# Patient Record
Sex: MALE | Race: WHITE | NOT HISPANIC OR LATINO
[De-identification: names, ages, dates, MRNs, and addresses within clinical notes are randomized per-mention and may not be internally consistent; named-entity substitution may affect disease eponyms.]

---

## 2022-01-13 ENCOUNTER — APPOINTMENT (OUTPATIENT)
Dept: GERIATRICS | Facility: CLINIC | Age: 82
End: 2022-01-13
Payer: MEDICARE

## 2022-01-13 VITALS — SYSTOLIC BLOOD PRESSURE: 130 MMHG | DIASTOLIC BLOOD PRESSURE: 70 MMHG | HEART RATE: 72 BPM

## 2022-01-13 DIAGNOSIS — I44.7 LEFT BUNDLE-BRANCH BLOCK, UNSPECIFIED: ICD-10-CM

## 2022-01-13 DIAGNOSIS — R29.6 REPEATED FALLS: ICD-10-CM

## 2022-01-13 DIAGNOSIS — I44.0 ATRIOVENTRICULAR BLOCK, FIRST DEGREE: ICD-10-CM

## 2022-01-13 PROBLEM — Z00.00 ENCOUNTER FOR PREVENTIVE HEALTH EXAMINATION: Status: ACTIVE | Noted: 2022-01-13

## 2022-01-13 NOTE — PHYSICAL EXAM
[Alert] : alert [No Acute Distress] : in no acute distress [Sclera] : the sclera and conjunctiva were normal [PERRL] : pupils were equal in size, round, and reactive to light [Normal Outer Ear/Nose] : the ears and nose were normal in appearance [Edema] : edema was not present [Normal] : no focal deficits [Normal Affect] : the affect was normal [Normal Mood] : the mood was normal [de-identified] : 2/6 holosys murmur

## 2022-01-13 NOTE — REVIEW OF SYSTEMS
[Recent Weight Gain (___ Lbs)] : recent [unfilled] ~Ulb weight gain [Hesitancy] : urinary hesitancy [Arthralgias] : arthralgias [Limb Pain] : limb pain [Depression] : depression [Negative] : Heme/Lymph [Fever] : no fever [Chills] : no chills [Confused] : no confusion

## 2022-01-13 NOTE — HISTORY OF PRESENT ILLNESS
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 The home situation was deemed to be unsafe with falls and extreme hoarding. The patient was hospitalized and then  sent to rehab. The pts PMH is significant for 40 years  of bipolar disease treated with lithium. over the last several months the patient has experienced functional and  cognitive decline. He currently ambultes with a walker. He denies cp, sob or palp. He has no specfic complaints  but makes  it clear he  does not need me as a physician  as he will be leaving the [facility] to return  to [place].

## 2022-01-13 NOTE — ASSESSMENT
[FreeTextEntry1] : The patient is a 82 yo male with long standing bipolar  disease. He was  recently removed from his home  by adult protective services after being found to be unable to care for himself and his wife and experiencing multiple falls. unclear what workup was done. Spoke with the patient's nephew Jacobo Scott who will assist with obtaining  hospital records.  At present  the patient is refusing lab work up as "I am  not going to live  here" The patient has experienced significant cognitive and  functional decline. Will arrange  for PT at the [facility]. Will follow up if the patient allows.

## 2022-01-28 ENCOUNTER — LABORATORY RESULT (OUTPATIENT)
Age: 82
End: 2022-01-28

## 2022-02-07 ENCOUNTER — APPOINTMENT (OUTPATIENT)
Dept: GERIATRICS | Facility: ASSISTED LIVING FACILITY | Age: 82
End: 2022-02-07

## 2022-02-07 VITALS — DIASTOLIC BLOOD PRESSURE: 65 MMHG | HEART RATE: 72 BPM | SYSTOLIC BLOOD PRESSURE: 136 MMHG

## 2022-02-07 DIAGNOSIS — N41.1 CHRONIC PROSTATITIS: ICD-10-CM

## 2022-02-07 DIAGNOSIS — R94.6 ABNORMAL RESULTS OF THYROID FUNCTION STUDIES: ICD-10-CM

## 2022-02-07 NOTE — PHYSICAL EXAM
[Alert] : alert [No Acute Distress] : in no acute distress [Sclera] : the sclera and conjunctiva were normal [PERRL] : pupils were equal in size, round, and reactive to light [Normal Outer Ear/Nose] : the ears and nose were normal in appearance [Motor Tone] : muscle strength and tone were normal [Normal] : normal skin color and pigmentation [No Focal Deficits] : no focal deficits [Normal Affect] : the affect was normal [Normal Mood] : the mood was normal [Remote Memory Normal] : remote memory was not impaired [Normal Gait] : abnormal gait

## 2022-02-07 NOTE — HISTORY OF PRESENT ILLNESS
[FreeTextEntry1] : 80 yo male seen as an initial visit at his home in the [facility] on 1/13. The patient has a history of bipolar disease and mild dementia. He is receiving PT for gait instability with good results. TFTs revealed suppressed TSH. No clinical evidence of hyperthyroidism.

## 2022-02-07 NOTE — REVIEW OF SYSTEMS
[Difficulty Walking] : difficulty walking [Negative] : Integumentary [Dizziness] : no dizziness [Limb Weakness] : no limb weakness [FreeTextEntry9] : back pain at night [de-identified] : long ho of bipolar disorder

## 2022-02-07 NOTE — ASSESSMENT
[FreeTextEntry1] : 80 yo male currently living at the [facility] after being removed from his home by adult protective services. Despite several attempts have not yet obtained previous medical records. Today the patient feels well., Only complaint is chronic back pain. Routine labs revealed a suppresed TSH. No clinical evidence of hyperthyroidism. Normal free t4. Will obtain total t3. Will observe for now. Continue physical therapy for unsteady gait. Will obtain  lithium level.

## 2022-02-09 ENCOUNTER — LABORATORY RESULT (OUTPATIENT)
Age: 82
End: 2022-02-09

## 2022-02-10 ENCOUNTER — NON-APPOINTMENT (OUTPATIENT)
Age: 82
End: 2022-02-10

## 2022-02-28 ENCOUNTER — APPOINTMENT (OUTPATIENT)
Dept: GERIATRICS | Facility: ASSISTED LIVING FACILITY | Age: 82
End: 2022-02-28

## 2022-02-28 VITALS — SYSTOLIC BLOOD PRESSURE: 130 MMHG | DIASTOLIC BLOOD PRESSURE: 60 MMHG | HEART RATE: 72 BPM

## 2022-02-28 DIAGNOSIS — I34.0 NONRHEUMATIC MITRAL (VALVE) INSUFFICIENCY: ICD-10-CM

## 2022-02-28 NOTE — HISTORY OF PRESENT ILLNESS
[FreeTextEntry1] : Asked to evaluate the patient by the [facility] staff for back pain. The patient states his back pain is chronic and unchanged. He states he has been worked up in the past. Told of "degenertive issues in lumbar spind and  "stenosis" States he has been successfully treated with tramadol in the past. The patient has been receiving PY. He is ambulating and rising from chair much better.

## 2022-02-28 NOTE — PHYSICAL EXAM
[Alert] : alert [No Acute Distress] : in no acute distress [No CVA Tenderness] : no CVA  tenderness [No Spinal Tenderness] : no spinal tenderness [Normal] : normal skin color and pigmentation [No Focal Deficits] : no focal deficits [Normal Affect] : the affect was normal [Normal Gait] : abnormal gait

## 2022-02-28 NOTE — ASSESSMENT
[FreeTextEntry1] : Asked to see today for back p[ain. The pt states that this is of >10 years duration. He is sp work up. Was previously treated with tramadol.

## 2022-02-28 NOTE — REVIEW OF SYSTEMS
[Negative] : Endocrine [Fever] : no fever [Chills] : no chills [Suicidal] : not suicidal [Anxiety] : no anxiety

## 2022-03-14 PROBLEM — N41.1 CHRONIC PROSTATITIS WITHOUT HEMATURIA: Status: RESOLVED | Noted: 2022-03-14 | Resolved: 2022-03-14

## 2022-03-21 LAB — LITHIUM SERPL-SCNC: 0.33 MMOL/L

## 2022-03-28 ENCOUNTER — APPOINTMENT (OUTPATIENT)
Dept: GERIATRICS | Facility: ASSISTED LIVING FACILITY | Age: 82
End: 2022-03-28

## 2022-03-28 VITALS — SYSTOLIC BLOOD PRESSURE: 130 MMHG | DIASTOLIC BLOOD PRESSURE: 70 MMHG | HEART RATE: 72 BPM

## 2022-03-28 NOTE — HISTORY OF PRESENT ILLNESS
[FreeTextEntry1] : The pt is an 80 yo male with long hx of bipolar disease, agit instability, OA, MR and CHF. The pt is co of malaise and viral syndrome for several days. He denies fever, cp or uri sx.  He subjectivly appears at baseline. No specific complaints

## 2022-03-28 NOTE — PHYSICAL EXAM
[Alert] : alert [No Acute Distress] : in no acute distress [EOMI] : extraocular movements were intact [PERRL] : pupils were equal in size, round, and reactive to light [Normal Outer Ear/Nose] : the ears and nose were normal in appearance [Edema] : edema was not present [Normal] : normal skin color and pigmentation [No Focal Deficits] : no focal deficits [Normal Affect] : the affect was normal

## 2022-03-28 NOTE — ASSESSMENT
[FreeTextEntry1] : The pt is co non specific malaise. His mood appears stable   , with hx of biploar illness. Li level is low. Will proceed as above and monitor.

## 2022-04-08 ENCOUNTER — EMERGENCY (EMERGENCY)
Facility: HOSPITAL | Age: 82
LOS: 1 days | Discharge: HOME | End: 2022-04-08
Attending: EMERGENCY MEDICINE | Admitting: EMERGENCY MEDICINE
Payer: MEDICARE

## 2022-04-08 VITALS
RESPIRATION RATE: 20 BRPM | HEART RATE: 69 BPM | DIASTOLIC BLOOD PRESSURE: 85 MMHG | TEMPERATURE: 98 F | OXYGEN SATURATION: 98 % | SYSTOLIC BLOOD PRESSURE: 135 MMHG

## 2022-04-08 VITALS
SYSTOLIC BLOOD PRESSURE: 139 MMHG | OXYGEN SATURATION: 98 % | HEART RATE: 67 BPM | TEMPERATURE: 98 F | DIASTOLIC BLOOD PRESSURE: 67 MMHG | RESPIRATION RATE: 20 BRPM

## 2022-04-08 LAB
ALBUMIN SERPL ELPH-MCNC: 4.2 G/DL — SIGNIFICANT CHANGE UP (ref 3.3–5)
ALP SERPL-CCNC: 85 U/L — SIGNIFICANT CHANGE UP (ref 40–120)
ALT FLD-CCNC: 16 U/L — SIGNIFICANT CHANGE UP (ref 4–41)
ANION GAP SERPL CALC-SCNC: 15 MMOL/L — HIGH (ref 7–14)
APPEARANCE UR: CLEAR — SIGNIFICANT CHANGE UP
AST SERPL-CCNC: 19 U/L — SIGNIFICANT CHANGE UP (ref 4–40)
BASOPHILS # BLD AUTO: 0.06 K/UL — SIGNIFICANT CHANGE UP (ref 0–0.2)
BASOPHILS NFR BLD AUTO: 0.7 % — SIGNIFICANT CHANGE UP (ref 0–2)
BILIRUB SERPL-MCNC: 0.7 MG/DL — SIGNIFICANT CHANGE UP (ref 0.2–1.2)
BILIRUB UR-MCNC: NEGATIVE — SIGNIFICANT CHANGE UP
BUN SERPL-MCNC: 18 MG/DL — SIGNIFICANT CHANGE UP (ref 7–23)
CALCIUM SERPL-MCNC: 10 MG/DL — SIGNIFICANT CHANGE UP (ref 8.4–10.5)
CHLORIDE SERPL-SCNC: 107 MMOL/L — SIGNIFICANT CHANGE UP (ref 98–107)
CO2 SERPL-SCNC: 19 MMOL/L — LOW (ref 22–31)
COLOR SPEC: YELLOW — SIGNIFICANT CHANGE UP
CREAT SERPL-MCNC: 0.91 MG/DL — SIGNIFICANT CHANGE UP (ref 0.5–1.3)
DIFF PNL FLD: NEGATIVE — SIGNIFICANT CHANGE UP
EGFR: 85 ML/MIN/1.73M2 — SIGNIFICANT CHANGE UP
EOSINOPHIL # BLD AUTO: 0.23 K/UL — SIGNIFICANT CHANGE UP (ref 0–0.5)
EOSINOPHIL NFR BLD AUTO: 2.6 % — SIGNIFICANT CHANGE UP (ref 0–6)
GLUCOSE SERPL-MCNC: 83 MG/DL — SIGNIFICANT CHANGE UP (ref 70–99)
GLUCOSE UR QL: NEGATIVE — SIGNIFICANT CHANGE UP
HCT VFR BLD CALC: 43.3 % — SIGNIFICANT CHANGE UP (ref 39–50)
HGB BLD-MCNC: 14.6 G/DL — SIGNIFICANT CHANGE UP (ref 13–17)
IANC: 5.5 K/UL — SIGNIFICANT CHANGE UP (ref 1.8–7.4)
IMM GRANULOCYTES NFR BLD AUTO: 0.2 % — SIGNIFICANT CHANGE UP (ref 0–1.5)
KETONES UR-MCNC: NEGATIVE — SIGNIFICANT CHANGE UP
LEUKOCYTE ESTERASE UR-ACNC: NEGATIVE — SIGNIFICANT CHANGE UP
LYMPHOCYTES # BLD AUTO: 2.34 K/UL — SIGNIFICANT CHANGE UP (ref 1–3.3)
LYMPHOCYTES # BLD AUTO: 26.4 % — SIGNIFICANT CHANGE UP (ref 13–44)
MCHC RBC-ENTMCNC: 32.2 PG — SIGNIFICANT CHANGE UP (ref 27–34)
MCHC RBC-ENTMCNC: 33.7 GM/DL — SIGNIFICANT CHANGE UP (ref 32–36)
MCV RBC AUTO: 95.6 FL — SIGNIFICANT CHANGE UP (ref 80–100)
MONOCYTES # BLD AUTO: 0.73 K/UL — SIGNIFICANT CHANGE UP (ref 0–0.9)
MONOCYTES NFR BLD AUTO: 8.2 % — SIGNIFICANT CHANGE UP (ref 2–14)
NEUTROPHILS # BLD AUTO: 5.5 K/UL — SIGNIFICANT CHANGE UP (ref 1.8–7.4)
NEUTROPHILS NFR BLD AUTO: 61.9 % — SIGNIFICANT CHANGE UP (ref 43–77)
NITRITE UR-MCNC: NEGATIVE — SIGNIFICANT CHANGE UP
NRBC # BLD: 0 /100 WBCS — SIGNIFICANT CHANGE UP
NRBC # FLD: 0 K/UL — SIGNIFICANT CHANGE UP
PH UR: 6 — SIGNIFICANT CHANGE UP (ref 5–8)
PLATELET # BLD AUTO: 164 K/UL — SIGNIFICANT CHANGE UP (ref 150–400)
POTASSIUM SERPL-MCNC: 4.2 MMOL/L — SIGNIFICANT CHANGE UP (ref 3.5–5.3)
POTASSIUM SERPL-SCNC: 4.2 MMOL/L — SIGNIFICANT CHANGE UP (ref 3.5–5.3)
PROT SERPL-MCNC: 7.1 G/DL — SIGNIFICANT CHANGE UP (ref 6–8.3)
PROT UR-MCNC: ABNORMAL
RBC # BLD: 4.53 M/UL — SIGNIFICANT CHANGE UP (ref 4.2–5.8)
RBC # FLD: 14 % — SIGNIFICANT CHANGE UP (ref 10.3–14.5)
SODIUM SERPL-SCNC: 141 MMOL/L — SIGNIFICANT CHANGE UP (ref 135–145)
SP GR SPEC: 1.02 — SIGNIFICANT CHANGE UP (ref 1–1.05)
UROBILINOGEN FLD QL: SIGNIFICANT CHANGE UP
WBC # BLD: 8.88 K/UL — SIGNIFICANT CHANGE UP (ref 3.8–10.5)
WBC # FLD AUTO: 8.88 K/UL — SIGNIFICANT CHANGE UP (ref 3.8–10.5)

## 2022-04-08 PROCEDURE — 99284 EMERGENCY DEPT VISIT MOD MDM: CPT | Mod: FS

## 2022-04-08 NOTE — ED PROVIDER NOTE - NS ED ATTENDING STATEMENT MOD
This was a shared visit with the [facility]. I reviewed and verified the documentation and independently performed the documented:

## 2022-04-08 NOTE — ED PROVIDER NOTE - PROGRESS NOTE DETAILS
Patient observed still with no discomfort rpt exam non tender. Ok for dc SW called for assistance with transport home

## 2022-04-08 NOTE — ED PROVIDER NOTE - PATIENT PORTAL LINK FT
You can access the FollowMyHealth Patient Portal offered by [facility] by registering at the following website: [email or web address]. By joining [facility]’s FollowMyHealth portal, you will also be able to view your health information using other applications (apps) compatible with our system.

## 2022-04-08 NOTE — ED ADULT NURSE NOTE - INTERVENTIONS DEFINITIONS
[place] to call system/Call bell, personal items and telephone within reach/Instruct patient to call for assistance/Room bathroom lighting operational/Non-slip footwear when patient is off stretcher/Physically safe environment: no spills, clutter or unnecessary equipment/Stretcher in lowest position, wheels locked, appropriate side rails in place/Provide visual cue, wrist band, yellow gown, etc./Monitor gait and stability/Monitor for mental status changes and reorient to person, place, and time

## 2022-04-08 NOTE — ED PROVIDER NOTE - CLINICAL SUMMARY MEDICAL DECISION MAKING FREE TEXT BOX
80 yo M with PMHX of bipolar depression on lithium, (chart PMHX of HTN, CAD, cardiomyopathy, arthritis, ) here with c/o of RLQ pain that began gradually yesterday morning, was its worse yesterday afternoon, last night began to get better and now today is resolved. Patient reports he told staff at [facility] living of his pain and told them to monitor him for a appendicitis with abdominal exams and he was transferred here after that, but now denies pain.   Does reports intermittent dysuria x weeks however notes that he has been drinking much less water.  labs, urine  no active pain, no TTP, benign abdominal exam. vitally stable.   reassess abd and for pain

## 2022-04-08 NOTE — ED ADULT NURSE NOTE - OBJECTIVE STATEMENT
Pt received AOx4, ambulatory w. walker w. pmhx of Bipolar, HTN, CAD, cardiomyopathy, Arthritis, LBBB, coming in from [facility]. chief complaint of RLQ abd pain that started yesterday. Denies CP, SOB, NVD, chills, fever, cough at this time. Pt states he was sent in here to r/o appendicitis. Breathing even and unlabored, saturating 100% on RA. VS as noted. 20G placed in RLF. Bruises noted to b/l arm. Safety precautions maintained.

## 2022-04-08 NOTE — ED PROVIDER NOTE - OBJECTIVE STATEMENT
82 yo M with PMHX of bipolar depression on lithium, (chart PMHX of HTN, CAD, cardiomyopathy, arthritis, ) here with c/o of RLQ pain that began gradually yesterday morning, was its worse yesterday afternoon, last night began to get better and now today is resolved. Patient reports he told staff at [facility] living of his pain and told them to monitor him for a appendicitis with abdominal exams and he was transferred here after that, but now denies pain.   Does reports intermittent dysuria x weeks however notes that he has been drinking much less water. Denies nausea, vomiting, diarrhea, constipation, hematuria, fever, chills, cp, sob.     patient is a Doctor of Urology.

## 2022-04-08 NOTE — ED PROVIDER NOTE - NSFOLLOWUPINSTRUCTIONS_ED_ALL_ED_FT
Follow up with your PMD within 48-72 hours.      Rest, increase fluids.     New, Worsening abdominal pain, fever, chills, weakness, nausea, vomiting  return to ER

## 2022-04-08 NOTE — PROVIDER CONTACT NOTE (OTHER) - ASSESSMENT
Arranged [facility] [phone]. P/U 9 PM. Trip# [id number]. Pt returning back to [facility] at [address] [place] [address].

## 2022-04-08 NOTE — ED PROVIDER NOTE - ATTENDING CONTRIBUTION TO CARE
Seen and examined,  has had minor low abd pains in the past but typically has not been severe enough to c/o. Now 2d ago c/o onset of RLQ pain "at [place]" which initially was episodic but not related to food intake, b.m., or activity/position. Then became constant yest. and more severe yest. night, states max 6/10 in evening. Pain was improving overnight and awoke with mild RLQ pain which cont. to improve over ~4 hrs. after awakening until resolved. Pt.  had no pain upon arrival in ED and is currently feeling well. Denies change in po intake over past few d.,  has constipation always and occ. needs to "help manually" to disimpact himself. No traumatic disimpaction in past 2d, last b.m. yest. evening but states did not notice any change in pain at time of b.m. Hx of hernia repair but no gallbladder or appendix surgery in past. MMM, clear lungs, heart reg, abd soft, NT to palp, +b.s.

## 2022-04-10 LAB
CULTURE RESULTS: SIGNIFICANT CHANGE UP
SPECIMEN SOURCE: SIGNIFICANT CHANGE UP

## 2022-04-11 ENCOUNTER — APPOINTMENT (OUTPATIENT)
Dept: GERIATRICS | Facility: ASSISTED LIVING FACILITY | Age: 82
End: 2022-04-11

## 2022-04-11 VITALS — SYSTOLIC BLOOD PRESSURE: 139 MMHG | HEART RATE: 78 BPM | DIASTOLIC BLOOD PRESSURE: 75 MMHG

## 2022-04-11 DIAGNOSIS — R26.81 UNSTEADINESS ON FEET: ICD-10-CM

## 2022-04-11 DIAGNOSIS — R10.9 UNSPECIFIED ABDOMINAL PAIN: ICD-10-CM

## 2022-04-11 NOTE — PHYSICAL EXAM
[Alert] : alert [No Acute Distress] : in no acute distress [EOMI] : extraocular movements were intact [PERRL] : pupils were equal in size, round, and reactive to light [Normal] : normal skin color and pigmentation [Normal Affect] : the affect was normal [Normal Mood] : the mood was normal

## 2022-04-11 NOTE — HISTORY OF PRESENT ILLNESS
[FreeTextEntry1] : Asked to evaluate sp trip to ER 3 days ago . The pt was sent to ER to evaluate for abdominal p[ain and ro appedicitis. In  ER the pt felt much better. No nausea or vomiting. Normal labs. The pt was sent home. Over the last 3 days the pt is doing well. No abd pain. He does co constipation

## 2022-04-11 NOTE — REVIEW OF SYSTEMS
[Constipation] : constipation [Hesitancy] : urinary hesitancy [Negative] : Heme/Lymph [Chest Pain] : no chest pain [Abdominal Pain] : no abdominal pain [Vomiting] : no vomiting [Dizziness] : no dizziness [Anxiety] : no anxiety

## 2022-04-11 NOTE — ASSESSMENT
[FreeTextEntry1] : The pt 3 days sp ER vist to ro appendicitis. Symptoms are resolved. Continue management as previously described

## 2022-06-06 ENCOUNTER — APPOINTMENT (OUTPATIENT)
Dept: GERIATRICS | Facility: ASSISTED LIVING FACILITY | Age: 82
End: 2022-06-06
Payer: MEDICARE

## 2022-06-06 VITALS — HEART RATE: 70 BPM | SYSTOLIC BLOOD PRESSURE: 136 MMHG | DIASTOLIC BLOOD PRESSURE: 72 MMHG

## 2022-06-13 NOTE — REVIEW OF SYSTEMS
[Chest Pain] : no chest pain [Palpitations] : no palpitations [Shortness Of Breath] : no shortness of breath [SOB on Exertion] : no shortness of breath during exertion [Abdominal Pain] : no abdominal pain [Dizziness] : no dizziness [Suicidal] : not suicidal

## 2022-06-13 NOTE — ASSESSMENT
[FreeTextEntry1] : 82 yo male with cardiomyopathy, bipolar disease, CAD, ckd, chronic back pain is being seen in his home for follow up of these problems.  only complaint is "I am depressed" "I don't want to live here". No cp or sob. Back pain is at baseline.\par \par Will continue current tx as previously outlined.

## 2022-06-13 NOTE — HISTORY OF PRESENT ILLNESS
[FreeTextEntry1] : 82 yo male with cardiomyopathy, bipolar disease, CAD, ckd, chronic back pain is being seen in his home for follow up of these problems.  only complaint is "I am depressed" "I don't want to live here". No cp or sob. Back pain is at baseline.

## 2022-06-28 ENCOUNTER — APPOINTMENT (OUTPATIENT)
Dept: GERIATRICS | Facility: ASSISTED LIVING FACILITY | Age: 82
End: 2022-06-28

## 2022-06-28 VITALS
HEART RATE: 78 BPM | DIASTOLIC BLOOD PRESSURE: 80 MMHG | TEMPERATURE: 97 F | SYSTOLIC BLOOD PRESSURE: 120 MMHG | OXYGEN SATURATION: 96 %

## 2022-06-28 DIAGNOSIS — U07.1 COVID-19: ICD-10-CM

## 2022-06-28 NOTE — PHYSICAL EXAM
[Alert] : alert [No Acute Distress] : in no acute distress [Sclera] : the sclera and conjunctiva were normal [PERRL] : pupils were equal in size, round, and reactive to light [Normal Outer Ear/Nose] : the ears and nose were normal in appearance [Normal Appearance] : the appearance of the neck was normal [Supple] : the neck was supple [No Respiratory Distress] : no respiratory distress [Respiration, Rhythm And Depth] : normal respiratory rhythm and effort [Normal S1, S2] : normal S1 and S2 [Edema] : edema was not present [Normal] : normal gait, no clubbing or cyanosis of the fingernails, muscle strength and tone were normal, no involuntary movements were seen [No Clubbing, Cyanosis] : no clubbing or cyanosis of the fingernails [Normal Color / Pigmentation] : normal skin color and pigmentation [No Focal Deficits] : no focal deficits [Normal Affect] : the affect was normal

## 2022-06-28 NOTE — HISTORY OF PRESENT ILLNESS
[FreeTextEntry1] : Asked to evaluate for COVID exposure. The pt is an 80 yo male with cardiomyopathy and bipolar disease. he lives in a [facility] apaartment with his wife who has COVID. The patient was tested 3 days ago and is negative. he does co a cough. He is vaccinated and boosted. He denies fever or other covid symptoms. He denies sob. Sat  is 96%

## 2022-06-28 NOTE — REVIEW OF SYSTEMS
[Negative] : Heme/Lymph [Fever] : no fever [Chills] : no chills [Feeling Poorly] : not feeling poorly

## 2022-07-22 PROBLEM — Z00.00 ENCOUNTER FOR PREVENTIVE HEALTH EXAMINATION: Noted: 2022-07-22

## 2022-07-26 ENCOUNTER — APPOINTMENT (OUTPATIENT)
Dept: GERIATRICS | Facility: ASSISTED LIVING FACILITY | Age: 82
End: 2022-07-26

## 2022-07-26 VITALS — HEART RATE: 72 BPM | DIASTOLIC BLOOD PRESSURE: 77 MMHG | SYSTOLIC BLOOD PRESSURE: 117 MMHG

## 2022-07-26 NOTE — REVIEW OF SYSTEMS
[Depression] : depression [Negative] : Heme/Lymph [Fever] : no fever [Chills] : no chills [Chest Pain] : no chest pain [Palpitations] : no palpitations [Shortness Of Breath] : no shortness of breath [Wheezing] : no wheezing [SOB on Exertion] : no shortness of breath during exertion [Confused] : no confusion [Dizziness] : no dizziness [Anxiety] : no anxiety

## 2022-07-26 NOTE — PHYSICAL EXAM
[Alert] : alert [No Acute Distress] : in no acute distress [Sclera] : the sclera and conjunctiva were normal [PERRL] : pupils were equal in size, round, and reactive to light [Normal] : normal skin color and pigmentation [No Focal Deficits] : no focal deficits [Normal Affect] : the affect was normal [Normal Mood] : the mood was normal

## 2022-07-26 NOTE — HISTORY OF PRESENT ILLNESS
[FreeTextEntry1] : 81 yo male with bipolar disease, CAD, CKD, chf and recent bout of covid is seen for follow up. Main issue has been depression. The pt was recently seen by psych via tele health. Today the pt seems more optimistic and les depressed. No cp or sob.

## 2022-07-26 NOTE — ASSESSMENT
[FreeTextEntry1] : 81 yo male sith bipolar disease, chf, djd, ckd, mild dementia is at baseline now recovered from covid.

## 2022-09-06 ENCOUNTER — APPOINTMENT (OUTPATIENT)
Dept: GERIATRICS | Facility: ASSISTED LIVING FACILITY | Age: 82
End: 2022-09-06

## 2022-09-06 VITALS — DIASTOLIC BLOOD PRESSURE: 62 MMHG | SYSTOLIC BLOOD PRESSURE: 140 MMHG

## 2022-09-06 NOTE — PHYSICAL EXAM
[Alert] : alert [No Acute Distress] : in no acute distress [Sclera] : the sclera and conjunctiva were normal [PERRL] : pupils were equal in size, round, and reactive to light [Normal Outer Ear/Nose] : the ears and nose were normal in appearance [Normal Appearance] : the appearance of the neck was normal [Supple] : the neck was supple [No Respiratory Distress] : no respiratory distress [Respiration, Rhythm And Depth] : normal respiratory rhythm and effort [Normal S1, S2] : normal S1 and S2 [Heart Rate And Rhythm] : heart rate was normal and rhythm regular [Normal] : normal skin color and pigmentation [de-identified] : alert

## 2022-09-06 NOTE — HISTORY OF PRESENT ILLNESS
[FreeTextEntry1] : 81 yo male with hx of bipolar dis, cad, ckd, chf is seen in his home. Main complaint is depression and arthritis pain

## 2022-10-10 ENCOUNTER — APPOINTMENT (OUTPATIENT)
Dept: GERIATRICS | Facility: ASSISTED LIVING FACILITY | Age: 82
End: 2022-10-10

## 2022-10-10 VITALS — DIASTOLIC BLOOD PRESSURE: 70 MMHG | SYSTOLIC BLOOD PRESSURE: 110 MMHG

## 2022-10-10 RX ORDER — TRAMADOL HYDROCHLORIDE 50 MG/1
50 TABLET, COATED ORAL
Qty: 60 | Refills: 0 | Status: DISCONTINUED | COMMUNITY
Start: 2022-02-28 | End: 2022-10-10

## 2022-10-10 NOTE — PHYSICAL EXAM
[Alert] : alert [No Acute Distress] : in no acute distress [Sclera] : the sclera and conjunctiva were normal [PERRL] : pupils were equal in size, round, and reactive to light [Normal] : normal skin color and pigmentation [No Focal Deficits] : no focal deficits [Normal Affect] : the affect was normal

## 2022-10-10 NOTE — REVIEW OF SYSTEMS
[Joint Stiffness] : joint stiffness [Limb Pain] : limb pain [Depression] : depression [Negative] : Heme/Lymph [Fever] : no fever [Chills] : no chills [Limb Swelling] : no limb swelling

## 2022-10-10 NOTE — HISTORY OF PRESENT ILLNESS
[FreeTextEntry1] : 81 yo male with hx of bipolar disease, ckd, chf and osteoarthritis   was seen in his home at the [facility]. Main concern is ongoing djd /oa with knee and back pain.  +/- response to tylenol and tramadol.

## 2022-10-25 ENCOUNTER — EMERGENCY (EMERGENCY)
Facility: HOSPITAL | Age: 82
LOS: 1 days | Discharge: HOME | End: 2022-10-25
Attending: EMERGENCY MEDICINE | Admitting: EMERGENCY MEDICINE

## 2022-10-25 VITALS
SYSTOLIC BLOOD PRESSURE: 134 MMHG | DIASTOLIC BLOOD PRESSURE: 96 MMHG | TEMPERATURE: 98 F | HEART RATE: 86 BPM | RESPIRATION RATE: 18 BRPM | OXYGEN SATURATION: 100 %

## 2022-10-25 VITALS
HEART RATE: 69 BPM | OXYGEN SATURATION: 99 % | TEMPERATURE: 99 F | RESPIRATION RATE: 16 BRPM | SYSTOLIC BLOOD PRESSURE: 156 MMHG | DIASTOLIC BLOOD PRESSURE: 81 MMHG

## 2022-10-25 LAB
APPEARANCE UR: CLEAR — SIGNIFICANT CHANGE UP
BACTERIA # UR AUTO: NEGATIVE — SIGNIFICANT CHANGE UP
BILIRUB UR-MCNC: NEGATIVE — SIGNIFICANT CHANGE UP
COLOR SPEC: YELLOW — SIGNIFICANT CHANGE UP
DIFF PNL FLD: NEGATIVE — SIGNIFICANT CHANGE UP
EPI CELLS # UR: 0 /HPF — SIGNIFICANT CHANGE UP (ref 0–5)
GLUCOSE UR QL: NEGATIVE — SIGNIFICANT CHANGE UP
HYALINE CASTS # UR AUTO: 2 /LPF — SIGNIFICANT CHANGE UP (ref 0–7)
KETONES UR-MCNC: NEGATIVE — SIGNIFICANT CHANGE UP
LEUKOCYTE ESTERASE UR-ACNC: NEGATIVE — SIGNIFICANT CHANGE UP
NITRITE UR-MCNC: NEGATIVE — SIGNIFICANT CHANGE UP
PH UR: 6 — SIGNIFICANT CHANGE UP (ref 5–8)
PROT UR-MCNC: ABNORMAL
RBC CASTS # UR COMP ASSIST: 3 /HPF — SIGNIFICANT CHANGE UP (ref 0–4)
SP GR SPEC: 1.03 — SIGNIFICANT CHANGE UP (ref 1.01–1.05)
UROBILINOGEN FLD QL: SIGNIFICANT CHANGE UP
WBC UR QL: 1 /HPF — SIGNIFICANT CHANGE UP (ref 0–5)

## 2022-10-25 PROCEDURE — 99283 EMERGENCY DEPT VISIT LOW MDM: CPT | Mod: GC

## 2022-10-25 RX ORDER — MINERAL OIL
133 OIL (ML) MISCELLANEOUS ONCE
Refills: 0 | Status: COMPLETED | OUTPATIENT
Start: 2022-10-25 | End: 2022-10-25

## 2022-10-25 RX ADMIN — Medication 133 MILLILITER(S): at 14:59

## 2022-10-25 NOTE — ED PROVIDER NOTE - NS ED ROS FT
REVIEW OF SYSTEMS:    CONSTITUTIONAL: No weakness, fevers or chills  EYES/ENT: No visual changes;  No vertigo or throat pain   NECK: No pain or stiffness  RESPIRATORY: No cough, wheezing, hemoptysis; No shortness of breath  CARDIOVASCULAR: No chest pain or palpitations  GASTROINTESTINAL: +abdominal pain. No nausea, vomiting, or hematemesis; +constipation. No melena or hematochezia.  GENITOURINARY: No dysuria, frequency or hematuria; +decreased urinary frequency  NEUROLOGICAL: No numbness or weakness  SKIN: No itching, burning, rashes, or lesions   HEME: no easy bruising or unexplained bleeding  ENDO: no heat intolerance, no cold intolerance  PSYCH: no SI, or depression  All other review of systems is negative unless indicated above.

## 2022-10-25 NOTE — ED PROVIDER NOTE - PROGRESS NOTE DETAILS
MD Olson- Pt had large bowel movement. UA w/o significant findings. Will call social work to coordinate ride home and plan for d/c.

## 2022-10-25 NOTE — ED ADULT NURSE NOTE - CHIEF COMPLAINT QUOTE
Pt coming from [facility] for c/c of constipation x 1wk. Pt also endorsing urinary retention x 2 hrs. Pt reports last BM about 1 wk ago. PMHx: dementia

## 2022-10-25 NOTE — ED PROVIDER NOTE - CLINICAL SUMMARY MEDICAL DECISION MAKING FREE TEXT BOX
82M PMH HTN, HLD, CAD, bipolar disorder presents w/ constipation x1 week. Nontoxic appearing, no nausea/vomiting, low risk for obstruction; likely stool impaction. Will give enemas, if not effective will attempt manual disimpaction. 82M PMH HTN, HLD, CAD, bipolar disorder presents w/ constipation x1 week. Nontoxic appearing, no nausea/vomiting, low risk for obstruction; likely stool impaction. Will give enemas, if not effective will attempt manual disimpaction. Will check UA.

## 2022-10-25 NOTE — ED ADULT NURSE NOTE - OBJECTIVE STATEMENT
Pt a&ox4 ambulatory with one assist no pertinent PMH presenting to ED for rectal pain, constipation and urinary retention. Pt states last normal BM was 5 days ago despite talking over the counter stool softeners. Pt abdomen tender in lower quadrant, no distension noted. Pt also c/o trouble urinating starting this morning. Last urination 11am. Pt appears comfortable in ED at this time. Pt denies blood in stool/urine. MD at bedside for evaluation, call bell in reach. Comfort measures provided.

## 2022-10-25 NOTE — ED PROVIDER NOTE - CARE PROVIDER_API CALL
Brent Dior)  Geriatric Medicine; [facility]  [address], [facility] [address]  [place] [address]  Phone: [phone]  Fax: [phone]  Established Patient  Follow Up Time: 7-10 Days

## 2022-10-25 NOTE — ED PROVIDER NOTE - OBJECTIVE STATEMENT
82M PMH bipolar disorder, HLD who presents from [facility] with a complaint of constipation. Per the patient, he has had constipation for over 1 week. He attests this to lapse in taking his stool softeners, which he regularly takes daily. Reports that he has had increasing abdominal discomfort associated and more recently has had decreased urinary frequency though reports still peeing freely. ROS otherwise negative. 82M PMH HTN, CAD, HLD, bipolar disorder who presents from [facility] with a complaint of constipation. Per the patient, he has had constipation for over 1 week. He attests this to lapse in taking his stool softeners, which he regularly takes daily. Reports that he has had increasing abdominal discomfort associated and more recently has had decreased urinary frequency though reports still peeing freely. ROS otherwise negative.

## 2022-10-25 NOTE — ED ADULT NURSE NOTE - NSIMPLEMENTINTERV_GEN_ALL_ED
Implemented All Fall with Harm Risk Interventions:  [place] to call system. Call bell, personal items and telephone within reach. Instruct patient to call for assistance. Room bathroom lighting operational. Non-slip footwear when patient is off stretcher. Physically safe environment: no spills, clutter or unnecessary equipment. Stretcher in lowest position, wheels locked, appropriate side rails in place. Provide visual cue, wrist band, yellow gown, etc. Monitor gait and stability. Monitor for mental status changes and reorient to person, place, and time. Review medications for side effects contributing to fall risk. Reinforce activity limits and safety measures with patient and family. Provide visual clues: red socks.

## 2022-10-25 NOTE — ED PROVIDER NOTE - ATTENDING CONTRIBUTION TO CARE
agree with resident note    "82M PMH HTN, CAD, HLD, bipolar disorder who presents from [facility] with a complaint of constipation. Per the patient, he has had constipation for over 1 week. He attests this to lapse in taking his stool softeners, which he regularly takes daily. Reports that he has had increasing abdominal discomfort associated and more recently has had decreased urinary frequency though reports still peeing freely. 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    PE: well appearing; VSS: CTAB/L; s1 s2 no m/r/g abd soft/NT/ND ext: no edema    Imp: constipation; given mineral oil enema and had large bowel movement and feels better; will check UA; has frequency and urgency likely from the constipation

## 2022-10-25 NOTE — ED PROVIDER NOTE - NSICDXPASTMEDICALHX_GEN_ALL_CORE_FT
PAST MEDICAL HISTORY:  Bipolar disorder     Hypercholesterolemia      PAST MEDICAL HISTORY:  Bipolar disorder     CAD (coronary artery disease)     Hypercholesterolemia     Hypertension

## 2022-10-25 NOTE — ED PROVIDER NOTE - PHYSICAL EXAMINATION
VITALS:   T(C): 36.7 (10-25-22 @ 13:20), Max: 37.3 (10-25-22 @ 12:11)  HR: 77 (10-25-22 @ 13:20) (69 - 77)  BP: 160/80 (10-25-22 @ 13:20) (156/81 - 160/80)  RR: 17 (10-25-22 @ 13:20) (16 - 17)  SpO2: 100% (10-25-22 @ 13:20) (99% - 100%)    GENERAL: NAD, lying in bed comfortably  HEAD:  Atraumatic, Normocephalic  EYES: EOMI, PERRLA, conjunctiva and sclera clear  ENT: Moist mucous membranes  NECK: Supple, No JVD  CHEST/LUNG: Clear to auscultation bilaterally; No rales, rhonchi, wheezing, or rubs. Unlabored respirations  HEART: Regular rate and rhythm; No murmurs, rubs, or gallops  ABDOMEN: BSx4; Tender throughout  EXTREMITIES:  2+ Peripheral Pulses, brisk capillary refill. No clubbing, cyanosis, or edema  NERVOUS SYSTEM:  A&Ox3, no focal deficits   SKIN: No rashes or lesions  Psych: Normal speech, normal behavior, normal affect

## 2022-10-25 NOTE — ED PROVIDER NOTE - PATIENT PORTAL LINK FT
You can access the FollowMyHealth Patient Portal offered by [facility] by registering at the following website: [email or web address]. By joining [employer]’s FollowMyHealth portal, you will also be able to view your health information using other applications (apps) compatible with our system.

## 2022-10-25 NOTE — ED PROVIDER NOTE - NSFOLLOWUPINSTRUCTIONS_ED_ALL_ED_FT
Constipation is when a person has fewer than three bowel movements in a week, has difficulty having a bowel movement, or has stools (feces) that are dry, hard, or larger than normal. Constipation may be caused by an underlying condition. It may become worse with age if a person takes certain medicines and does not take in enough fluids.    Follow these instructions at home:    Eating and drinking   •Eat foods that have a lot of fiber, such as beans, whole grains, and fresh fruits and vegetables.  •Limit foods that are low in fiber and high in fat and processed sugars, such as fried or sweet foods. These include french fries, hamburgers, cookies, candies, and soda.  •Drink enough fluid to keep your urine pale yellow.    General instructions   •Exercise regularly or as told by your health care provider. Try to do 150 minutes of moderate exercise each week.  •Use the bathroom when you have the urge to go. Do not hold it in.  •Take over-the-counter and prescription medicines only as told by your health care provider. This includes any fiber supplements.  •During bowel movements:   •Practice deep breathing while relaxing the lower abdomen.  •Practice pelvic floor relaxation.  •Watch your condition for any changes. Let your health care provider know about them.  •Keep all follow-up visits as told by your health care provider. This is important.    Contact a health care provider if:  •You have pain that gets worse.  •You have a fever.  •You do not have a bowel movement after 4 days.  •You vomit.  •You are not hungry or you lose weight.  •You are bleeding from the opening between the buttocks (anus).  •You have thin, pencil-like stools.    Get help right away if:  •You have a fever and your symptoms suddenly get worse.  •You leak stool or have blood in your stool.  •Your abdomen is bloated.  •You have severe pain in your abdomen.  •You feel dizzy or you faint.    Summary  •Constipation is when a person has fewer than three bowel movements in a week, has difficulty having a bowel movement, or has stools (feces) that are dry, hard, or larger than normal.  •Eat foods that have a lot of fiber, such as beans, whole grains, and fresh fruits and vegetables.  •Drink enough fluid to keep your urine pale yellow.  •Take over-the-counter and prescription medicines only as told by your health care provider. This includes any fiber supplements.    This information is not intended to replace advice given to you by your health care provider. Make sure you discuss any questions you have with your health care provider.

## 2022-10-25 NOTE — ED ADULT TRIAGE NOTE - CHIEF COMPLAINT QUOTE
Pt coming from [facility] for c/c of constipation x 1wk. Pt also endorsing urinary retention x 2 hrs. Pt reports last BM about 1 wk ago. PMHx: dementia

## 2022-10-25 NOTE — ED ADULT NURSE REASSESSMENT NOTE - NS ED NURSE REASSESS COMMENT FT1
Pt received Fleet Enema and placed on toilet seat. Pt noted to have large NBNB BM, expresses relief in pain. Pt able to urinate without difficulty, awaiting discharge

## 2022-10-25 NOTE — CHART NOTE - NSCHARTNOTEFT_GEN_A_CORE
informed by Dr. Olson that patient is ready for discharge back to assisted living and needs transportation.  contacted patient's  informed by Dr. Olson that patient is ready for discharge back to assisted living and needs transportation.  contacted patient's nephew, Johny ([phone]), who stated there's nobody to pick him up and assisted living does not provide transportation; they are unable to pay for an ambulette.  scheduled ambulette as bill back through [employer] ([phone]).  spoke to Keenan who confirmed pick-up time for 6pm; trip# [id number]. No other social work needs at this time.

## 2022-11-08 ENCOUNTER — APPOINTMENT (OUTPATIENT)
Dept: GERIATRICS | Facility: ASSISTED LIVING FACILITY | Age: 82
End: 2022-11-08

## 2022-11-08 VITALS — SYSTOLIC BLOOD PRESSURE: 140 MMHG | DIASTOLIC BLOOD PRESSURE: 64 MMHG

## 2022-11-08 NOTE — HISTORY OF PRESENT ILLNESS
[FreeTextEntry1] : I was asked to see this 82-year-old man following an ER visit approximately 2 weeks ago.  At that time the patient had a fecal impaction.  He was seen in the [facility] emergency room.  He received an enema.  He states he feels much improved.  He has discontinued tramadol because of constipation.  He denies any pain at this time.  His mood appears improved.  He denies chest pain shortness of breath or abdominal pain.  He is having daily bowel movements.

## 2022-11-08 NOTE — PHYSICAL EXAM
[Alert] : alert [No Acute Distress] : in no acute distress [Sclera] : the sclera and conjunctiva were normal [PERRL] : pupils were equal in size, round, and reactive to light [Normal Outer Ear/Nose] : the ears and nose were normal in appearance [Normal] : no spinal tenderness [Normal Color / Pigmentation] : normal skin color and pigmentation [No Focal Deficits] : no focal deficits [Normal Affect] : the affect was normal

## 2022-11-08 NOTE — ASSESSMENT
[FreeTextEntry1] : The patient is an 82-year-old man with a history of coronary artery disease, mild dementia who was recently seen in the emergency room for constipation.  The patient is much improved.  His mood is good.  He has no complaints at this time.  We will continue as previously outlined.  We will add MiraLAX 3 times a week.  He is no longer taking tramadol., congestive heart failure

## 2022-11-08 NOTE — REVIEW OF SYSTEMS
[Constipation] : constipation [Hesitancy] : urinary hesitancy [Limb Pain] : limb pain [Confused] : no confusion [Depression] : depression [Negative] : Endocrine

## 2022-11-09 RX ORDER — TRAMADOL HYDROCHLORIDE 50 MG/1
50 TABLET, COATED ORAL
Qty: 60 | Refills: 0 | Status: DISCONTINUED | COMMUNITY
Start: 2022-10-10 | End: 2022-11-09

## 2022-11-12 PROBLEM — I10 ESSENTIAL (PRIMARY) HYPERTENSION: Chronic | Status: ACTIVE | Noted: 2022-10-25

## 2022-11-12 PROBLEM — I25.10 ATHEROSCLEROTIC HEART DISEASE OF NATIVE CORONARY ARTERY WITHOUT ANGINA PECTORIS: Chronic | Status: ACTIVE | Noted: 2022-10-25

## 2022-11-12 PROBLEM — E78.00 PURE HYPERCHOLESTEROLEMIA, UNSPECIFIED: Chronic | Status: ACTIVE | Noted: 2022-10-25

## 2022-11-12 PROBLEM — F31.9 BIPOLAR DISORDER, UNSPECIFIED: Chronic | Status: ACTIVE | Noted: 2022-10-25

## 2022-11-14 ENCOUNTER — RX RENEWAL (OUTPATIENT)
Age: 82
End: 2022-11-14

## 2022-12-06 ENCOUNTER — APPOINTMENT (OUTPATIENT)
Dept: GERIATRICS | Facility: ASSISTED LIVING FACILITY | Age: 82
End: 2022-12-06

## 2022-12-06 VITALS — SYSTOLIC BLOOD PRESSURE: 130 MMHG | DIASTOLIC BLOOD PRESSURE: 61 MMHG | HEART RATE: 68 BPM

## 2022-12-06 NOTE — PHYSICAL EXAM
[Alert] : alert [No Acute Distress] : in no acute distress [Sclera] : the sclera and conjunctiva were normal [PERRL] : pupils were equal in size, round, and reactive to light [Normal Outer Ear/Nose] : the ears and nose were normal in appearance [Normal] : posterior cervical, supraclavicular, anterior cervical, supraclavicular, axillary [No Focal Deficits] : no focal deficits [Normal Affect] : the affect was normal

## 2022-12-06 NOTE — HISTORY OF PRESENT ILLNESS
[FreeTextEntry1] : The patient is a 82-year-old man with a history of bipolar disease, CHF, chronic kidney disease, osteoarthritis.  He was seen in his home at the [facility].  His main complaint is depression related to boredom.  He expresses unhappiness about having to live at the [facility].  He continues to complain of generalized osteoarthritis.  The patient is status post a recent trip to the emergency room for constipation.  Consequently he has cut back on his tramadol.

## 2022-12-06 NOTE — REVIEW OF SYSTEMS
[Confused] : no confusion [Dizziness] : no dizziness [Depression] : depression [Negative] : Neurological

## 2023-01-09 ENCOUNTER — EMERGENCY (EMERGENCY)
Facility: HOSPITAL | Age: 83
LOS: 1 days | Discharge: HOME | End: 2023-01-09
Attending: EMERGENCY MEDICINE | Admitting: EMERGENCY MEDICINE
Payer: MEDICARE

## 2023-01-09 VITALS
SYSTOLIC BLOOD PRESSURE: 157 MMHG | TEMPERATURE: 97 F | DIASTOLIC BLOOD PRESSURE: 97 MMHG | HEART RATE: 67 BPM | OXYGEN SATURATION: 98 % | RESPIRATION RATE: 20 BRPM

## 2023-01-09 VITALS
TEMPERATURE: 98 F | SYSTOLIC BLOOD PRESSURE: 147 MMHG | DIASTOLIC BLOOD PRESSURE: 74 MMHG | HEART RATE: 58 BPM | RESPIRATION RATE: 17 BRPM | OXYGEN SATURATION: 98 %

## 2023-01-09 LAB
ALBUMIN SERPL ELPH-MCNC: 4.5 G/DL — SIGNIFICANT CHANGE UP (ref 3.3–5)
ALP SERPL-CCNC: 69 U/L — SIGNIFICANT CHANGE UP (ref 40–120)
ALT FLD-CCNC: 13 U/L — SIGNIFICANT CHANGE UP (ref 4–41)
AMPHET UR-MCNC: NEGATIVE — SIGNIFICANT CHANGE UP
ANION GAP SERPL CALC-SCNC: 13 MMOL/L — SIGNIFICANT CHANGE UP (ref 7–14)
APAP SERPL-MCNC: <10 UG/ML — LOW (ref 15–25)
APPEARANCE UR: CLEAR — SIGNIFICANT CHANGE UP
AST SERPL-CCNC: 17 U/L — SIGNIFICANT CHANGE UP (ref 4–40)
BARBITURATES UR SCN-MCNC: NEGATIVE — SIGNIFICANT CHANGE UP
BASOPHILS # BLD AUTO: 0.06 K/UL — SIGNIFICANT CHANGE UP (ref 0–0.2)
BASOPHILS NFR BLD AUTO: 0.7 % — SIGNIFICANT CHANGE UP (ref 0–2)
BENZODIAZ UR-MCNC: NEGATIVE — SIGNIFICANT CHANGE UP
BILIRUB SERPL-MCNC: 0.7 MG/DL — SIGNIFICANT CHANGE UP (ref 0.2–1.2)
BILIRUB UR-MCNC: NEGATIVE — SIGNIFICANT CHANGE UP
BUN SERPL-MCNC: 16 MG/DL — SIGNIFICANT CHANGE UP (ref 7–23)
CALCIUM SERPL-MCNC: 9.5 MG/DL — SIGNIFICANT CHANGE UP (ref 8.4–10.5)
CHLORIDE SERPL-SCNC: 104 MMOL/L — SIGNIFICANT CHANGE UP (ref 98–107)
CO2 SERPL-SCNC: 23 MMOL/L — SIGNIFICANT CHANGE UP (ref 22–31)
COCAINE METAB.OTHER UR-MCNC: NEGATIVE — SIGNIFICANT CHANGE UP
COLOR SPEC: COLORLESS — SIGNIFICANT CHANGE UP
CREAT SERPL-MCNC: 0.89 MG/DL — SIGNIFICANT CHANGE UP (ref 0.5–1.3)
CREATININE URINE RESULT, DAU: 28 MG/DL — SIGNIFICANT CHANGE UP
DIFF PNL FLD: NEGATIVE — SIGNIFICANT CHANGE UP
EGFR: 86 ML/MIN/1.73M2 — SIGNIFICANT CHANGE UP
EOSINOPHIL # BLD AUTO: 0.12 K/UL — SIGNIFICANT CHANGE UP (ref 0–0.5)
EOSINOPHIL NFR BLD AUTO: 1.4 % — SIGNIFICANT CHANGE UP (ref 0–6)
EPI CELLS # UR: SIGNIFICANT CHANGE UP
ETHANOL SERPL-MCNC: <10 MG/DL — SIGNIFICANT CHANGE UP
GLUCOSE SERPL-MCNC: 105 MG/DL — HIGH (ref 70–99)
GLUCOSE UR QL: NEGATIVE — SIGNIFICANT CHANGE UP
HCT VFR BLD CALC: 45.6 % — SIGNIFICANT CHANGE UP (ref 39–50)
HGB BLD-MCNC: 15.1 G/DL — SIGNIFICANT CHANGE UP (ref 13–17)
IANC: 5.46 K/UL — SIGNIFICANT CHANGE UP (ref 1.8–7.4)
IMM GRANULOCYTES NFR BLD AUTO: 0.2 % — SIGNIFICANT CHANGE UP (ref 0–0.9)
KETONES UR-MCNC: NEGATIVE — SIGNIFICANT CHANGE UP
LEUKOCYTE ESTERASE UR-ACNC: ABNORMAL
LITHIUM SERPL-MCNC: 0.2 MMOL/L — LOW (ref 0.6–1.2)
LYMPHOCYTES # BLD AUTO: 2.37 K/UL — SIGNIFICANT CHANGE UP (ref 1–3.3)
LYMPHOCYTES # BLD AUTO: 27.7 % — SIGNIFICANT CHANGE UP (ref 13–44)
MCHC RBC-ENTMCNC: 31.6 PG — SIGNIFICANT CHANGE UP (ref 27–34)
MCHC RBC-ENTMCNC: 33.1 GM/DL — SIGNIFICANT CHANGE UP (ref 32–36)
MCV RBC AUTO: 95.4 FL — SIGNIFICANT CHANGE UP (ref 80–100)
METHADONE UR-MCNC: NEGATIVE — SIGNIFICANT CHANGE UP
MONOCYTES # BLD AUTO: 0.52 K/UL — SIGNIFICANT CHANGE UP (ref 0–0.9)
MONOCYTES NFR BLD AUTO: 6.1 % — SIGNIFICANT CHANGE UP (ref 2–14)
NEUTROPHILS # BLD AUTO: 5.46 K/UL — SIGNIFICANT CHANGE UP (ref 1.8–7.4)
NEUTROPHILS NFR BLD AUTO: 63.9 % — SIGNIFICANT CHANGE UP (ref 43–77)
NITRITE UR-MCNC: NEGATIVE — SIGNIFICANT CHANGE UP
NRBC # BLD: 0 /100 WBCS — SIGNIFICANT CHANGE UP (ref 0–0)
NRBC # FLD: 0 K/UL — SIGNIFICANT CHANGE UP (ref 0–0)
OPIATES UR-MCNC: NEGATIVE — SIGNIFICANT CHANGE UP
OXYCODONE UR-MCNC: NEGATIVE — SIGNIFICANT CHANGE UP
PCP SPEC-MCNC: SIGNIFICANT CHANGE UP
PCP UR-MCNC: NEGATIVE — SIGNIFICANT CHANGE UP
PH UR: 6.5 — SIGNIFICANT CHANGE UP (ref 5–8)
PLATELET # BLD AUTO: 157 K/UL — SIGNIFICANT CHANGE UP (ref 150–400)
POTASSIUM SERPL-MCNC: 4.1 MMOL/L — SIGNIFICANT CHANGE UP (ref 3.5–5.3)
POTASSIUM SERPL-SCNC: 4.1 MMOL/L — SIGNIFICANT CHANGE UP (ref 3.5–5.3)
PROT SERPL-MCNC: 7.7 G/DL — SIGNIFICANT CHANGE UP (ref 6–8.3)
PROT UR-MCNC: NEGATIVE — SIGNIFICANT CHANGE UP
RBC # BLD: 4.78 M/UL — SIGNIFICANT CHANGE UP (ref 4.2–5.8)
RBC # FLD: 13.8 % — SIGNIFICANT CHANGE UP (ref 10.3–14.5)
RBC CASTS # UR COMP ASSIST: 0 /HPF — SIGNIFICANT CHANGE UP (ref 0–4)
SALICYLATES SERPL-MCNC: <0.3 MG/DL — LOW (ref 15–30)
SARS-COV-2 RNA SPEC QL NAA+PROBE: SIGNIFICANT CHANGE UP
SODIUM SERPL-SCNC: 140 MMOL/L — SIGNIFICANT CHANGE UP (ref 135–145)
SP GR SPEC: 1.01 — LOW (ref 1.01–1.05)
THC UR QL: NEGATIVE — SIGNIFICANT CHANGE UP
TOXICOLOGY SCREEN, DRUGS OF ABUSE, SERUM RESULT: SIGNIFICANT CHANGE UP
TSH SERPL-MCNC: 0.84 UIU/ML — SIGNIFICANT CHANGE UP (ref 0.27–4.2)
UROBILINOGEN FLD QL: SIGNIFICANT CHANGE UP
WBC # BLD: 8.55 K/UL — SIGNIFICANT CHANGE UP (ref 3.8–10.5)
WBC # FLD AUTO: 8.55 K/UL — SIGNIFICANT CHANGE UP (ref 3.8–10.5)
WBC UR QL: SIGNIFICANT CHANGE UP /HPF (ref 0–5)

## 2023-01-09 PROCEDURE — 99285 EMERGENCY DEPT VISIT HI MDM: CPT | Mod: GC

## 2023-01-09 PROCEDURE — 71045 X-RAY EXAM CHEST 1 VIEW: CPT | Mod: 26

## 2023-01-09 RX ORDER — ALBUTEROL 90 UG/1
1 AEROSOL, METERED ORAL ONCE
Refills: 0 | Status: COMPLETED | OUTPATIENT
Start: 2023-01-09 | End: 2023-01-09

## 2023-01-09 RX ADMIN — ALBUTEROL 1 PUFF(S): 90 AEROSOL, METERED ORAL at 18:05
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
## 2023-01-09 NOTE — ED PROVIDER NOTE - NSICDXPASTMEDICALHX_GEN_ALL_CORE_FT
PAST MEDICAL HISTORY:  Bipolar disorder     CAD (coronary artery disease)     Hypercholesterolemia     Hypertension

## 2023-01-09 NOTE — ED PROVIDER NOTE - CARE PLAN
1 Principal Discharge DX:	Behavior concern   Principal Discharge DX:	Behavior concern  Secondary Diagnosis:	Bipolar mood disorder

## 2023-01-09 NOTE — ED PROVIDER NOTE - PROGRESS NOTE DETAILS
Akilah Patterson MD, PGY-3: labs, ua, cxr unremarkable. pt clinically stable. ready for dc home. pt has resources for jj-psych f/u. contacted SW to assist with transport. Akilah Patterson MD, PGY-3: pt given instructions about geriatric psych f/u at [facility], also referred via electronic referral, will call pt to setup apt.

## 2023-01-09 NOTE — ED PROVIDER NOTE - PATIENT PORTAL LINK FT
You can access the FollowMyHealth Patient Portal offered by [facility] by registering at the following website: [email or web address]. By joining [employer]’s FollowMyHealth portal, you will also be able to view your health information using other applications (apps) compatible with our system.

## 2023-01-09 NOTE — ED PROVIDER NOTE - PHYSICAL EXAMINATION
Gen: NAD, non-toxic appearing  Head: normal appearing  HEENT: normal conjunctiva  Lung: no respiratory distress, speaking in full sentences, ctab     CV: regular rate and rhythm, no murmurs  Abd: soft, non distended, non tender   MSK: no visible deformities  Neuro: alert and grossly oriented, no gross motor deficits  Skin: No stefani rashes   PSYCH:   Well-kept and pleasant individual.  Speaks calmly with examiner.  Becomes tearful when discussing depressed mood and wanting to be there for his wife.  Otherwise no abnormal mood, intact affect.  No abnormal thought processes.  No abnormal thought content.  Thoughts are well organized.

## 2023-01-09 NOTE — ED PROVIDER NOTE - NSFOLLOWUPINSTRUCTIONS_ED_ALL_ED_FT
--today, the lab tests we did include basic blood and urine studies, the imaging tests we did include chest xray. results significant for no abnormalities - no pneumonia, no uti. we have included these test results in your paperwork. please take them to your follow-up appointment  --given that you were in the ED today, we recommend a followup visit with your general doctor (primary care doctor) within 7 days.   --your diagnosis is: behavior concern  --return to the ED if thoughts of hurting self/others, if depression, if further episodes of aggressive behavior.   --use resources for geriatric-psych for followup.

## 2023-01-09 NOTE — ED PROVIDER NOTE - CLINICAL SUMMARY MEDICAL DECISION MAKING FREE TEXT BOX
Elderly male, with a history of bipolar disorder, well controlled for many years, presenting with a chief complaint of agitation x1 episode after provocative event.  Depressed mood, however no suicidal thoughts or plan.  No abnormal thought contents.  Vital signs are unremarkable.  Patient is very pleasant to be around, no agitation on my exam.  No organic cause of altered mental status was appreciated, will screen for same, CMP, CBC, urine analysis, urine culture and chest x-ray with EKG.  Low suspicion for ongoing organic process.  Patient likely has depressed mood secondary to lifestyle changes associated with aging.  Compassionately discussed.  Spoke with ED psychiatry team, who felt like the patient was low risk enough that he did not require consultation, ED team agrees.  Will discharge with resources for outpatient psychiatric follow-up for depressed mood.

## 2023-01-09 NOTE — ED PROVIDER NOTE - NS ED ROS FT
GENERAL: no fever  EYES: no eye pain  HEENT: no neck pain  CARDIAC: no chest pain  PULMONARY: no SOB  GI: no abdominal pain  : no dysuria  SKIN: no rashes  NEURO: no headache  MSK: no new joint pain  PSYCH: + Agitation, + depressed mood

## 2023-01-09 NOTE — PROVIDER CONTACT NOTE (OTHER) - ASSESSMENT
Pt with bi-polar, agitation, BLS ambulance needed for transportation back to assisted living facility.

## 2023-01-09 NOTE — PROVIDER CONTACT NOTE (OTHER) - ACTION/TREATMENT ORDERED:
longterm EMS arranged for transport back-ETA 10:00 p.m. trip # [id number]. [facility] arranged for transport back-ETA 10:00 p.m. trip # [id number].  Addendum:  Informed by provider that pt will be transported to facility by family.  Cancelled [facility] trip.
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
## 2023-01-12 LAB
-  AMIKACIN: SIGNIFICANT CHANGE UP
-  AMOXICILLIN/CLAVULANIC ACID: SIGNIFICANT CHANGE UP
-  AMPICILLIN/SULBACTAM: SIGNIFICANT CHANGE UP
-  AMPICILLIN: SIGNIFICANT CHANGE UP
-  AZTREONAM: SIGNIFICANT CHANGE UP
-  CEFAZOLIN: SIGNIFICANT CHANGE UP
-  CEFEPIME: SIGNIFICANT CHANGE UP
-  CEFOXITIN: SIGNIFICANT CHANGE UP
-  CEFTRIAXONE: SIGNIFICANT CHANGE UP
-  CEFUROXIME: SIGNIFICANT CHANGE UP
-  CIPROFLOXACIN: SIGNIFICANT CHANGE UP
-  ERTAPENEM: SIGNIFICANT CHANGE UP
-  GENTAMICIN: SIGNIFICANT CHANGE UP
-  LEVOFLOXACIN: SIGNIFICANT CHANGE UP
-  MEROPENEM: SIGNIFICANT CHANGE UP
-  NITROFURANTOIN: SIGNIFICANT CHANGE UP
-  PIPERACILLIN/TAZOBACTAM: SIGNIFICANT CHANGE UP
-  TOBRAMYCIN: SIGNIFICANT CHANGE UP
-  TRIMETHOPRIM/SULFAMETHOXAZOLE: SIGNIFICANT CHANGE UP
CULTURE RESULTS: SIGNIFICANT CHANGE UP
METHOD TYPE: SIGNIFICANT CHANGE UP
ORGANISM # SPEC MICROSCOPIC CNT: SIGNIFICANT CHANGE UP
ORGANISM # SPEC MICROSCOPIC CNT: SIGNIFICANT CHANGE UP
SPECIMEN SOURCE: SIGNIFICANT CHANGE UP

## 2023-01-15 NOTE — ED POST DISCHARGE NOTE - RESULT SUMMARY
UCX : Proteus Mirabilis 10,000-49,000CFU/ml . Patient elderly with bipolar disorder. Patient resides in [facility] living called and [place] [facility] with  to have nurse call me back.

## 2023-01-17 ENCOUNTER — APPOINTMENT (OUTPATIENT)
Dept: GERIATRICS | Facility: ASSISTED LIVING FACILITY | Age: 83
End: 2023-01-17
Payer: MEDICARE

## 2023-01-17 VITALS — DIASTOLIC BLOOD PRESSURE: 80 MMHG | SYSTOLIC BLOOD PRESSURE: 140 MMHG | TEMPERATURE: 78 F

## 2023-01-17 PROCEDURE — 99347 HOME/RES VST EST SF MDM 20: CPT

## 2023-01-17 NOTE — ASSESSMENT
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  He has a long history of bipolar disease.  He has been described by his psychiatrist as having poor impulse control.  He was sent to the emergency room last week for agitation.  There is no evidence of suicidal intent or ideation.  We will continue as previously outlined.

## 2023-01-17 NOTE — PHYSICAL EXAM
[Alert] : alert [No Acute Distress] : in no acute distress [Sclera] : the sclera and conjunctiva were normal [PERRL] : pupils were equal in size, round, and reactive to light [Normal Outer Ear/Nose] : the ears and nose were normal in appearance [Normal Appearance] : the appearance of the neck was normal [Supple] : the neck was supple [No Respiratory Distress] : no respiratory distress [Respiration, Rhythm And Depth] : normal respiratory rhythm and effort [Edema] : edema was not present [Normal] : posterior cervical, supraclavicular, anterior cervical, supraclavicular, axillary [No Spinal Tenderness] : no spinal tenderness [No Focal Deficits] : no focal deficits [Normal Affect] : the affect was normal [de-identified] : Venous stasis changes [de-identified] : Venous stasis changes of the legs.  No edema.

## 2023-02-07 ENCOUNTER — RX RENEWAL (OUTPATIENT)
Age: 83
End: 2023-02-07

## 2023-02-16 NOTE — ED ADULT NURSE NOTE - PAIN: PRESENCE, MLM
Home Oxygen Evaluation    Home Oxygen Evaluation completed. Patient is on 2 liters per minute via nasal cannula.   Resting SpO2 = 94%  Resting SpO2 on room air = 85%      Natasha Narayan RCP  8:17 AM complains of pain/discomfort

## 2023-02-28 ENCOUNTER — APPOINTMENT (OUTPATIENT)
Dept: GERIATRICS | Facility: ASSISTED LIVING FACILITY | Age: 83
End: 2023-02-28
Payer: MEDICARE

## 2023-02-28 VITALS — SYSTOLIC BLOOD PRESSURE: 147 MMHG | DIASTOLIC BLOOD PRESSURE: 78 MMHG | RESPIRATION RATE: 52 BRPM

## 2023-02-28 DIAGNOSIS — N18.9 CHRONIC KIDNEY DISEASE, UNSPECIFIED: ICD-10-CM

## 2023-02-28 PROCEDURE — 99348 HOME/RES VST EST LOW MDM 30: CPT

## 2023-02-28 NOTE — REVIEW OF SYSTEMS
[Fever] : no fever [Chills] : no chills [Chest Pain] : no chest pain [Palpitations] : no palpitations [Constipation] : constipation [Hesitancy] : urinary hesitancy [Limb Pain] : limb pain [Negative] : Heme/Lymph

## 2023-02-28 NOTE — HISTORY OF PRESENT ILLNESS
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  He was seen in his home at the [facility].  He has no specific complaints at this time.  He continues to express dissatisfaction about the [facility].  He has no pain complaints at this time.

## 2023-02-28 NOTE — PHYSICAL EXAM
In order to meet Medicare requirements, the clinical documentation must support the information cited in the admission order.  Please be sure to provide detailed and clear documentation about the following in the admitting note/history and physical: [Alert] : alert [No Acute Distress] : in no acute distress [PERRL] : pupils were equal in size, round, and reactive to light [Normal] : normal skin color and pigmentation [No Focal Deficits] : no focal deficits [Normal Affect] : the affect was normal

## 2023-04-11 ENCOUNTER — APPOINTMENT (OUTPATIENT)
Dept: GERIATRICS | Facility: ASSISTED LIVING FACILITY | Age: 83
End: 2023-04-11
Payer: MEDICARE

## 2023-04-11 VITALS — DIASTOLIC BLOOD PRESSURE: 96 MMHG | RESPIRATION RATE: 78 BRPM | SYSTOLIC BLOOD PRESSURE: 140 MMHG

## 2023-04-11 PROCEDURE — 99348 HOME/RES VST EST LOW MDM 30: CPT

## 2023-04-11 NOTE — HISTORY OF PRESENT ILLNESS
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  He was seen at his home in the [facility].  His main concern on complaint relates to unhappiness regarding his living situation.  Specifically he does not want to remain at the [facility].  This has been a longstanding complaint.  He states he would rather be dead.  He denies any suicidal intention.

## 2023-04-11 NOTE — REVIEW OF SYSTEMS
[Limb Pain] : limb pain [Negative] : Genitourinary [Confused] : no confusion [Dizziness] : no dizziness

## 2023-04-11 NOTE — ASSESSMENT
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  The main issue at this time remains unhappiness with his living situation at the [facility].  He denies suicidal intent or ideation.  Per his wife he is going to seek telepsychiatry consultation with his longstanding psychiatrist Dr. Guerrier.

## 2023-04-24 ENCOUNTER — RX RENEWAL (OUTPATIENT)
Age: 83
End: 2023-04-24

## 2023-05-18 NOTE — ED PROVIDER NOTE - BIRTH SEX
Procedure confirmed  Endoscopy     Via: Voice mail    Instructions given: Given to Patient at Visit     Prep Given: N/A    Call the office if there are any questions  Male

## 2023-06-13 ENCOUNTER — APPOINTMENT (OUTPATIENT)
Dept: GERIATRICS | Facility: ASSISTED LIVING FACILITY | Age: 83
End: 2023-06-13
Payer: MEDICARE

## 2023-06-13 VITALS — SYSTOLIC BLOOD PRESSURE: 109 MMHG | DIASTOLIC BLOOD PRESSURE: 72 MMHG

## 2023-06-13 DIAGNOSIS — K59.00 CONSTIPATION, UNSPECIFIED: ICD-10-CM

## 2023-06-13 PROCEDURE — 99348 HOME/RES VST EST LOW MDM 30: CPT

## 2023-06-13 NOTE — HISTORY OF PRESENT ILLNESS
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  He was seen in his home at the [facility].  His main concern continues to relate to unhappiness regarding living in the [facility].  His mood seems improved since our last visit.  He has been playing the piano for other residents and this seems to give him pleasure.  He denies chest pain shortness of breath or palpitations.

## 2023-06-13 NOTE — REVIEW OF SYSTEMS
[Feeling Poorly] : feeling poorly [Limb Pain] : limb pain [Negative] : Heme/Lymph [Fever] : no fever

## 2023-06-13 NOTE — PHYSICAL EXAM
[Alert] : alert [No Acute Distress] : in no acute distress [Normal] : normal skin color and pigmentation [No Focal Deficits] : no focal deficits [Oriented To Time, Place, And Person] : oriented to person, place, and time

## 2023-08-15 ENCOUNTER — APPOINTMENT (OUTPATIENT)
Dept: GERIATRICS | Facility: ASSISTED LIVING FACILITY | Age: 83
End: 2023-08-15
Payer: MEDICARE

## 2023-08-15 VITALS — HEART RATE: 74 BPM | DIASTOLIC BLOOD PRESSURE: 82 MMHG | SYSTOLIC BLOOD PRESSURE: 130 MMHG

## 2023-08-15 DIAGNOSIS — G89.29 DORSALGIA, UNSPECIFIED: ICD-10-CM

## 2023-08-15 DIAGNOSIS — M54.9 DORSALGIA, UNSPECIFIED: ICD-10-CM

## 2023-08-15 PROCEDURE — 99348 HOME/RES VST EST LOW MDM 30: CPT

## 2023-08-15 NOTE — HISTORY OF PRESENT ILLNESS
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  He is being seen in his home at the [facility].  He was last seen on June 13, 2023.  Since our previous visit the patient appears at baseline.  He denies chest pain shortness of breath or palpitations.  He is able to ambulate with his walker down the gasca to the dining area.  He continues to complain about the food having to live at the [facility].

## 2023-08-15 NOTE — REVIEW OF SYSTEMS
[Fever] : no fever [Chills] : no chills [Feeling Poorly] : not feeling poorly [Hesitancy] : no urinary hesitancy [Limb Pain] : limb pain [Confused] : no confusion [Dizziness] : no dizziness [Depression] : depression [Negative] : Heme/Lymph

## 2023-08-29 ENCOUNTER — RX RENEWAL (OUTPATIENT)
Age: 83
End: 2023-08-29

## 2023-08-29 RX ORDER — ACETAMINOPHEN 325 MG/1
325 TABLET, FILM COATED ORAL
Qty: 150 | Refills: 2 | Status: ACTIVE | COMMUNITY
Start: 2022-01-13 | End: 1900-01-01

## 2023-09-04 ENCOUNTER — TRANSCRIPTION ENCOUNTER (OUTPATIENT)
Age: 83
End: 2023-09-04

## 2023-09-04 ENCOUNTER — INPATIENT (INPATIENT)
Facility: HOSPITAL | Age: 83
LOS: 3 days | Discharge: NURSING, REHAB OR LONG-TERM CARE | End: 2023-09-08
Attending: SURGERY | Admitting: SURGERY
Payer: MEDICARE

## 2023-09-04 VITALS
OXYGEN SATURATION: 96 % | SYSTOLIC BLOOD PRESSURE: 152 MMHG | DIASTOLIC BLOOD PRESSURE: 93 MMHG | HEART RATE: 99 BPM | RESPIRATION RATE: 18 BRPM | TEMPERATURE: 98 F

## 2023-09-04 LAB
ALBUMIN SERPL ELPH-MCNC: 4.6 G/DL — SIGNIFICANT CHANGE UP (ref 3.3–5)
ALP SERPL-CCNC: 81 U/L — SIGNIFICANT CHANGE UP (ref 40–120)
ALT FLD-CCNC: 7 U/L — SIGNIFICANT CHANGE UP (ref 4–41)
ANION GAP SERPL CALC-SCNC: 15 MMOL/L — HIGH (ref 7–14)
AST SERPL-CCNC: 12 U/L — SIGNIFICANT CHANGE UP (ref 4–40)
BASOPHILS # BLD AUTO: 0.05 K/UL — SIGNIFICANT CHANGE UP (ref 0–0.2)
BASOPHILS NFR BLD AUTO: 0.3 % — SIGNIFICANT CHANGE UP (ref 0–2)
BILIRUB SERPL-MCNC: 0.6 MG/DL — SIGNIFICANT CHANGE UP (ref 0.2–1.2)
BUN SERPL-MCNC: 12 MG/DL — SIGNIFICANT CHANGE UP (ref 7–23)
CALCIUM SERPL-MCNC: 9.8 MG/DL — SIGNIFICANT CHANGE UP (ref 8.4–10.5)
CHLORIDE SERPL-SCNC: 104 MMOL/L — SIGNIFICANT CHANGE UP (ref 98–107)
CO2 SERPL-SCNC: 23 MMOL/L — SIGNIFICANT CHANGE UP (ref 22–31)
CREAT SERPL-MCNC: 0.92 MG/DL — SIGNIFICANT CHANGE UP (ref 0.5–1.3)
EGFR: 83 ML/MIN/1.73M2 — SIGNIFICANT CHANGE UP
EOSINOPHIL # BLD AUTO: 0.02 K/UL — SIGNIFICANT CHANGE UP (ref 0–0.5)
EOSINOPHIL NFR BLD AUTO: 0.1 % — SIGNIFICANT CHANGE UP (ref 0–6)
GAS PNL BLDV: SIGNIFICANT CHANGE UP
GLUCOSE SERPL-MCNC: 165 MG/DL — HIGH (ref 70–99)
HCT VFR BLD CALC: 48 % — SIGNIFICANT CHANGE UP (ref 39–50)
HGB BLD-MCNC: 16.1 G/DL — SIGNIFICANT CHANGE UP (ref 13–17)
IANC: 13.41 K/UL — HIGH (ref 1.8–7.4)
IMM GRANULOCYTES NFR BLD AUTO: 0.3 % — SIGNIFICANT CHANGE UP (ref 0–0.9)
LITHIUM SERPL-MCNC: 0.3 MMOL/L — LOW (ref 0.6–1.2)
LYMPHOCYTES # BLD AUTO: 1.54 K/UL — SIGNIFICANT CHANGE UP (ref 1–3.3)
LYMPHOCYTES # BLD AUTO: 9.8 % — LOW (ref 13–44)
MAGNESIUM SERPL-MCNC: 1.9 MG/DL — SIGNIFICANT CHANGE UP (ref 1.6–2.6)
MCHC RBC-ENTMCNC: 31.9 PG — SIGNIFICANT CHANGE UP (ref 27–34)
MCHC RBC-ENTMCNC: 33.5 GM/DL — SIGNIFICANT CHANGE UP (ref 32–36)
MCV RBC AUTO: 95 FL — SIGNIFICANT CHANGE UP (ref 80–100)
MONOCYTES # BLD AUTO: 0.72 K/UL — SIGNIFICANT CHANGE UP (ref 0–0.9)
MONOCYTES NFR BLD AUTO: 4.6 % — SIGNIFICANT CHANGE UP (ref 2–14)
NEUTROPHILS # BLD AUTO: 13.41 K/UL — HIGH (ref 1.8–7.4)
NEUTROPHILS NFR BLD AUTO: 84.9 % — HIGH (ref 43–77)
NRBC # BLD: 0 /100 WBCS — SIGNIFICANT CHANGE UP (ref 0–0)
NRBC # FLD: 0 K/UL — SIGNIFICANT CHANGE UP (ref 0–0)
PHOSPHATE SERPL-MCNC: 2.6 MG/DL — SIGNIFICANT CHANGE UP (ref 2.5–4.5)
PLATELET # BLD AUTO: 200 K/UL — SIGNIFICANT CHANGE UP (ref 150–400)
POTASSIUM SERPL-MCNC: 4.1 MMOL/L — SIGNIFICANT CHANGE UP (ref 3.5–5.3)
POTASSIUM SERPL-SCNC: 4.1 MMOL/L — SIGNIFICANT CHANGE UP (ref 3.5–5.3)
PROT SERPL-MCNC: 8.2 G/DL — SIGNIFICANT CHANGE UP (ref 6–8.3)
RBC # BLD: 5.05 M/UL — SIGNIFICANT CHANGE UP (ref 4.2–5.8)
RBC # FLD: 13.6 % — SIGNIFICANT CHANGE UP (ref 10.3–14.5)
SODIUM SERPL-SCNC: 142 MMOL/L — SIGNIFICANT CHANGE UP (ref 135–145)
WBC # BLD: 15.79 K/UL — HIGH (ref 3.8–10.5)
WBC # FLD AUTO: 15.79 K/UL — HIGH (ref 3.8–10.5)

## 2023-09-04 PROCEDURE — 99285 EMERGENCY DEPT VISIT HI MDM: CPT

## 2023-09-04 RX ORDER — SODIUM CHLORIDE 9 MG/ML
500 INJECTION, SOLUTION INTRAVENOUS
Refills: 0 | Status: DISCONTINUED | OUTPATIENT
Start: 2023-09-04 | End: 2023-09-05

## 2023-09-04 RX ORDER — MINERAL OIL
133 OIL (ML) MISCELLANEOUS ONCE
Refills: 0 | Status: COMPLETED | OUTPATIENT
Start: 2023-09-04 | End: 2023-09-04

## 2023-09-04 RX ADMIN — Medication 133 MILLILITER(S): at 21:52

## 2023-09-04 RX ADMIN — SODIUM CHLORIDE 500 MILLILITER(S): 9 INJECTION, SOLUTION INTRAVENOUS at 21:52

## 2023-09-04 NOTE — ED ADULT TRIAGE NOTE - CHIEF COMPLAINT QUOTE
pt c/o left lower abd pain since 4 pm today. denies n/v/d/fever/chills. pt states last BM was 1 week ago. +flatus.

## 2023-09-04 NOTE — ED ADULT NURSE NOTE - OBJECTIVE STATEMENT
Patient received in ED room 17. A&Ox4 and ambulatory with walker at baseline. C/o left sided groin pain since 1600 today, and constipation x 1 week. Pt noted to have bulge to left groin during writer and MD Mendoza assessment. Denies CP, SOB, nausea, vomiting, headache, lightheadedness, dizziness, fever or chills. Well-appearing sitting up in stretcher HOB elevated. Speaking in full and complete sentences. No acute distress noted. Respirations even and unlabored. IV 20g placed to left forearm, labs drawn and sent as ordered. Medicated as ordered. Bed in lowest position, call bell in hand, wheels locked, fall precautions in effect, appropriate side rails up, safety maintained. Awaiting CT.

## 2023-09-04 NOTE — ED ADULT NURSE NOTE - NSFALLHARMRISKINTERV_ED_ALL_ED
Assistance OOB with selected safe patient handling equipment if applicable/Assistance with ambulation/Communicate risk of Fall with Harm to all staff, patient, and family/Monitor gait and stability/Provide visual cue: red socks, yellow wristband, yellow gown, etc/Reinforce activity limits and safety measures with patient and family/Bed in lowest position, wheels locked, appropriate side rails in place/Call bell, personal items and telephone in reach/Instruct patient to call for assistance before getting out of bed/chair/stretcher/Non-slip footwear applied when patient is off stretcher/[place] to call system/Physically safe environment - no spills, clutter or unnecessary equipment/Purposeful Proactive Rounding/Room/bathroom lighting operational, light cord in reach

## 2023-09-05 DIAGNOSIS — K40.30 UNILATERAL INGUINAL HERNIA, WITH OBSTRUCTION, WITHOUT GANGRENE, NOT SPECIFIED AS RECURRENT: ICD-10-CM

## 2023-09-05 LAB
ANION GAP SERPL CALC-SCNC: 17 MMOL/L — HIGH (ref 7–14)
BLD GP AB SCN SERPL QL: NEGATIVE — SIGNIFICANT CHANGE UP
BUN SERPL-MCNC: 16 MG/DL — SIGNIFICANT CHANGE UP (ref 7–23)
CALCIUM SERPL-MCNC: 9.2 MG/DL — SIGNIFICANT CHANGE UP (ref 8.4–10.5)
CHLORIDE SERPL-SCNC: 103 MMOL/L — SIGNIFICANT CHANGE UP (ref 98–107)
CO2 SERPL-SCNC: 21 MMOL/L — LOW (ref 22–31)
CREAT SERPL-MCNC: 1.03 MG/DL — SIGNIFICANT CHANGE UP (ref 0.5–1.3)
EGFR: 72 ML/MIN/1.73M2 — SIGNIFICANT CHANGE UP
GLUCOSE SERPL-MCNC: 184 MG/DL — HIGH (ref 70–99)
HCT VFR BLD CALC: 45.4 % — SIGNIFICANT CHANGE UP (ref 39–50)
HGB BLD-MCNC: 15.1 G/DL — SIGNIFICANT CHANGE UP (ref 13–17)
MAGNESIUM SERPL-MCNC: 1.7 MG/DL — SIGNIFICANT CHANGE UP (ref 1.6–2.6)
MCHC RBC-ENTMCNC: 31.5 PG — SIGNIFICANT CHANGE UP (ref 27–34)
MCHC RBC-ENTMCNC: 33.3 GM/DL — SIGNIFICANT CHANGE UP (ref 32–36)
MCV RBC AUTO: 94.8 FL — SIGNIFICANT CHANGE UP (ref 80–100)
NRBC # BLD: 0 /100 WBCS — SIGNIFICANT CHANGE UP (ref 0–0)
NRBC # FLD: 0 K/UL — SIGNIFICANT CHANGE UP (ref 0–0)
PHOSPHATE SERPL-MCNC: 4 MG/DL — SIGNIFICANT CHANGE UP (ref 2.5–4.5)
PLATELET # BLD AUTO: 179 K/UL — SIGNIFICANT CHANGE UP (ref 150–400)
POTASSIUM SERPL-MCNC: 4.3 MMOL/L — SIGNIFICANT CHANGE UP (ref 3.5–5.3)
POTASSIUM SERPL-SCNC: 4.3 MMOL/L — SIGNIFICANT CHANGE UP (ref 3.5–5.3)
RBC # BLD: 4.79 M/UL — SIGNIFICANT CHANGE UP (ref 4.2–5.8)
RBC # FLD: 13.6 % — SIGNIFICANT CHANGE UP (ref 10.3–14.5)
RH IG SCN BLD-IMP: POSITIVE — SIGNIFICANT CHANGE UP
SODIUM SERPL-SCNC: 141 MMOL/L — SIGNIFICANT CHANGE UP (ref 135–145)
WBC # BLD: 12.27 K/UL — HIGH (ref 3.8–10.5)
WBC # FLD AUTO: 12.27 K/UL — HIGH (ref 3.8–10.5)

## 2023-09-05 PROCEDURE — 49651 LAP ING HERNIA REPAIR RECUR: CPT

## 2023-09-05 PROCEDURE — 93010 ELECTROCARDIOGRAM REPORT: CPT

## 2023-09-05 PROCEDURE — G1004: CPT

## 2023-09-05 PROCEDURE — 71045 X-RAY EXAM CHEST 1 VIEW: CPT | Mod: 26

## 2023-09-05 PROCEDURE — 74177 CT ABD & PELVIS W/CONTRAST: CPT | Mod: 26,ME

## 2023-09-05 DEVICE — LIGATING CLIPS WECK HEMOLOK POLYMER LARGE (PURPLE) 6: Type: IMPLANTABLE DEVICE | Site: LEFT | Status: FUNCTIONAL

## 2023-09-05 DEVICE — MESH HERNIA INGUINAL 3DMAX LARGE 4 X 6" LEFT: Type: IMPLANTABLE DEVICE | Site: LEFT | Status: FUNCTIONAL

## 2023-09-05 DEVICE — SURGICEL 2 X 14": Type: IMPLANTABLE DEVICE | Site: LEFT | Status: FUNCTIONAL

## 2023-09-05 RX ORDER — HYDROMORPHONE HYDROCHLORIDE 2 MG/ML
0.5 INJECTION INTRAMUSCULAR; INTRAVENOUS; SUBCUTANEOUS
Refills: 0 | Status: DISCONTINUED | OUTPATIENT
Start: 2023-09-05 | End: 2023-09-05

## 2023-09-05 RX ORDER — MAGNESIUM SULFATE 500 MG/ML
2 VIAL (ML) INJECTION ONCE
Refills: 0 | Status: COMPLETED | OUTPATIENT
Start: 2023-09-05 | End: 2023-09-05

## 2023-09-05 RX ORDER — SODIUM CHLORIDE 9 MG/ML
1000 INJECTION, SOLUTION INTRAVENOUS
Refills: 0 | Status: DISCONTINUED | OUTPATIENT
Start: 2023-09-05 | End: 2023-09-05

## 2023-09-05 RX ORDER — ENOXAPARIN SODIUM 100 MG/ML
40 INJECTION SUBCUTANEOUS EVERY 24 HOURS
Refills: 0 | Status: DISCONTINUED | OUTPATIENT
Start: 2023-09-05 | End: 2023-09-08

## 2023-09-05 RX ORDER — ACETAMINOPHEN 500 MG
1000 TABLET ORAL EVERY 6 HOURS
Refills: 0 | Status: DISCONTINUED | OUTPATIENT
Start: 2023-09-05 | End: 2023-09-08

## 2023-09-05 RX ORDER — OXYCODONE HYDROCHLORIDE 5 MG/1
2.5 TABLET ORAL EVERY 4 HOURS
Refills: 0 | Status: DISCONTINUED | OUTPATIENT
Start: 2023-09-05 | End: 2023-09-08

## 2023-09-05 RX ORDER — INFLUENZA VIRUS VACCINE 15; 15; 15; 15 UG/.5ML; UG/.5ML; UG/.5ML; UG/.5ML
0.7 SUSPENSION INTRAMUSCULAR ONCE
Refills: 0 | Status: COMPLETED | OUTPATIENT
Start: 2023-09-05 | End: 2023-09-07

## 2023-09-05 RX ORDER — ONDANSETRON 8 MG/1
4 TABLET, FILM COATED ORAL ONCE
Refills: 0 | Status: DISCONTINUED | OUTPATIENT
Start: 2023-09-05 | End: 2023-09-05

## 2023-09-05 RX ORDER — SODIUM CHLORIDE 9 MG/ML
1000 INJECTION, SOLUTION INTRAVENOUS
Refills: 0 | Status: DISCONTINUED | OUTPATIENT
Start: 2023-09-05 | End: 2023-09-06

## 2023-09-05 RX ORDER — OXYCODONE HYDROCHLORIDE 5 MG/1
5 TABLET ORAL ONCE
Refills: 0 | Status: DISCONTINUED | OUTPATIENT
Start: 2023-09-05 | End: 2023-09-05

## 2023-09-05 RX ORDER — OXYCODONE HYDROCHLORIDE 5 MG/1
5 TABLET ORAL EVERY 4 HOURS
Refills: 0 | Status: DISCONTINUED | OUTPATIENT
Start: 2023-09-05 | End: 2023-09-08

## 2023-09-05 RX ORDER — IBUPROFEN 200 MG
400 TABLET ORAL EVERY 6 HOURS
Refills: 0 | Status: DISCONTINUED | OUTPATIENT
Start: 2023-09-05 | End: 2023-09-08

## 2023-09-05 RX ADMIN — Medication 50 GRAM(S): at 15:28

## 2023-09-05 RX ADMIN — ENOXAPARIN SODIUM 40 MILLIGRAM(S): 100 INJECTION SUBCUTANEOUS at 19:40

## 2023-09-05 RX ADMIN — Medication 400 MILLIGRAM(S): at 21:43

## 2023-09-05 RX ADMIN — SODIUM CHLORIDE 100 MILLILITER(S): 9 INJECTION, SOLUTION INTRAVENOUS at 14:29

## 2023-09-05 RX ADMIN — Medication 400 MILLIGRAM(S): at 21:13

## 2023-09-05 RX ADMIN — SODIUM CHLORIDE 75 MILLILITER(S): 9 INJECTION, SOLUTION INTRAVENOUS at 05:18

## 2023-09-05 NOTE — ED PROVIDER NOTE - CARE PLAN
Principal Discharge DX:	Incarcerated inguinal hernia  Secondary Diagnosis:	SBO (small bowel obstruction)   1

## 2023-09-05 NOTE — H&P ADULT - NSHPLABSRESULTS_GEN_ALL_CORE
LABS:                        16.1   15.79 )-----------( 200      ( 04 Sep 2023 21:36 )             48.0     04 Sep 2023 21:36    142    |  104    |  12     ----------------------------<  165    4.1     |  23     |  0.92     Ca    9.8        04 Sep 2023 21:36  Phos  2.6       04 Sep 2023 21:36  Mg     1.90      04 Sep 2023 21:36    TPro  8.2    /  Alb  4.6    /  TBili  0.6    /  DBili  x      /  AST  12     /  ALT  7      /  AlkPhos  81     04 Sep 2023 21:36      CAPILLARY BLOOD GLUCOSE            LIVER FUNCTIONS - ( 04 Sep 2023 21:36 )  Alb: 4.6 g/dL / Pro: 8.2 g/dL / ALK PHOS: 81 U/L / ALT: 7 U/L / AST: 12 U/L / GGT: x             Urinalysis Basic - ( 04 Sep 2023 21:36 )    Color: x / Appearance: x / SG: x / pH: x  Gluc: 165 mg/dL / Ketone: x  / Bili: x / Urobili: x   Blood: x / Protein: x / Nitrite: x   Leuk Esterase: x / RBC: x / WBC x   Sq Epi: x / Non Sq Epi: x / Bacteria: x      IMAGING:    CT Abdomen and Pelvis w/ IV Cont (09.05.23 @ 02:40)    FINDINGS:  LOWER CHEST: Bibasilar subsegmental atelectasis.  Thinning of left   ventricular apex suggesting prior myocardial infarction.  Atherosclerotic   calcifications aorta and coronary arteries.    LIVER: Small right lobe calcification, likely sequela of prior   granulomatous disease.  BILE DUCTS: Normal caliber.  GALLBLADDER: Within normal limits.  SPLEEN: Multiple indeterminate rounded hypodensities measuring up to 1.3   cm.  PANCREAS: Within normal limits.  ADRENALS: Within normal limits.  KIDNEYS/URETERS: Bilateral cortical scarring. Bilateral subcentimeter   hypodensities, too small to characterize. Kidneys enhance symmetrically.   No hydronephrosis.    BLADDER: Within normal limits.  REPRODUCTIVE ORGANS: Prostate is mildly enlarged, measuring 4.7 x 5.8 cm.    BOWEL: Small hiatal hernia.  Stomach is distended with fluid.   Small   bowel obstruction with a bowel containing left inguinal hernia and a   transition point within the hernia sac (301-117, 601-81), medial to the   left inferior epigastric artery compatible with direct inguinal hernia.   Small amount of fluid within the hernia sac. No evidence of bowel   ischemia.    Distal bowel is collapsed.  Appendix is normal.  Extensive   colonic diverticulosis without evidence of diverticulitis.    PERITONEUM: Small fluid within the hernia sac.  VESSELS: Atherosclerotic changes.  RETROPERITONEUM/LYMPH NODES: No lymphadenopathy.  ABDOMINAL WALL: Left inguinal hernia as described above.  BONES: Degenerative changes.    IMPRESSION:  Small bowel obstruction with transition point involving a left inguinal   hernia containing small bowel and fluid.

## 2023-09-05 NOTE — BRIEF OPERATIVE NOTE - ASSISTANT(S)
Carola PGY 5 [FreeTextEntry3] : seen and discussed with  [Time Spent: ___ minutes] : I have spent [unfilled] minutes of time on the encounter.

## 2023-09-05 NOTE — H&P ADULT - ATTENDING SUPERVISION STATEMENT
Refill request received for     Disp Refills Start End    losartan (COZAAR) 100 MG tablet 90 tablet 1 3/9/2021     Sig - Route: Take 1 tablet by mouth daily. - Oral      The pt was last seen on 11/30/2020. There is a future appointment scheduled for 12/2/2021. Refill by protocol.    ambien      Last Written Prescription Date:  6/12/18  Last Fill Quantity: 30,   # refills: 0  Last Office Visit: 6/12/18  Future Office visit:       Routing refill request to provider for review/approval because:  Drug not on the [facility], [facility] or [facility] refill protocol or controlled substance     Resident

## 2023-09-05 NOTE — ED PROVIDER NOTE - CLINICAL SUMMARY MEDICAL DECISION MAKING FREE TEXT BOX
83M p/w sent from SNF, pmhx HTN CAD no stents, bipolar d/o, on lithium x 40 yrs over past 10 days noted decr BM.  (+)flatus.  L lower quadrant mass.  Pt got a glycerine enema previously and it improved his Bm.  No fever or vomiting.  VS wnl.  [facility]  Pt thinks it is a stool ball but on exam appears to be a L inguinal hernia.  Pt not in distress.  Plan check labs, CT, rx enema, pain meds, reass.  If hernia found would consult sx.  No sign of bowel obstruction here.

## 2023-09-05 NOTE — BRIEF OPERATIVE NOTE - NSICDXBRIEFPROCEDURE_GEN_ALL_CORE_FT
PROCEDURES:  Laparoscopic herniorrhaphy of left inguinal hernia using transabdominal preperitoneal (SHELBI) patch technique 05-Sep-2023 14:16:32  Seven Srivastava

## 2023-09-05 NOTE — H&P ADULT - NSHPPHYSICALEXAM_GEN_ALL_CORE
VITAL SIGNS:  Vital Signs Last 24 Hrs  T(C): 36.9 (04 Sep 2023 22:23), Max: 36.9 (04 Sep 2023 20:27)  T(F): 98.5 (04 Sep 2023 22:23), Max: 98.5 (04 Sep 2023 20:27)  HR: 91 (04 Sep 2023 22:23) (91 - 100)  BP: 150/94 (04 Sep 2023 22:23) (147/83 - 152/93)  BP(mean): --  RR: 16 (04 Sep 2023 22:23) (16 - 18)  SpO2: 96% (04 Sep 2023 22:23) (96% - 98%)    Parameters below as of 04 Sep 2023 22:23  Patient On (Oxygen Delivery Method): room air          PHYSICAL EXAM:    General: NAD, Sitting in bed comfortably  HEENT: NC/AT, EOMI  Neck: Soft, supple  Cardio: RRR, nml S1/S2  Resp: Good effort, CTA b/l  GI/Abd: Soft, NT/ND, no rebound/guarding, no masses palpated  Groin: Left groin bulge, firm, midly tender, no skin changes, nonreducible  Musculoskeletal: All 4 extremities moving spontaneously, no limitations

## 2023-09-05 NOTE — H&P ADULT - HISTORY OF PRESENT ILLNESS
83M w/ PMHx of HTN, HLD, CAD, and bipolar on lithium, and bilateral inguinal hernia repairs ~40 years ago who presents to the ED after noticing a bulge in his left groin a couple days ago.  Patient denies nausea or vomiting. His last BM was ~1 week ago and last flatus was 1 day ago.    In the ED, patient was afebrile, hemodynamically stable, labs remarkable for leukocytosis of 15k and lactate of 2.8. CTAP revealed left bowel containing direct inguinal hernia.

## 2023-09-05 NOTE — PATIENT PROFILE ADULT - FALL HARM RISK - HARM RISK INTERVENTIONS

## 2023-09-05 NOTE — H&P ADULT - ASSESSMENT
83M presenting with incarcerated bowel containing left inguinal hernia.    PLAN:  - Admit to B Team Surgery under Dr. Hutson.  - NPO/IVFs  - NGT  - Abdominal exam prior to providing pain medication  - VTE ppx    Discussed with attending surgeon Dr. Hutson.    OBIE Pantoja, PGY-4  [facility]  B Team Surgery  [id number]

## 2023-09-05 NOTE — ED PROVIDER NOTE - IV ALTEPLASE EXCL ABS HIDDEN
show Hydroxyzine Pregnancy And Lactation Text: This medication is not safe during pregnancy and should not be taken. It is also excreted in breast milk and breast feeding isn't recommended.

## 2023-09-05 NOTE — ED ADULT NURSE REASSESSMENT NOTE - NS ED NURSE REASSESS COMMENT FT1
Break RN note- Patient resting quietly in bed, breathing even and nonlabored. No acute distress. Awaiting CT scan. Safety maintained. Patient stable upon exiting the room.

## 2023-09-05 NOTE — ED PROVIDER NOTE - PROGRESS NOTE DETAILS
Patient signed out pending results of CT scan.  CT scan is demonstrating a left inguinal hernia that is incarcerated leading to a subsequent SBO.  Surgery was consulted and they are going to take the patient to their service.  He is remained hemodynamically stable.

## 2023-09-05 NOTE — ED PROVIDER NOTE - OBJECTIVE STATEMENT
83M p/w sent from SNF, pmhx HTN CAD no stents, bipolar d/o, on lithium x 40 yrs over past 10 days noted decr BM.  (+)flatus.  L lower quadrant mass.  Pt got a glycerine enema previously and it improved his Bm.  No fever or vomiting.  VS wnl.  Mostly bedbound. From residential.  Pt thinks it is a stool ball but on exam appears to be a L inguinal hernia.  Pt not in distress.  Plan check labs, CT, rx enema, pain meds, reass.  If hernia found would consult sx.  No sign of bowel obstruction here.

## 2023-09-05 NOTE — CHART NOTE - NSCHARTNOTEFT_GEN_A_CORE
Post Operative Note  Patient: JARRETT DIAZ 83y (1940) Male   MRN: [id number]  Location: [facility]  Visit: 09-05-23 Inpatient  Date: 09-05-23 @ 20:33    Procedure: S/P Laparoscopic herniorrhaphy of left inguinal hernia using transabdominal preperitoneal (SHELBI) patch technique    Subjective: Patient seen and examined post operatively. Resting comfortably in bed. Reports pain as controlled. Denies nausea, vomiting, fever, chills, chest pain, SOB, cough.    Objective:  Vitals: T(F): 98.6 (09-05-23 @ 17:55), Max: 98.7 (09-05-23 @ 09:22)  HR: 66 (09-05-23 @ 17:55)  BP: 114/55 (09-05-23 @ 17:55) (114/55 - 150/94)  RR: 17 (09-05-23 @ 17:55)  SpO2: 96% (09-05-23 @ 17:55)  Vent Settings:     In:   09-05-23 @ 07:01  -  09-05-23 @ 20:33  --------------------------------------------------------  IN: 100 mL    IV Fluids: lactated ringers. 1000 milliLiter(s) (100 mL/Hr) IV Continuous <Continuous>      Out:   09-05-23 @ 07:01  -  09-05-23 @ 20:33  --------------------------------------------------------  OUT: 0 mL      EBL: 20 mL    Voided Urine:   09-05-23 @ 07:01  -  09-05-23 @ 20:33  --------------------------------------------------------  OUT: 0 mL      Woodruff Catheter: no     Physical Examination:  General: NAD, resting comfortably in bed  HEENT: Normocephalic atraumatic  Respiratory: Nonlabored respirations, normal CW expansion.  Cardio: S1S2, regular rate and rhythm.  Abdomen: softly distended, appropriately tender, surgical incisionsx3 are c/d/i.  Vascular: extremities are warm and well perfused.     Imaging:  No post-op imaging studies    Assessment:  83y Male patient S/P Laparoscopic herniorrhaphy of left inguinal hernia for incarcerated left inguinal hernia, recovering appropriately.    Plan:  - Pain control PRN  - Diet: CLD  - IVF:  mL/Hr  - Activity: Ambulate as tolerated  - DVT ppx: SCD's & Lovenox  - I/Os monitoring Q4 hours  - am labs    Date/Time: 09-05-23 @ 20:33    [facility] Surgery B team  [id number]

## 2023-09-05 NOTE — ED PROVIDER NOTE - PHYSICAL EXAMINATION
VS:  unremarkable    GEN - NAD;   well appearing;   A+O x3   HEAD - NC/AT     ENT - PEERL, EOMI, mucous membranes    moist , no discharge      NECK: Neck supple, non-tender without lymphadenopathy, no masses, no JVD  PULM - CTA b/l,  symmetric breath sounds  COR -  normal heart sounds    ABD - , ND, NT, soft, except tender L groin bulge, nonreducible, no overlying skin change.  Testes wnl.    BACK - no CVA tenderness, nontender spine     EXTREMS - no edema, no deformity, warm and well perfused    SKIN - no rash    or bruising      NEUROLOGIC - alert, face symmetric, speech fluent, sensation nl, motor no focal deficit.

## 2023-09-05 NOTE — BRIEF OPERATIVE NOTE - OPERATION/FINDINGS
Laparoscopic left inguinal hernia repair (SHELBI) for incarcerated bowel containing hernia with SBO. Hernia contents reduced, bowel appeared congested but viable. Flap created and hernia repaired with Bard 3D MAX 4x6 inch mesh. Flap closed with running v-loc.

## 2023-09-05 NOTE — H&P ADULT - ATTENDING COMMENTS
The patient was seen and examined, chart and notes reviewed.  The current diagnosis, plan of care and alternatives have been discussed with the patient.  All questions have been answered and updates have been discussed.  The case was discussed with team residents/PA's and medical students at  surgical rounds and throughout the course of the day.    Incarcerated LIH  a.  Admit to B surgery/ TERI HANSON  b.  NPO, start IVF   c.  CT reviewed  d.  Plan for LIHR  e.  Start chemical DVT prophylaxis

## 2023-09-06 ENCOUNTER — TRANSCRIPTION ENCOUNTER (OUTPATIENT)
Age: 83
End: 2023-09-06

## 2023-09-06 LAB
ANION GAP SERPL CALC-SCNC: 13 MMOL/L — SIGNIFICANT CHANGE UP (ref 7–14)
BUN SERPL-MCNC: 17 MG/DL — SIGNIFICANT CHANGE UP (ref 7–23)
CALCIUM SERPL-MCNC: 9.1 MG/DL — SIGNIFICANT CHANGE UP (ref 8.4–10.5)
CHLORIDE SERPL-SCNC: 103 MMOL/L — SIGNIFICANT CHANGE UP (ref 98–107)
CO2 SERPL-SCNC: 25 MMOL/L — SIGNIFICANT CHANGE UP (ref 22–31)
CREAT SERPL-MCNC: 1.08 MG/DL — SIGNIFICANT CHANGE UP (ref 0.5–1.3)
EGFR: 68 ML/MIN/1.73M2 — SIGNIFICANT CHANGE UP
GLUCOSE SERPL-MCNC: 109 MG/DL — HIGH (ref 70–99)
MAGNESIUM SERPL-MCNC: 2.2 MG/DL — SIGNIFICANT CHANGE UP (ref 1.6–2.6)
PHOSPHATE SERPL-MCNC: 3.9 MG/DL — SIGNIFICANT CHANGE UP (ref 2.5–4.5)
POTASSIUM SERPL-MCNC: 4.3 MMOL/L — SIGNIFICANT CHANGE UP (ref 3.5–5.3)
POTASSIUM SERPL-SCNC: 4.3 MMOL/L — SIGNIFICANT CHANGE UP (ref 3.5–5.3)
SODIUM SERPL-SCNC: 141 MMOL/L — SIGNIFICANT CHANGE UP (ref 135–145)

## 2023-09-06 RX ORDER — LITHIUM CARBONATE 300 MG/1
450 TABLET, EXTENDED RELEASE ORAL
Refills: 0 | Status: DISCONTINUED | OUTPATIENT
Start: 2023-09-06 | End: 2023-09-08

## 2023-09-06 RX ADMIN — Medication 1000 MILLIGRAM(S): at 05:48

## 2023-09-06 RX ADMIN — ENOXAPARIN SODIUM 40 MILLIGRAM(S): 100 INJECTION SUBCUTANEOUS at 19:35

## 2023-09-06 RX ADMIN — Medication 1000 MILLIGRAM(S): at 06:18

## 2023-09-06 RX ADMIN — LITHIUM CARBONATE 450 MILLIGRAM(S): 300 TABLET, EXTENDED RELEASE ORAL at 18:11

## 2023-09-06 NOTE — DISCHARGE NOTE PROVIDER - NSDCFUADDAPPT_GEN_ALL_CORE_FT
Please follow up with your primary care provider 1-2 weeks after discharge regarding recent hospitalization.

## 2023-09-06 NOTE — DISCHARGE NOTE PROVIDER - HOSPITAL COURSE
Patient is an 83 year old male with a PMHx of HTN, HLD, CAD, bipolar disorder (on Lithium) and PSHx of bilateral inguinal hernia repairs (~40 years ago) who presented to the ED after noticing a bulge in his left groin a couple days ago.    On 9/5 CT Abdomen / Pelvis performed showing small bowel obstruction with transition point involving a left inguinal hernia containing small bowel and fluid.  Patient went to the OR for a laparoscopic left inguinal hernia repair (SHELBI) for incarcerated bowel containing hernia with SBO.  Post operatively patient was advanced to a clear liquid diet, which he tolerated well.    On 9/6 patient was advanced to a soft and bite sized diet, which he tolerated well.    At the time of discharge, the patient was hemodynamically stable, was tolerating PO diet, was voiding urine and passing stool.  He was ambulating and was comfortable with adequate pain control.  The patient was instructed to follow up with Dr. Au within 1 week after discharge from the hospital.  The patient / family felt comfortable with discharge.  The patient had no other issues.    Per attending, patient deemed medically stable and ready for discharge at this time.

## 2023-09-06 NOTE — ANESTHESIA FOLLOW-UP NOTE - NSCONDITION_GEN_ALL_CORE
Stable Continued growth and development Follow up with PMD 1-2 days  Feeding on demand and at least every 3 hrs  Monitor diaper count Follow up with Pediatrician in 1-2 days  Feeding on demand and at least every 3 hrs  Monitor diaper count

## 2023-09-06 NOTE — DISCHARGE NOTE NURSING/CASE MANAGEMENT/SOCIAL WORK - NSDCPEFALRISK_GEN_ALL_CORE
For information on Fall & Injury Prevention, visit: [email or web address] OR  [email or web address] OR  https://www.cdc.gov/steadi/patient.html

## 2023-09-06 NOTE — DISCHARGE NOTE NURSING/CASE MANAGEMENT/SOCIAL WORK - PATIENT PORTAL LINK FT
You can access the FollowMyHealth Patient Portal offered by [facility] by registering at the following website: [email or web address]. By joining [employer]’s FollowMyHealth portal, you will also be able to view your health information using other applications (apps) compatible with our system.

## 2023-09-06 NOTE — DISCHARGE NOTE NURSING/CASE MANAGEMENT/SOCIAL WORK - NSDCVIVACCINE_GEN_ALL_CORE_FT
No Vaccines Administered. influenza, high-dose, quadrivalent; 07-Sep-2023 14:53; Akilah Garcia (ERNESTO); [employer]; [id number] (Exp. Date: 07-Jun-2024); IntraMuscular; Deltoid Right.; 0.7 milliLiter(s); VIS (VIS Published: 06-Aug-2021, VIS Presented: 07-Sep-2023);

## 2023-09-06 NOTE — DISCHARGE NOTE PROVIDER - NSDCCPTREATMENT_GEN_ALL_CORE_FT
PRINCIPAL PROCEDURE  Procedure: Laparoscopic herniorrhaphy of left inguinal hernia using transabdominal preperitoneal (SHELBI) patch technique  Findings and Treatment: 9/5/23

## 2023-09-06 NOTE — DISCHARGE NOTE PROVIDER - DISCHARGE DIET
Soft and Bite-Sized Diet Topical Sulfur Applications Pregnancy And Lactation Text: This medication is Pregnancy Category C and has an unknown safety profile during pregnancy. It is unknown if this topical medication is excreted in breast milk.

## 2023-09-06 NOTE — DISCHARGE NOTE PROVIDER - CARE PROVIDER_API CALL
Michelle Au [facility]  Surgery  [address] [place] [address] Level [place] [address]  Phone: [phone]  Fax: [phone]  Follow Up Time: 1 week

## 2023-09-06 NOTE — DISCHARGE NOTE PROVIDER - NSDCFUADDINST_GEN_ALL_CORE_FT
WOUND CARE: Please keep incisions clean and dry.  Please do not scrub or rub incisions.  Do not use lotion or powder on incisions.   BATHING: Please do not submerge wound underwater.  You may shower and / or sponge bathe.  ACTIVITY: No heavy lifting or straining.  Otherwise, you may return to your usual level of physical activity.  If you are taking narcotic pain medication (such as Oxycodone or Percocet) DO NOT drive a car, operate machinery or make important decisions.  DIET: Resume a soft and bite sized diet.  NOTIFY YOUR SURGEON IF: You have any bleeding that does not stop, any pus draining from your wound(s), any fever (over 100.4 F) or chills, persistent nausea / vomiting, persistent diarrhea or if your pain is not controlled on your discharge pain medications.  FOLLOW-UP:   1. Please follow up with your primary care physician in one week regarding your hospitalization.    2. Please follow-up with your surgeon, Dr. Canales within 7 days following discharge.  Please call [phone] to schedule an appointment. WOUND CARE: Please keep incisions clean and dry.  Please do not scrub or rub incisions.  Do not use lotion or powder on incisions.   BATHING: Please do not submerge wound underwater.  You may shower and / or sponge bathe.  ACTIVITY: No heavy lifting or straining.  Otherwise, you may return to your usual level of physical activity.  If you are taking narcotic pain medication (such as Oxycodone or Percocet) DO NOT drive a car, operate machinery or make important decisions.  DIET: Resume a soft and bite sized diet.  NOTIFY YOUR SURGEON IF: You have any bleeding that does not stop, any pus draining from your wound(s), any fever (over 100.4 F) or chills, persistent nausea / vomiting, persistent diarrhea or if your pain is not controlled on your discharge pain medications.  FOLLOW-UP:   1. Please follow up with your primary care physician in one week regarding your hospitalization.    2. Please follow-up with your surgeon, Dr. Au within 7 days following discharge.  Please call [phone] to schedule an appointment.

## 2023-09-06 NOTE — DISCHARGE NOTE PROVIDER - NSDCMRMEDTOKEN_GEN_ALL_CORE_FT
acetaminophen 500 mg oral tablet: 2 tab(s) orally every 6 hours  ibuprofen 400 mg oral tablet: 1 tab(s) orally every 6 hours  lithium 450 mg oral tablet, extended release: 1 tab(s) orally 2 times a day

## 2023-09-06 NOTE — DISCHARGE NOTE NURSING/CASE MANAGEMENT/SOCIAL WORK - NSDCPNINST_GEN_ALL_CORE
Make a follow up appointment with DR. Au. Call MD if you develop a fever, or if there is redness, swelling, drainage or pain not relieved by pain medication. No heavy lifting, bending, or straining to move your bowels. Take over the counter stool softeners as needed to prevent constipation which may be caused by pain medication.

## 2023-09-06 NOTE — DISCHARGE NOTE PROVIDER - NSDCCPCAREPLAN_GEN_ALL_CORE_FT
PRINCIPAL DISCHARGE DIAGNOSIS  Diagnosis: Incarcerated inguinal hernia  Assessment and Plan of Treatment:       SECONDARY DISCHARGE DIAGNOSES  Diagnosis: SBO (small bowel obstruction)  Assessment and Plan of Treatment:

## 2023-09-06 NOTE — PROGRESS NOTE ADULT - SUBJECTIVE AND OBJECTIVE BOX
Surgery Daily Progress Note  =====================================================  Interval / Overnight Events: No acute events overnight.      HPI:  Patient is an 83 year old male with a PMHx of HTN, HLD, CAD, bipolar disorder (on Lithium) and PSHx of bilateral inguinal hernia repairs (~40 years ago) who presented to the ED after noticing a bulge in his left groin a couple days ago. (05 Sep 2023 05:08)      PAST MEDICAL & SURGICAL HISTORY:  Bipolar disorder  Hypercholesterolemia  Hypertension  CAD (coronary artery disease)  No significant past surgical history      ALLERGIES:  No Known Allergies    --------------------------------------------------------------------------------------    MEDICATIONS:    Neurologic Medications  acetaminophen     Tablet .. 1000 milliGRAM(s) Oral every 6 hours  ibuprofen  Tablet. 400 milliGRAM(s) Oral every 6 hours  oxyCODONE    IR 2.5 milliGRAM(s) Oral every 4 hours PRN Moderate Pain (4 - 6)  oxyCODONE    IR 5 milliGRAM(s) Oral every 4 hours PRN Severe Pain (7 - 10)    Gastrointestinal Medications  lactated ringers. 1000 milliLiter(s) IV Continuous <Continuous>    Hematologic/Oncologic Medications  enoxaparin Injectable 40 milliGRAM(s) SubCutaneous every 24 hours  influenza  Vaccine (HIGH DOSE) 0.7 milliLiter(s) IntraMuscular once    --------------------------------------------------------------------------------------    VITAL SIGNS:  T(C): 37 (06 Sep 2023 05:44), Max: 37.1 (05 Sep 2023 09:22)  T(F): 98.6 (06 Sep 2023 05:44), Max: 98.7 (05 Sep 2023 09:22)  HR: 74 (06 Sep 2023 05:44) (65 - 87)  BP: 107/54 (06 Sep 2023 05:44) (107/54 - 134/71)  BP(mean): 68 (05 Sep 2023 16:00) (65 - 89)  RR: 17 (06 Sep 2023 05:44) (14 - 20)  SpO2: 96% (06 Sep 2023 05:44) (94% - 99%)    --------------------------------------------------------------------------------------    INS AND OUTS:    05 Sep 2023 07:01  -  06 Sep 2023 07:00  --------------------------------------------------------  IN:    Lactated Ringers: 100 mL    Oral Fluid: 170 mL  Total IN: 270 mL    OUT:    Voided (mL): 150 mL  Total OUT: 150 mL    Total NET: 120 mL    --------------------------------------------------------------------------------------    EXAM    NEUROLOGY   Exam: Normal, in no acute distress.    HEENT  Exam: Normocephalic, atraumatic.    RESPIRATORY  Exam: Normal expansion / effort.     CARDIOVASCULAR  Exam: S1, S2.  Regular rate and rhythm.    GI/NUTRITION  Exam: Abdomen soft, Non-tender, Non-distended.  Incisions clean, dry and intact.  Current Diet: Clear liquid diet    MUSCULOSKELETAL  Exam: All extremities moving spontaneously without limitations.      METABOLIC / FLUIDS / ELECTROLYTES  lactated ringers. 1000 milliLiter(s) IV Continuous <Continuous>      HEMATOLOGIC  [x] VTE Prophylaxis: enoxaparin Injectable 40 milliGRAM(s) SubCutaneous every 24 hours      INFECTIOUS DISEASE  Antimicrobials/Immunologic Medications:  influenza  Vaccine (HIGH DOSE) 0.7 milliLiter(s) IntraMuscular once    --------------------------------------------------------------------------------------

## 2023-09-06 NOTE — PROGRESS NOTE ADULT - ASSESSMENT
Patient is an 83 year old male with a PMHx of HTN, HLD, CAD, bipolar disorder (on Lithium) and PSHx of bilateral inguinal hernia repairs (~40 years ago) who presented to the ED after noticing a bulge in his left groin a couple days ago.  CT Abdomen / Pelvis performed showing small bowel obstruction with transition point involving a left inguinal hernia containing small bowel and fluid.  Patient is now S/P a laparoscopic left inguinal hernia repair (SHELBI) for incarcerated bowel containing hernia with SBO on 9/5/23.      PLAN:  - Clear liquid diet  - LR @100cc/hr  - Out of bed  - Pain control  - VTE prophylaxis with Lovenox subcutaneous      #[id number]  B Team Surgery

## 2023-09-07 LAB
ANION GAP SERPL CALC-SCNC: 13 MMOL/L — SIGNIFICANT CHANGE UP (ref 7–14)
BUN SERPL-MCNC: 15 MG/DL — SIGNIFICANT CHANGE UP (ref 7–23)
CALCIUM SERPL-MCNC: 8.7 MG/DL — SIGNIFICANT CHANGE UP (ref 8.4–10.5)
CHLORIDE SERPL-SCNC: 105 MMOL/L — SIGNIFICANT CHANGE UP (ref 98–107)
CO2 SERPL-SCNC: 21 MMOL/L — LOW (ref 22–31)
CREAT SERPL-MCNC: 1.01 MG/DL — SIGNIFICANT CHANGE UP (ref 0.5–1.3)
EGFR: 74 ML/MIN/1.73M2 — SIGNIFICANT CHANGE UP
GLUCOSE SERPL-MCNC: 100 MG/DL — HIGH (ref 70–99)
HCT VFR BLD CALC: 39.6 % — SIGNIFICANT CHANGE UP (ref 39–50)
HGB BLD-MCNC: 13.1 G/DL — SIGNIFICANT CHANGE UP (ref 13–17)
MAGNESIUM SERPL-MCNC: 2.1 MG/DL — SIGNIFICANT CHANGE UP (ref 1.6–2.6)
MCHC RBC-ENTMCNC: 31.4 PG — SIGNIFICANT CHANGE UP (ref 27–34)
MCHC RBC-ENTMCNC: 33.1 GM/DL — SIGNIFICANT CHANGE UP (ref 32–36)
MCV RBC AUTO: 95 FL — SIGNIFICANT CHANGE UP (ref 80–100)
NRBC # BLD: 0 /100 WBCS — SIGNIFICANT CHANGE UP (ref 0–0)
NRBC # FLD: 0 K/UL — SIGNIFICANT CHANGE UP (ref 0–0)
PHOSPHATE SERPL-MCNC: 2.3 MG/DL — LOW (ref 2.5–4.5)
PLATELET # BLD AUTO: 158 K/UL — SIGNIFICANT CHANGE UP (ref 150–400)
POTASSIUM SERPL-MCNC: 3.9 MMOL/L — SIGNIFICANT CHANGE UP (ref 3.5–5.3)
POTASSIUM SERPL-SCNC: 3.9 MMOL/L — SIGNIFICANT CHANGE UP (ref 3.5–5.3)
RBC # BLD: 4.17 M/UL — LOW (ref 4.2–5.8)
RBC # FLD: 13.8 % — SIGNIFICANT CHANGE UP (ref 10.3–14.5)
SODIUM SERPL-SCNC: 139 MMOL/L — SIGNIFICANT CHANGE UP (ref 135–145)
WBC # BLD: 9.57 K/UL — SIGNIFICANT CHANGE UP (ref 3.8–10.5)
WBC # FLD AUTO: 9.57 K/UL — SIGNIFICANT CHANGE UP (ref 3.8–10.5)

## 2023-09-07 RX ORDER — POLYETHYLENE GLYCOL 3350 17 G/17G
17 POWDER, FOR SOLUTION ORAL EVERY 12 HOURS
Refills: 0 | Status: DISCONTINUED | OUTPATIENT
Start: 2023-09-07 | End: 2023-09-08

## 2023-09-07 RX ORDER — SODIUM,POTASSIUM PHOSPHATES 278-250MG
1 POWDER IN PACKET (EA) ORAL ONCE
Refills: 0 | Status: COMPLETED | OUTPATIENT
Start: 2023-09-07 | End: 2023-09-07

## 2023-09-07 RX ADMIN — LITHIUM CARBONATE 450 MILLIGRAM(S): 300 TABLET, EXTENDED RELEASE ORAL at 07:05

## 2023-09-07 RX ADMIN — Medication 1000 MILLIGRAM(S): at 08:02

## 2023-09-07 RX ADMIN — Medication 1 TABLET(S): at 14:24

## 2023-09-07 RX ADMIN — POLYETHYLENE GLYCOL 3350 17 GRAM(S): 17 POWDER, FOR SOLUTION ORAL at 19:22

## 2023-09-07 RX ADMIN — Medication 1000 MILLIGRAM(S): at 07:05

## 2023-09-07 RX ADMIN — INFLUENZA VIRUS VACCINE 0.7 MILLILITER(S): 15; 15; 15; 15 SUSPENSION INTRAMUSCULAR at 14:53

## 2023-09-07 RX ADMIN — Medication 1000 MILLIGRAM(S): at 01:22

## 2023-09-07 RX ADMIN — Medication 1000 MILLIGRAM(S): at 02:22

## 2023-09-07 RX ADMIN — LITHIUM CARBONATE 450 MILLIGRAM(S): 300 TABLET, EXTENDED RELEASE ORAL at 19:21

## 2023-09-07 RX ADMIN — ENOXAPARIN SODIUM 40 MILLIGRAM(S): 100 INJECTION SUBCUTANEOUS at 19:21

## 2023-09-07 NOTE — PROGRESS NOTE ADULT - ASSESSMENT
Patient is an 83 year old male with a PMHx of HTN, HLD, CAD, bipolar disorder (on Lithium) and PSHx of bilateral inguinal hernia repairs (~40 years ago) who presented to the ED after noticing a bulge in his left groin a couple days ago.  CT Abdomen / Pelvis performed showing small bowel obstruction with transition point involving a left inguinal hernia containing small bowel and fluid.  Patient is now S/P a laparoscopic left inguinal hernia repair (SHELBI) for incarcerated bowel containing hernia with SBO on 9/5/23. He is recovering well, passing flatus, urinating without issue, pain is well controlled, and tolerating regular diet. Medically ready for discharge. He will go home tomorrow per his preference.       PLAN:  - regular diet  - discharge when pt is amenable       #[id number]  B Team Surgery

## 2023-09-07 NOTE — PROGRESS NOTE ADULT - SUBJECTIVE AND OBJECTIVE BOX
Surgery Progress Note     24hour Events:     Subjective:  Patient seen and examined. No complaints today, but feels like he would prefer to stay for an extra day to make sure he is ready to go home. Passing flatus, no BM yet.       Vital Signs:  Vital Signs Last 24 Hrs  T(C): 36.7 (07 Sep 2023 06:33), Max: 37.3 (06 Sep 2023 14:01)  T(F): 98.1 (07 Sep 2023 06:33), Max: 99.2 (06 Sep 2023 14:01)  HR: 64 (07 Sep 2023 06:33) (63 - 85)  BP: 103/52 (07 Sep 2023 06:33) (103/52 - 135/60)  BP(mean): --  RR: 18 (07 Sep 2023 06:33) (17 - 18)  SpO2: 98% (07 Sep 2023 06:33) (90% - 99%)    Parameters below as of 07 Sep 2023 06:33  Patient On (Oxygen Delivery Method): room air        CAPILLARY BLOOD GLUCOSE          I&O's Detail    06 Sep 2023 07:01  -  07 Sep 2023 07:00  --------------------------------------------------------  IN:    Oral Fluid: 100 mL  Total IN: 100 mL    OUT:    Voided (mL): 750 mL  Total OUT: 750 mL    Total NET: -650 mL            Physical Exam:  General: NAD, resting comfortably in bed  HEENT: Normocephalic atraumatic  Respiratory: Nonlabored respirations  Cardio: pulse present  Abdomen: surgical incisionsx3 are c/d/i, abdomen non-tender  Vascular: extremities are warm and well perfused.       Labs:    09-07    139  |  105  |  15  ----------------------------<  100<H>  3.9   |  21<L>  |  1.01    Ca    8.7      07 Sep 2023 05:10  Phos  2.3     09-07  Mg     2.10     09-07                              13.1   9.57  )-----------( 158      ( 07 Sep 2023 05:10 )             39.6

## 2023-09-08 VITALS
SYSTOLIC BLOOD PRESSURE: 115 MMHG | HEART RATE: 76 BPM | OXYGEN SATURATION: 96 % | RESPIRATION RATE: 17 BRPM | DIASTOLIC BLOOD PRESSURE: 68 MMHG | TEMPERATURE: 98 F

## 2023-09-08 RX ORDER — ACETAMINOPHEN 500 MG
2 TABLET ORAL
Qty: 0 | Refills: 0 | DISCHARGE
Start: 2023-09-08

## 2023-09-08 RX ORDER — LITHIUM CARBONATE 300 MG/1
1 TABLET, EXTENDED RELEASE ORAL
Qty: 0 | Refills: 0 | DISCHARGE
Start: 2023-09-08

## 2023-09-08 RX ORDER — IBUPROFEN 200 MG
1 TABLET ORAL
Qty: 0 | Refills: 0 | DISCHARGE
Start: 2023-09-08

## 2023-09-08 RX ADMIN — LITHIUM CARBONATE 450 MILLIGRAM(S): 300 TABLET, EXTENDED RELEASE ORAL at 05:15

## 2023-09-08 NOTE — PROGRESS NOTE ADULT - ASSESSMENT
83 year old female with PMH CAD, HLD, HTN, bipolar disorder and PSH bilateral inguinal hernia repairs presents s/p laparoscopic L inguinal hernia repair on 9/5    Plan  - Diet: regular  - Lithium restarted  - Pain control  - DVT prophylaxis  - Dispo planning    B team surgery [id number] 83 year old female with PMH CAD, HLD, HTN, bipolar disorder and PSH bilateral inguinal hernia repairs presents s/p laparoscopic L inguinal hernia repair on 9/5    Plan  - Diet: regular  - Lithium restarted  - Pain control  - DVT prophylaxis  - PT consult  - Patient is surgically cleared for discharge    B team surgery [id number]

## 2023-09-08 NOTE — PROGRESS NOTE ADULT - SUBJECTIVE AND OBJECTIVE BOX
TEAM [ B ] Surgery Daily Progress Note  =====================================================    SUBJECTIVE: Patient seen and examined at bedside on AM rounds. Patient reports that they're feeling well. Denies fever, chills, N/V, chest pain, SOB    ALLERGIES:  No Known Allergies      --------------------------------------------------------------------------------------    MEDICATIONS:    Neurologic Medications  acetaminophen     Tablet .. 1000 milliGRAM(s) Oral every 6 hours  ibuprofen  Tablet. 400 milliGRAM(s) Oral every 6 hours  lithium CR (ESKALITH-CR) 450 milliGRAM(s) Oral two times a day  oxyCODONE    IR 2.5 milliGRAM(s) Oral every 4 hours PRN Moderate Pain (4 - 6)  oxyCODONE    IR 5 milliGRAM(s) Oral every 4 hours PRN Severe Pain (7 - 10)    Respiratory Medications    Cardiovascular Medications    Gastrointestinal Medications  polyethylene glycol 3350 17 Gram(s) Oral every 12 hours    Genitourinary Medications    Hematologic/Oncologic Medications  enoxaparin Injectable 40 milliGRAM(s) SubCutaneous every 24 hours    Antimicrobial/Immunologic Medications    Endocrine/Metabolic Medications    Topical/Other Medications    --------------------------------------------------------------------------------------    VITAL SIGNS:  T(C): 37 (09-08-23 @ 05:21), Max: 37.2 (09-08-23 @ 01:33)  HR: 64 (09-08-23 @ 05:21) (64 - 76)  BP: 110/76 (09-08-23 @ 05:21) (103/52 - 128/73)  RR: 17 (09-08-23 @ 05:21) (17 - 18)  SpO2: 95% (09-08-23 @ 05:21) (95% - 100%)  --------------------------------------------------------------------------------------    INS AND OUTS:    09-06-23 @ 07:01  -  09-07-23 @ 07:00  --------------------------------------------------------  IN: 100 mL / OUT: 750 mL / NET: -650 mL    09-07-23 @ 07:01  -  09-08-23 @ 05:59  --------------------------------------------------------  IN: 0 mL / OUT: 700 mL / NET: -700 mL      --------------------------------------------------------------------------------------      EXAM    General: NAD, resting in bed comfortably.  Cardiac: regular rate, warm and well perfused  Respiratory: Nonlabored respirations, normal cw expansion.  Abdomen: soft, nontender, nondistended. ___ incision is c/d/i, ostomy, NGT, faust.   Extremities: normal strength, FROM, no deformities    --------------------------------------------------------------------------------------    LABS       TEAM [ B ] Surgery Daily Progress Note  =====================================================    SUBJECTIVE: Patient seen and examined at bedside on AM rounds. Patient reports that they're feeling well. Denies fever, chills, N/V, chest pain, SOB    ALLERGIES:  No Known Allergies      --------------------------------------------------------------------------------------    MEDICATIONS:    Neurologic Medications  acetaminophen     Tablet .. 1000 milliGRAM(s) Oral every 6 hours  ibuprofen  Tablet. 400 milliGRAM(s) Oral every 6 hours  lithium CR (ESKALITH-CR) 450 milliGRAM(s) Oral two times a day  oxyCODONE    IR 2.5 milliGRAM(s) Oral every 4 hours PRN Moderate Pain (4 - 6)  oxyCODONE    IR 5 milliGRAM(s) Oral every 4 hours PRN Severe Pain (7 - 10)    Respiratory Medications    Cardiovascular Medications    Gastrointestinal Medications  polyethylene glycol 3350 17 Gram(s) Oral every 12 hours    Genitourinary Medications    Hematologic/Oncologic Medications  enoxaparin Injectable 40 milliGRAM(s) SubCutaneous every 24 hours    Antimicrobial/Immunologic Medications    Endocrine/Metabolic Medications    Topical/Other Medications    --------------------------------------------------------------------------------------    VITAL SIGNS:  T(C): 37 (09-08-23 @ 05:21), Max: 37.2 (09-08-23 @ 01:33)  HR: 64 (09-08-23 @ 05:21) (64 - 76)  BP: 110/76 (09-08-23 @ 05:21) (103/52 - 128/73)  RR: 17 (09-08-23 @ 05:21) (17 - 18)  SpO2: 95% (09-08-23 @ 05:21) (95% - 100%)  --------------------------------------------------------------------------------------    INS AND OUTS:    09-06-23 @ 07:01  -  09-07-23 @ 07:00  --------------------------------------------------------  IN: 100 mL / OUT: 750 mL / NET: -650 mL    09-07-23 @ 07:01  -  09-08-23 @ 05:59  --------------------------------------------------------  IN: 0 mL / OUT: 700 mL / NET: -700 mL      --------------------------------------------------------------------------------------      EXAM    General: NAD, resting in bed comfortably.  Cardiac: regular rate, warm and well perfused  Respiratory: Nonlabored respirations, normal cw expansion.  Abdomen: soft, nontender, nondistended, port sites c/d/i  Extremities: normal strength, FROM, no deformities    --------------------------------------------------------------------------------------    LABS                        13.1   9.57  )-----------( 158      ( 07 Sep 2023 05:10 )             39.6   09-07    139  |  105  |  15  ----------------------------<  100<H>  3.9   |  21<L>  |  1.01    Ca    8.7      07 Sep 2023 05:10  Phos  2.3     09-07  Mg     2.10     09-07

## 2023-09-08 NOTE — PHYSICAL THERAPY INITIAL EVALUATION ADULT - LEVEL OF CONSCIOUSNESS, REHAB EVAL
[facility] Emergency Department/Urgent Care Lab result notification [Positive for uti and bacteria is susceptible to antibiotic]:    Reason for call:   Notify of Final urine culture result, confirm patient is taking antibiotic, assess symptoms, and advise per Emergency Dept/Urgent Care discharge instructions and Emergency Dept urine culture protocol.    Lab Result & Date of Final Report [copied from Result Note]:    Final Urine Culture Report on 5/31/21  [facility] Emergency Dept discharge antibiotic prescribed: Cefdinir (Omnicef) 300 mg capsule, 1 capsule (300 mg) by mouth 2 times daily for 10 days  #1. Bacteria, >100,000 colonies/mL Escherichia coli , is SUSCEPTIBLE to Antibiotic.    Recommendations in treatment per [facility] ED lab result Urine Culture protocol.    Current symptoms (include time patient called):    1:53PM: Spoke with patient. He states he is feeling better and is taking the antibiotic and has seen his PCP since his ED visit.     Recommendations/Instructions:   Patient notified of lab result and treatment recommendation.   Take antibiotic as directed by the Emergency Dept/Urgent Care Provider.  Advised to follow up with the PCP and urology as directed by the ED provider.  The patient is comfortable with the information given and has no further questions.     Please contact you PCP or return to the Emergency Department if your:    Symptoms return    Symptoms worsen or other concerning symptoms    Saima Lawrence RN  [facility]  Emergency Dept Lab Result RN  Ph# [phone]            alert

## 2023-09-12 ENCOUNTER — APPOINTMENT (OUTPATIENT)
Dept: GERIATRICS | Facility: ASSISTED LIVING FACILITY | Age: 83
End: 2023-09-12
Payer: MEDICARE

## 2023-09-12 VITALS — HEART RATE: 78 BPM | DIASTOLIC BLOOD PRESSURE: 80 MMHG | SYSTOLIC BLOOD PRESSURE: 134 MMHG

## 2023-09-12 DIAGNOSIS — K40.90 UNILATERAL INGUINAL HERNIA, W/OUT OBSTRUCTION OR GANGRENE, NOT SPECIFIED AS RECURRENT: ICD-10-CM

## 2023-09-12 PROCEDURE — 99348 HOME/RES VST EST LOW MDM 30: CPT

## 2023-10-09 RX ORDER — DOCUSATE SODIUM 100 MG/1
100 CAPSULE ORAL
Qty: 100 | Refills: 2 | Status: ACTIVE | COMMUNITY
Start: 2022-01-13 | End: 1900-01-01

## 2023-10-31 NOTE — ED PROVIDER NOTE - NSICDXNOPASTSURGICALHX_GEN_ALL_ED
<-- Click to add NO significant Past Surgical History Doxycycline Pregnancy And Lactation Text: This medication is Pregnancy Category D and not consider safe during pregnancy. It is also excreted in breast milk but is considered safe for shorter treatment courses.

## 2023-11-07 ENCOUNTER — APPOINTMENT (OUTPATIENT)
Dept: GERIATRICS | Facility: ASSISTED LIVING FACILITY | Age: 83
End: 2023-11-07
Payer: MEDICARE

## 2023-11-07 VITALS — SYSTOLIC BLOOD PRESSURE: 128 MMHG | DIASTOLIC BLOOD PRESSURE: 70 MMHG

## 2023-11-07 DIAGNOSIS — M19.90 UNSPECIFIED OSTEOARTHRITIS, UNSPECIFIED SITE: ICD-10-CM

## 2023-11-07 DIAGNOSIS — R53.83 OTHER MALAISE: ICD-10-CM

## 2023-11-07 DIAGNOSIS — R53.81 OTHER MALAISE: ICD-10-CM

## 2023-11-07 PROCEDURE — 99348 HOME/RES VST EST LOW MDM 30: CPT

## 2023-11-08 ENCOUNTER — INPATIENT (INPATIENT)
Facility: HOSPITAL | Age: 83
LOS: 7 days | Discharge: NURSING, REHAB OR LONG-TERM CARE | DRG: 280 | End: 2023-11-16
Attending: HOSPITALIST | Admitting: STUDENT IN AN ORGANIZED HEALTH CARE EDUCATION/TRAINING PROGRAM
Payer: MEDICARE

## 2023-11-08 VITALS
DIASTOLIC BLOOD PRESSURE: 90 MMHG | RESPIRATION RATE: 20 BRPM | OXYGEN SATURATION: 97 % | HEART RATE: 97 BPM | SYSTOLIC BLOOD PRESSURE: 159 MMHG | TEMPERATURE: 99 F

## 2023-11-08 DIAGNOSIS — J81.0 ACUTE PULMONARY EDEMA: ICD-10-CM

## 2023-11-08 LAB
ALBUMIN SERPL ELPH-MCNC: 4.9 G/DL — SIGNIFICANT CHANGE UP (ref 3.3–5)
ALP SERPL-CCNC: 94 U/L — SIGNIFICANT CHANGE UP (ref 40–120)
ALT FLD-CCNC: 9 U/L — LOW (ref 10–45)
ANION GAP SERPL CALC-SCNC: 16 MMOL/L — SIGNIFICANT CHANGE UP (ref 5–17)
APPEARANCE UR: CLEAR — SIGNIFICANT CHANGE UP
APTT BLD: 30.6 SEC — SIGNIFICANT CHANGE UP (ref 24.5–35.6)
AST SERPL-CCNC: 12 U/L — SIGNIFICANT CHANGE UP (ref 10–40)
BACTERIA # UR AUTO: NEGATIVE /HPF — SIGNIFICANT CHANGE UP
BASE EXCESS BLDV CALC-SCNC: -2.4 MMOL/L — LOW (ref -2–3)
BASOPHILS # BLD AUTO: 0.08 K/UL — SIGNIFICANT CHANGE UP (ref 0–0.2)
BASOPHILS NFR BLD AUTO: 0.6 % — SIGNIFICANT CHANGE UP (ref 0–2)
BILIRUB SERPL-MCNC: 0.6 MG/DL — SIGNIFICANT CHANGE UP (ref 0.2–1.2)
BILIRUB UR-MCNC: NEGATIVE — SIGNIFICANT CHANGE UP
BUN SERPL-MCNC: 19 MG/DL — SIGNIFICANT CHANGE UP (ref 7–23)
CA-I SERPL-SCNC: 1.2 MMOL/L — SIGNIFICANT CHANGE UP (ref 1.15–1.33)
CALCIUM SERPL-MCNC: 10 MG/DL — SIGNIFICANT CHANGE UP (ref 8.4–10.5)
CAST: 10 /LPF — HIGH (ref 0–4)
CHLORIDE BLDV-SCNC: 105 MMOL/L — SIGNIFICANT CHANGE UP (ref 96–108)
CHLORIDE SERPL-SCNC: 103 MMOL/L — SIGNIFICANT CHANGE UP (ref 96–108)
CO2 BLDV-SCNC: 27 MMOL/L — HIGH (ref 22–26)
CO2 SERPL-SCNC: 21 MMOL/L — LOW (ref 22–31)
COLOR SPEC: YELLOW — SIGNIFICANT CHANGE UP
CREAT SERPL-MCNC: 1.2 MG/DL — SIGNIFICANT CHANGE UP (ref 0.5–1.3)
DIFF PNL FLD: NEGATIVE — SIGNIFICANT CHANGE UP
EGFR: 60 ML/MIN/1.73M2 — SIGNIFICANT CHANGE UP
EOSINOPHIL # BLD AUTO: 0.23 K/UL — SIGNIFICANT CHANGE UP (ref 0–0.5)
EOSINOPHIL NFR BLD AUTO: 1.7 % — SIGNIFICANT CHANGE UP (ref 0–6)
GAS PNL BLDV: 137 MMOL/L — SIGNIFICANT CHANGE UP (ref 136–145)
GAS PNL BLDV: SIGNIFICANT CHANGE UP
GLUCOSE BLDV-MCNC: 137 MG/DL — HIGH (ref 70–99)
GLUCOSE SERPL-MCNC: 113 MG/DL — HIGH (ref 70–99)
GLUCOSE UR QL: NEGATIVE MG/DL — SIGNIFICANT CHANGE UP
HCO3 BLDV-SCNC: 25 MMOL/L — SIGNIFICANT CHANGE UP (ref 22–29)
HCT VFR BLD CALC: 52.8 % — HIGH (ref 39–50)
HCT VFR BLDA CALC: 47 % — SIGNIFICANT CHANGE UP (ref 39–51)
HGB BLD CALC-MCNC: 15.7 G/DL — SIGNIFICANT CHANGE UP (ref 12.6–17.4)
HGB BLD-MCNC: 17.4 G/DL — HIGH (ref 13–17)
IMM GRANULOCYTES NFR BLD AUTO: 0.4 % — SIGNIFICANT CHANGE UP (ref 0–0.9)
INR BLD: 1.18 RATIO — SIGNIFICANT CHANGE UP (ref 0.85–1.18)
KETONES UR-MCNC: NEGATIVE MG/DL — SIGNIFICANT CHANGE UP
LACTATE BLDV-MCNC: 2 MMOL/L — SIGNIFICANT CHANGE UP (ref 0.5–2)
LEUKOCYTE ESTERASE UR-ACNC: ABNORMAL
LITHIUM SERPL-MCNC: 0.2 MMOL/L — LOW (ref 0.6–1.2)
LYMPHOCYTES # BLD AUTO: 25.1 % — SIGNIFICANT CHANGE UP (ref 13–44)
LYMPHOCYTES # BLD AUTO: 3.38 K/UL — HIGH (ref 1–3.3)
MAGNESIUM SERPL-MCNC: 2 MG/DL — SIGNIFICANT CHANGE UP (ref 1.6–2.6)
MCHC RBC-ENTMCNC: 32 PG — SIGNIFICANT CHANGE UP (ref 27–34)
MCHC RBC-ENTMCNC: 33 GM/DL — SIGNIFICANT CHANGE UP (ref 32–36)
MCV RBC AUTO: 97.1 FL — SIGNIFICANT CHANGE UP (ref 80–100)
MONOCYTES # BLD AUTO: 0.89 K/UL — SIGNIFICANT CHANGE UP (ref 0–0.9)
MONOCYTES NFR BLD AUTO: 6.6 % — SIGNIFICANT CHANGE UP (ref 2–14)
NEUTROPHILS # BLD AUTO: 8.81 K/UL — HIGH (ref 1.8–7.4)
NEUTROPHILS NFR BLD AUTO: 65.6 % — SIGNIFICANT CHANGE UP (ref 43–77)
NITRITE UR-MCNC: NEGATIVE — SIGNIFICANT CHANGE UP
NRBC # BLD: 0 /100 WBCS — SIGNIFICANT CHANGE UP (ref 0–0)
NT-PROBNP SERPL-SCNC: 3035 PG/ML — HIGH (ref 0–300)
PCO2 BLDV: 52 MMHG — SIGNIFICANT CHANGE UP (ref 42–55)
PH BLDV: 7.29 — LOW (ref 7.32–7.43)
PH UR: 5.5 — SIGNIFICANT CHANGE UP (ref 5–8)
PHOSPHATE SERPL-MCNC: 3.6 MG/DL — SIGNIFICANT CHANGE UP (ref 2.5–4.5)
PLATELET # BLD AUTO: 221 K/UL — SIGNIFICANT CHANGE UP (ref 150–400)
PO2 BLDV: 34 MMHG — SIGNIFICANT CHANGE UP (ref 25–45)
POTASSIUM BLDV-SCNC: 4.5 MMOL/L — SIGNIFICANT CHANGE UP (ref 3.5–5.1)
POTASSIUM SERPL-MCNC: 4.5 MMOL/L — SIGNIFICANT CHANGE UP (ref 3.5–5.3)
POTASSIUM SERPL-SCNC: 4.5 MMOL/L — SIGNIFICANT CHANGE UP (ref 3.5–5.3)
PROT SERPL-MCNC: 8.1 G/DL — SIGNIFICANT CHANGE UP (ref 6–8.3)
PROT UR-MCNC: 30 MG/DL
PROTHROM AB SERPL-ACNC: 12.3 SEC — SIGNIFICANT CHANGE UP (ref 9.5–13)
RBC # BLD: 5.44 M/UL — SIGNIFICANT CHANGE UP (ref 4.2–5.8)
RBC # FLD: 14.9 % — HIGH (ref 10.3–14.5)
RBC CASTS # UR COMP ASSIST: 0 /HPF — SIGNIFICANT CHANGE UP (ref 0–4)
REVIEW: SIGNIFICANT CHANGE UP
SAO2 % BLDV: 45.5 % — LOW (ref 67–88)
SODIUM SERPL-SCNC: 140 MMOL/L — SIGNIFICANT CHANGE UP (ref 135–145)
SP GR SPEC: 1.01 — SIGNIFICANT CHANGE UP (ref 1–1.03)
SQUAMOUS # UR AUTO: 1 /HPF — SIGNIFICANT CHANGE UP (ref 0–5)
TROPONIN T, HIGH SENSITIVITY RESULT: 2432 NG/L — HIGH (ref 0–51)
TROPONIN T, HIGH SENSITIVITY RESULT: 26 NG/L — SIGNIFICANT CHANGE UP (ref 0–51)
UROBILINOGEN FLD QL: 0.2 MG/DL — SIGNIFICANT CHANGE UP (ref 0.2–1)
WBC # BLD: 13.44 K/UL — HIGH (ref 3.8–10.5)
WBC # FLD AUTO: 13.44 K/UL — HIGH (ref 3.8–10.5)
WBC UR QL: 2 /HPF — SIGNIFICANT CHANGE UP (ref 0–5)

## 2023-11-08 PROCEDURE — 93308 TTE F-UP OR LMTD: CPT | Mod: 26

## 2023-11-08 PROCEDURE — 71045 X-RAY EXAM CHEST 1 VIEW: CPT | Mod: 26

## 2023-11-08 PROCEDURE — 99222 1ST HOSP IP/OBS MODERATE 55: CPT | Mod: GC

## 2023-11-08 PROCEDURE — 99291 CRITICAL CARE FIRST HOUR: CPT | Mod: GC

## 2023-11-08 PROCEDURE — 99223 1ST HOSP IP/OBS HIGH 75: CPT

## 2023-11-08 RX ORDER — FUROSEMIDE 40 MG
40 TABLET ORAL ONCE
Refills: 0 | Status: COMPLETED | OUTPATIENT
Start: 2023-11-08 | End: 2023-11-08

## 2023-11-08 RX ORDER — HEPARIN SODIUM 5000 [USP'U]/ML
4400 INJECTION INTRAVENOUS; SUBCUTANEOUS ONCE
Refills: 0 | Status: COMPLETED | OUTPATIENT
Start: 2023-11-08 | End: 2023-11-08

## 2023-11-08 RX ORDER — HEPARIN SODIUM 5000 [USP'U]/ML
5000 INJECTION INTRAVENOUS; SUBCUTANEOUS ONCE
Refills: 0 | Status: DISCONTINUED | OUTPATIENT
Start: 2023-11-08 | End: 2023-11-08

## 2023-11-08 RX ORDER — HEPARIN SODIUM 5000 [USP'U]/ML
4400 INJECTION INTRAVENOUS; SUBCUTANEOUS EVERY 6 HOURS
Refills: 0 | Status: DISCONTINUED | OUTPATIENT
Start: 2023-11-08 | End: 2023-11-09

## 2023-11-08 RX ORDER — TICAGRELOR 90 MG/1
180 TABLET ORAL ONCE
Refills: 0 | Status: COMPLETED | OUTPATIENT
Start: 2023-11-08 | End: 2023-11-08

## 2023-11-08 RX ORDER — HEPARIN SODIUM 5000 [USP'U]/ML
INJECTION INTRAVENOUS; SUBCUTANEOUS
Qty: 25000 | Refills: 0 | Status: DISCONTINUED | OUTPATIENT
Start: 2023-11-08 | End: 2023-11-09

## 2023-11-08 RX ADMIN — HEPARIN SODIUM 4400 UNIT(S): 5000 INJECTION INTRAVENOUS; SUBCUTANEOUS at 22:48

## 2023-11-08 RX ADMIN — HEPARIN SODIUM 900 UNIT(S)/HR: 5000 INJECTION INTRAVENOUS; SUBCUTANEOUS at 23:06

## 2023-11-08 RX ADMIN — Medication 40 MILLIGRAM(S): at 18:31

## 2023-11-08 RX ADMIN — HEPARIN SODIUM 900 UNIT(S)/HR: 5000 INJECTION INTRAVENOUS; SUBCUTANEOUS at 22:50

## 2023-11-08 RX ADMIN — TICAGRELOR 180 MILLIGRAM(S): 90 TABLET ORAL at 22:52

## 2023-11-08 NOTE — H&P ADULT - NSHPREVIEWOFSYSTEMS_GEN_ALL_CORE
REVIEW OF SYSTEMS:  CONSTITUTIONAL: No weakness. No fevers. No chills. No rigors. No poor appetite.  EYES: No blurry or double vision. No eye pain.  ENT: No hearing difficulty. No sore throat. No Sinusitis/rhinorrhea.   NECK: No pain. No stiffness/rigidity.  CARDIAC: +chest pain. No palpitations. No lightheadedness. No syncope.  RESPIRATORY: No cough. No SOB.   GASTROINTESTINAL: No abdominal pain. No nausea. No vomiting. No diarrhea. No constipation.   GENITOURINARY: No dysuria. No frequency. No oliguria.  NEUROLOGICAL: No numbness/tingling. No focal weakness. No headache. No unsteady gait.  BACK: No back pain. No flank pain.  EXTREMITIES: No lower extremity edema. Full ROM. No joint pain.  SKIN: No rashes. No itching. No other lesions.  All other review of systems is negative unless indicated above.  Unless indicated above, unable to assess ROS 2/2

## 2023-11-08 NOTE — H&P ADULT - PROBLEM SELECTOR PLAN 1
- tele  - tte  - trend CE  - cont asa, brilinta, hep gtt  - f/u card recs  - plan for [facility] - tele  - tte  - trend CE  - cont asa, brilinta, hep gtt  - f/u card recs  - plan for [facility] - tele  - tte  - trend CE  - cont asa, brilinta, hep gtt  - f/u card recs  - plan for [facility] - tele  - tte  - trend CE  - cont asa, brilinta, hep gtt  - f/u card recs  - plan for [facility]  - will make npo for now, advance diet post procedure - tele  - tte  - trend CE  - cont asa, brilinta, hep gtt  - f/u card recs  - plan for [facility]  - will make npo for now, advance diet post procedure - tele  - tte  - trend CE  - cont asa, brilinta, hep gtt  - f/u card recs  - plan for [facility]  - will make npo for now, advance diet post procedure

## 2023-11-08 NOTE — ED ADULT TRIAGE NOTE - CHIEF COMPLAINT QUOTE
From [facility] Notified ED at 1753 with resp distress ON CPAP from facility 02 sat 90% Denies intubation Resp labored Pt awake alert and orientedx4

## 2023-11-08 NOTE — H&P ADULT - ASSESSMENT
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

## 2023-11-08 NOTE — H&P ADULT - NSHPPHYSICALEXAM_GEN_ALL_CORE
PHYSICAL EXAM:   GENERAL: Alert. Not confused. No acute distress. Not thin. Not cachectic. Not obese.  HEAD:  Atraumatic. Normocephalic.  EYES: EOMI. PERRLA. Normal conjunctiva/sclera.  ENT: Neck supple. No JVD. Moist oral mucosa. Not edentulous. No thrush.  LYMPH: Normal supraclavicular/cervical lymph nodes.   CARDIAC: Not tachy, Not nolan. +irregularly irregular. S1. S2. No murmur. No rub. No distant heart sounds.  LUNG/CHEST: CTAB. BS equal bilaterally. No wheezes. No rales. No rhonchi.  ABDOMEN: Soft. No tenderness. No distension. No fluid wave. Normal bowel sounds.  BACK: No midline/vertebral tenderness. No flank tenderness.  VASCULAR: +2 b/l radial or ulnar pulses. Palpable DP pulses.  EXTREMITIES:  No clubbing. No cyanosis. No edema. Moving all 4.  NEUROLOGY: A&Ox3. Non-focal exam. Cranial nerves intact. Normal speech. Sensation intact.  PSYCH: Normal behavior. Normal affect.  SKIN: No jaundice. No erythema. No rash/lesion.  ICU Vital Signs Last 24 Hrs  T(C): 36.7 (09 Nov 2023 00:37), Max: 37 (08 Nov 2023 18:19)  T(F): 98 (09 Nov 2023 00:37), Max: 98.6 (08 Nov 2023 18:19)  HR: 69 (09 Nov 2023 00:37) (67 - 97)  BP: 119/84 (09 Nov 2023 00:37) (119/84 - 159/90)  BP(mean): --  ABP: --  ABP(mean): --  RR: 16 (09 Nov 2023 00:37) (16 - 20)  SpO2: 97% (09 Nov 2023 00:37) (95% - 100%)    O2 Parameters below as of 09 Nov 2023 00:37  Patient On (Oxygen Delivery Method): nasal cannula  O2 Flow (L/min): 3        I&O's Summary

## 2023-11-08 NOTE — CONSULT NOTE ADULT - ASSESSMENT
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    Cardiology consulted for c/f ACS.    Troponin 20 --> 2432, lactate 2.0.  EKG showed afib with LBBB which per the patient are both new, although no data available in our system. Sgarbossa criteria is borderline with discordant depressions in V5, V6.  CXR with possible mild pulmonary edema, no effusions, no consolidations.  He was very briefly on BiPAP in the ED, but quickly weaned down to 4L nasal canula and satting mid-high 90s with normal WOB.      Impression/Recommendations:  - Symptoms and enzymes are consistent with ACS. EKG borderline for STEMI given borderline Sgarbossa in LBBB.  - Discussed with interventionalist Dr. Lang, given resolved chest pain, will treat NSTEMI and cath in the morning  - Admit to tele  - Trend troponin, CK-MB, CPK, and EKGs  - If chest pain returns, please call back   - S/p ASA load with EMS  - Please load 180mg Brilinta  - Please load 5000U heparin + start heparin drip  - TTE in the morning  - LHC in the morning  - Will need long-term AC and beta-blocker post-cath for afib      Note incomplete until cosigned by attending.    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    Cardiology consulted for c/f ACS.    Troponin 20 --> 2432, lactate 2.0.  EKG showed afib with LBBB which per the patient are both new, although no data available in our system. Sgarbossa criteria is borderline with discordant depressions in V5, V6.  CXR with possible mild pulmonary edema, no effusions, no consolidations.  He was very briefly on BiPAP in the ED, but quickly weaned down to 4L nasal canula and satting mid-high 90s with normal WOB.      Impression/Recommendations:  - Symptoms and enzymes are consistent with ACS. EKG borderline for STEMI given borderline Sgarbossa in LBBB.  - Discussed with interventionalist Dr. Lang, given resolved chest pain, will treat NSTEMI and cath in the morning  - Admit to tele  - Trend troponin, CK-MB, CPK, and EKGs  - If chest pain returns, please call back   - S/p ASA load with EMS  - Please load 180mg Brilinta  - Please load 5000U heparin + start heparin drip  - TTE in the morning  - LHC in the morning  - Will need long-term AC and beta-blocker post-cath for afib      Note incomplete until cosigned by attending.    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    Cardiology consulted for c/f ACS.    Troponin 20 --> 2432, lactate 2.0.  EKG showed afib with LBBB which per the patient are both new, although no data available in our system. Sgarbossa criteria is borderline with discordant depressions in V5, V6.  CXR with possible mild pulmonary edema, no effusions, no consolidations.  He was very briefly on BiPAP in the ED, but quickly weaned down to 4L nasal canula and satting mid-high 90s with normal WOB.      Impression/Recommendations:  - Symptoms and enzymes are consistent with ACS. EKG borderline for STEMI given borderline Sgarbossa in LBBB.  - Discussed with interventionalist Dr. Lang, given resolved chest pain, will treat NSTEMI and cath in the morning  - Admit to tele  - Trend troponin, CK-MB, CPK, and EKGs  - If chest pain returns, please call back   - S/p ASA load with EMS  - Please load 180mg Brilinta  - Please load 5000U heparin + start heparin drip  - TTE in the morning  - LHC in the morning  - Will need long-term AC and beta-blocker post-cath for afib      Note incomplete until cosigned by attending.    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  Now presenting with crushing substernal chest pain.  A. fib and LBBB seen on EKG (?new); elevated cardiac enz. c/w acute myocardial injury.  Pain free at this time.  Cardiology consulted for c/f ACS.    Troponin 20 --> 2432, lactate 2.0.  EKG showed afib with LBBB which per the patient are both new, although no data available in our system. Sgarbossa criteria is borderline with discordant depressions in V5, V6.  CXR with possible mild pulmonary edema, no effusions, no consolidations.  He was very briefly on BiPAP in the ED, but quickly weaned down to 4L nasal canula and satting mid-high 90s with normal WOB.      Impression/Recommendations:  - Symptoms and enzymes are consistent with ACS. EKG borderline for STEMI given borderline Sgarbossa in LBBB.  - Discussed with interventionalist Dr. Lang, given resolved chest pain, will treat NSTEMI and cath in the morning  - Admit to tele  - Trend troponin, CK-MB, CPK, and EKGs  - If chest pain returns, please call back   - S/p ASA load with EMS  - Please load 180mg Brilinta  - Please load 5000U heparin + start heparin drip  - TTE in the morning  - LHC in the morning  - Will need long-term AC and beta-blocker post-cath for afib      David Ibanez, PGY-4  Cardiology Fellow    Plan discussed with cardiology fellow.  Patient seen and examined.  Hx., exam and labs as above.  I agree with the assessment and recommendations, which I have reviewed and edited where appropriate.  Justin Anton M.D.  Cardiology Attending, Consult Service    For Cardiology consults and questions, all Cardiology service information can be found 24/7 on [email or web address] - use password: [email or web address] to log in.  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  Now presenting with crushing substernal chest pain.  A. fib and LBBB seen on EKG (?new); elevated cardiac enz. c/w acute myocardial injury.  Pain free at this time.  Cardiology consulted for c/f ACS.    Troponin 20 --> 2432, lactate 2.0.  EKG showed afib with LBBB which per the patient are both new, although no data available in our system. Sgarbossa criteria is borderline with discordant depressions in V5, V6.  CXR with possible mild pulmonary edema, no effusions, no consolidations.  He was very briefly on BiPAP in the ED, but quickly weaned down to 4L nasal canula and satting mid-high 90s with normal WOB.      Impression/Recommendations:  - Symptoms and enzymes are consistent with ACS. EKG borderline for STEMI given borderline Sgarbossa in LBBB.  - Discussed with interventionalist Dr. Lang, given resolved chest pain, will treat NSTEMI and cath in the morning  - Admit to tele  - Trend troponin, CK-MB, CPK, and EKGs  - If chest pain returns, please call back   - S/p ASA load with EMS  - Please load 180mg Brilinta  - Please load 5000U heparin + start heparin drip  - TTE in the morning  - LHC in the morning  - Will need long-term AC and beta-blocker post-cath for afib      David Ibanez, PGY-4  Cardiology Fellow    Plan discussed with cardiology fellow.  Patient seen and examined.  Hx., exam and labs as above.  I agree with the assessment and recommendations, which I have reviewed and edited where appropriate.  Justin Anton M.D.  Cardiology Attending, Consult Service    For Cardiology consults and questions, all Cardiology service information can be found 24/7 on [email or web address] - use password: [email or web address] to log in.

## 2023-11-08 NOTE — ED PROVIDER NOTE - OTHER FINDINGS
ECG recorded at 1850 independently interpreted by me at 1858 and shows A-fib rate 76, left bundle branch block, Sgarbossa a and B criteria not met.  Borderline Sgarbossa modified C criteria met in lead V6, approximately 28% discordance.  Impression is still not convincing enough for full Sgarbossa criteria being met we will continue to monitor ECG recorded at 2216 independently interpreted by me at 2225 and shows A-fib rate 65, left bundle branch block, previous ST depression in lead V5 and V6 much less pronounced now and no longer meeting criteria for modified Sgarbossa C.

## 2023-11-08 NOTE — CONSULT NOTE ADULT - SUBJECTIVE AND OBJECTIVE BOX
CHIEF COMPLAINT: Chest pain    HPI:  83-year-old gentleman with history of bipolar disease left bundle branch block CKD cardiomyopathy, CAD presenting for evaluation of shortness of breath and chest tightness.  Patient is coming from assisted living facility where he was found to be hypoxic in the 80s, placed on BiPAP with improvement.  He states he has a little bit of chest pressure currently but is much better. Vital signs notable for blood pressure 140 systolic patient is still slightly tachypneic on noninvasive ventilation but conversational, extremities warm and well-perfused, trace edema bilateral ankles.  Moves all extremity spontaneously. ECG recorded at 1804 independently interpreted by me at 1820 and shows A-fib rate 77 left bundle branch block, No Sgarbossa a criteria met, no Sgarbossa B criteria met, question of possible modified Sgarbossa C criteria met in lead V6 as ST depression and amplitude of R wave is approximately 37%.  Impression is not for likely STEMI as Sgarbossa C criteria is equivocal however will repeat ECG for repeat evaluation.  No prior ECG in our system for comparison. ED POCUS with diffuse bilateral B-lines, depressed LV systolic function. MICU consulted for new BiPAP.    On my assessment, patient appears comfortable in bed on BiPAP. He states that he had sudden onset chest discomfort at his home and decided to come to the hospital. He denies that he was short of breath and states he has not ever had any breathing issues. At the time of interview, he is not endorsing further chest pressure/pain and/or shortness of breath. He received 40mg IV Lasix in the ED. Blood pressure at time of assessment 130s/80s, respirations in the teens w/ O2 sat 97% on BiPAP 10/5. Able to speak in full sentences.      PAST MEDICAL & SURGICAL HISTORY:  Bipolar disease    CKD    Cardiomyopathy    CAD    FAMILY HISTORY:  No family history of diabetes    SOCIAL HISTORY:  Never smoker    Allergies    Allergy Status Unknown    Intolerances          REVIEW OF SYSTEMS:  CONSTITUTIONAL: No weakness, fevers or chills  EYES/ENT: No visual changes;  No vertigo or throat pain   NECK: No pain or stiffness  RESPIRATORY: No cough, wheezing, hemoptysis; No shortness of breath  CARDIOVASCULAR: No chest pain or palpitations  GASTROINTESTINAL: No abdominal or epigastric pain. No nausea, vomiting, or hematemesis; No diarrhea or constipation. No melena or hematochezia.  GENITOURINARY: No dysuria, frequency or hematuria  NEUROLOGICAL: No numbness or weakness  SKIN: No itching, burning, rashes, or lesions   HEME: no easy bruising or unexplained bleeding  ENDO: no heat intolerance, no cold intolerance  PSYCH: no SI, or depression  All other review of systems is negative unless indicated above.    OBJECTIVE:  ICU Vital Signs Last 24 Hrs  T(C): 36.8 (08 Nov 2023 20:28), Max: 37 (08 Nov 2023 18:19)  T(F): 98.2 (08 Nov 2023 20:28), Max: 98.6 (08 Nov 2023 18:19)  HR: 90 (08 Nov 2023 21:13) (79 - 97)  BP: 127/81 (08 Nov 2023 20:28) (127/81 - 159/90)  BP(mean): --  ABP: --  ABP(mean): --  RR: 18 (08 Nov 2023 20:28) (18 - 20)  SpO2: 100% (08 Nov 2023 21:13) (96% - 100%)    O2 Parameters below as of 08 Nov 2023 20:28  Patient On (Oxygen Delivery Method): room air              CAPILLARY BLOOD GLUCOSE          PHYSICAL EXAM:  VITALS:   T(C): 36.8 (11-08-23 @ 20:28), Max: 37 (11-08-23 @ 18:19)  HR: 90 (11-08-23 @ 21:13) (79 - 97)  BP: 127/81 (11-08-23 @ 20:28) (127/81 - 159/90)  RR: 18 (11-08-23 @ 20:28) (18 - 20)  SpO2: 100% (11-08-23 @ 21:13) (96% - 100%)    GENERAL: NAD, lying in bed comfortably, able to speak in full sentences  HEAD:  Atraumatic, Normocephalic  EYES: EOMI, PERRLA, conjunctiva and sclera clear  ENT: Moist mucous membranes  NECK: Supple, No JVD  CHEST/LUNG: Decreased breath sounds b/l bases; No rales, rhonchi, wheezing, or rubs. Unlabored respirations  HEART: Regular rate and rhythm; No murmurs, rubs, or gallops  ABDOMEN: BSx4; Soft, nontender, nondistended  EXTREMITIES:  2+ Peripheral Pulses, brisk capillary refill. No clubbing, cyanosis, or edema  NERVOUS SYSTEM:  A&Ox3, no focal deficits   SKIN: No rashes or lesions  Psych: Normal speech, normal behavior, normal affect    HOSPITAL MEDICATIONS:  MEDICATIONS  (STANDING):    MEDICATIONS  (PRN):      LABS:                        17.4   13.44 )-----------( 221      ( 08 Nov 2023 18:38 )             52.8     11-08    140  |  103  |  19  ----------------------------<  113<H>  4.5   |  21<L>  |  1.20    Ca    10.0      08 Nov 2023 18:38  Phos  3.6     11-08  Mg     2.0     11-08    TPro  8.1  /  Alb  4.9  /  TBili  0.6  /  DBili  x   /  AST  12  /  ALT  9<L>  /  AlkPhos  94  11-08    PT/INR - ( 08 Nov 2023 18:38 )   PT: 12.3 sec;   INR: 1.18 ratio         PTT - ( 08 Nov 2023 18:38 )  PTT:30.6 sec  Urinalysis Basic - ( 08 Nov 2023 18:38 )    Color: x / Appearance: x / SG: x / pH: x  Gluc: 113 mg/dL / Ketone: x  / Bili: x / Urobili: x   Blood: x / Protein: x / Nitrite: x   Leuk Esterase: x / RBC: x / WBC x   Sq Epi: x / Non Sq Epi: x / Bacteria: x        Venous Blood Gas:  11-08 @ 20:23  7.29/52/34/25/45.5  VBG Lactate: 2.0      MICROBIOLOGY:     RADIOLOGY:  [ ] Reviewed and interpreted by me    EKG: A fib 81, L axis deviation, LBBB, QTc 506 CHIEF COMPLAINT: Chest pain    HPI:  83-year-old gentleman with history of bipolar disease left bundle branch block CKD cardiomyopathy, CAD presenting for evaluation of shortness of breath and chest tightness.  Patient is coming from assisted living facility where he was found to be hypoxic in the 80s, placed on BiPAP with improvement.  He states he has a little bit of chest pressure currently but is much better. Vital signs notable for blood pressure 140 systolic patient is still slightly tachypneic on noninvasive ventilation but conversational, extremities warm and well-perfused, trace edema bilateral ankles.  Moves all extremity spontaneously. ECG recorded at 1804 independently interpreted by me at 1820 and shows A-fib rate 77 left bundle branch block, No Sgarbossa a criteria met, no Sgarbossa B criteria met, question of possible modified Sgarbossa C criteria met in lead V6 as ST depression and amplitude of R wave is approximately 37%.  Impression is not for likely STEMI as Sgarbossa C criteria is equivocal however will repeat ECG for repeat evaluation.  No prior ECG in our system for comparison. ED POCUS with diffuse bilateral B-lines, depressed LV systolic function. MICU consulted for new BiPAP.    On my assessment, patient appears comfortable in bed on BiPAP. He states that he had sudden onset chest discomfort at his home and decided to come to the hospital. He denies that he was short of breath and states he has not ever had any breathing issues. At the time of interview, he is not endorsing further chest pressure/pain and/or shortness of breath. He received 40mg IV Lasix in the ED. Blood pressure at time of assessment 130s/80s, respirations in the teens w/ O2 sat 97% on BiPAP 10/5. Able to speak in full sentences.      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 No nausea, vomiting, or hematemesis; No diarrhea or constipation. No melena or hematochezia.  GENITOURINARY: No dysuria, frequency or hematuria  NEUROLOGICAL: No numbness or weakness  SKIN: No itching, burning, rashes, or lesions   HEME: no easy bruising or unexplained bleeding  ENDO: no heat intolerance, no cold intolerance  PSYCH: no SI, or depression  All other review of systems is negative unless indicated above.    OBJECTIVE:  ICU Vital Signs Last 24 Hrs  T(C): 36.8 (08 Nov 2023 20:28), Max: 37 (08 Nov 2023 18:19)  T(F): 98.2 (08 Nov 2023 20:28), Max: 98.6 (08 Nov 2023 18:19)  HR: 90 (08 Nov 2023 21:13) (79 - 97)  BP: 127/81 (08 Nov 2023 20:28) (127/81 - 159/90)  BP(mean): --  ABP: --  ABP(mean): --  RR: 18 (08 Nov 2023 20:28) (18 - 20)  SpO2: 100% (08 Nov 2023 21:13) (96% - 100%)    O2 Parameters below as of 08 Nov 2023 20:28  Patient On (Oxygen Delivery Method): room air              CAPILLARY BLOOD GLUCOSE          PHYSICAL EXAM:  VITALS:   T(C): 36.8 (11-08-23 @ 20:28), Max: 37 (11-08-23 @ 18:19)  HR: 90 (11-08-23 @ 21:13) (79 - 97)  BP: 127/81 (11-08-23 @ 20:28) (127/81 - 159/90)  RR: 18 (11-08-23 @ 20:28) (18 - 20)  SpO2: 100% (11-08-23 @ 21:13) (96% - 100%)    GENERAL: NAD, lying in bed comfortably, able to speak in full sentences  HEAD:  Atraumatic, Normocephalic  EYES: EOMI, PERRLA, conjunctiva and sclera clear  ENT: Moist mucous membranes  NECK: Supple, No JVD  CHEST/LUNG: Decreased breath sounds b/l bases; No rales, rhonchi, wheezing, or rubs. Unlabored respirations  HEART: Regular rate and rhythm; No murmurs, rubs, or gallops  ABDOMEN: BSx4; Soft, nontender, nondistended  EXTREMITIES:  2+ Peripheral Pulses, brisk capillary refill. No clubbing, cyanosis, or edema  NERVOUS SYSTEM:  A&Ox3, no focal deficits   SKIN: No rashes or lesions  Psych: Normal speech, normal behavior, normal affect    HOSPITAL MEDICATIONS:  MEDICATIONS  (STANDING):    MEDICATIONS  (PRN):      LABS:                        17.4   13.44 )-----------( 221      ( 08 Nov 2023 18:38 )             52.8     11-08    140  |  103  |  19  ----------------------------<  113<H>  4.5   |  21<L>  |  1.20    Ca    10.0      08 Nov 2023 18:38  Phos  3.6     11-08  Mg     2.0     11-08    TPro  8.1  /  Alb  4.9  /  TBili  0.6  /  DBili  x   /  AST  12  /  ALT  9<L>  /  AlkPhos  94  11-08    PT/INR - ( 08 Nov 2023 18:38 )   PT: 12.3 sec;   INR: 1.18 ratio         PTT - ( 08 Nov 2023 18:38 )  PTT:30.6 sec  Urinalysis Basic - ( 08 Nov 2023 18:38 )    Color: x / Appearance: x / SG: x / pH: x  Gluc: 113 mg/dL / Ketone: x  / Bili: x / Urobili: x   Blood: x / Protein: x / Nitrite: x   Leuk Esterase: x / RBC: x / WBC x   Sq Epi: x / Non Sq Epi: x / Bacteria: x        Venous Blood Gas:  11-08 @ 20:23  7.29/52/34/25/45.5  VBG Lactate: 2.0      MICROBIOLOGY:     RADIOLOGY:  [ ] Reviewed and interpreted by me    EKG: A fib 81, L axis deviation, LBBB, QTc 506 CHIEF COMPLAINT: Chest pain    HPI:  83-year-old gentleman with history of bipolar disease left bundle branch block CKD cardiomyopathy, CAD presenting for evaluation of shortness of breath and chest tightness.  Patient is coming from assisted living facility where he was found to be hypoxic in the 80s, placed on BiPAP with improvement.  He states he has a little bit of chest pressure currently but is much better. Vital signs notable for blood pressure 140 systolic patient is still slightly tachypneic on noninvasive ventilation but conversational, extremities warm and well-perfused, trace edema bilateral ankles.  Moves all extremity spontaneously. ECG recorded at 1804 independently interpreted by me at 1820 and shows A-fib rate 77 left bundle branch block, No Sgarbossa a criteria met, no Sgarbossa B criteria met, question of possible modified Sgarbossa C criteria met in lead V6 as ST depression and amplitude of R wave is approximately 37%.  Impression is not for likely STEMI as Sgarbossa C criteria is equivocal however will repeat ECG for repeat evaluation.  No prior ECG in our system for comparison. ED POCUS with diffuse bilateral B-lines, depressed LV systolic function. MICU consulted for new BiPAP.    On my assessment, patient appears comfortable in bed on BiPAP. He states that he had sudden onset chest discomfort at his home and decided to come to the hospital. He denies that he was short of breath and states he has not ever had any breathing issues. At the time of interview, he is not endorsing further chest pressure/pain and/or shortness of breath. He received 40mg IV Lasix in the ED. Blood pressure at time of assessment 130s/80s, respirations in the teens w/ O2 sat 97% on BiPAP 10/5. Able to speak in full sentences.      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 No nausea, vomiting, or hematemesis; No diarrhea or constipation. No melena or hematochezia.  GENITOURINARY: No dysuria, frequency or hematuria  NEUROLOGICAL: No numbness or weakness  SKIN: No itching, burning, rashes, or lesions   HEME: no easy bruising or unexplained bleeding  ENDO: no heat intolerance, no cold intolerance  PSYCH: no SI, or depression  All other review of systems is negative unless indicated above.    OBJECTIVE:  ICU Vital Signs Last 24 Hrs  T(C): 36.8 (08 Nov 2023 20:28), Max: 37 (08 Nov 2023 18:19)  T(F): 98.2 (08 Nov 2023 20:28), Max: 98.6 (08 Nov 2023 18:19)  HR: 90 (08 Nov 2023 21:13) (79 - 97)  BP: 127/81 (08 Nov 2023 20:28) (127/81 - 159/90)  BP(mean): --  ABP: --  ABP(mean): --  RR: 18 (08 Nov 2023 20:28) (18 - 20)  SpO2: 100% (08 Nov 2023 21:13) (96% - 100%)    O2 Parameters below as of 08 Nov 2023 20:28  Patient On (Oxygen Delivery Method): room air              CAPILLARY BLOOD GLUCOSE          PHYSICAL EXAM:  VITALS:   T(C): 36.8 (11-08-23 @ 20:28), Max: 37 (11-08-23 @ 18:19)  HR: 90 (11-08-23 @ 21:13) (79 - 97)  BP: 127/81 (11-08-23 @ 20:28) (127/81 - 159/90)  RR: 18 (11-08-23 @ 20:28) (18 - 20)  SpO2: 100% (11-08-23 @ 21:13) (96% - 100%)    GENERAL: NAD, lying in bed comfortably, able to speak in full sentences  HEAD:  Atraumatic, Normocephalic  EYES: EOMI, PERRLA, conjunctiva and sclera clear  ENT: Moist mucous membranes  NECK: Supple, No JVD  CHEST/LUNG: Expiratory wheezes in all fields; No rales, rhonchi, wheezing, or rubs. Unlabored respirations  HEART: Regular rate and rhythm; No murmurs, rubs, or gallops  ABDOMEN: BSx4; Soft, nontender, nondistended  EXTREMITIES:  2+ Peripheral Pulses, brisk capillary refill. No clubbing, cyanosis, or edema  NERVOUS SYSTEM:  A&Ox3, no focal deficits   SKIN: No rashes or lesions  Psych: Normal speech, normal behavior, normal affect    HOSPITAL MEDICATIONS:  MEDICATIONS  (STANDING):    MEDICATIONS  (PRN):      LABS:                        17.4   13.44 )-----------( 221      ( 08 Nov 2023 18:38 )             52.8     11-08    140  |  103  |  19  ----------------------------<  113<H>  4.5   |  21<L>  |  1.20    Ca    10.0      08 Nov 2023 18:38  Phos  3.6     11-08  Mg     2.0     11-08    TPro  8.1  /  Alb  4.9  /  TBili  0.6  /  DBili  x   /  AST  12  /  ALT  9<L>  /  AlkPhos  94  11-08    PT/INR - ( 08 Nov 2023 18:38 )   PT: 12.3 sec;   INR: 1.18 ratio         PTT - ( 08 Nov 2023 18:38 )  PTT:30.6 sec  Urinalysis Basic - ( 08 Nov 2023 18:38 )    Color: x / Appearance: x / SG: x / pH: x  Gluc: 113 mg/dL / Ketone: x  / Bili: x / Urobili: x   Blood: x / Protein: x / Nitrite: x   Leuk Esterase: x / RBC: x / WBC x   Sq Epi: x / Non Sq Epi: x / Bacteria: x        Venous Blood Gas:  11-08 @ 20:23  7.29/52/34/25/45.5  VBG Lactate: 2.0      MICROBIOLOGY:     RADIOLOGY:  [ ] Reviewed and interpreted by me    EKG: A fib 81, L axis deviation, LBBB, QTc 506 CHIEF COMPLAINT: Chest pain    HPI:  83-year-old gentleman with history of bipolar disease left bundle branch block CKD cardiomyopathy, CAD presenting for evaluation of shortness of breath and chest tightness.  Patient is coming from assisted living facility where he was found to be hypoxic in the 80s, placed on BiPAP with improvement.  He states he has a little bit of chest pressure currently but is much better. Vital signs notable for blood pressure 140 systolic patient is still slightly tachypneic on noninvasive ventilation but conversational, extremities warm and well-perfused, trace edema bilateral ankles.  Moves all extremity spontaneously. ECG recorded at 1804 independently interpreted by me at 1820 and shows A-fib rate 77 left bundle branch block, No Sgarbossa a criteria met, no Sgarbossa B criteria met, question of possible modified Sgarbossa C criteria met in lead V6 as ST depression and amplitude of R wave is approximately 37%.  Impression is not for likely STEMI as Sgarbossa C criteria is equivocal however will repeat ECG for repeat evaluation.  No prior ECG in our system for comparison. ED POCUS with diffuse bilateral B-lines, depressed LV systolic function. MICU consulted for new BiPAP.    On my assessment, patient appears comfortable in bed on BiPAP. He states that he had sudden onset chest discomfort at his home and decided to come to the hospital. He denies that he was short of breath and states he has not ever had any breathing issues. At the time of interview, he is not endorsing further chest pressure/pain and/or shortness of breath. He received 40mg IV Lasix in the ED. Blood pressure at time of assessment 130s/80s, respirations in the teens w/ O2 sat 97% on BiPAP 10/5. Able to speak in full sentences.      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 No nausea, vomiting, or hematemesis; No diarrhea or constipation. No melena or hematochezia.  GENITOURINARY: No dysuria, frequency or hematuria  NEUROLOGICAL: No numbness or weakness  SKIN: No itching, burning, rashes, or lesions   HEME: no easy bruising or unexplained bleeding  ENDO: no heat intolerance, no cold intolerance  PSYCH: no SI, or depression  All other review of systems is negative unless indicated above.    OBJECTIVE:  ICU Vital Signs Last 24 Hrs  T(C): 36.8 (08 Nov 2023 20:28), Max: 37 (08 Nov 2023 18:19)  T(F): 98.2 (08 Nov 2023 20:28), Max: 98.6 (08 Nov 2023 18:19)  HR: 90 (08 Nov 2023 21:13) (79 - 97)  BP: 127/81 (08 Nov 2023 20:28) (127/81 - 159/90)  RR: 18 (08 Nov 2023 20:28) (18 - 20)  SpO2: 100% (08 Nov 2023 21:13) (96% - 100%)    O2 Parameters below as of 08 Nov 2023 20:28  Patient On (Oxygen Delivery Method): room air      GENERAL: NAD, lying in bed comfortably, able to speak in full sentences  HEAD:  Atraumatic, Normocephalic  EYES: EOMI, PERRLA, conjunctiva and sclera clear  ENT: Moist mucous membranes  NECK: Supple, No JVD  CHEST/LUNG: Expiratory wheezes in all fields; No rales, rhonchi, wheezing, or rubs. Unlabored respirations  HEART: Regular rate and rhythm; No murmurs, rubs, or gallops  ABDOMEN: BSx4; Soft, nontender, nondistended  EXTREMITIES:  2+ Peripheral Pulses, brisk capillary refill. No clubbing, cyanosis, or edema  NERVOUS SYSTEM:  A&Ox3, no focal deficits   SKIN: No rashes or lesions  Psych: Normal speech, normal behavior, normal affect    HOSPITAL MEDICATIONS:  MEDICATIONS  (STANDING):    MEDICATIONS  (PRN):      LABS:                        17.4   13.44 )-----------( 221      ( 08 Nov 2023 18:38 )             52.8     11-08    140  |  103  |  19  ----------------------------<  113<H>  4.5   |  21<L>  |  1.20    Ca    10.0      08 Nov 2023 18:38  Phos  3.6     11-08  Mg     2.0     11-08    TPro  8.1  /  Alb  4.9  /  TBili  0.6  /  DBili  x   /  AST  12  /  ALT  9<L>  /  AlkPhos  94  11-08    PT/INR - ( 08 Nov 2023 18:38 )   PT: 12.3 sec;   INR: 1.18 ratio         PTT - ( 08 Nov 2023 18:38 )  PTT:30.6 sec  Urinalysis Basic - ( 08 Nov 2023 18:38 )    Color: x / Appearance: x / SG: x / pH: x  Gluc: 113 mg/dL / Ketone: x  / Bili: x / Urobili: x   Blood: x / Protein: x / Nitrite: x   Leuk Esterase: x / RBC: x / WBC x   Sq Epi: x / Non Sq Epi: x / Bacteria: x      Venous Blood Gas:  11-08 @ 20:23  7.29/52/34/25/45.5  VBG Lactate: 2.0      MICROBIOLOGY:     RADIOLOGY:  [x ] Reviewed and interpreted by me  CXR- no lobar consolidation, increased interstitial markings throughout potentially c/w pulmonary edema    EKG: A fib 81, L axis deviation, LBBB, QTc 506

## 2023-11-08 NOTE — CONSULT NOTE ADULT - SUBJECTIVE AND OBJECTIVE BOX
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    The pain started late afternoon, 9/10, substernal, pressure-like, non-radiating. Lasted 30 minutes until EMS arrived and gave him 4 aspirins to chew, after which the pain eased up and by the time I saw him in the ED the pain had resolved. Pain was non-pleuritic, non-positional, and unknown if worsened by exertion, but he was not exerting himself at the onset. No associated SOB. No diaphoresis or n/v. No recent fevers or infectious symptoms. Never had any similar pain.    Non-diabetic. Smoked in his youth, but quit > 50 years ago. Occasional glass of wine, no drugs.    Cardiology consulted for c/f ACS.    Troponin 20 --> 2432, lactate 2.0.  EKG showed afib with LBBB which per the patient are both new, although no data available in our system. Sgarbossa criteria is borderline with discordant depressions in V5, V6.  CXR with possible mild pulmonary edema, no effusions, no consolidations.  He was very briefly on BiPAP in the ED, but quickly weaned down to 4L nasal canula and satting mid-high 90s with normal WOB.      Allergies:  Allergy Status Unknown      Home Meds:    Current Medications:   heparin   Injectable 5000 Unit(s) IV Push once  heparin   Injectable 4400 Unit(s) IV Push every 6 hours PRN  heparin  Infusion.  Unit(s)/Hr IV Continuous <Continuous>  ticagrelor 180 milliGRAM(s) Oral Once    REVIEW OF SYSTEMS:  All other review of systems is negative unless indicated above.    Physical Exam:  T(F): 98.2 (11-08), Max: 98.6 (11-08)  HR: 95 (11-08) (79 - 97)  BP: 139/74 (11-08) (127/81 - 159/90)  RR: 16 (11-08)  SpO2: 96% (11-08)  GENERAL: No acute distress, well-developed, elderly  CHEST/LUNG: Clear to auscultation bilaterally; No wheeze, equal breath sounds bilaterally   HEART: Regular rate and rhythm; 2/6 holosystolic murmur best heard at the apex  ABDOMEN: Soft, Nontender, Nondistended  EXTREMITIES:  No edema  NEUROLOGY: AAOx3    CXR: Personally reviewed    Labs: Personally reviewed                        17.4   13.44 )-----------( 221      ( 08 Nov 2023 18:38 )             52.8     11-08    140  |  103  |  19  ----------------------------<  113<H>  4.5   |  21<L>  |  1.20    Ca    10.0      08 Nov 2023 18:38  Phos  3.6     11-08  Mg     2.0     11-08    TPro  8.1  /  Alb  4.9  /  TBili  0.6  /  DBili  x   /  AST  12  /  ALT  9<L>  /  AlkPhos  94  11-08    PT/INR - ( 08 Nov 2023 18:38 )   PT: 12.3 sec;   INR: 1.18 ratio         PTT - ( 08 Nov 2023 18:38 )  PTT:30.6 sec    CARDIAC MARKERS ( 08 Nov 2023 21:39 )  2432 ng/L / x     / x     / x     / x     / x      CARDIAC MARKERS ( 08 Nov 2023 18:38 )  26 ng/L / x     / x     / x     / x     / x                     Patient seen and evaluated at bedside    Chief Complaint: chest pain    HPI: 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    The pain started late afternoon, 9/10, substernal, pressure-like, non-radiating. Lasted 30 minutes until EMS arrived and gave him 4 aspirins to chew, after which the pain eased up and by the time I saw him in the ED the pain had resolved. Pain was non-pleuritic, non-positional, and unknown if worsened by exertion, but he was not exerting himself at the onset. No associated SOB. No diaphoresis or n/v. No recent fevers or infectious symptoms. Never had any similar pain.    Non-diabetic. Smoked in his youth, but quit > 50 years ago. Occasional glass of wine, no drugs.    Cardiology consulted for c/f ACS.    Troponin 20 --> 2432, lactate 2.0.  EKG showed afib with LBBB which per the patient are both new, although no data available in our system. Sgarbossa criteria is borderline with discordant depressions in V5, V6.  CXR with possible mild pulmonary edema, no effusions, no consolidations.  He was very briefly on BiPAP in the ED, but quickly weaned down to 4L nasal canula and satting mid-high 90s with normal WOB.      Allergies:  Allergy Status Unknown      Home Meds:    Current Medications:   heparin   Injectable 5000 Unit(s) IV Push once  heparin   Injectable 4400 Unit(s) IV Push every 6 hours PRN  heparin  Infusion.  Unit(s)/Hr IV Continuous <Continuous>  ticagrelor 180 milliGRAM(s) Oral Once    REVIEW OF SYSTEMS:  All other review of systems is negative unless indicated above.    Physical Exam:  T(F): 98.2 (11-08), Max: 98.6 (11-08)  HR: 95 (11-08) (79 - 97)  BP: 139/74 (11-08) (127/81 - 159/90)  RR: 16 (11-08)  SpO2: 96% (11-08)  GENERAL: No acute distress, well-developed, elderly  CHEST/LUNG: Clear to auscultation bilaterally; No wheeze, equal breath sounds bilaterally   HEART: Regular rate and rhythm; 2/6 holosystolic murmur best heard at the apex  ABDOMEN: Soft, Nontender, Nondistended  EXTREMITIES:  No edema  NEUROLOGY: AAOx3    CXR: Personally reviewed    Labs: Personally reviewed                        17.4   13.44 )-----------( 221      ( 08 Nov 2023 18:38 )             52.8     11-08    140  |  103  |  19  ----------------------------<  113<H>  4.5   |  21<L>  |  1.20    Ca    10.0      08 Nov 2023 18:38  Phos  3.6     11-08  Mg     2.0     11-08    TPro  8.1  /  Alb  4.9  /  TBili  0.6  /  DBili  x   /  AST  12  /  ALT  9<L>  /  AlkPhos  94  11-08    PT/INR - ( 08 Nov 2023 18:38 )   PT: 12.3 sec;   INR: 1.18 ratio         PTT - ( 08 Nov 2023 18:38 )  PTT:30.6 sec    CARDIAC MARKERS ( 08 Nov 2023 21:39 )  2432 ng/L / x     / x     / x     / x     / x      CARDIAC MARKERS ( 08 Nov 2023 18:38 )  26 ng/L / x     / x     / x     / x     / x                     Patient seen and evaluated at bedside    Chief Complaint: chest pain    HPI: 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  The pain started late afternoon, 9/10, substernal, pressure-like, non-radiating. Lasted 30 minutes until EMS arrived and gave him 4 aspirins to chew, after which the pain eased up. By the time I saw him in the ED the pain had resolved. Pain was non-pleuritic, non-positional, and unknown if worsened by exertion, but he was not exerting himself at the onset. No associated SOB. No diaphoresis or n/v. No recent fevers or infectious symptoms. Never had any similar pain.    Non-diabetic. Smoked in his youth, but quit > 50 years ago. Occasional glass of wine, no drugs.    Cardiology consulted for c/f ACS.    Troponin 20 --> 2432, lactate 2.0.  EKG showed A. fib with LBBB which per the patient are both new, although no data available in our system. Sgarbossa criteria is borderline with discordant depressions in V5, V6.  CXR with possible mild pulmonary edema, no effusions, no consolidations.  He was very briefly on BiPAP in the ED, but quickly weaned down to 4L nasal canula and now with O2 sat mid-high 90s with normal WOB.      Allergies:  Allergy Status Unknown      Home Medications:  lithium 450 mg oral tablet, extended release: 1 tab(s) orally once a day (09 Nov 2023 02:12)      Current Medications:   heparin   Injectable 5000 Unit(s) IV Push once  heparin   Injectable 4400 Unit(s) IV Push every 6 hours PRN  heparin  Infusion.  Unit(s)/Hr IV Continuous <Continuous>  ticagrelor 180 milliGRAM(s) Oral Once    SOCIAL HISTORY:  Retired urologist    Social ETOH; remote cigarette use (10 pack years)      FAMILY HISTORY:    No known heart disease    ROS:  General: no fatigue/malaise, weight loss/gain.  Skin: no rashes.  Eyes: no blurred vision, no loss of vision. 	  ENT: no sore throat, rhinorrhea, sinus congestion.  Respiratory: no SOB, cough or wheeze.  Cardiovascular: no palpitations, orthopnea or paroxysmal nocturnal dyspnea.   Gastrointestinal:  no N/V/D, no melena/hematemesis/hematochezia.  Genitourinary: no dysuria/hesitancy or hematuria.  Musculoskeletal: no myalgias or arthralgias.  Neurological: no changes in vision or hearing, no lightheadedness/dizziness, no syncope/near syncope	  Psychiatric: no unusual stress/anxiety.   Hematology/Lymphatics: no unusual bleeding, bruising and no lymphadenopathy.  All others negative except as stated above and in HPI.       Physical Exam:  T(F): 98.2 (11-08), Max: 98.6 (11-08)  HR: 95 (11-08) (79 - 97)  BP: 139/74 (11-08) (127/81 - 159/90)  RR: 16 (11-08)  SpO2: 96% (11-08)    GENERAL: No acute distress, well-developed, elderly  HEENT:  Neg.  CHEST/LUNG: Clear to auscultation bilaterally; No wheeze, equal breath sounds bilaterally   HEART: Regular rate and rhythm; 2/6 holosystolic murmur best heard at the apex  ABDOMEN: Soft, Nontender, Nondistended  EXTREMITIES:  No edema  NEUROLOGY: AAOx3  PSYCH:  A&Ox3  SKIN:  no rash      EKG showed A. fib with LBBB     Labs:                      17.4   13.44 )-----------( 221      ( 08 Nov 2023 18:38 )             52.8     11-08  140  |  103  |  19  ----------------------------<  113<H>  4.5   |  21<L>  |  1.20    Ca    10.0      08 Nov 2023 18:38  Phos  3.6     11-08  Mg     2.0     11-08    TPro  8.1  /  Alb  4.9  /  TBili  0.6  /  DBili  x   /  AST  12  /  ALT  9<L>  /  AlkPhos  94  11-08    PT/INR - ( 08 Nov 2023 18:38 )   PT: 12.3 sec;   INR: 1.18 ratio    PTT - ( 08 Nov 2023 18:38 )  PTT:30.6 sec    CARDIAC MARKERS ( 08 Nov 2023 21:39 )  2432 ng/L / x     / x     / x     / x     / x      CARDIAC MARKERS ( 08 Nov 2023 18:38 )  26 ng/L / x     / x     / x     / x     / x        Trop T:  26  2432 - >10,000    Xray Chest 1 View- PORTABLE-Urgent (11.08.23 @ 18:46)   FINDINGS:  The heart is enlarged in this projection..  No focal consolidations noted.  There is no pneumothorax or pleural effusion.  No acute bony abnormality.    IMPRESSION:  No focal consolidations. 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  The pain started late afternoon, 9/10, substernal, pressure-like, non-radiating. Lasted 30 minutes until EMS arrived and gave him 4 aspirins to chew, after which the pain eased up. By the time I saw him in the ED the pain had resolved. Pain was non-pleuritic, non-positional, and unknown if worsened by exertion, but he was not exerting himself at the onset. No associated SOB. No diaphoresis or n/v. No recent fevers or infectious symptoms. Never had any similar pain.    Non-diabetic. Smoked in his youth, but quit > 50 years ago. Occasional glass of wine, no drugs.    Cardiology consulted for c/f ACS.    Troponin 20 --> 2432, lactate 2.0.  EKG showed A. fib with LBBB which per the patient are both new, although no data available in our system. Sgarbossa criteria is borderline with discordant depressions in V5, V6.  CXR with possible mild pulmonary edema, no effusions, no consolidations.  He was very briefly on BiPAP in the ED, but quickly weaned down to 4L nasal canula and now with O2 sat mid-high 90s with normal WOB.      Allergies:  Allergy Status Unknown      Home Medications:  lithium 450 mg oral tablet, extended release: 1 tab(s) orally once a day (09 Nov 2023 02:12)      Current Medications:   heparin   Injectable 5000 Unit(s) IV Push once  heparin   Injectable 4400 Unit(s) IV Push every 6 hours PRN  heparin  Infusion.  Unit(s)/Hr IV Continuous <Continuous>  ticagrelor 180 milliGRAM(s) Oral Once    SOCIAL HISTORY:  Retired urologist    Social ETOH; remote cigarette use (10 pack years)      FAMILY HISTORY:    No known heart disease    ROS:  General: no fatigue/malaise, weight loss/gain.  Skin: no rashes.  Eyes: no blurred vision, no loss of vision. 	  ENT: no sore throat, rhinorrhea, sinus congestion.  Respiratory: no SOB, cough or wheeze.  Cardiovascular: no palpitations, orthopnea or paroxysmal nocturnal dyspnea.   Gastrointestinal:  no N/V/D, no melena/hematemesis/hematochezia.  Genitourinary: no dysuria/hesitancy or hematuria.  Musculoskeletal: no myalgias or arthralgias.  Neurological: no changes in vision or hearing, no lightheadedness/dizziness, no syncope/near syncope	  Psychiatric: no unusual stress/anxiety.   Hematology/Lymphatics: no unusual bleeding, bruising and no lymphadenopathy.  All others negative except as stated above and in HPI.       Physical Exam:  T(F): 98.2 (11-08), Max: 98.6 (11-08)  HR: 95 (11-08) (79 - 97)  BP: 139/74 (11-08) (127/81 - 159/90)  RR: 16 (11-08)  SpO2: 96% (11-08)    GENERAL: No acute distress, well-developed, elderly  HEENT:  Neg.  CHEST/LUNG: Clear to auscultation bilaterally; No wheeze, equal breath sounds bilaterally   HEART: Regular rate and rhythm; 2/6 holosystolic murmur best heard at the apex  ABDOMEN: Soft, Nontender, Nondistended  EXTREMITIES:  No edema  NEUROLOGY: AAOx3  PSYCH:  A&Ox3  SKIN:  no rash      EKG showed A. fib with LBBB     Labs:                      17.4   13.44 )-----------( 221      ( 08 Nov 2023 18:38 )             52.8     11-08  140  |  103  |  19  ----------------------------<  113<H>  4.5   |  21<L>  |  1.20    Ca    10.0      08 Nov 2023 18:38  Phos  3.6     11-08  Mg     2.0     11-08    TPro  8.1  /  Alb  4.9  /  TBili  0.6  /  DBili  x   /  AST  12  /  ALT  9<L>  /  AlkPhos  94  11-08    PT/INR - ( 08 Nov 2023 18:38 )   PT: 12.3 sec;   INR: 1.18 ratio    PTT - ( 08 Nov 2023 18:38 )  PTT:30.6 sec    CARDIAC MARKERS ( 08 Nov 2023 21:39 )  2432 ng/L / x     / x     / x     / x     / x      CARDIAC MARKERS ( 08 Nov 2023 18:38 )  26 ng/L / x     / x     / x     / x     / x        Trop T:  26  2432 - >10,000    Xray Chest 1 View- PORTABLE-Urgent (11.08.23 @ 18:46)   FINDINGS:  The heart is enlarged in this projection..  No focal consolidations noted.  There is no pneumothorax or pleural effusion.  No acute bony abnormality.    IMPRESSION:  No focal consolidations.

## 2023-11-08 NOTE — ED PROVIDER NOTE - PHYSICAL EXAMINATION
Physical Exam:  General: + SOB, difficulty breathing  Eyes: EOMI, Conjunctiva and sclera clear  Neck: No JVD  Lungs: + rhonchi diffuse lungs, no wheeze, no rhonchi  Heart: Normal S1, S2, no murmurs  Abdomen: Soft, nontender, nondistended, no CVA tenderness  Extremities: 2+ peripheral pulses, no edema  Psych: AAO X3  Neurologic: Non-focal

## 2023-11-08 NOTE — H&P ADULT - NSHPLABSRESULTS_GEN_ALL_CORE
Personally reviewed old records.  Personally reviewed labs.  Personally reviewed imaging.  Personally reviewed EKG. Afib rate 76, qtc 481. Inc LBBB, Ant Fascic block, RBBB. TREV in V1-V3. STD in 2/3/. TWI in 1L/V5-V6. Does not meet sgarbossa criteria                          17.4   13.44 )-----------( 221      ( 2023 18:38 )             52.8       11-    140  |  103  |  19  ----------------------------<  113<H>  4.5   |  21<L>  |  1.20    Ca    10.0      2023 18:38  Phos  3.6     11-08  Mg     2.0     11-    TPro  8.1  /  Alb  4.9  /  TBili  0.6  /  DBili  x   /  AST  12  /  ALT  9<L>  /  AlkPhos  94  11-08            LIVER FUNCTIONS - ( 2023 18:38 )  Alb: 4.9 g/dL / Pro: 8.1 g/dL / ALK PHOS: 94 U/L / ALT: 9 U/L / AST: 12 U/L / GGT: x             PT/INR - ( 2023 18:38 )   PT: 12.3 sec;   INR: 1.18 ratio         PTT - ( 2023 18:38 )  PTT:30.6 sec    Urinalysis Basic - ( 2023 23:12 )    Color: Yellow / Appearance: Clear / S.011 / pH: x  Gluc: x / Ketone: Negative mg/dL  / Bili: Negative / Urobili: 0.2 mg/dL   Blood: x / Protein: 30 mg/dL / Nitrite: Negative   Leuk Esterase: Trace / RBC: 0 /HPF / WBC 2 /HPF   Sq Epi: x / Non Sq Epi: 1 /HPF / Bacteria: Negative /HPF

## 2023-11-08 NOTE — CONSULT NOTE ADULT - ATTENDING COMMENTS
care provided 11/8/23    Patient seen and examined.  Agree with resident note as above.  Patient with hx as noted including CAD, CHF, CKD, depression on Li, admitted Sept for SBO due to incarcerated bowel in a hernia who presented to ER with dyspnea and chest tightness.  Appears likely ACS vs NSTEMI with resultant acute pulmonary edema and acute hypoxic resp failure.  Patient s/p BiPAP and Lasix, and now stable on NC without sx and breathing comfortably.    Recs as above and I have edited as appropriate - continue cards workup including likely cath in AM, antiplatelet drugs per cards.  Nasal canula ok for now given patient's comfort, if dyspnea or hypoxia recur can use BiPAP prn.    Patient identifies wife as surrogate decision maker and NOK.  He requests FULL CODE.

## 2023-11-08 NOTE — ED PROVIDER NOTE - CARE PLAN
1 Principal Discharge DX:	Chest pain   Principal Discharge DX:	Acute pulmonary edema  Secondary Diagnosis:	Acute heart failure   Principal Discharge DX:	Acute pulmonary edema  Secondary Diagnosis:	Acute heart failure  Secondary Diagnosis:	NSTEMI (non-ST elevation myocardial infarction)

## 2023-11-08 NOTE — ED PROVIDER NOTE - ATTENDING CONTRIBUTION TO CARE
Attending MD Wilder:  I personally have seen and examined this patient. I have performed a substantive portion of the visit including all aspects of the medical decision making.  Resident note reviewed and agree on plan of care and except where noted.        83-year-old gentleman with history of bipolar disease left bundle branch block CKD cardiomyopathy, CAD presenting for evaluation of shortness of breath and chest tightness.  Patient is coming from assisted living facility where he was found to be hypoxic in the 80s, placed on CPAP with improvement.  He states he has a little bit of chest pressure currently but is much better.    Vital signs notable for blood pressure 140 systolic patient is still slightly tachypneic on noninvasive ventilation but conversational, extremities warm and well-perfused, trace edema bilateral ankles.  Moves all extremity spontaneously.     ECG recorded at 1804 independently interpreted by me at 1820 and shows A-fib rate 77 left bundle branch block,     No Sgarbossa a criteria met, no Sgarbossa B criteria met, question of possible modified Sgarbossa C criteria met in lead V6 as ST depression and amplitude of R wave is approximately 37%.  Impression is not for likely STEMI as Sgarbossa C criteria is equivocal however will repeat ECG for repeat evaluation.  No prior ECG in our system for comparison.      ED POCUS with diffuse bilateral B-lines, depressed LV systolic function.    Patient presenting for evaluation of acute hypoxic respiratory failure, impression is for likely acute CHF/acute pulmonary edema from SCAPE.  Blood pressure acceptable now, will continue noninvasive ventilation provide IV furosemide obtain chest x-ray cardiac biomarkers and admit.  Initial ECG is somewhat equivocal for possible positivity for modified Sgarbossa criteria C, will repeat ECG to see if criteria is met again.        *The above represents an initial assessment/impression. Please refer to progress notes for potential changes in patient clinical course*

## 2023-11-08 NOTE — ED ADULT NURSE NOTE - NS ED NURSE TRANSPORT WITH
Cardiac Monitor/Defib/ACLS/Rescue Kit/O2/BVM/oxygen/IV pump/bipap pt with ED tech and Respiratory/Cardiac Monitor/Defib/ACLS/Rescue Kit/O2/BVM/oxygen/IV pump/bipap

## 2023-11-08 NOTE — ED PROVIDER NOTE - CLINICAL SUMMARY MEDICAL DECISION MAKING FREE TEXT BOX
83 Y M presenting with SOB, chest pain, acute onset, primary concern for ACS, vs. SCAPE, will check lithium in setting of concern for chronic lithium excessive dosing, 2/2 renal impairment. common bw, likely admit after trop, initial ECG equivocal for scarbossas criteria

## 2023-11-08 NOTE — ED PROCEDURE NOTE - US CPT CODES
[id number] Echocardiography Transthoracic with Image 2D (Echo/FAST) [id number] Echocardiography Transthoracic with Image 2D (Echo/FAST) [id number] Echocardiography Transthoracic with Image 2D (Echo/FAST)

## 2023-11-08 NOTE — H&P ADULT - PROBLEM SELECTOR PLAN 5
- new onset  - chadsvasc score of at least 3  - tsh, TTE  - will need doac at discharge  - rate control as per cards

## 2023-11-08 NOTE — H&P ADULT - NSHPSOCIALHISTORY_GEN_ALL_CORE
Social History:    Marital Status: ( x ) , (  ) Single, (  ) , (  ) , (  )   # of Children: 0  Lives with: (  ) alone, (  ) children, ( x ) spouse, (  ) parents, (  ) siblings, (  ) friends, (  ) other:   Occupation:     Substance Use/Illicit Drugs: (  ) never used vs other:   Tobacco Usage: (  ) never smoked, ( x ) former smoker, (  ) current smoker and Total Pack-Years: 10 pk yrs  Last Alcohol Usage/Frequency/Amount/Withdrawal/Hx of Abuse:  glass of wine daily  Foreign travel:   Animal exposure:

## 2023-11-08 NOTE — ED ADULT NURSE NOTE - OBJECTIVE STATEMENT
84 y/o w/ no significant PMH presents to ED complaining of chest pain. Pt BIBEMS from assisted living facility after complaining of chest pain and SOB. Pt states that chest pain began suddenly and "feels like an elephant sitting on his chest." Pt endorses associated SOB and per EMS was satting in the 80s on RA. Pt was placed on a non-rebreather by EMS and was satting to the mid 90s. Pt immediately placed on BIPAP and setting at 97%. Per EMS, pt was given 324 of Aspirin and pt reports resolution of chest pain. Pt is not tachypneic and respirations are even and unlabored. Pt denies current chest pain, dizziness, N/V/D, fever, chills, headache. VSS at this time. 82 y/o w/ no significant PMH presents to ED complaining of chest pain. Pt BIBEMS from assisted living facility after complaining of chest pain and SOB. Pt states that chest pain began suddenly and "feels like an elephant sitting on his chest." Pt endorses associated SOB and per EMS was satting in the 80s on RA. Pt was placed on a non-rebreather by EMS and was satting to the mid 90s. Pt immediately placed on BIPAP and setting at 97%. Per EMS, pt was given 324 of Aspirin and pt reports resolution of chest pain. Pt is not tachypneic and respirations are even and unlabored. Pt denies current chest pain, dizziness, N/V/D, fever, chills, headache. VSS at this time.

## 2023-11-08 NOTE — ED PROVIDER NOTE - PROGRESS NOTE DETAILS
Wagner MELISSA: JOSE consulted for first-time BIPAP. Attending MD Wilder: MICU was seen patient and feel patient is actually stable to be trialed off of BiPAP.  No indication for ICU admission at this time per MICU. Attending MD Wilder: Repeat troponin is elevated now at 2432.  Given initial EKGs that were borderline for possible positivity for modified Sgarbossa criteria C, will contact cardiology for consultation. Patient at this juncture is chest-pain free. Wagner MELISSA: Spoke w/ David Ibanez MD regarding troponin increase. Will load w/ heparin and brilinta. Confirmed that pt received 324mg of ASA prior to ED arrival. Attending MD Wilder: Patient seen by cardiology fellow and given the patient is chest pain-free at this time, plan for medical management with heparin infusion ticagrelor load and likely left heart cath in the morning. Wagner MELISSA: Hospitalist aware of NSTEMI, NPO and cath in the morning. MICU not taking the patient for first-time BIPAP consult.

## 2023-11-08 NOTE — ED PROVIDER NOTE - NS ED ROS FT
GENERAL: No fever or chills  EYES: No change in vision  HEENT: No trouble swallowing or speaking  CARDIAC: + chest pain  PULMONARY: No cough + SOB  GI: No abdominal pain, no nausea or no vomiting, no diarrhea or constipation  : No changes in urination  SKIN: No rashes  NEURO: No headache, no numbness  MSK: No joint pain  Otherwise as HPI or negative.

## 2023-11-08 NOTE — H&P ADULT - HISTORY OF PRESENT ILLNESS
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    Pt reports sitting and watching TV when in the late afternoon reports 9/10, substernal, pressure-like, non-radiating pain. Lasted 30 minutes until EMS arrived and gave him 4 aspirins to chew, after which the pain eased up.  Pain was non-pleuritic, non-positional, and unknown if worsened by exertion, but he was not exerting himself at the onset. No associated SOB. No diaphoresis or n/v. No recent fevers or infectious symptoms. Never had any similar pain.  Last stress test and TTE was more than 40 yrs ago. No one has told him that he has Afib. Is not aware of any abnormal EKG findings in the past.    Non-diabetic. Smoked in his youth, but quit > 50 years ago. Occasional glass of wine, no drugs.

## 2023-11-08 NOTE — ED ADULT NURSE NOTE - NSFALLUNIVINTERV_ED_ALL_ED
Bed/Stretcher in lowest position, wheels locked, appropriate side rails in place/Call bell, personal items and telephone in reach/Instruct patient to call for assistance before getting out of bed/chair/stretcher/Non-slip footwear applied when patient is off stretcher/[place] to call system/Physically safe environment - no spills, clutter or unnecessary equipment/Purposeful proactive rounding/Room/bathroom lighting operational, light cord in reach Bed/Stretcher in lowest position, wheels locked, appropriate side rails in place/Call bell, personal items and telephone in reach/Instruct patient to call for assistance before getting out of bed/chair/stretcher/Non-slip footwear applied when patient is off stretcher/[place] to call system/Physically safe environment - no spills, clutter or unnecessary equipment/Purposeful proactive rounding/Room/bathroom lighting operational, light cord in reach Bed/Stretcher in lowest position, wheels locked, appropriate side rails in place/Call bell, personal items and telephone in reach/Instruct patient to call for assistance before getting out of bed/chair/stretcher/Non-slip footwear applied when patient is off stretcher/[place] to call system/Physically safe environment - no spills, clutter or unnecessary equipment/Purposeful proactive rounding/Room/bathroom lighting operational, light cord in reach

## 2023-11-08 NOTE — CONSULT NOTE ADULT - ASSESSMENT
83-year-old gentleman with history of bipolar disease left bundle branch block CKD cardiomyopathy, CAD presenting for evaluation of shortness of breath and chest tightness.  Patient is coming from assisted living facility where he was found to be hypoxic in the 80s, placed on BiPAP with improvement. EKG w/ a. fib 77, LBBB, not meeting criteria for STEMI. POCUS with diffuse bilateral B-lines and depressed LV systolic function. Picture likely consistent with ACS +/- flash pulmonary edema. MICU consulted for new BiPAP.    #Acute hypoxic respiratory failure  #Acute chest pain, r/o ACS  - Patient presented w/ chest tightness & acute hypoxic respiratory failure requiring BiPAP. EKG w/ a. fib, LBBB and ST changes not meeting STEMI criteria per ED assessment. Initially short of breath requiring BiPAP.  83-year-old gentleman with history of bipolar disease left bundle branch block CKD cardiomyopathy, CAD presenting for evaluation of shortness of breath and chest tightness.  Patient is coming from assisted living facility where he was found to be hypoxic in the 80s, placed on BiPAP with improvement. EKG w/ a. fib 77, LBBB, not meeting criteria for STEMI. POCUS with diffuse bilateral B-lines and depressed LV systolic function. Picture likely consistent with ACS +/- flash pulmonary edema. MICU consulted for new BiPAP.    #Acute hypoxic respiratory failure  #Acute chest pain, likely ACS  - Patient presented w/ chest tightness & acute hypoxic respiratory failure requiring BiPAP. EKG w/ a. fib, LBBB and ST changes not meeting STEMI criteria per ED assessment. Initially short of breath requiring BiPAP.  83-year-old gentleman with history of bipolar disease left bundle branch block CKD cardiomyopathy, CAD presenting for evaluation of shortness of breath and chest tightness.  Patient is coming from assisted living facility where he was found to be hypoxic in the 80s, placed on BiPAP with improvement. EKG w/ a. fib 77, LBBB, not meeting criteria for STEMI. POCUS with diffuse bilateral B-lines and depressed LV systolic function. Picture likely consistent with ACS +/- flash pulmonary edema. MICU consulted for new BiPAP.    #Acute hypoxic respiratory failure  #Acute chest pain, likely ACS  Patient presented w/ chest tightness & acute hypoxic respiratory failure requiring BiPAP. EKG w/ a. fib, LBBB and ST changes not meeting STEMI criteria per ED assessment. Initially short of breath requiring BiPAP. CXR w/ pulmonary edema, POCUS w/ b-line pattern. Received 40mg IV Lasix. Now breathing comfortably on BiPAP, able to speak in full sentences.   - Admit to medicine  - Recommend cardiology eval for likely NSTEMI  - Continue diuresis  - Patient no longer requires BiPAP, recommend discontinuing  - Supplemental oxygen via NC as needed    #Code status  - Patient reports his HCP is wife Marquita Guzman  - States he would like to be FULL CODE    Patient not a MICU candidate at this time. Please reconsult as deemed appropriate.     Discussed with ICU attending, Dr. Rajan 83-year-old gentleman with history of bipolar disease left bundle branch block CKD cardiomyopathy, CAD presenting for evaluation of shortness of breath and chest tightness.  Patient is coming from assisted living facility where he was found to be hypoxic in the 80s, placed on BiPAP with improvement. EKG w/ a. fib 77, LBBB, not meeting criteria for STEMI. POCUS with diffuse bilateral B-lines and depressed LV systolic function. Picture likely consistent with ACS +/- flash pulmonary edema. MICU consulted for new BiPAP.    #Acute hypoxic respiratory failure  #Acute chest pain, likely ACS  Patient presented w/ chest tightness & acute hypoxic respiratory failure requiring BiPAP. EKG w/ a. fib, LBBB and ST changes not meeting STEMI criteria per ED assessment. Initially short of breath requiring BiPAP. CXR w/ pulmonary edema, POCUS w/ b-line pattern. Received 40mg IV Lasix. Now breathing comfortably on BiPAP, able to speak in full sentences.   - Admit to medicine  - Recommend cardiology eval for likely NSTEMI  - Continue diuresis  - Patient no longer requires BiPAP, recommend discontinuing  - Supplemental oxygen via NC as needed    #Code status  - Patient reports his HCP is wife Marquita Guzman  - States he would like to be FULL CODE  - Updated patient's wife Marquita over the phone    Patient not a MICU candidate at this time. Please reconsult as deemed appropriate.     Discussed with ICU attending, Dr. Mohini Olson MD  PGY-3 83-year-old gentleman with history of bipolar disease left bundle branch block CKD cardiomyopathy, CAD presenting for evaluation of shortness of breath and chest tightness.  Patient is coming from assisted living facility where he was found to be hypoxic in the 80s, placed on BiPAP with improvement. EKG w/ a. fib 77, LBBB, not meeting criteria for STEMI. POCUS with diffuse bilateral B-lines and depressed LV systolic function. Picture likely consistent with ACS +acute pulmonary edema. MICU consulted for new BiPAP.    #Acute hypoxic respiratory failure  #Acute pulmonary edema  #Acute chest pain, likely ACS vs NSTEMI  Patient presented w/ chest tightness & acute hypoxic respiratory failure requiring BiPAP. EKG w/ a. fib, LBBB and ST changes not meeting STEMI criteria per ED assessment. Initially short of breath requiring BiPAP. CXR w/ pulmonary edema, POCUS w/ b-line pattern. Received 40mg IV Lasix. Now breathing comfortably on BiPAP, able to speak in full sentences.   - Admit to medicine  - Recommend cardiology eval for likely NSTEMI  - Continue diuresis  - Patient no longer requires BiPAP, recommend discontinuing  - Supplemental oxygen via NC as needed    #Code status  - Patient reports his HCP is wife Marquita Guzman  - States he would like to be FULL CODE  - Updated patient's wife Marquita over the phone    Patient not a MICU candidate at this time. Please reconsult as deemed appropriate.     Discussed with ICU attending, Dr. Mohini Olson MD  PGY-3

## 2023-11-08 NOTE — ED PROCEDURE NOTE - PROCEDURE ADDITIONAL DETAILS
POCUS: Emergency Department Focused Ultrasound performed at patient's bedside.  The complete report will be available in PACS.    Global LV systolic dysfunction.  Bilateral B-lines consistent with pulmonary edema Two to four times a month

## 2023-11-08 NOTE — ED PROVIDER NOTE - OBJECTIVE STATEMENT
89 Y M presenting with acute onset chest pain, dyspnea, difficulty breathing starting 30 minutes prior to arrival, given 324 asa prior to arrival. States giant weight on center of chest, orthopnea, SOB, hypoxemia to 80% on RA, improvement on bipap, denies any fever, chills, LE swelling.

## 2023-11-09 DIAGNOSIS — I50.21 ACUTE SYSTOLIC (CONGESTIVE) HEART FAILURE: ICD-10-CM

## 2023-11-09 DIAGNOSIS — I45.2 BIFASCICULAR BLOCK: ICD-10-CM

## 2023-11-09 DIAGNOSIS — J96.01 ACUTE RESPIRATORY FAILURE WITH HYPOXIA: ICD-10-CM

## 2023-11-09 DIAGNOSIS — I25.10 ATHEROSCLEROTIC HEART DISEASE OF NATIVE CORONARY ARTERY WITHOUT ANGINA PECTORIS: ICD-10-CM

## 2023-11-09 DIAGNOSIS — I34.0 NONRHEUMATIC MITRAL (VALVE) INSUFFICIENCY: ICD-10-CM

## 2023-11-09 DIAGNOSIS — I21.9 ACUTE MYOCARDIAL INFARCTION, UNSPECIFIED: ICD-10-CM

## 2023-11-09 DIAGNOSIS — I48.91 UNSPECIFIED ATRIAL FIBRILLATION: ICD-10-CM

## 2023-11-09 DIAGNOSIS — I48.92 UNSPECIFIED ATRIAL FLUTTER: ICD-10-CM

## 2023-11-09 DIAGNOSIS — F31.9 BIPOLAR DISORDER, UNSPECIFIED: ICD-10-CM

## 2023-11-09 LAB
A1C WITH ESTIMATED AVERAGE GLUCOSE RESULT: 5 % — SIGNIFICANT CHANGE UP (ref 4–5.6)
ALBUMIN SERPL ELPH-MCNC: 4.3 G/DL — SIGNIFICANT CHANGE UP (ref 3.3–5)
ALP SERPL-CCNC: 73 U/L — SIGNIFICANT CHANGE UP (ref 40–120)
ALT FLD-CCNC: 38 U/L — SIGNIFICANT CHANGE UP (ref 10–45)
ANION GAP SERPL CALC-SCNC: 12 MMOL/L — SIGNIFICANT CHANGE UP (ref 5–17)
APTT BLD: 31.3 SEC — SIGNIFICANT CHANGE UP (ref 24.5–35.6)
APTT BLD: 72.5 SEC — HIGH (ref 24.5–35.6)
APTT BLD: 81.2 SEC — HIGH (ref 24.5–35.6)
AST SERPL-CCNC: 256 U/L — HIGH (ref 10–40)
BASOPHILS # BLD AUTO: 0.08 K/UL — SIGNIFICANT CHANGE UP (ref 0–0.2)
BASOPHILS NFR BLD AUTO: 0.7 % — SIGNIFICANT CHANGE UP (ref 0–2)
BILIRUB SERPL-MCNC: 0.7 MG/DL — SIGNIFICANT CHANGE UP (ref 0.2–1.2)
BUN SERPL-MCNC: 21 MG/DL — SIGNIFICANT CHANGE UP (ref 7–23)
CALCIUM SERPL-MCNC: 9.6 MG/DL — SIGNIFICANT CHANGE UP (ref 8.4–10.5)
CHLORIDE SERPL-SCNC: 104 MMOL/L — SIGNIFICANT CHANGE UP (ref 96–108)
CHOLEST SERPL-MCNC: 219 MG/DL — HIGH
CK MB BLD-MCNC: 15.3 % — HIGH (ref 0–3.5)
CK MB CFR SERPL CALC: 374.9 NG/ML — HIGH (ref 0–6.7)
CK SERPL-CCNC: 2453 U/L — HIGH (ref 30–200)
CO2 SERPL-SCNC: 23 MMOL/L — SIGNIFICANT CHANGE UP (ref 22–31)
CREAT SERPL-MCNC: 1.1 MG/DL — SIGNIFICANT CHANGE UP (ref 0.5–1.3)
EGFR: 67 ML/MIN/1.73M2 — SIGNIFICANT CHANGE UP
EOSINOPHIL # BLD AUTO: 0.11 K/UL — SIGNIFICANT CHANGE UP (ref 0–0.5)
EOSINOPHIL NFR BLD AUTO: 1 % — SIGNIFICANT CHANGE UP (ref 0–6)
ESTIMATED AVERAGE GLUCOSE: 97 MG/DL — SIGNIFICANT CHANGE UP (ref 68–114)
GLUCOSE SERPL-MCNC: 105 MG/DL — HIGH (ref 70–99)
HCT VFR BLD CALC: 44.1 % — SIGNIFICANT CHANGE UP (ref 39–50)
HDLC SERPL-MCNC: 47 MG/DL — SIGNIFICANT CHANGE UP
HGB BLD-MCNC: 14.4 G/DL — SIGNIFICANT CHANGE UP (ref 13–17)
IMM GRANULOCYTES NFR BLD AUTO: 0.4 % — SIGNIFICANT CHANGE UP (ref 0–0.9)
LIPID PNL WITH DIRECT LDL SERPL: 147 MG/DL — HIGH
LYMPHOCYTES # BLD AUTO: 2.86 K/UL — SIGNIFICANT CHANGE UP (ref 1–3.3)
LYMPHOCYTES # BLD AUTO: 25.7 % — SIGNIFICANT CHANGE UP (ref 13–44)
MAGNESIUM SERPL-MCNC: 1.9 MG/DL — SIGNIFICANT CHANGE UP (ref 1.6–2.6)
MCHC RBC-ENTMCNC: 31.4 PG — SIGNIFICANT CHANGE UP (ref 27–34)
MCHC RBC-ENTMCNC: 32.7 GM/DL — SIGNIFICANT CHANGE UP (ref 32–36)
MCV RBC AUTO: 96.1 FL — SIGNIFICANT CHANGE UP (ref 80–100)
MONOCYTES # BLD AUTO: 0.79 K/UL — SIGNIFICANT CHANGE UP (ref 0–0.9)
MONOCYTES NFR BLD AUTO: 7.1 % — SIGNIFICANT CHANGE UP (ref 2–14)
NEUTROPHILS # BLD AUTO: 7.27 K/UL — SIGNIFICANT CHANGE UP (ref 1.8–7.4)
NEUTROPHILS NFR BLD AUTO: 65.1 % — SIGNIFICANT CHANGE UP (ref 43–77)
NON HDL CHOLESTEROL: 171 MG/DL — HIGH
NRBC # BLD: 0 /100 WBCS — SIGNIFICANT CHANGE UP (ref 0–0)
PHOSPHATE SERPL-MCNC: 3.4 MG/DL — SIGNIFICANT CHANGE UP (ref 2.5–4.5)
PLATELET # BLD AUTO: 154 K/UL — SIGNIFICANT CHANGE UP (ref 150–400)
POTASSIUM SERPL-MCNC: 4.2 MMOL/L — SIGNIFICANT CHANGE UP (ref 3.5–5.3)
POTASSIUM SERPL-SCNC: 4.2 MMOL/L — SIGNIFICANT CHANGE UP (ref 3.5–5.3)
PROT SERPL-MCNC: 6.9 G/DL — SIGNIFICANT CHANGE UP (ref 6–8.3)
RBC # BLD: 4.59 M/UL — SIGNIFICANT CHANGE UP (ref 4.2–5.8)
RBC # FLD: 14.8 % — HIGH (ref 10.3–14.5)
SODIUM SERPL-SCNC: 139 MMOL/L — SIGNIFICANT CHANGE UP (ref 135–145)
TRIGL SERPL-MCNC: 135 MG/DL — SIGNIFICANT CHANGE UP
TROPONIN T, HIGH SENSITIVITY RESULT: HIGH NG/L (ref 0–51)
WBC # BLD: 11.15 K/UL — HIGH (ref 3.8–10.5)
WBC # FLD AUTO: 11.15 K/UL — HIGH (ref 3.8–10.5)

## 2023-11-09 PROCEDURE — 93454 CORONARY ARTERY ANGIO S&I: CPT | Mod: 26

## 2023-11-09 PROCEDURE — 99232 SBSQ HOSP IP/OBS MODERATE 35: CPT

## 2023-11-09 PROCEDURE — 93306 TTE W/DOPPLER COMPLETE: CPT | Mod: 26

## 2023-11-09 PROCEDURE — 99233 SBSQ HOSP IP/OBS HIGH 50: CPT | Mod: GC

## 2023-11-09 PROCEDURE — 99222 1ST HOSP IP/OBS MODERATE 55: CPT

## 2023-11-09 PROCEDURE — 99152 MOD SED SAME PHYS/QHP 5/>YRS: CPT

## 2023-11-09 RX ORDER — HEPARIN SODIUM 5000 [USP'U]/ML
3000 INJECTION INTRAVENOUS; SUBCUTANEOUS EVERY 6 HOURS
Refills: 0 | Status: DISCONTINUED | OUTPATIENT
Start: 2023-11-09 | End: 2023-11-12

## 2023-11-09 RX ORDER — METOPROLOL TARTRATE 50 MG
12.5 TABLET ORAL EVERY 12 HOURS
Refills: 0 | Status: COMPLETED | OUTPATIENT
Start: 2023-11-09 | End: 2023-11-10

## 2023-11-09 RX ORDER — HEPARIN SODIUM 5000 [USP'U]/ML
INJECTION INTRAVENOUS; SUBCUTANEOUS
Qty: 25000 | Refills: 0 | Status: DISCONTINUED | OUTPATIENT
Start: 2023-11-09 | End: 2023-11-12

## 2023-11-09 RX ORDER — LITHIUM CARBONATE 300 MG/1
150 TABLET, EXTENDED RELEASE ORAL
Refills: 0 | Status: DISCONTINUED | OUTPATIENT
Start: 2023-11-09 | End: 2023-11-16

## 2023-11-09 RX ORDER — HEPARIN SODIUM 5000 [USP'U]/ML
6000 INJECTION INTRAVENOUS; SUBCUTANEOUS EVERY 6 HOURS
Refills: 0 | Status: DISCONTINUED | OUTPATIENT
Start: 2023-11-09 | End: 2023-11-12

## 2023-11-09 RX ORDER — ASPIRIN/CALCIUM CARB/MAGNESIUM 324 MG
81 TABLET ORAL DAILY
Refills: 0 | Status: DISCONTINUED | OUTPATIENT
Start: 2023-11-09 | End: 2023-11-16

## 2023-11-09 RX ORDER — TICAGRELOR 90 MG/1
90 TABLET ORAL EVERY 12 HOURS
Refills: 0 | Status: DISCONTINUED | OUTPATIENT
Start: 2023-11-09 | End: 2023-11-10

## 2023-11-09 RX ADMIN — TICAGRELOR 90 MILLIGRAM(S): 90 TABLET ORAL at 17:45

## 2023-11-09 RX ADMIN — Medication 81 MILLIGRAM(S): at 06:19

## 2023-11-09 RX ADMIN — Medication 12.5 MILLIGRAM(S): at 19:07

## 2023-11-09 RX ADMIN — LITHIUM CARBONATE 150 MILLIGRAM(S): 300 TABLET, EXTENDED RELEASE ORAL at 17:46

## 2023-11-09 RX ADMIN — HEPARIN SODIUM 1100 UNIT(S)/HR: 5000 INJECTION INTRAVENOUS; SUBCUTANEOUS at 17:45

## 2023-11-09 RX ADMIN — HEPARIN SODIUM 900 UNIT(S)/HR: 5000 INJECTION INTRAVENOUS; SUBCUTANEOUS at 05:10

## 2023-11-09 RX ADMIN — HEPARIN SODIUM 1300 UNIT(S)/HR: 5000 INJECTION INTRAVENOUS; SUBCUTANEOUS at 23:01

## 2023-11-09 RX ADMIN — LITHIUM CARBONATE 150 MILLIGRAM(S): 300 TABLET, EXTENDED RELEASE ORAL at 06:35

## 2023-11-09 RX ADMIN — HEPARIN SODIUM 1300 UNIT(S)/HR: 5000 INJECTION INTRAVENOUS; SUBCUTANEOUS at 19:00

## 2023-11-09 RX ADMIN — TICAGRELOR 90 MILLIGRAM(S): 90 TABLET ORAL at 06:20

## 2023-11-09 RX ADMIN — HEPARIN SODIUM 900 UNIT(S)/HR: 5000 INJECTION INTRAVENOUS; SUBCUTANEOUS at 07:11

## 2023-11-09 NOTE — PROGRESS NOTE ADULT - ASSESSMENT
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 fib and chest pain with LBBB and rapidly rising troponins concerning for NSTEMI.       Recs:  - Obtain TTE  - c/w ASA/Brilinta/heparin gtt  - will need long term A/C for afib; please check a TSH  - plan for [facility] today  - check A1C, lipid panel  - start atorvastatin 80mg   - further recs pending TTE and C      Gabriele Herman M.D.  Cardiology fellow     Plan discussed with cardiology fellow.  Patient seen and examined.  Hx., exam and labs as above.  I agree with the assessment and recommendations, which I have reviewed and edited where appropriate.  Justin Anton M.D.  Cardiology Attending, Consult Service    For Cardiology consults and questions, all Cardiology service information can be found 24/7 on [email or web address] - use password: [email or web address] to log in.  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 fib and chest pain with LBBB and rapidly rising troponins concerning for NSTEMI.       Recs:  - Obtain TTE  - c/w ASA/Brilinta/heparin gtt  - will need long term A/C for afib; please check a TSH  - plan for [facility] today  - check A1C, lipid panel  - start atorvastatin 80mg   - further recs pending TTE and C      Gabriele Herman M.D.  Cardiology fellow     Plan discussed with cardiology fellow.  Patient seen and examined.  Hx., exam and labs as above.  I agree with the assessment and recommendations, which I have reviewed and edited where appropriate.  Justin Anton M.D.  Cardiology Attending, Consult Service    For Cardiology consults and questions, all Cardiology service information can be found 24/7 on [email or web address] - use password: [email or web address] to log in.  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 fib and chest pain with LBBB and rapidly rising troponins concerning for NSTEMI.       Recs:  - Obtain TTE  - c/w ASA/Brilinta/heparin gtt  - will need long term A/C for afib; please check a TSH  - plan for [facility] today  - check A1C, lipid panel  - start atorvastatin 80mg   - further recs pending TTE and C      Gabriele Herman M.D.  Cardiology fellow     Plan discussed with cardiology fellow.  Patient seen and examined.  Hx., exam and labs as above.  I agree with the assessment and recommendations, which I have reviewed and edited where appropriate.  Justin Anton M.D.  Cardiology Attending, Consult Service    For Cardiology consults and questions, all Cardiology service information can be found 24/7 on [email or web address] - use password: [email or web address] to log in.  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 fib and chest pain with LBBB and rapidly rising troponins concerning for NSTEMI. 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 Pending CABG eval. TTE with EF 30%, LV thrombus.       Recs:  - c/w ASA/Brilinta, atorvastatin 80mg  - c/w heparin gtt, will need long term A/C for afib and LV thrombus  - f/u CT surg recs  -obtain cardiac MRI    Gabriele Herman M.D.  Cardiology fellow     Plan discussed with cardiology fellow.  Patient seen and examined.  Hx., exam and labs as above.  I agree with the assessment and recommendations, which I have reviewed and edited where appropriate.  Justin Anton M.D.  Cardiology Attending, Consult Service    For Cardiology consults and questions, all Cardiology service information can be found 24/7 on [email or web address] - use password: [email or web address] to log in.  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 fib and chest pain with LBBB and rapidly rising troponins concerning for NSTEMI. 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 Pending CABG eval. TTE with EF 30%, LV thrombus.       Recs:  - c/w ASA/Brilinta, atorvastatin 80mg  - c/w heparin gtt, will need long term A/C for afib and LV thrombus  - f/u CT surg recs  -obtain cardiac MRI    Gabriele Herman M.D.  Cardiology fellow     Plan discussed with cardiology fellow.  Patient seen and examined.  Hx., exam and labs as above.  I agree with the assessment and recommendations, which I have reviewed and edited where appropriate.  Jutsin Anton M.D.  Cardiology Attending, Consult Service    For Cardiology consults and questions, all Cardiology service information can be found 24/7 on [email or web address] - use password: [email or web address] to log in.  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 fib and chest pain with LBBB and rapidly rising troponins concerning for NSTEMI. 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 Pending CABG eval. TTE with EF 30%, LV thrombus.       Recs:  - c/w ASA/Brilinta, atorvastatin 80mg  - c/w heparin gtt, will need long term A/C for afib and LV thrombus  - f/u CT surg recs  -obtain cardiac MRI    Gabriele Herman M.D.  Cardiology fellow     Plan discussed with cardiology fellow.  Patient seen and examined.  Hx., exam and labs as above.  I agree with the assessment and recommendations, which I have reviewed and edited where appropriate.  Justin Anton M.D.  Cardiology Attending, Consult Service    For Cardiology consults and questions, all Cardiology service information can be found 24/7 on [email or web address] - use password: [email or web address] to log in.  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 fib and chest pain with LBBB and rapidly rising troponins concerning for NSTEMI. 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 Pending CABG eval. TTE with EF 30%, LV thrombus.       Recs:  - c/w ASA/Brilinta, atorvastatin 80mg  - c/w heparin gtt, will need long term A/C for afib and LV thrombus  - f/u CT surg recs  -obtain cardiac MRI    Gabriele Herman M.D.  Cardiology fellow     Plan discussed with cardiology fellow.  Patient seen and examined.  Hx., exam and labs as above.  I agree with the assessment and recommendations, which I have reviewed and edited where appropriate.  Justin Anton M.D.  Cardiology Attending, Consult Service    For Cardiology consults and questions, all Cardiology service information can be found 24/7 on [email or web address] - use password: [email or web address] to log in.  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 fib and chest pain with LBBB and rapidly rising troponins concerning for NSTEMI. 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 Pending CABG eval. TTE with EF 30%, LV thrombus.       Recs:  - c/w ASA/Brilinta, atorvastatin 80mg  - c/w heparin gtt, will need long term A/C for afib and LV thrombus  - f/u CT surg recs  -obtain cardiac MRI    Gabriele Herman M.D.  Cardiology fellow     Plan discussed with cardiology fellow.  Patient seen and examined.  Hx., exam and labs as above.  I agree with the assessment and recommendations, which I have reviewed and edited where appropriate.  Justin Anton M.D.  Cardiology Attending, Consult Service    For Cardiology consults and questions, all Cardiology service information can be found 24/7 on [email or web address] - use password: [email or web address] to log in.  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 fib and chest pain with LBBB and rapidly rising troponins concerning for NSTEMI. 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 Pending CABG eval. TTE with EF 30%, LV thrombus.       Recs:  - c/w ASA/Brilinta, atorvastatin 80mg  - c/w heparin gtt, will need long term A/C for afib and LV thrombus  - f/u CT surg recs  -obtain cardiac MRI    Gabriele Herman M.D.  Cardiology fellow     Plan discussed with cardiology fellow.  Patient seen and examined.  Hx., exam and labs as above.  I agree with the assessment and recommendations, which I have reviewed and edited where appropriate.  Justin Anton M.D.  Cardiology Attending, Consult Service    For Cardiology consults and questions, all Cardiology service information can be found 24/7 on [email or web address] - use password: [email or web address] to log in.  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 fib and chest pain with LBBB and rapidly rising troponins concerning for NSTEMI. 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 Pending CABG eval. TTE with EF 30%, LV thrombus.       Recs:  - c/w ASA/Brilinta, atorvastatin 80mg  - c/w heparin gtt, will need long term A/C for afib and LV thrombus  - f/u CT surg recs  -obtain cardiac MRI  -please start entresto 24/26 and toprol 25mg    Gabriele Herman M.D.  Cardiology fellow     Plan discussed with cardiology fellow.  Patient seen and examined.  Hx., exam and labs as above.  I agree with the assessment and recommendations, which I have reviewed and edited where appropriate.  Justin Anton M.D.  Cardiology Attending, Consult Service    For Cardiology consults and questions, all Cardiology service information can be found 24/7 on [email or web address] - use password: [email or web address] to log in.  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 fib and chest pain with LBBB and rapidly rising troponins concerning for NSTEMI. 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 Pending CABG eval. TTE with EF 30%, LV thrombus.       Recs:  - c/w ASA/Brilinta, atorvastatin 80mg  - c/w heparin gtt, will need long term A/C for afib and LV thrombus  - f/u CT surg recs  -obtain cardiac MRI  -please start entresto 24/26 and toprol 25mg    Gabriele Herman M.D.  Cardiology fellow     Plan discussed with cardiology fellow.  Patient seen and examined.  Hx., exam and labs as above.  I agree with the assessment and recommendations, which I have reviewed and edited where appropriate.  Justin Anton M.D.  Cardiology Attending, Consult Service    For Cardiology consults and questions, all Cardiology service information can be found 24/7 on [email or web address] - use password: [email or web address] to log in.  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 fib and chest pain with LBBB and rapidly rising troponins concerning for NSTEMI. 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 Pending CABG eval. TTE with EF 30%, LV thrombus.       Recs:  - c/w ASA/Brilinta, atorvastatin 80mg  - c/w heparin gtt, will need long term A/C for afib and LV thrombus  - f/u CT surg recs  -obtain cardiac MRI  -please start entresto 24/26 and toprol 25mg    Gabriele Herman M.D.  Cardiology fellow     Plan discussed with cardiology fellow.  Patient seen and examined.  Hx., exam and labs as above.  I agree with the assessment and recommendations, which I have reviewed and edited where appropriate.  Justin Anton M.D.  Cardiology Attending, Consult Service    For Cardiology consults and questions, all Cardiology service information can be found 24/7 on [email or web address] - use password: [email or web address] to log in.

## 2023-11-09 NOTE — PATIENT PROFILE ADULT - FALL HARM RISK - HARM RISK INTERVENTIONS
Assistance with ambulation/Assistance OOB with selected safe patient handling equipment/Communicate Risk of Fall with Harm to all staff/Discuss with provider need for PT consult/Monitor gait and stability/Provide patient with walking aids - walker, cane, crutches/Reinforce activity limits and safety measures with patient and family/Tailored Fall Risk Interventions/Visual Cue: Yellow wristband and red socks/Bed in lowest position, wheels locked, appropriate side rails in place/Call bell, personal items and telephone in reach/Instruct patient to call for assistance before getting out of bed or chair/Non-slip footwear when patient is out of bed/[place] to call system/Physically safe environment - no spills, clutter or unnecessary equipment/Purposeful Proactive Rounding/Room/bathroom lighting operational, light cord in reach Assistance with ambulation/Assistance OOB with selected safe patient handling equipment/Communicate Risk of Fall with Harm to all staff/Discuss with provider need for PT consult/Monitor gait and stability/Provide patient with walking aids - walker, cane, crutches/Reinforce activity limits and safety measures with patient and family/Tailored Fall Risk Interventions/Visual Cue: Yellow wristband and red socks/Bed in lowest position, wheels locked, appropriate side rails in place/Call bell, personal items and telephone in reach/Instruct patient to call for assistance before getting out of bed or chair/Non-slip footwear when patient is out of bed/[place] to call system/Physically safe environment - no spills, clutter or unnecessary equipment/Purposeful Proactive Rounding/Room/bathroom lighting operational, light cord in reach Assistance with ambulation/Assistance OOB with selected safe patient handling equipment/Communicate Risk of Fall with Harm to all staff/Discuss with provider need for PT consult/Monitor gait and stability/Provide patient with walking aids - walker, cane, crutches/Reinforce activity limits and safety measures with patient and family/Tailored Fall Risk Interventions/Visual Cue: Yellow wristband and red socks/Bed in lowest position, wheels locked, appropriate side rails in place/Call bell, personal items and telephone in reach/Instruct patient to call for assistance before getting out of bed or chair/Non-slip footwear when patient is out of bed/[place] to call system/Physically safe environment - no spills, clutter or unnecessary equipment/Purposeful Proactive Rounding/Room/bathroom lighting operational, light cord in reach

## 2023-11-09 NOTE — PROGRESS NOTE ADULT - PROBLEM SELECTOR PLAN 1
- NSTEMI s/p [facility] on 11/9 with triple vessel disease  - c/w aspirin and brillinta for now  - CT surgery consulted - NSTEMI s/p [facility] on 11/9 with triple vessel disease  - c/w aspirin and brillinta for now  - CT surgery consulted - NSTEMI s/p [facility] on 11/9 with triple vessel disease  - c/w aspirin and brillinta for now  - CT surgery consulted

## 2023-11-09 NOTE — CONSULT NOTE ADULT - PROBLEM SELECTOR RECOMMENDATION 2
Recommend ASA  Recommend Statins when LFT is normalized  Recommend BB as tolerated  Continue heparin gtt for ACS & new onset A fib  Rec: cardiac MRI to assess cardiac viability  If patient is amenable to surgery, will continue to pre-op workup Recommend ASA  Recommend Statins when LFT is normalized  Recommend BB as tolerated  Continue heparin gtt for ACS & new onset A fib  Rec: cardiac MRI to assess cardiac viability if pt amenable to surgery  If patient is amenable to surgery, will continue to pre-op workup

## 2023-11-09 NOTE — ED ADULT NURSE REASSESSMENT NOTE - NS ED NURSE REASSESS COMMENT FT1
Pt resting in bed comfortably. Denying chest pain or SOB. Tolerating off-bipap well on 3L. Heparin gtt infusing. Awaiting bed assignment.
per inpatient MD Bowens order, STAT coagulation study ordered and sent at 0316. 6 hour coagulation study from start of heparin infusion was not to be drawn until 0421. aptt result of 81.2 recorded from coagulation study sent at 0316, per ACS titration dosing nomogram, current heparin rate nontherapeutic, inpatient ACP Sierra Oconnell contacted regarding incident, per EULOGIO Oconnell, heparin infusion NOT to be titrated until 0421 coagulation study is drawn and resulted. no new RN interventions at this time
Received report from Aldo Knott. pt is A&Ox4 able to follow all commands. Pulse, motor, sensation present and equal in all 4 extremities. pt Breathing spontaneous and unlabored on 3L NC, Skin warm and dry and of color appropriate for ethnicity , moves all extremities, speech clear. pending admission bed. no further nurse intervention needed at this time.

## 2023-11-09 NOTE — PROGRESS NOTE ADULT - NSPROGADDITIONALINFOA_GEN_ALL_CORE
disposition: chf and cts consults pending    Sofy Dick D.O.  Division of Hospital Medicine  Available on MS Teams

## 2023-11-09 NOTE — CONSULT NOTE ADULT - PROBLEM SELECTOR RECOMMENDATION 9
Continue diuresis as per primary team  Supplemental O2 as needed  Evaluation in progression for possible MVR  Recommend cardiac viability: order cardiac MR, Dr. Weeks will discuss with patient if he wants to continue the workup for possible MVR; If patient is amenable to surgery, will continue to pre-op workup

## 2023-11-09 NOTE — PATIENT PROFILE ADULT - ARRIVAL FROM
Inpatient called to let us know that Daphnie is being discharged today, and has a lab/aranesp appt with us tomorrow (scheduled prior). Would you like me to change the appt to a lab/follow up visit either this week or next with MD/PA, before she starts the aranesp up again, or have her come in here tomorrow for just the lab and nurse visit/aranesp.     Radha   
Assisted living facility

## 2023-11-09 NOTE — CONSULT NOTE ADULT - ASSESSMENT
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 Admitted to the ED. Found to have new onset of A fib, LBBB, newly reduced EF 29%, severe MR. CTS called to evaluate for possible CABG and MVR. Patient seen. Currently denies SOB and CP.     Patient seen and examined. Awaiting for official results of cardiac. TTE showed severe MR and global akinesis. Unofficial as per Dr. Weeks, patient has obstructive CAD. Patient is not amenable to any intervention at this time. Dr. Weeks will discuss options with patient. Patient does not want a cardiac MRI to assess viability. 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 Admitted to the ED. Found to have new onset of A fib, LBBB, newly reduced EF 29%, severe MR. CTS called to evaluate for possible CABG and MVR. Patient seen. Currently denies SOB and CP.     Patient seen and examined. Awaiting for official results of cardiac. TTE showed severe MR and global akinesis. Unofficial as per Dr. Weeks, patient has obstructive CAD. Patient is not amenable to any intervention at this time. Dr. Weeks will discuss options with patient. Patient does not want a cardiac MRI to assess viability.    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 Admitted to the ED. Found to have new onset of A fib, LBBB, newly reduced EF 29%, severe MR. CTS called to evaluate for possible CABG and MVR. Patient seen. Currently denies SOB and CP.     Patient seen and examined. Awaiting for official results of cardiac. TTE showed severe MR and global akinesis. Unofficial as per Dr. Weeks, patient has obstructive CAD. Patient is not amenable to any intervention at this time. Dr. Weeks will discuss options with patient. Patient does not want a cardiac MRI to assess viability.    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 Admitted to the ED. Found to have new onset of A fib, LBBB, newly reduced EF 29%, severe MR. CTS called to evaluate for possible CABG and MVR. Patient seen. Currently denies SOB and CP.     Patient seen and examined. Awaiting for official results of cardiac. TTE showed severe MR and global akinesis. Unofficial as per Dr. Weeks, patient has obstructive CAD. Patient is not amenable to any intervention at this time. Dr. Weeks will discuss options with patient. Patient does not want a cardiac MRI to assess viability.    Cardiac Surgery: [id number]

## 2023-11-09 NOTE — PROGRESS NOTE ADULT - SUBJECTIVE AND OBJECTIVE BOX
Patient seen after [facility], no complaints.    GENERAL: No fevers, no chills.  EYES: No blurry vision,  No photophobia  ENT: No sore throat.  No dysphagia  Cardiovascular: No chest pain, palpitations, orthopnea  Pulmonary: No cough, no wheezing. No shortness of breath  Gastrointestinal: No abdominal pain, no diarrhea, no constipation.    Musculoskeletal: No weakness.  No myalgias.  Dermatology:  No rashes.  Neuro: No Headache.  No vertigo.  No dizziness.  Psych: No anxiety, no depression.  Denies suicidal thoughts.    MEDICATIONS  (STANDING):  aspirin enteric coated 81 milliGRAM(s) Oral daily  heparin  Infusion.  Unit(s)/Hr (9 mL/Hr) IV Continuous <Continuous>  lithium 150 milliGRAM(s) Oral two times a day  ticagrelor 90 milliGRAM(s) Oral every 12 hours    MEDICATIONS  (PRN):  heparin   Injectable 4400 Unit(s) IV Push every 6 hours PRN For aPTT less than 40    Vital Signs Last 24 Hrs  T(C): 36.5 (09 Nov 2023 11:45), Max: 37 (08 Nov 2023 18:19)  T(F): 97.7 (09 Nov 2023 11:45), Max: 98.6 (08 Nov 2023 18:19)  HR: 64 (09 Nov 2023 12:30) (60 - 97)  BP: 146/82 (09 Nov 2023 12:30) (111/64 - 159/90)  BP(mean): 108 (09 Nov 2023 12:30) (82 - 108)  RR: 17 (09 Nov 2023 12:30) (16 - 20)  SpO2: 98% (09 Nov 2023 12:30) (94% - 100%)    Parameters below as of 09 Nov 2023 13:00  Patient On (Oxygen Delivery Method): nasal cannula    GENERAL: NAD  HEAD:  Atraumatic, Normocephalic  EYES: EOMI, PERRLA, conjunctiva and sclera clear  ENT: Pharynx not erythematous  PULMONARY: Clear to auscultation bilaterally; No wheeze  CARDIOVASCULAR: Regular rate and rhythm; No murmurs, rubs, or gallops  ABDOMEN: Soft, Nontender, Nondistended; Bowel sounds present  EXTREMITIES:  2+ Peripheral Pulses, No clubbing, cyanosis, or edema  MUSCULOSKELETAL: No calf tenderness  PSYCH: AAOx3, normal aff  SKIN: warm and dry, No rashes or lesions    .  LABS:                         14.4   11.15 )-----------( 154      ( 09 Nov 2023 04:29 )             44.1     11-09    139  |  104  |  21  ----------------------------<  105<H>  4.2   |  23  |  1.10    Ca    9.6      09 Nov 2023 03:16  Phos  3.4     11-09  Mg     1.9     11-09    TPro  6.9  /  Alb  4.3  /  TBili  0.7  /  DBili  x   /  AST  256<H>  /  ALT  38  /  AlkPhos  73  11-09    PT/INR - ( 08 Nov 2023 18:38 )   PT: 12.3 sec;   INR: 1.18 ratio         PTT - ( 09 Nov 2023 04:29 )  PTT:72.5 sec  Urinalysis Basic - ( 09 Nov 2023 03:16 )    Color: x / Appearance: x / SG: x / pH: x  Gluc: 105 mg/dL / Ketone: x  / Bili: x / Urobili: x   Blood: x / Protein: x / Nitrite: x   Leuk Esterase: x / RBC: x / WBC x   Sq Epi: x / Non Sq Epi: x / Bacteria: x            RADIOLOGY, EKG & ADDITIONAL TESTS: Reviewed.    Patient seen after [facility], no complaints.    GENERAL: No fevers, no chills.  EYES: No blurry vision,  No photophobia  ENT: No sore throat.  No dysphagia  Cardiovascular: No chest pain, palpitations, orthopnea  Pulmonary: No cough, no wheezing. No shortness of breath  Gastrointestinal: No abdominal pain, no diarrhea, no constipation.    Musculoskeletal: No weakness.  No myalgias.  Dermatology:  No rashes.  Neuro: No Headache.  No vertigo.  No dizziness.  Psych: No anxiety, no depression.  Denies suicidal thoughts.    MEDICATIONS  (STANDING):  aspirin enteric coated 81 milliGRAM(s) Oral daily  heparin  Infusion.  Unit(s)/Hr (9 mL/Hr) IV Continuous <Continuous>  lithium 150 milliGRAM(s) Oral two times a day  ticagrelor 90 milliGRAM(s) Oral every 12 hours    MEDICATIONS  (PRN):  heparin   Injectable 4400 Unit(s) IV Push every 6 hours PRN For aPTT less than 40    Vital Signs Last 24 Hrs  T(C): 36.5 (09 Nov 2023 11:45), Max: 37 (08 Nov 2023 18:19)  T(F): 97.7 (09 Nov 2023 11:45), Max: 98.6 (08 Nov 2023 18:19)  HR: 64 (09 Nov 2023 12:30) (60 - 97)  BP: 146/82 (09 Nov 2023 12:30) (111/64 - 159/90)  BP(mean): 108 (09 Nov 2023 12:30) (82 - 108)  RR: 17 (09 Nov 2023 12:30) (16 - 20)  SpO2: 98% (09 Nov 2023 12:30) (94% - 100%)    Parameters below as of 09 Nov 2023 13:00  Patient On (Oxygen Delivery Method): nasal cannula    GENERAL: NAD  HEAD:  Atraumatic, Normocephalic  EYES: EOMI, PERRLA, conjunctiva and sclera clear  ENT: Pharynx not erythematous  PULMONARY: Clear to auscultation bilaterally; No wheeze  CARDIOVASCULAR: Regular rate and rhythm; No murmurs, rubs, or gallops  ABDOMEN: Soft, Nontender, Nondistended; Bowel sounds present  EXTREMITIES:  2+ Peripheral Pulses, No clubbing, cyanosis, or edema  MUSCULOSKELETAL: No calf tenderness  PSYCH: AAOx3, normal aff  SKIN: warm and dry, No rashes or lesions    .  LABS:                         14.4   11.15 )-----------( 154      ( 09 Nov 2023 04:29 )             44.1     11-09    139  |  104  |  21  ----------------------------<  105<H>  4.2   |  23  |  1.10    Ca    9.6      09 Nov 2023 03:16  Phos  3.4     11-09  Mg     1.9     11-09    TPro  6.9  /  Alb  4.3  /  TBili  0.7  /  DBili  x   /  AST  256<H>  /  ALT  38  /  AlkPhos  73  11-09    PT/INR - ( 08 Nov 2023 18:38 )   PT: 12.3 sec;   INR: 1.18 ratio         PTT - ( 09 Nov 2023 04:29 )  PTT:72.5 sec  Urinalysis Basic - ( 09 Nov 2023 03:16 )    Color: x / Appearance: x / SG: x / pH: x  Gluc: 105 mg/dL / Ketone: x  / Bili: x / Urobili: x   Blood: x / Protein: x / Nitrite: x   Leuk Esterase: x / RBC: x / WBC x   Sq Epi: x / Non Sq Epi: x / Bacteria: x            RADIOLOGY, EKG & ADDITIONAL TESTS: Reviewed.    Patient seen after [facility], no complaints.    GENERAL: No fevers, no chills.  EYES: No blurry vision,  No photophobia  ENT: No sore throat.  No dysphagia  Cardiovascular: No chest pain, palpitations, orthopnea  Pulmonary: No cough, no wheezing. No shortness of breath  Gastrointestinal: No abdominal pain, no diarrhea, no constipation.    Musculoskeletal: No weakness.  No myalgias.  Dermatology:  No rashes.  Neuro: No Headache.  No vertigo.  No dizziness.  Psych: No anxiety, no depression.  Denies suicidal thoughts.    MEDICATIONS  (STANDING):  aspirin enteric coated 81 milliGRAM(s) Oral daily  heparin  Infusion.  Unit(s)/Hr (9 mL/Hr) IV Continuous <Continuous>  lithium 150 milliGRAM(s) Oral two times a day  ticagrelor 90 milliGRAM(s) Oral every 12 hours    MEDICATIONS  (PRN):  heparin   Injectable 4400 Unit(s) IV Push every 6 hours PRN For aPTT less than 40    Vital Signs Last 24 Hrs  T(C): 36.5 (09 Nov 2023 11:45), Max: 37 (08 Nov 2023 18:19)  T(F): 97.7 (09 Nov 2023 11:45), Max: 98.6 (08 Nov 2023 18:19)  HR: 64 (09 Nov 2023 12:30) (60 - 97)  BP: 146/82 (09 Nov 2023 12:30) (111/64 - 159/90)  BP(mean): 108 (09 Nov 2023 12:30) (82 - 108)  RR: 17 (09 Nov 2023 12:30) (16 - 20)  SpO2: 98% (09 Nov 2023 12:30) (94% - 100%)    Parameters below as of 09 Nov 2023 13:00  Patient On (Oxygen Delivery Method): nasal cannula    GENERAL: NAD  HEAD:  Atraumatic, Normocephalic  EYES: EOMI, PERRLA, conjunctiva and sclera clear  ENT: Pharynx not erythematous  PULMONARY: Clear to auscultation bilaterally; No wheeze  CARDIOVASCULAR: Regular rate and rhythm; No murmurs, rubs, or gallops  ABDOMEN: Soft, Nontender, Nondistended; Bowel sounds present  EXTREMITIES:  2+ Peripheral Pulses, No clubbing, cyanosis, or edema  MUSCULOSKELETAL: No calf tenderness  PSYCH: AAOx3, normal aff  SKIN: warm and dry, No rashes or lesions    .  LABS:                         14.4   11.15 )-----------( 154      ( 09 Nov 2023 04:29 )             44.1     11-09    139  |  104  |  21  ----------------------------<  105<H>  4.2   |  23  |  1.10    Ca    9.6      09 Nov 2023 03:16  Phos  3.4     11-09  Mg     1.9     11-09    TPro  6.9  /  Alb  4.3  /  TBili  0.7  /  DBili  x   /  AST  256<H>  /  ALT  38  /  AlkPhos  73  11-09    PT/INR - ( 08 Nov 2023 18:38 )   PT: 12.3 sec;   INR: 1.18 ratio         PTT - ( 09 Nov 2023 04:29 )  PTT:72.5 sec  Urinalysis Basic - ( 09 Nov 2023 03:16 )    Color: x / Appearance: x / SG: x / pH: x  Gluc: 105 mg/dL / Ketone: x  / Bili: x / Urobili: x   Blood: x / Protein: x / Nitrite: x   Leuk Esterase: x / RBC: x / WBC x   Sq Epi: x / Non Sq Epi: x / Bacteria: x            RADIOLOGY, EKG & ADDITIONAL TESTS: Reviewed.

## 2023-11-09 NOTE — PATIENT PROFILE ADULT - FUNCTIONAL ASSESSMENT - BASIC MOBILITY 4.
anterior cervical L/posterior cervical R/anterior cervical R/posterior cervical L 3 = A little assistance

## 2023-11-09 NOTE — PROGRESS NOTE ADULT - SUBJECTIVE AND OBJECTIVE BOX
Patient seen and examined at bedside.    Overnight Events: no chest pain    REVIEW OF SYSTEMS:  CONSTITUTIONAL: No weakness, fevers or chills  EYES/ENT: No visual changes;  No dysphagia  NECK: No pain or stiffness  RESPIRATORY: No cough, wheezing, hemoptysis; No shortness of breath  CARDIOVASCULAR: No chest pain or palpitations; No lower extremity edema  GASTROINTESTINAL: No abdominal or epigastric pain. No nausea, vomiting, or hematemesis; No diarrhea or constipation. No melena or hematochezia.  BACK: No back pain  GENITOURINARY: No dysuria, frequency or hematuria  NEUROLOGICAL: No numbness or weakness  SKIN: No itching, burning, rashes, or lesions   All other review of systems is negative unless indicated above.            Current Meds:  aspirin enteric coated 81 milliGRAM(s) Oral daily  heparin   Injectable 4400 Unit(s) IV Push every 6 hours PRN  heparin  Infusion.  Unit(s)/Hr IV Continuous <Continuous>  lithium 150 milliGRAM(s) Oral two times a day  ticagrelor 90 milliGRAM(s) Oral every 12 hours      Vitals:  T(F): 98.2 (11-09), Max: 98.6 (11-08)  HR: 61 (11-09) (61 - 97)  BP: 127/79 (11-09) (119/84 - 159/90)  RR: 18 (11-09)  SpO2: 99% (11-09)  I&O's Summary    Physical Exam:  GEN: NAD  HEENT: EOMI, clear sclera  PULM: CTA b/l, no wheeze  CV: RRR S1 S2, 2/6 holosystolic murmur at apex, no JVD  ABD: S, NT, ND  EXT: WWP, no edema  PSYCH: normal affect  SKIN: No rash                          14.4   11.15 )-----------( 154      ( 09 Nov 2023 04:29 )             44.1     11-09    139  |  104  |  21  ----------------------------<  105<H>  4.2   |  23  |  1.10    Ca    9.6      09 Nov 2023 03:16  Phos  3.4     11-09  Mg     1.9     11-09    TPro  6.9  /  Alb  4.3  /  TBili  0.7  /  DBili  x   /  AST  256<H>  /  ALT  38  /  AlkPhos  73  11-09    PT/INR - ( 08 Nov 2023 18:38 )   PT: 12.3 sec;   INR: 1.18 ratio         PTT - ( 09 Nov 2023 04:29 )  PTT:72.5 sec  CARDIAC MARKERS ( 09 Nov 2023 03:16 )  x     / x     / 2453 U/L / x     / 374.9 ng/mL     Patient seen and examined at bedside.    Overnight Events: no chest pain    REVIEW OF SYSTEMS:  CONSTITUTIONAL: No weakness, fevers or chills  EYES/ENT: No visual changes;  No dysphagia  NECK: No pain or stiffness  RESPIRATORY: No cough, wheezing, hemoptysis; No shortness of breath  CARDIOVASCULAR: No chest pain or palpitations; No lower extremity edema  GASTROINTESTINAL: No abdominal or epigastric pain. No nausea, vomiting, or hematemesis; No diarrhea or constipation. No melena or hematochezia.  BACK: No back pain  GENITOURINARY: No dysuria, frequency or hematuria  NEUROLOGICAL: No numbness or weakness  SKIN: No itching, burning, rashes, or lesions   All other review of systems is negative unless indicated above.            Current Meds:  aspirin enteric coated 81 milliGRAM(s) Oral daily  heparin   Injectable 4400 Unit(s) IV Push every 6 hours PRN  heparin  Infusion.  Unit(s)/Hr IV Continuous <Continuous>  lithium 150 milliGRAM(s) Oral two times a day  ticagrelor 90 milliGRAM(s) Oral every 12 hours      Vitals:  T(F): 98.2 (11-09), Max: 98.6 (11-08)  HR: 61 (11-09) (61 - 97)  BP: 127/79 (11-09) (119/84 - 159/90)  RR: 18 (11-09)  SpO2: 99% (11-09)      Physical Exam:  GEN: NAD  HEENT: EOMI, clear sclera  PULM: CTA b/l, no wheeze  CV: RRR S1 S2, 2/6 holosystolic murmur at apex, no JVD  ABD: S, NT, ND  EXT: WWP, no edema  PSYCH: normal affect  SKIN: No rash      LABS:                      14.4   11.15 )-----------( 154      ( 09 Nov 2023 04:29 )             44.1     11-09  139  |  104  |  21  ----------------------------<  105<H>  4.2   |  23  |  1.10    Ca    9.6      09 Nov 2023 03:16  Phos  3.4     11-09  Mg     1.9     11-09    TPro  6.9  /  Alb  4.3  /  TBili  0.7  /  DBili  x   /  AST  256<H>  /  ALT  38  /  AlkPhos  73  11-09    PT/INR - ( 08 Nov 2023 18:38 )   PT: 12.3 sec;   INR: 1.18 ratio    PTT - ( 09 Nov 2023 04:29 )  PTT:72.5 sec    CARDIAC MARKERS ( 09 Nov 2023 03:16 )  x     / x     / 2453 U/L / x     / 374.9 ng/mL

## 2023-11-09 NOTE — CONSULT NOTE ADULT - SUBJECTIVE AND OBJECTIVE BOX
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 Admitted to the ED. Found to have new onset of A fib, LBBB, newly reduced EF 29%, severe MR. CTS called to evaluate for possible CABG and MVR    Past Medical History  Bipolar mood disorder        Past Surgical History      MEDICATIONS  (STANDING):  aspirin enteric coated 81 milliGRAM(s) Oral daily  heparin  Infusion.  Unit(s)/Hr (9 mL/Hr) IV Continuous <Continuous>  lithium 150 milliGRAM(s) Oral two times a day  ticagrelor 90 milliGRAM(s) Oral every 12 hours    MEDICATIONS  (PRN):  heparin   Injectable 4400 Unit(s) IV Push every 6 hours PRN For aPTT less than 40      Vital Signs Last 24 Hrs  T(C): 36.5 (11-09-23 @ 11:45), Max: 37 (11-08-23 @ 18:19)  T(F): 97.7 (11-09-23 @ 11:45), Max: 98.6 (11-08-23 @ 18:19)  HR: 65 (11-09-23 @ 12:05) (60 - 97)  BP: 136/72 (11-09-23 @ 11:55) (111/64 - 159/90)  RR: 17 (11-09-23 @ 12:05) (16 - 20)  SpO2: 95% (11-09-23 @ 12:05) (94% - 100%)           Daily Height in cm: 180.34 (09 Nov 2023 10:07)    Daily   Admit Wt: Drug Dosing Weight  Height (cm): 180.3 (09 Nov 2023 10:07)  Weight (kg): 72.3 (08 Nov 2023 22:30)  BMI (kg/m2): 22.2 (09 Nov 2023 10:07)  BSA (m2): 1.91 (09 Nov 2023 10:07)    Allergies: No Known Allergies      SOCIAL HISTORY:  Smoker: [ ] Yes  [X] No                 Pt denies  ETOH use: [ ] Yes  [X] No             Pt denies  Ilicit Drug use:  [ ] Yes  [X] No     Pt denies    FAMILY HISTORY:      Review of Systems  GENERAL:  no weakness, fatigue, fevers or chills  NEURO: no dizziness, numbness, tingling or weakness  SKIN: no itching, burning, rashes, or lesions   HEENT: no visual changes;  no headache, no vertigo, no recent colds  RESPIRATORY: no shortness of breath, no cough, sputum, wheezing  CARDIOVASCULAR:  no chest pain,  or palpitations  GI: no abd pain. no N/V/D.  PERIPHERAL VASCULAR: no swelling, no tenderness, no erythema    PHYSICAL EXAM  General: Well nourished, well developed, NAD.                                              Neuro: Normal exam oriented to person/place & time with no focal motor or sensory  deficits.                    Eyes: Normal exam of conjunctiva & lids, pupils equally reactive.   ENT: Normal exam of nasal/oral mucosa with absence of cyanosis.   Neck: Normal exam of jugular veins, trachea & thyroid.   Chest: Normal lung exam with good air movement absence of wheezes, rales, or rhonchi.                                                                         CV:  Auscultation: normal S1S2, RRR   Carotids: No Bruits[X]  Abdominal Aorta: normal [X] nonpalpable[X]                                                                         GI: Normal exam of abdomen with no noted masses or tenderness. +BSx4Q                                                                                            Extremities: Normal no evidence of cyanosis or deformity, Edema: none  Lower Extremity Pulses: Right[+2DP] Left[+2DP] Varicosities[none]  SKIN : Normal exam to inspection & palpation.                                                           LABS:                        14.4   11.15 )-----------( 154      ( 09 Nov 2023 04:29 )             44.1     11-09    139  |  104  |  21  ----------------------------<  105<H>  4.2   |  23  |  1.10    Ca    9.6      09 Nov 2023 03:16  Phos  3.4     11-09  Mg     1.9     11-09    TPro  6.9  /  Alb  4.3  /  TBili  0.7  /  DBili  x   /  AST  256<H>  /  ALT  38  /  AlkPhos  73  11-09    PT/INR - ( 08 Nov 2023 18:38 )   PT: 12.3 sec;   INR: 1.18 ratio         PTT - ( 09 Nov 2023 04:29 )  PTT:72.5 sec      Cardiac Cath:    TTE / CECILIA:   History of Present Illness:  HPI:  83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 Admitted to the ED. Found to have new onset of A fib, LBBB, newly reduced EF 29%, severe MR. CTS called to evaluate for possible CABG and MVR. Patient seen. Currently denies SOB and CP.    Past Medical History  Bipolar mood disorder    Past Surgical History  inguinal hernia repair x3    MEDICATIONS  (STANDING):  aspirin enteric coated 81 milliGRAM(s) Oral daily  heparin  Infusion.  Unit(s)/Hr (9 mL/Hr) IV Continuous <Continuous>  lithium 150 milliGRAM(s) Oral two times a day  ticagrelor 90 milliGRAM(s) Oral every 12 hours    MEDICATIONS  (PRN):  heparin   Injectable 4400 Unit(s) IV Push every 6 hours PRN For aPTT less than 40      Vital Signs Last 24 Hrs  T(C): 36.5 (11-09-23 @ 11:45), Max: 37 (11-08-23 @ 18:19)  T(F): 97.7 (11-09-23 @ 11:45), Max: 98.6 (11-08-23 @ 18:19)  HR: 65 (11-09-23 @ 12:05) (60 - 97)  BP: 136/72 (11-09-23 @ 11:55) (111/64 - 159/90)  RR: 17 (11-09-23 @ 12:05) (16 - 20)  SpO2: 95% (11-09-23 @ 12:05) (94% - 100%)           Daily Height in cm: 180.34 (09 Nov 2023 10:07)    Daily   Admit Wt: Drug Dosing Weight  Height (cm): 180.3 (09 Nov 2023 10:07)  Weight (kg): 72.3 (08 Nov 2023 22:30)  BMI (kg/m2): 22.2 (09 Nov 2023 10:07)  BSA (m2): 1.91 (09 Nov 2023 10:07)    Allergies: No Known Allergies      SOCIAL HISTORY:  Smoker: [ ] Yes  [X] No                 Pt denies  ETOH use: [ ] Yes  [X] No             Pt denies  Ilicit Drug use:  [ ] Yes  [X] No     Pt denies    FAMILY HISTORY:      Review of Systems  GENERAL:  no weakness, fatigue, fevers or chills  NEURO: no dizziness, numbness, tingling or weakness  SKIN: no itching, burning, rashes, or lesions   HEENT: no visual changes;  no headache, no vertigo, no recent colds  RESPIRATORY: no shortness of breath, no cough, sputum, wheezing  CARDIOVASCULAR:  no chest pain,  or palpitations  GI: no abd pain. no N/V/D.  PERIPHERAL VASCULAR: no swelling, no tenderness, no erythema    PHYSICAL EXAM  General: frail elderly, NAD.                                              Neuro: Normal exam oriented to person/place & time with no focal motor or sensory deficits.                    Eyes: Normal exam of conjunctiva & lids, pupils equally reactive.   ENT: Normal exam of nasal/oral mucosa with absence of cyanosis.   Neck: Normal exam of jugular veins, trachea & thyroid.   Chest: bilateral breath sounds diminished at the base. no wheezing/ rales/ rhonchi                                                                        CV:  Auscultation: irregular  Carotids: No Bruits[X]  Abdominal Aorta: normal [X] nonpalpable[X]                                                                         GI: Normal exam of abdomen with no noted masses or tenderness. +BSx4Q                                                                                            Extremities: cold to touch to bilateral feet. no evidence of cyanosis or deformity, Edema: none  Upper Extremity Pulses: Right[+2RP] Left[+2RP]  Lower Extremity Pulses: Right[+1DP] Left[+1DP] Varicosities[none]  SKIN: generalized ecchymosis. right radial TR bands in place. bilateral lower extremities dryness.                                                           LABS:                        14.4   11.15 )-----------( 154      ( 09 Nov 2023 04:29 )             44.1     11-09    139  |  104  |  21  ----------------------------<  105<H>  4.2   |  23  |  1.10    Ca    9.6      09 Nov 2023 03:16  Phos  3.4     11-09  Mg     1.9     11-09    TPro  6.9  /  Alb  4.3  /  TBili  0.7  /  DBili  x   /  AST  256<H>  /  ALT  38  /  AlkPhos  73  11-09    PT/INR - ( 08 Nov 2023 18:38 )   PT: 12.3 sec;   INR: 1.18 ratio         PTT - ( 09 Nov 2023 04:29 )  PTT:72.5 sec      Cardiac Cath: report not in sunrise yet    Echo on 11.09.23   1. Left ventricular systolic function is severely decreased with an ejection fraction of 29 % by Cole's method of disks.   2. Multiple segmental abnormalities exist. See findings.   3. Normal right ventricular cavity size and systolic function.   4. Fibrocalcific aortic valve sclerosis without stenosis.   5. Symmetric mitral valve leaflet tethering.   6. Severe mitral regurgitation.   7. No pericardial effusion seen.   8. LV thrombus seen in the apex a left ventricular thrombus.    CXR on 11.08.23: No focal consolidations.

## 2023-11-10 DIAGNOSIS — Z29.9 ENCOUNTER FOR PROPHYLACTIC MEASURES, UNSPECIFIED: ICD-10-CM

## 2023-11-10 LAB
A1C WITH ESTIMATED AVERAGE GLUCOSE RESULT: 5 % — SIGNIFICANT CHANGE UP (ref 4–5.6)
ANION GAP SERPL CALC-SCNC: 20 MMOL/L — HIGH (ref 5–17)
APTT BLD: 59 SEC — HIGH (ref 24.5–35.6)
APTT BLD: 75.9 SEC — HIGH (ref 24.5–35.6)
BUN SERPL-MCNC: 21 MG/DL — SIGNIFICANT CHANGE UP (ref 7–23)
CALCIUM SERPL-MCNC: 9.6 MG/DL — SIGNIFICANT CHANGE UP (ref 8.4–10.5)
CHLORIDE SERPL-SCNC: 106 MMOL/L — SIGNIFICANT CHANGE UP (ref 96–108)
CHOLEST SERPL-MCNC: 218 MG/DL — HIGH
CO2 SERPL-SCNC: 14 MMOL/L — LOW (ref 22–31)
CREAT SERPL-MCNC: 0.85 MG/DL — SIGNIFICANT CHANGE UP (ref 0.5–1.3)
EGFR: 86 ML/MIN/1.73M2 — SIGNIFICANT CHANGE UP
ESTIMATED AVERAGE GLUCOSE: 97 MG/DL — SIGNIFICANT CHANGE UP (ref 68–114)
GLUCOSE SERPL-MCNC: 98 MG/DL — SIGNIFICANT CHANGE UP (ref 70–99)
HCT VFR BLD CALC: 40.2 % — SIGNIFICANT CHANGE UP (ref 39–50)
HCT VFR BLD CALC: 45 % — SIGNIFICANT CHANGE UP (ref 39–50)
HCT VFR BLD CALC: 46.3 % — SIGNIFICANT CHANGE UP (ref 39–50)
HDLC SERPL-MCNC: 51 MG/DL — SIGNIFICANT CHANGE UP
HGB BLD-MCNC: 13.5 G/DL — SIGNIFICANT CHANGE UP (ref 13–17)
HGB BLD-MCNC: 15.1 G/DL — SIGNIFICANT CHANGE UP (ref 13–17)
LIPID PNL WITH DIRECT LDL SERPL: 144 MG/DL — HIGH
LITHIUM SERPL-MCNC: 0.5 MMOL/L — LOW (ref 0.6–1.2)
MAGNESIUM SERPL-MCNC: 1.9 MG/DL — SIGNIFICANT CHANGE UP (ref 1.6–2.6)
MCHC RBC-ENTMCNC: 31.1 PG — SIGNIFICANT CHANGE UP (ref 27–34)
MCHC RBC-ENTMCNC: 31.2 PG — SIGNIFICANT CHANGE UP (ref 27–34)
MCHC RBC-ENTMCNC: 31.7 PG — SIGNIFICANT CHANGE UP (ref 27–34)
MCHC RBC-ENTMCNC: 32.6 GM/DL — SIGNIFICANT CHANGE UP (ref 32–36)
MCHC RBC-ENTMCNC: 33.6 GM/DL — SIGNIFICANT CHANGE UP (ref 32–36)
MCV RBC AUTO: 92.6 FL — SIGNIFICANT CHANGE UP (ref 80–100)
MCV RBC AUTO: 94.5 FL — SIGNIFICANT CHANGE UP (ref 80–100)
MCV RBC AUTO: 95.7 FL — SIGNIFICANT CHANGE UP (ref 80–100)
NON HDL CHOLESTEROL: 167 MG/DL — HIGH
NRBC # BLD: 0 /100 WBCS — SIGNIFICANT CHANGE UP (ref 0–0)
PHOSPHATE SERPL-MCNC: 2.5 MG/DL — SIGNIFICANT CHANGE UP (ref 2.5–4.5)
PLATELET # BLD AUTO: 149 K/UL — LOW (ref 150–400)
PLATELET # BLD AUTO: 160 K/UL — SIGNIFICANT CHANGE UP (ref 150–400)
PLATELET # BLD AUTO: 163 K/UL — SIGNIFICANT CHANGE UP (ref 150–400)
POTASSIUM SERPL-MCNC: 4.3 MMOL/L — SIGNIFICANT CHANGE UP (ref 3.5–5.3)
POTASSIUM SERPL-SCNC: 4.3 MMOL/L — SIGNIFICANT CHANGE UP (ref 3.5–5.3)
RBC # BLD: 4.34 M/UL — SIGNIFICANT CHANGE UP (ref 4.2–5.8)
RBC # BLD: 4.76 M/UL — SIGNIFICANT CHANGE UP (ref 4.2–5.8)
RBC # BLD: 4.84 M/UL — SIGNIFICANT CHANGE UP (ref 4.2–5.8)
RBC # FLD: 14.9 % — HIGH (ref 10.3–14.5)
RBC # FLD: 15 % — HIGH (ref 10.3–14.5)
SODIUM SERPL-SCNC: 140 MMOL/L — SIGNIFICANT CHANGE UP (ref 135–145)
TRIGL SERPL-MCNC: 128 MG/DL — SIGNIFICANT CHANGE UP
WBC # BLD: 10.81 K/UL — HIGH (ref 3.8–10.5)
WBC # BLD: 11.54 K/UL — HIGH (ref 3.8–10.5)
WBC # BLD: 11.84 K/UL — HIGH (ref 3.8–10.5)
WBC # FLD AUTO: 10.81 K/UL — HIGH (ref 3.8–10.5)
WBC # FLD AUTO: 11.54 K/UL — HIGH (ref 3.8–10.5)
WBC # FLD AUTO: 11.84 K/UL — HIGH (ref 3.8–10.5)

## 2023-11-10 PROCEDURE — 93010 ELECTROCARDIOGRAM REPORT: CPT

## 2023-11-10 PROCEDURE — 99232 SBSQ HOSP IP/OBS MODERATE 35: CPT

## 2023-11-10 PROCEDURE — 99233 SBSQ HOSP IP/OBS HIGH 50: CPT | Mod: GC

## 2023-11-10 RX ORDER — METOPROLOL TARTRATE 50 MG
25 TABLET ORAL DAILY
Refills: 0 | Status: DISCONTINUED | OUTPATIENT
Start: 2023-11-11 | End: 2023-11-15

## 2023-11-10 RX ORDER — CHLORHEXIDINE GLUCONATE 213 G/1000ML
1 SOLUTION TOPICAL
Refills: 0 | Status: DISCONTINUED | OUTPATIENT
Start: 2023-11-10 | End: 2023-11-16

## 2023-11-10 RX ORDER — SACUBITRIL AND VALSARTAN 24; 26 MG/1; MG/1
1 TABLET, FILM COATED ORAL
Refills: 0 | Status: DISCONTINUED | OUTPATIENT
Start: 2023-11-10 | End: 2023-11-16

## 2023-11-10 RX ADMIN — HEPARIN SODIUM 1300 UNIT(S)/HR: 5000 INJECTION INTRAVENOUS; SUBCUTANEOUS at 00:53

## 2023-11-10 RX ADMIN — Medication 12.5 MILLIGRAM(S): at 06:16

## 2023-11-10 RX ADMIN — LITHIUM CARBONATE 150 MILLIGRAM(S): 300 TABLET, EXTENDED RELEASE ORAL at 06:16

## 2023-11-10 RX ADMIN — HEPARIN SODIUM 1300 UNIT(S)/HR: 5000 INJECTION INTRAVENOUS; SUBCUTANEOUS at 20:39

## 2023-11-10 RX ADMIN — HEPARIN SODIUM 1300 UNIT(S)/HR: 5000 INJECTION INTRAVENOUS; SUBCUTANEOUS at 07:37

## 2023-11-10 RX ADMIN — LITHIUM CARBONATE 150 MILLIGRAM(S): 300 TABLET, EXTENDED RELEASE ORAL at 17:24

## 2023-11-10 RX ADMIN — HEPARIN SODIUM 1300 UNIT(S)/HR: 5000 INJECTION INTRAVENOUS; SUBCUTANEOUS at 11:10

## 2023-11-10 RX ADMIN — Medication 81 MILLIGRAM(S): at 11:09

## 2023-11-10 RX ADMIN — HEPARIN SODIUM 1300 UNIT(S)/HR: 5000 INJECTION INTRAVENOUS; SUBCUTANEOUS at 07:29

## 2023-11-10 RX ADMIN — TICAGRELOR 90 MILLIGRAM(S): 90 TABLET ORAL at 06:16

## 2023-11-10 RX ADMIN — SACUBITRIL AND VALSARTAN 1 TABLET(S): 24; 26 TABLET, FILM COATED ORAL at 17:24

## 2023-11-10 RX ADMIN — CHLORHEXIDINE GLUCONATE 1 APPLICATION(S): 213 SOLUTION TOPICAL at 17:31

## 2023-11-10 RX ADMIN — Medication 12.5 MILLIGRAM(S): at 17:24

## 2023-11-10 NOTE — PHARMACOTHERAPY INTERVENTION NOTE - COMMENTS
Performed medication reconciliation and home medication list updated in prescription writer/ outpatient medication review. Medications verified with pt's nephew Johny and [facility].    Home medications:  Lithium carbonate ER 450mg, HALF tablet (total: 225mg) once a day for bipolar d/o     Will f/u with lithium levels.     Sheela Joshi, PharmD, BCPS  Clinical Pharmacy Specialist  Teams (preferred) or [phone]  Performed medication reconciliation and home medication list updated in prescription writer/ outpatient medication review. Medications verified with pt's nephew [facility].    Home medications:  Lithium carbonate ER 450mg, HALF tablet (total: 225mg) once a day for bipolar d/o     Will f/u with lithium levels.     Sheela Joshi, PharmD, BCPS  Clinical Pharmacy Specialist  Teams (preferred) or [phone]  Performed medication reconciliation and home medication list updated in prescription writer/ outpatient medication review. Medications verified with pt's nephew Johny and [facility].    Home medications:  Lithium carbonate ER 450mg, HALF tablet (total: 225mg) once a day for bipolar d/o     Will f/u with lithium levels.     Sheela Joshi, PharmD, BCPS  Clinical Pharmacy Specialist  Teams (preferred) or [phone]

## 2023-11-10 NOTE — PHARMACOTHERAPY INTERVENTION NOTE - PROVIDER CONTACTED
Dr. Toro, [facility] Dr. Toro, [facility] Dr. Toro, [facility]

## 2023-11-10 NOTE — PROGRESS NOTE ADULT - PROBLEM SELECTOR PLAN 1
#NSTEMI , ACS  #TTE: Severe MR, global akinesis   #obstructive CAD multiple vessel disease     - s/p LHC on 11/9 with triple vessel disease    - 11/10: case discussed with cardiology team , per their recs: HOLD Brilinta. Switched to ToprolXL 25mg po qd. Continue Aspirin, statin and heparin gtt.   - CT surgery consulted for MVR and CABG evaluation.   - 11/10: I had GOC discussion with patient and patient's nephew Glynn. Unclear what GOC discussion was done previously but MRI was not done yesterday. Today when I tried discussing with patient his clinical course and cardiology recs, he states "he is not in the mood for it", but we did go over LHC, finding of TVD, rec for CABG, concern for severe MR. I asked if he would like to proceed with cardiac MRI for further evaluation for MVR and CABG, he states not today, he feels tired. Patient's nephew approached me to discuss his clinical course and treatment plan, we went over the plan. He is concerned that patient "is not of the sound mind to make decisions" and he states that he would like to discuss with cardiology and CT surgery teams as well. Cardiology team defers to CT surgery for recs for CABG and MVR. I called CT surgery team and requested PA to have Dr. Weeks call patient's nephew to clarify their concerns. >    > F/U with patient and nephew regarding GOC discussions and further plan from CT surgery and cardiology teams.

## 2023-11-10 NOTE — PROGRESS NOTE ADULT - SUBJECTIVE AND OBJECTIVE BOX
Patient seen and examined at bedside.    Overnight Events: no chest pain    REVIEW OF SYSTEMS:  CONSTITUTIONAL: No weakness, fevers or chills  EYES/ENT: No visual changes;  No dysphagia  NECK: No pain or stiffness  RESPIRATORY: No cough, wheezing, hemoptysis; No shortness of breath  CARDIOVASCULAR: No chest pain or palpitations; No lower extremity edema  GASTROINTESTINAL: No abdominal or epigastric pain. No nausea, vomiting, or hematemesis; No diarrhea or constipation. No melena or hematochezia.  BACK: No back pain  GENITOURINARY: No dysuria, frequency or hematuria  NEUROLOGICAL: No numbness or weakness  SKIN: No itching, burning, rashes, or lesions   All other review of systems is negative unless indicated above.            Current Meds:  aspirin enteric coated 81 milliGRAM(s) Oral daily  heparin   Injectable 6000 Unit(s) IV Push every 6 hours PRN  heparin   Injectable 3000 Unit(s) IV Push every 6 hours PRN  heparin  Infusion.  Unit(s)/Hr IV Continuous <Continuous>  lithium 150 milliGRAM(s) Oral two times a day  metoprolol tartrate 12.5 milliGRAM(s) Oral every 12 hours  ticagrelor 90 milliGRAM(s) Oral every 12 hours      Vitals:  T(F): 99.2 (11-10), Max: 99.5 (11-10)  HR: 70 (11-10) (54 - 78)  BP: 117/70 (11-10) (110/62 - 170/87)  RR: 18 (11-10)  SpO2: 95% (11-10)  I&O's Summary    09 Nov 2023 07:01  -  10 Nov 2023 07:00  --------------------------------------------------------  IN: 60 mL / OUT: 300 mL / NET: -240 mL        Physical Exam:  GEN: NAD  HEENT: EOMI, clear sclera  PULM: CTA b/l, no wheeze  CV: RRR S1 S2, 2/6 holosystolic murmur at apex, no JVD  ABD: S, NT, ND  EXT: WWP, no edema  PSYCH: normal affect  SKIN: No rash                          15.1   11.84 )-----------( 160      ( 10 Nov 2023 07:21 )             45.0     11-09    139  |  104  |  21  ----------------------------<  105<H>  4.2   |  23  |  1.10    Ca    9.6      09 Nov 2023 03:16  Phos  3.4     11-09  Mg     1.9     11-09    TPro  6.9  /  Alb  4.3  /  TBili  0.7  /  DBili  x   /  AST  256<H>  /  ALT  38  /  AlkPhos  73  11-09    PT/INR - ( 08 Nov 2023 18:38 )   PT: 12.3 sec;   INR: 1.18 ratio         PTT - ( 10 Nov 2023 06:51 )  PTT:75.9 sec  CARDIAC MARKERS ( 09 Nov 2023 03:16 )  x     / x     / 2453 U/L / x     / 374.9 ng/mL         Patient seen and examined at bedside.    Overnight Events: no chest pain    REVIEW OF SYSTEMS:  CONSTITUTIONAL: No weakness, fevers or chills  EYES/ENT: No visual changes;  No dysphagia  NECK: No pain or stiffness  RESPIRATORY: No cough, wheezing, hemoptysis; No shortness of breath  CARDIOVASCULAR: No chest pain or palpitations; No lower extremity edema  GASTROINTESTINAL: No abdominal or epigastric pain. No nausea, vomiting, or hematemesis; No diarrhea or constipation. No melena or hematochezia.  BACK: No back pain  GENITOURINARY: No dysuria, frequency or hematuria  NEUROLOGICAL: No numbness or weakness  SKIN: No itching, burning, rashes, or lesions   All other review of systems is negative unless indicated above.            Current Meds:  aspirin enteric coated 81 milliGRAM(s) Oral daily  heparin   Injectable 6000 Unit(s) IV Push every 6 hours PRN  heparin   Injectable 3000 Unit(s) IV Push every 6 hours PRN  heparin  Infusion.  Unit(s)/Hr IV Continuous <Continuous>  lithium 150 milliGRAM(s) Oral two times a day  metoprolol tartrate 12.5 milliGRAM(s) Oral every 12 hours  ticagrelor 90 milliGRAM(s) Oral every 12 hours      Vitals:  T(F): 99.2 (11-10), Max: 99.5 (11-10)  HR: 70 (11-10) (54 - 78)  BP: 117/70 (11-10) (110/62 - 170/87)  RR: 18 (11-10)  SpO2: 95% (11-10)  I&O's Summary    09 Nov 2023 07:01  -  10 Nov 2023 07:00  --------------------------------------------------------  IN: 60 mL / OUT: 300 mL / NET: -240 mL    Physical Exam:  GEN: NAD  HEENT: EOMI, clear sclera  PULM: CTA b/l, no wheeze  CV: RRR S1 S2, 2/6 holosystolic murmur at apex, no JVD  ABD: S, NT, ND  EXT: WWP, no edema  PSYCH: normal affect  SKIN: No rash      LABS:                      15.1   11.84 )-----------( 160      ( 10 Nov 2023 07:21 )             45.0     11-09  139  |  104  |  21  ----------------------------<  105<H>  4.2   |  23  |  1.10    Ca    9.6      09 Nov 2023 03:16  Phos  3.4     11-09  Mg     1.9     11-09    TPro  6.9  /  Alb  4.3  /  TBili  0.7  /  DBili  x   /  AST  256<H>  /  ALT  38  /  AlkPhos  73  11-09    PT/INR - ( 08 Nov 2023 18:38 )   PT: 12.3 sec;   INR: 1.18 ratio    PTT - ( 10 Nov 2023 06:51 )  PTT:75.9 sec    CARDIAC MARKERS ( 09 Nov 2023 03:16 ) x     / x     / 2453 U/L / x     / 374.9 ng/mL

## 2023-11-10 NOTE — PROGRESS NOTE ADULT - ASSESSMENT
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 fib and chest pain with LBBB and rapidly rising troponins concerning for NSTEMI. [facility] with triple vessel disease. Pending CABG eval. TTE with EF 30%, LV thrombus.       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 fib and chest pain with LBBB and rapidly rising troponins concerning for NSTEMI. [facility] with triple vessel disease. Pending CABG eval. TTE with EF 30%, LV thrombus.       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 fib and chest pain with LBBB and rapidly rising troponins concerning for NSTEMI. [facility] with triple vessel disease. Pending CABG eval. TTE with EF 30%, LV thrombus.       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 fib and chest pain with LBBB and rapidly rising troponins concerning for NSTEMI. [facility] with triple vessel disease. Pending CABG eval. TTE with EF 30%, LV thrombus.       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 fib and chest pain with LBBB and rapidly rising troponins concerning for NSTEMI. [facility] with triple vessel disease. Pending CABG eval. TTE with EF 30%, LV thrombus.       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 fib and chest pain with LBBB and rapidly rising troponins concerning for NSTEMI. [facility] with triple vessel disease. Pending CABG eval. TTE with EF 30%, LV thrombus.       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 fib and chest pain with LBBB and rapidly rising troponins concerning for NSTEMI. [facility] with triple vessel disease. Pending CT surgery/CABG eval. TTE with EF 30%, LV thrombus.     Recs:  - c/w ASA, hold Brilinta for now while assessing CT surgery candidacy  - start atorvastatin 80mg   - c/w heparin gtt, will need long term A/C for afib and LV thrombus  - please start Entresto 24/26 for LV dysfunction and change metop tartrate to metop succinate 25mg  - f/u CT surg recs; cardiac MRI if patient agreeable    Gabriele Herman M.D.  Cardiology fellow     Plan discussed with cardiology fellow.  Patient seen and examined.  Hx., exam and labs as above.  I agree with the assessment and recommendations, which I have reviewed and edited where appropriate.  Justin Anton M.D.  Cardiology Attending, Consult Service    For Cardiology consults and questions, all Cardiology service information can be found 24/7 on [email or web address] - use password: [email or web address] to log in.      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 fib and chest pain with LBBB and rapidly rising troponins concerning for NSTEMI. [facility] with triple vessel disease. Pending CT surgery/CABG eval. TTE with EF 30%, LV thrombus.     Recs:  - c/w ASA, hold Brilinta for now while assessing CT surgery candidacy  - start atorvastatin 80mg   - c/w heparin gtt, will need long term A/C for afib and LV thrombus  - please start Entresto 24/26 for LV dysfunction and change metop tartrate to metop succinate 25mg  - f/u CT surg recs; cardiac MRI if patient agreeable    Gabriele Herman M.D.  Cardiology fellow     Plan discussed with cardiology fellow.  Patient seen and examined.  Hx., exam and labs as above.  I agree with the assessment and recommendations, which I have reviewed and edited where appropriate.  Justin Anton M.D.  Cardiology Attending, Consult Service    For Cardiology consults and questions, all Cardiology service information can be found 24/7 on [email or web address] - use password: [email or web address] to log in.      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 fib and chest pain with LBBB and rapidly rising troponins concerning for NSTEMI. [facility] with triple vessel disease. Pending CT surgery/CABG eval. TTE with EF 30%, LV thrombus.     Recs:  - c/w ASA, hold Brilinta for now while assessing CT surgery candidacy  - start atorvastatin 80mg   - c/w heparin gtt, will need long term A/C for afib and LV thrombus  - please start Entresto 24/26 for LV dysfunction and change metop tartrate to metop succinate 25mg  - f/u CT surg recs; cardiac MRI if patient agreeable    Gabriele Herman M.D.  Cardiology fellow     Plan discussed with cardiology fellow.  Patient seen and examined.  Hx., exam and labs as above.  I agree with the assessment and recommendations, which I have reviewed and edited where appropriate.  Justin Anton M.D.  Cardiology Attending, Consult Service    For Cardiology consults and questions, all Cardiology service information can be found 24/7 on [email or web address] - use password: [email or web address] to log in.

## 2023-11-10 NOTE — PROGRESS NOTE ADULT - ASSESSMENT
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

## 2023-11-10 NOTE — PROGRESS NOTE ADULT - SUBJECTIVE AND OBJECTIVE BOX
[facility] Division of Hospital Medicine  America Toro MD M-F, 8A-5P: MS Teams, Pager  Other Times: HIC Extension / HIC Pager      Patient is a 83y old  Male who presents with a chief complaint of chest pain (10 Nov 2023 08:11)      SUBJECTIVE / OVERNIGHT EVENTS:  Patient was examined this morning. Clinical course discussion, GOC discussion as below. Patient denies headache, fever, dizziness, chest pain, palpitations, dyspnea, nausea, abdominal pain, pain in his extremity, numbness/weakness/tingling in extremities.       ADDITIONAL REVIEW OF SYSTEMS:    MEDICATIONS  (STANDING):  aspirin enteric coated 81 milliGRAM(s) Oral daily  heparin  Infusion.  Unit(s)/Hr (13 mL/Hr) IV Continuous <Continuous>  lithium 150 milliGRAM(s) Oral two times a day  metoprolol tartrate 12.5 milliGRAM(s) Oral every 12 hours  sacubitril 24 mG/valsartan 26 mG 1 Tablet(s) Oral two times a day    MEDICATIONS  (PRN):  heparin   Injectable 6000 Unit(s) IV Push every 6 hours PRN For aPTT less than 40  heparin   Injectable 3000 Unit(s) IV Push every 6 hours PRN For aPTT between 40 - 57      CAPILLARY BLOOD GLUCOSE          I&O's Summary    2023 07:01  -  10 Nov 2023 07:00  --------------------------------------------------------  IN: 60 mL / OUT: 300 mL / NET: -240 mL        Daily     Daily Weight in k.5 (10 Nov 2023 07:40)    PHYSICAL EXAM:  Vital Signs Last 24 Hrs  T(C): 36.5 (10 Nov 2023 10:24), Max: 37.5 (10 Nov 2023 04:47)  T(F): 97.7 (10 Nov 2023 10:24), Max: 99.5 (10 Nov 2023 04:47)  HR: 67 (10 Nov 2023 10:28) (54 - 78)  BP: 105/68 (10 Nov 2023 10:24) (105/68 - 170/87)  BP(mean): 105 (2023 21:36) (98 - 118)  RR: 18 (10 Nov 2023 10:24) (16 - 18)  SpO2: 96% (10 Nov 2023 10:28) (93% - 98%)    Parameters below as of 10 Nov 2023 07:05  Patient On (Oxygen Delivery Method): nasal cannula  O2 Flow (L/min): 2    CONSTITUTIONAL: NAD, well-developed  EYES: PERRLA; conjunctiva and sclera clear  ENMT: Moist oral mucosa  RESPIRATORY: Normal respiratory effort; lungs are clear to auscultation bilaterally  CARDIOVASCULAR: Regular rate and rhythm, normal S1 and S2. + murmur in mitral area  No lower extremity edema; Peripheral pulses are 2+ bilaterally  ABDOMEN: Nontender to palpation, normoactive bowel sounds, no rebound/guarding;  MUSCULOSKELETAL:  no clubbing or cyanosis of digits; no joint swelling or tenderness to palpation, moving all extremities   PSYCH: A+O to person, place, and time; affect appropriate  NEUROLOGY: CN 2-12 are intact and symmetric; no gross sensory/motor deficits   SKIN: multiple areas of ecchymosis/superficial hematomas on upper extremities BL      LABS:                        15.1   11.84 )-----------( 160      ( 10 Nov 2023 07:21 )             45.0     11-10    140  |  106  |  21  ----------------------------<  98  4.3   |  14<L>  |  0.85    Ca    9.6      10 Nov 2023 07:21  Phos  2.5     11-10  Mg     1.9     11-10    TPro  6.9  /  Alb  4.3  /  TBili  0.7  /  DBili  x   /  AST  256<H>  /  ALT  38  /  AlkPhos  73  11-09    LIVER FUNCTIONS - ( 2023 03:16 )  Alb: 4.3 g/dL / Pro: 6.9 g/dL / ALK PHOS: 73 U/L / ALT: 38 U/L / AST: 256 U/L / GGT: x           PT/INR - ( 2023 18:38 )   PT: 12.3 sec;   INR: 1.18 ratio         PTT - ( 10 Nov 2023 06:51 )  PTT:75.9 sec  CARDIAC MARKERS ( 2023 03:16 )  x     / x     / 2453 U/L / x     / 374.9 ng/mL      Urinalysis Basic - ( 10 Nov 2023 07:21 )    Color: x / Appearance: x / SG: x / pH: x  Gluc: 98 mg/dL / Ketone: x  / Bili: x / Urobili: x   Blood: x / Protein: x / Nitrite: x   Leuk Esterase: x / RBC: x / WBC x   Sq Epi: x / Non Sq Epi: x / Bacteria: x            RADIOLOGY & ADDITIONAL TESTS:  Results Reviewed:   Imaging Personally Reviewed:  Electrocardiogram Personally Reviewed:    COORDINATION OF CARE:  Care Discussed with Consultants/Other Providers [Y/N]:  Prior or Outpatient Records Reviewed [Y/N]:   [facility] Division of Hospital Medicine  America Toro MD M-F, 8A-5P: MS Teams, Pager  Other Times: HIC Extension / HIC Pager      Patient is a 83y old  Male who presents with a chief complaint of chest pain (10 Nov 2023 08:11)      SUBJECTIVE / OVERNIGHT EVENTS:  Patient was examined this morning. Clinical course discussion, GOC discussion as below. Patient denies headache, fever, dizziness, chest pain, palpitations, dyspnea, nausea, abdominal pain, pain in his extremity, numbness/weakness/tingling in extremities.       ADDITIONAL REVIEW OF SYSTEMS:    MEDICATIONS  (STANDING):  aspirin enteric coated 81 milliGRAM(s) Oral daily  heparin  Infusion.  Unit(s)/Hr (13 mL/Hr) IV Continuous <Continuous>  lithium 150 milliGRAM(s) Oral two times a day  metoprolol tartrate 12.5 milliGRAM(s) Oral every 12 hours  sacubitril 24 mG/valsartan 26 mG 1 Tablet(s) Oral two times a day    MEDICATIONS  (PRN):  heparin   Injectable 6000 Unit(s) IV Push every 6 hours PRN For aPTT less than 40  heparin   Injectable 3000 Unit(s) IV Push every 6 hours PRN For aPTT between 40 - 57      CAPILLARY BLOOD GLUCOSE          I&O's Summary    2023 07:01  -  10 Nov 2023 07:00  --------------------------------------------------------  IN: 60 mL / OUT: 300 mL / NET: -240 mL        Daily     Daily Weight in k.5 (10 Nov 2023 07:40)    PHYSICAL EXAM:  Vital Signs Last 24 Hrs  T(C): 36.5 (10 Nov 2023 10:24), Max: 37.5 (10 Nov 2023 04:47)  T(F): 97.7 (10 Nov 2023 10:24), Max: 99.5 (10 Nov 2023 04:47)  HR: 67 (10 Nov 2023 10:28) (54 - 78)  BP: 105/68 (10 Nov 2023 10:24) (105/68 - 170/87)  BP(mean): 105 (2023 21:36) (98 - 118)  RR: 18 (10 Nov 2023 10:24) (16 - 18)  SpO2: 96% (10 Nov 2023 10:28) (93% - 98%)    Parameters below as of 10 Nov 2023 07:05  Patient On (Oxygen Delivery Method): nasal cannula  O2 Flow (L/min): 2    CONSTITUTIONAL: NAD, well-developed  EYES: PERRLA; conjunctiva and sclera clear  ENMT: Moist oral mucosa  RESPIRATORY: Normal respiratory effort; lungs are clear to auscultation bilaterally  CARDIOVASCULAR: Regular rate and rhythm, normal S1 and S2. + murmur in mitral area  No lower extremity edema; Peripheral pulses are 2+ bilaterally  ABDOMEN: Nontender to palpation, normoactive bowel sounds, no rebound/guarding;  MUSCULOSKELETAL:  no clubbing or cyanosis of digits; no joint swelling or tenderness to palpation, moving all extremities   PSYCH: A+O to person, place, and time; affect appropriate  NEUROLOGY: CN 2-12 are intact and symmetric; no gross sensory/motor deficits   SKIN: multiple areas of ecchymosis/superficial hematomas on upper extremities BL      LABS:                        15.1   11.84 )-----------( 160      ( 10 Nov 2023 07:21 )             45.0     11-10    140  |  106  |  21  ----------------------------<  98  4.3   |  14<L>  |  0.85    Ca    9.6      10 Nov 2023 07:21  Phos  2.5     11-10  Mg     1.9     11-10    TPro  6.9  /  Alb  4.3  /  TBili  0.7  /  DBili  x   /  AST  256<H>  /  ALT  38  /  AlkPhos  73  11-09    LIVER FUNCTIONS - ( 2023 03:16 )  Alb: 4.3 g/dL / Pro: 6.9 g/dL / ALK PHOS: 73 U/L / ALT: 38 U/L / AST: 256 U/L / GGT: x           PT/INR - ( 2023 18:38 )   PT: 12.3 sec;   INR: 1.18 ratio         PTT - ( 10 Nov 2023 06:51 )  PTT:75.9 sec  CARDIAC MARKERS ( 2023 03:16 )  x     / x     / 2453 U/L / x     / 374.9 ng/mL      Urinalysis Basic - ( 10 Nov 2023 07:21 )    Color: x / Appearance: x / SG: x / pH: x  Gluc: 98 mg/dL / Ketone: x  / Bili: x / Urobili: x   Blood: x / Protein: x / Nitrite: x   Leuk Esterase: x / RBC: x / WBC x   Sq Epi: x / Non Sq Epi: x / Bacteria: x            RADIOLOGY & ADDITIONAL TESTS:  Results Reviewed:   Imaging Personally Reviewed:  Electrocardiogram Personally Reviewed:    COORDINATION OF CARE:  Care Discussed with Consultants/Other Providers [Y/N]:  Prior or Outpatient Records Reviewed [Y/N]:   [facility] Division of Hospital Medicine  America Toro MD M-F, 8A-5P: MS Teams, Pager  Other Times: HIC Extension / HIC Pager      Patient is a 83y old  Male who presents with a chief complaint of chest pain (10 Nov 2023 08:11)      SUBJECTIVE / OVERNIGHT EVENTS:  Patient was examined this morning. Clinical course discussion, GOC discussion as below. Patient denies headache, fever, dizziness, chest pain, palpitations, dyspnea, nausea, abdominal pain, pain in his extremity, numbness/weakness/tingling in extremities.       ADDITIONAL REVIEW OF SYSTEMS:    MEDICATIONS  (STANDING):  aspirin enteric coated 81 milliGRAM(s) Oral daily  heparin  Infusion.  Unit(s)/Hr (13 mL/Hr) IV Continuous <Continuous>  lithium 150 milliGRAM(s) Oral two times a day  metoprolol tartrate 12.5 milliGRAM(s) Oral every 12 hours  sacubitril 24 mG/valsartan 26 mG 1 Tablet(s) Oral two times a day    MEDICATIONS  (PRN):  heparin   Injectable 6000 Unit(s) IV Push every 6 hours PRN For aPTT less than 40  heparin   Injectable 3000 Unit(s) IV Push every 6 hours PRN For aPTT between 40 - 57      CAPILLARY BLOOD GLUCOSE          I&O's Summary    2023 07:01  -  10 Nov 2023 07:00  --------------------------------------------------------  IN: 60 mL / OUT: 300 mL / NET: -240 mL        Daily     Daily Weight in k.5 (10 Nov 2023 07:40)    PHYSICAL EXAM:  Vital Signs Last 24 Hrs  T(C): 36.5 (10 Nov 2023 10:24), Max: 37.5 (10 Nov 2023 04:47)  T(F): 97.7 (10 Nov 2023 10:24), Max: 99.5 (10 Nov 2023 04:47)  HR: 67 (10 Nov 2023 10:28) (54 - 78)  BP: 105/68 (10 Nov 2023 10:24) (105/68 - 170/87)  BP(mean): 105 (2023 21:36) (98 - 118)  RR: 18 (10 Nov 2023 10:24) (16 - 18)  SpO2: 96% (10 Nov 2023 10:28) (93% - 98%)    Parameters below as of 10 Nov 2023 07:05  Patient On (Oxygen Delivery Method): nasal cannula  O2 Flow (L/min): 2    CONSTITUTIONAL: NAD, well-developed  EYES: PERRLA; conjunctiva and sclera clear  ENMT: Moist oral mucosa  RESPIRATORY: Normal respiratory effort; lungs are clear to auscultation bilaterally  CARDIOVASCULAR: Regular rate and rhythm, normal S1 and S2. + murmur in mitral area  No lower extremity edema; Peripheral pulses are 2+ bilaterally  ABDOMEN: Nontender to palpation, normoactive bowel sounds, no rebound/guarding;  MUSCULOSKELETAL:  no clubbing or cyanosis of digits; no joint swelling or tenderness to palpation, moving all extremities   PSYCH: A+O to person, place, and time; affect appropriate  NEUROLOGY: CN 2-12 are intact and symmetric; no gross sensory/motor deficits   SKIN: multiple areas of ecchymosis/superficial hematomas on upper extremities BL      LABS:                        15.1   11.84 )-----------( 160      ( 10 Nov 2023 07:21 )             45.0     11-10    140  |  106  |  21  ----------------------------<  98  4.3   |  14<L>  |  0.85    Ca    9.6      10 Nov 2023 07:21  Phos  2.5     11-10  Mg     1.9     11-10    TPro  6.9  /  Alb  4.3  /  TBili  0.7  /  DBili  x   /  AST  256<H>  /  ALT  38  /  AlkPhos  73  11-09    LIVER FUNCTIONS - ( 2023 03:16 )  Alb: 4.3 g/dL / Pro: 6.9 g/dL / ALK PHOS: 73 U/L / ALT: 38 U/L / AST: 256 U/L / GGT: x           PT/INR - ( 2023 18:38 )   PT: 12.3 sec;   INR: 1.18 ratio         PTT - ( 10 Nov 2023 06:51 )  PTT:75.9 sec  CARDIAC MARKERS ( 2023 03:16 )  x     / x     / 2453 U/L / x     / 374.9 ng/mL      Urinalysis Basic - ( 10 Nov 2023 07:21 )    Color: x / Appearance: x / SG: x / pH: x  Gluc: 98 mg/dL / Ketone: x  / Bili: x / Urobili: x   Blood: x / Protein: x / Nitrite: x   Leuk Esterase: x / RBC: x / WBC x   Sq Epi: x / Non Sq Epi: x / Bacteria: x            RADIOLOGY & ADDITIONAL TESTS:  Results Reviewed:   Imaging Personally Reviewed:  Electrocardiogram Personally Reviewed:    COORDINATION OF CARE:  Care Discussed with Consultants/Other Providers [Y/N]:  Prior or Outpatient Records Reviewed [Y/N]:

## 2023-11-11 DIAGNOSIS — I51.3 INTRACARDIAC THROMBOSIS, NOT ELSEWHERE CLASSIFIED: ICD-10-CM

## 2023-11-11 LAB
ALBUMIN SERPL ELPH-MCNC: 3.9 G/DL — SIGNIFICANT CHANGE UP (ref 3.3–5)
ALP SERPL-CCNC: 61 U/L — SIGNIFICANT CHANGE UP (ref 40–120)
ALT FLD-CCNC: 19 U/L — SIGNIFICANT CHANGE UP (ref 10–45)
ANION GAP SERPL CALC-SCNC: 13 MMOL/L — SIGNIFICANT CHANGE UP (ref 5–17)
APTT BLD: 101.9 SEC — HIGH (ref 24.5–35.6)
APTT BLD: 71.1 SEC — HIGH (ref 24.5–35.6)
APTT BLD: 71.5 SEC — HIGH (ref 24.5–35.6)
AST SERPL-CCNC: 50 U/L — HIGH (ref 10–40)
BILIRUB DIRECT SERPL-MCNC: 0.2 MG/DL — SIGNIFICANT CHANGE UP (ref 0–0.3)
BILIRUB INDIRECT FLD-MCNC: 0.7 MG/DL — SIGNIFICANT CHANGE UP (ref 0.2–1)
BILIRUB SERPL-MCNC: 0.9 MG/DL — SIGNIFICANT CHANGE UP (ref 0.2–1.2)
BUN SERPL-MCNC: 22 MG/DL — SIGNIFICANT CHANGE UP (ref 7–23)
CALCIUM SERPL-MCNC: 9.1 MG/DL — SIGNIFICANT CHANGE UP (ref 8.4–10.5)
CHLORIDE SERPL-SCNC: 107 MMOL/L — SIGNIFICANT CHANGE UP (ref 96–108)
CO2 SERPL-SCNC: 21 MMOL/L — LOW (ref 22–31)
CREAT SERPL-MCNC: 0.88 MG/DL — SIGNIFICANT CHANGE UP (ref 0.5–1.3)
EGFR: 85 ML/MIN/1.73M2 — SIGNIFICANT CHANGE UP
GLUCOSE SERPL-MCNC: 99 MG/DL — SIGNIFICANT CHANGE UP (ref 70–99)
HCT VFR BLD CALC: 42.1 % — SIGNIFICANT CHANGE UP (ref 39–50)
HGB BLD-MCNC: 13.8 G/DL — SIGNIFICANT CHANGE UP (ref 13–17)
INR BLD: 1.31 RATIO — HIGH (ref 0.85–1.18)
LITHIUM SERPL-MCNC: 0.5 MMOL/L — LOW (ref 0.6–1.2)
MAGNESIUM SERPL-MCNC: 1.9 MG/DL — SIGNIFICANT CHANGE UP (ref 1.6–2.6)
MCHC RBC-ENTMCNC: 31 PG — SIGNIFICANT CHANGE UP (ref 27–34)
MCHC RBC-ENTMCNC: 32.8 GM/DL — SIGNIFICANT CHANGE UP (ref 32–36)
MCV RBC AUTO: 94.6 FL — SIGNIFICANT CHANGE UP (ref 80–100)
NRBC # BLD: 0 /100 WBCS — SIGNIFICANT CHANGE UP (ref 0–0)
PLATELET # BLD AUTO: 144 K/UL — LOW (ref 150–400)
POTASSIUM SERPL-MCNC: 3.9 MMOL/L — SIGNIFICANT CHANGE UP (ref 3.5–5.3)
POTASSIUM SERPL-SCNC: 3.9 MMOL/L — SIGNIFICANT CHANGE UP (ref 3.5–5.3)
PROT SERPL-MCNC: 6.5 G/DL — SIGNIFICANT CHANGE UP (ref 6–8.3)
PROTHROM AB SERPL-ACNC: 13.6 SEC — HIGH (ref 9.5–13)
RBC # BLD: 4.45 M/UL — SIGNIFICANT CHANGE UP (ref 4.2–5.8)
RBC # FLD: 14.8 % — HIGH (ref 10.3–14.5)
SODIUM SERPL-SCNC: 141 MMOL/L — SIGNIFICANT CHANGE UP (ref 135–145)
WBC # BLD: 8.58 K/UL — SIGNIFICANT CHANGE UP (ref 3.8–10.5)
WBC # FLD AUTO: 8.58 K/UL — SIGNIFICANT CHANGE UP (ref 3.8–10.5)

## 2023-11-11 PROCEDURE — 99233 SBSQ HOSP IP/OBS HIGH 50: CPT

## 2023-11-11 RX ORDER — ACETAMINOPHEN 500 MG
650 TABLET ORAL EVERY 6 HOURS
Refills: 0 | Status: DISCONTINUED | OUTPATIENT
Start: 2023-11-11 | End: 2023-11-16

## 2023-11-11 RX ORDER — ATORVASTATIN CALCIUM 80 MG/1
80 TABLET, FILM COATED ORAL AT BEDTIME
Refills: 0 | Status: DISCONTINUED | OUTPATIENT
Start: 2023-11-11 | End: 2023-11-16

## 2023-11-11 RX ADMIN — Medication 25 MILLIGRAM(S): at 08:20

## 2023-11-11 RX ADMIN — HEPARIN SODIUM 1100 UNIT(S)/HR: 5000 INJECTION INTRAVENOUS; SUBCUTANEOUS at 07:04

## 2023-11-11 RX ADMIN — HEPARIN SODIUM 1100 UNIT(S)/HR: 5000 INJECTION INTRAVENOUS; SUBCUTANEOUS at 14:10

## 2023-11-11 RX ADMIN — ATORVASTATIN CALCIUM 80 MILLIGRAM(S): 80 TABLET, FILM COATED ORAL at 22:18

## 2023-11-11 RX ADMIN — SACUBITRIL AND VALSARTAN 1 TABLET(S): 24; 26 TABLET, FILM COATED ORAL at 08:20

## 2023-11-11 RX ADMIN — LITHIUM CARBONATE 150 MILLIGRAM(S): 300 TABLET, EXTENDED RELEASE ORAL at 17:22

## 2023-11-11 RX ADMIN — LITHIUM CARBONATE 150 MILLIGRAM(S): 300 TABLET, EXTENDED RELEASE ORAL at 06:10

## 2023-11-11 RX ADMIN — Medication 81 MILLIGRAM(S): at 11:24

## 2023-11-11 NOTE — PHYSICAL THERAPY INITIAL EVALUATION ADULT - PERTINENT HX OF CURRENT PROBLEM, REHAB EVAL
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 Pt presents with crushing substernal chest pain.  Pt reports sitting and watching TV when in the late afternoon reports 9/10, substernal, pressure-like, non-radiating pain. Lasted 30 minutes until EMS arrived and gave him 4 aspirins to chew, after which the pain eased up.  Pt found to have Type 1 myocardial infarction. Pt found to have new onset of A fib, LBBB, newly reduced EF 29%, severe MR. Pt pending surgical evaluation for CABG as well as MVR.  CXR(11/8): No focal consolidations  POCUS TTE(11/8): Small pericardial effusion vs. pericardial fat pad. No evidence of tamponade. Moderate LV systolic dysfunction. The right ventricle is not enlarged. Diffuse B-lines in bilateral anterior lung fields concerning for pulmonary edema.

## 2023-11-11 NOTE — PROGRESS NOTE ADULT - ASSESSMENT
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 Pending surgical evaluation for CABG as well as MVR. 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 Pending surgical evaluation for CABG as well as MVR. 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 Pending surgical evaluation for CABG as well as MVR.

## 2023-11-11 NOTE — PHYSICAL THERAPY INITIAL EVALUATION ADULT - ADDITIONAL COMMENTS
Pt lives in [facility], no steps to enter, elevator access.  Pt is independent with ambulation, able to ambulate from room to dining gasca using rolling walker.  Pt reports he performs ADLs independently. Pt lives in [facility], no steps to enter, elevator access.  Pt is independent with ambulation, able to ambulate from room to dining gasca using rolling walker.  Pt reports he performs ADLs independently. Pt lives in [facility], no steps to enter, elevator access.  Pt is independent with ambulation, able to ambulate from room to dining [employer]ca using rolling walker.  Pt reports he performs ADLs independently.

## 2023-11-11 NOTE — PROGRESS NOTE ADULT - PROBLEM SELECTOR PLAN 1
- Pt admitted for NSTEMI, ACS  - TTE EF 29%, severe MR and LV thrombus  - s/p [facility] on 11/9 with triple vessel disease  - CT surgery consulted for MVR and CABG evaluation  - ASA 81  - Heparin gtt  - Atorvastatin 80  - A1c 5.0%  -  - Pt admitted for NSTEMI, ACS  - TTE EF 29%, severe MR and LV thrombus  - s/p [facility] on 11/9 with triple vessel disease  - CT surgery consulted for MVR and CABG evaluation  - ASA 81  - Heparin gtt  - Atorvastatin 80  - A1c 5.0%  -  - Pt admitted for NSTEMI, ACS  - TTE EF 29%, severe MR and LV thrombus  - s/p [facility] on 11/9 with triple vessel disease  - CT surgery consulted for MVR and CABG evaluation  - ASA 81  - Heparin gtt  - Atorvastatin 80  - A1c 5.0%  -

## 2023-11-11 NOTE — PROGRESS NOTE ADULT - SUBJECTIVE AND OBJECTIVE BOX
Patient is a 83y old  Male who presents with a chief complaint of chest pain (10 Nov 2023 12:02)      INTERVAL HPI/OVERNIGHT EVENTS: JYOTSNA overnight. Pt is AOx2 this morning. No acute complaints.       REVIEW OF SYSTEMS:    CONSTITUTIONAL: No weakness, fevers or chills  EYES/ENT: No visual changes;  No vertigo or throat pain   NECK: No pain or stiffness  RESPIRATORY: No cough, wheezing, hemoptysis; No shortness of breath  CARDIOVASCULAR: No chest pain or palpitations  GASTROINTESTINAL: No abdominal or epigastric pain. No nausea, vomiting, or hematemesis; No diarrhea or constipation. No melena or hematochezia.  GENITOURINARY: No dysuria, frequency or hematuria  NEUROLOGICAL: No numbness or weakness  All other review of systems is negative unless indicated above.    FAMILY HISTORY:    T(C): 36.8 (11-11-23 @ 04:18), Max: 37 (11-10-23 @ 12:37)  HR: 56 (11-11-23 @ 04:18) (53 - 67)  BP: 106/72 (11-11-23 @ 04:18) (105/68 - 113/62)  RR: 18 (11-11-23 @ 04:18) (18 - 18)  SpO2: 98% (11-11-23 @ 04:18) (96% - 99%)  Wt(kg): --Vital Signs Last 24 Hrs  T(C): 36.8 (11 Nov 2023 04:18), Max: 37 (10 Nov 2023 12:37)  T(F): 98.3 (11 Nov 2023 04:18), Max: 98.6 (10 Nov 2023 12:37)  HR: 56 (11 Nov 2023 04:18) (53 - 67)  BP: 106/72 (11 Nov 2023 04:18) (105/68 - 113/62)  BP(mean): --  RR: 18 (11 Nov 2023 04:18) (18 - 18)  SpO2: 98% (11 Nov 2023 04:18) (96% - 99%)    Parameters below as of 11 Nov 2023 04:18  Patient On (Oxygen Delivery Method): room air        PHYSICAL EXAM:  CONSTITUTIONAL: NAD, well-developed  EYES: PERRLA; conjunctiva and sclera clear  ENMT: Moist oral mucosa  RESPIRATORY: Normal respiratory effort; lungs are clear to auscultation bilaterally  CARDIOVASCULAR: Regular rate and rhythm, normal S1 and S2. + murmur in mitral area  No lower extremity edema; Peripheral pulses are 2+ bilaterally  ABDOMEN: Nontender to palpation, normoactive bowel sounds, no rebound/guarding;  MUSCULOSKELETAL:  no clubbing or cyanosis of digits; no joint swelling or tenderness to palpation, moving all extremities   PSYCH: A+O to person, place, and time; affect appropriate  NEUROLOGY: CN 2-12 are intact and symmetric; no gross sensory/motor deficits   SKIN: multiple areas of ecchymosis/superficial hematomas on upper extremities BL    Consultant(s) Notes Reviewed:  [x ] YES  [ ] NO  Care Discussed with Consultants/Other Providers [ x] YES  [ ] NO    LABS:                        13.8   8.58  )-----------( 144      ( 11 Nov 2023 06:14 )             42.1     11 Nov 2023 06:14    141    |  107    |  22     ----------------------------<  99     3.9     |  21     |  0.88     Ca    9.1        11 Nov 2023 06:14  Mg     1.9       11 Nov 2023 06:14      PT/INR - ( 11 Nov 2023 06:14 )   PT: 13.6 sec;   INR: 1.31 ratio         PTT - ( 11 Nov 2023 06:14 )  PTT:101.9 sec  CAPILLARY BLOOD GLUCOSE        BLOOD CULTURE      RADIOLOGY & ADDITIONAL TESTS:    Imaging Personally Reviewed:  [ ] YES  [ ] NO  aspirin enteric coated 81 milliGRAM(s) Oral daily  chlorhexidine 2% Cloths 1 Application(s) Topical <User Schedule>  heparin   Injectable 6000 Unit(s) IV Push every 6 hours PRN  heparin   Injectable 3000 Unit(s) IV Push every 6 hours PRN  heparin  Infusion.  Unit(s)/Hr IV Continuous <Continuous>  lithium 150 milliGRAM(s) Oral two times a day  metoprolol succinate ER 25 milliGRAM(s) Oral daily  sacubitril 24 mG/valsartan 26 mG 1 Tablet(s) Oral two times a day      HEALTH ISSUES - PROBLEM Dx:  Type 1 myocardial infarction    Bipolar disorder    Bifascicular bundle branch block    Acute respiratory failure with hypoxia    Atrial fibrillation    Acute systolic congestive heart failure    Severe mitral regurgitation    CAD, multiple vessel    Need for prophylactic measure

## 2023-11-11 NOTE — PHYSICAL THERAPY INITIAL EVALUATION ADULT - NSPTDISCHREC_GEN_A_CORE
D/c back to [facility] with HPT, to improve all bed mobility, transfers and ambulation, improve pt strength and endurance, and optimize safety./Home PT D/c back to [facility] with HPT, to improve all bed mobility, transfers and ambulation, improve pt strength and endurance, and optimize safety./Home PT D/c back to group home with HPT, to improve all bed mobility, transfers and ambulation, improve pt strength and endurance, and optimize safety./Home PT

## 2023-11-12 DIAGNOSIS — I25.10 ATHEROSCLEROTIC HEART DISEASE OF NATIVE CORONARY ARTERY WITHOUT ANGINA PECTORIS: ICD-10-CM

## 2023-11-12 LAB
ANION GAP SERPL CALC-SCNC: 12 MMOL/L — SIGNIFICANT CHANGE UP (ref 5–17)
APTT BLD: 121.9 SEC — CRITICAL HIGH (ref 24.5–35.6)
APTT BLD: 55.3 SEC — HIGH (ref 24.5–35.6)
BUN SERPL-MCNC: 19 MG/DL — SIGNIFICANT CHANGE UP (ref 7–23)
CALCIUM SERPL-MCNC: 9 MG/DL — SIGNIFICANT CHANGE UP (ref 8.4–10.5)
CHLORIDE SERPL-SCNC: 105 MMOL/L — SIGNIFICANT CHANGE UP (ref 96–108)
CO2 SERPL-SCNC: 22 MMOL/L — SIGNIFICANT CHANGE UP (ref 22–31)
CREAT SERPL-MCNC: 0.86 MG/DL — SIGNIFICANT CHANGE UP (ref 0.5–1.3)
EGFR: 86 ML/MIN/1.73M2 — SIGNIFICANT CHANGE UP
GLUCOSE SERPL-MCNC: 94 MG/DL — SIGNIFICANT CHANGE UP (ref 70–99)
HCT VFR BLD CALC: 40.8 % — SIGNIFICANT CHANGE UP (ref 39–50)
HCT VFR BLD CALC: 41.6 % — SIGNIFICANT CHANGE UP (ref 39–50)
HGB BLD-MCNC: 13.6 G/DL — SIGNIFICANT CHANGE UP (ref 13–17)
HGB BLD-MCNC: 13.8 G/DL — SIGNIFICANT CHANGE UP (ref 13–17)
MCHC RBC-ENTMCNC: 31.6 PG — SIGNIFICANT CHANGE UP (ref 27–34)
MCHC RBC-ENTMCNC: 31.8 PG — SIGNIFICANT CHANGE UP (ref 27–34)
MCHC RBC-ENTMCNC: 33.2 GM/DL — SIGNIFICANT CHANGE UP (ref 32–36)
MCHC RBC-ENTMCNC: 33.3 GM/DL — SIGNIFICANT CHANGE UP (ref 32–36)
MCV RBC AUTO: 94.9 FL — SIGNIFICANT CHANGE UP (ref 80–100)
MCV RBC AUTO: 95.9 FL — SIGNIFICANT CHANGE UP (ref 80–100)
NRBC # BLD: 0 /100 WBCS — SIGNIFICANT CHANGE UP (ref 0–0)
PLATELET # BLD AUTO: 142 K/UL — LOW (ref 150–400)
PLATELET # BLD AUTO: 147 K/UL — LOW (ref 150–400)
POTASSIUM SERPL-MCNC: 3.7 MMOL/L — SIGNIFICANT CHANGE UP (ref 3.5–5.3)
POTASSIUM SERPL-SCNC: 3.7 MMOL/L — SIGNIFICANT CHANGE UP (ref 3.5–5.3)
RBC # BLD: 4.3 M/UL — SIGNIFICANT CHANGE UP (ref 4.2–5.8)
RBC # BLD: 4.34 M/UL — SIGNIFICANT CHANGE UP (ref 4.2–5.8)
RBC # FLD: 14.5 % — SIGNIFICANT CHANGE UP (ref 10.3–14.5)
RBC # FLD: 14.6 % — HIGH (ref 10.3–14.5)
SODIUM SERPL-SCNC: 139 MMOL/L — SIGNIFICANT CHANGE UP (ref 135–145)
WBC # BLD: 8.86 K/UL — SIGNIFICANT CHANGE UP (ref 3.8–10.5)
WBC # BLD: 9.1 K/UL — SIGNIFICANT CHANGE UP (ref 3.8–10.5)
WBC # FLD AUTO: 8.86 K/UL — SIGNIFICANT CHANGE UP (ref 3.8–10.5)
WBC # FLD AUTO: 9.1 K/UL — SIGNIFICANT CHANGE UP (ref 3.8–10.5)

## 2023-11-12 PROCEDURE — 99233 SBSQ HOSP IP/OBS HIGH 50: CPT

## 2023-11-12 RX ORDER — HEPARIN SODIUM 5000 [USP'U]/ML
1500 INJECTION INTRAVENOUS; SUBCUTANEOUS
Qty: 25000 | Refills: 0 | Status: DISCONTINUED | OUTPATIENT
Start: 2023-11-12 | End: 2023-11-15

## 2023-11-12 RX ORDER — HEPARIN SODIUM 5000 [USP'U]/ML
6000 INJECTION INTRAVENOUS; SUBCUTANEOUS EVERY 6 HOURS
Refills: 0 | Status: DISCONTINUED | OUTPATIENT
Start: 2023-11-12 | End: 2023-11-15

## 2023-11-12 RX ORDER — HEPARIN SODIUM 5000 [USP'U]/ML
3000 INJECTION INTRAVENOUS; SUBCUTANEOUS EVERY 6 HOURS
Refills: 0 | Status: DISCONTINUED | OUTPATIENT
Start: 2023-11-12 | End: 2023-11-15

## 2023-11-12 RX ADMIN — HEPARIN SODIUM 1300 UNIT(S)/HR: 5000 INJECTION INTRAVENOUS; SUBCUTANEOUS at 20:33

## 2023-11-12 RX ADMIN — Medication 81 MILLIGRAM(S): at 13:58

## 2023-11-12 RX ADMIN — HEPARIN SODIUM 1300 UNIT(S)/HR: 5000 INJECTION INTRAVENOUS; SUBCUTANEOUS at 17:28

## 2023-11-12 RX ADMIN — LITHIUM CARBONATE 150 MILLIGRAM(S): 300 TABLET, EXTENDED RELEASE ORAL at 05:45

## 2023-11-12 RX ADMIN — HEPARIN SODIUM 1500 UNIT(S)/HR: 5000 INJECTION INTRAVENOUS; SUBCUTANEOUS at 10:09

## 2023-11-12 RX ADMIN — LITHIUM CARBONATE 150 MILLIGRAM(S): 300 TABLET, EXTENDED RELEASE ORAL at 20:34

## 2023-11-12 RX ADMIN — SACUBITRIL AND VALSARTAN 1 TABLET(S): 24; 26 TABLET, FILM COATED ORAL at 18:56

## 2023-11-12 RX ADMIN — ATORVASTATIN CALCIUM 80 MILLIGRAM(S): 80 TABLET, FILM COATED ORAL at 22:19

## 2023-11-12 RX ADMIN — CHLORHEXIDINE GLUCONATE 1 APPLICATION(S): 213 SOLUTION TOPICAL at 05:45

## 2023-11-12 NOTE — PROVIDER CONTACT NOTE (OTHER) - BACKGROUND
pt admitted for acute pulmonary edema, hx of bipolar disorder. on hep gtt for LV thrombus. has had 9 beats wct in past

## 2023-11-12 NOTE — PROGRESS NOTE ADULT - PROBLEM SELECTOR PLAN 1
- Pt admitted for NSTEMI, ACS  - TTE EF 29%, severe MR and LV thrombus  - s/p [facility] on 11/9 with triple vessel disease  - CT surgery consulted for MVR and CABG evaluation  - ASA 81  - Heparin gtt  - Atorvastatin 80  - A1c 5.0%  -  - Pt admitted for NSTEMI, ACS  - TTE EF 29%, severe MR and LV thrombus  - s/p [facility] on 11/9 with triple vessel disease  - CT surgery consulted for MVR and CABG evaluation  - ASA 81  - Heparin gtt  - Atorvastatin 80  - A1c 5.0%  -  - Pt admitted for NSTEMI, ACS  - TTE EF 29%, severe MR and LV thrombus  - s/p [facility] on 11/9 with triple vessel disease  - CT surgery consulted for MVR and CABG evaluation  - ASA 81  - Heparin gtt  - Atorvastatin 80  - A1c 5.0%  -

## 2023-11-12 NOTE — PROGRESS NOTE ADULT - SUBJECTIVE AND OBJECTIVE BOX
Patient is a 83y old  Male who presents with a chief complaint of chest pain (11 Nov 2023 08:04)      INTERVAL HPI/OVERNIGHT EVENTS: JYOTSNA overnight. No acute medical complaints.       REVIEW OF SYSTEMS:    CONSTITUTIONAL: No weakness, fevers or chills  EYES/ENT: No visual changes;  No vertigo or throat pain   NECK: No pain or stiffness  RESPIRATORY: No cough, wheezing, hemoptysis; No shortness of breath  CARDIOVASCULAR: No chest pain or palpitations  GASTROINTESTINAL: No abdominal or epigastric pain. No nausea, vomiting, or hematemesis; No diarrhea or constipation. No melena or hematochezia.  GENITOURINARY: No dysuria, frequency or hematuria  NEUROLOGICAL: No numbness or weakness  All other review of systems is negative unless indicated above.    FAMILY HISTORY:    T(C): 36.6 (11-12-23 @ 04:21), Max: 36.6 (11-11-23 @ 17:18)  HR: 60 (11-12-23 @ 05:00) (51 - 86)  BP: 115/68 (11-12-23 @ 04:21) (92/55 - 138/98)  RR: 18 (11-12-23 @ 04:21) (18 - 18)  SpO2: 96% (11-12-23 @ 05:00) (93% - 98%)  Wt(kg): --Vital Signs Last 24 Hrs  T(C): 36.6 (12 Nov 2023 04:21), Max: 36.6 (11 Nov 2023 17:18)  T(F): 97.8 (12 Nov 2023 04:21), Max: 97.8 (11 Nov 2023 17:18)  HR: 60 (12 Nov 2023 05:00) (51 - 86)  BP: 115/68 (12 Nov 2023 04:21) (92/55 - 138/98)  BP(mean): --  RR: 18 (12 Nov 2023 04:21) (18 - 18)  SpO2: 96% (12 Nov 2023 05:00) (93% - 98%)    Parameters below as of 12 Nov 2023 04:21  Patient On (Oxygen Delivery Method): nasal cannula  O2 Flow (L/min): 2      PHYSICAL EXAM:  CONSTITUTIONAL: NAD, well-developed  EYES: PERRLA; conjunctiva and sclera clear  ENMT: Moist oral mucosa  RESPIRATORY: Normal respiratory effort; lungs are clear to auscultation bilaterally  CARDIOVASCULAR: Regular rate and rhythm, normal S1 and S2. + diastolic murmur best heard in L midaxillary line  No lower extremity edema; Peripheral pulses are 2+ bilaterally  ABDOMEN: Nontender to palpation, normoactive bowel sounds, no rebound/guarding;  MUSCULOSKELETAL:  no clubbing or cyanosis of digits; no joint swelling or tenderness to palpation, moving all extremities   PSYCH: A+O to person, place, and time; affect appropriate  NEUROLOGY: CN 2-12 are intact and symmetric; no gross sensory/motor deficits   SKIN: multiple areas of ecchymosis/superficial hematomas on upper extremities BL    Consultant(s) Notes Reviewed:  [x ] YES  [ ] NO  Care Discussed with Consultants/Other Providers [ x] YES  [ ] NO    LABS:                        13.6   8.86  )-----------( 142      ( 12 Nov 2023 06:43 )             40.8     12 Nov 2023 06:43    139    |  105    |  19     ----------------------------<  94     3.7     |  22     |  0.86     Ca    9.0        12 Nov 2023 06:43      PT/INR - ( 11 Nov 2023 06:14 )   PT: 13.6 sec;   INR: 1.31 ratio         PTT - ( 11 Nov 2023 20:58 )  PTT:71.1 sec  CAPILLARY BLOOD GLUCOSE        BLOOD CULTURE      RADIOLOGY & ADDITIONAL TESTS:    Imaging Personally Reviewed:  [ ] YES  [ ] NO  acetaminophen     Tablet .. 650 milliGRAM(s) Oral every 6 hours PRN  aspirin enteric coated 81 milliGRAM(s) Oral daily  atorvastatin 80 milliGRAM(s) Oral at bedtime  chlorhexidine 2% Cloths 1 Application(s) Topical <User Schedule>  heparin   Injectable 6000 Unit(s) IV Push every 6 hours PRN  heparin   Injectable 3000 Unit(s) IV Push every 6 hours PRN  heparin  Infusion.  Unit(s)/Hr IV Continuous <Continuous>  lithium 150 milliGRAM(s) Oral two times a day  metoprolol succinate ER 25 milliGRAM(s) Oral daily  sacubitril 24 mG/valsartan 26 mG 1 Tablet(s) Oral two times a day      HEALTH ISSUES - PROBLEM Dx:  Type 1 myocardial infarction    Acute systolic congestive heart failure    Atrial fibrillation    LV (left ventricular) mural thrombus    Bipolar disorder    Need for prophylactic measure    Bifascicular bundle branch block    Acute respiratory failure with hypoxia    Severe mitral regurgitation    CAD, multiple vessel

## 2023-11-13 LAB
ANION GAP SERPL CALC-SCNC: 12 MMOL/L — SIGNIFICANT CHANGE UP (ref 5–17)
APTT BLD: 87.9 SEC — HIGH (ref 24.5–35.6)
APTT BLD: 99.1 SEC — HIGH (ref 24.5–35.6)
BUN SERPL-MCNC: 18 MG/DL — SIGNIFICANT CHANGE UP (ref 7–23)
CALCIUM SERPL-MCNC: 9.4 MG/DL — SIGNIFICANT CHANGE UP (ref 8.4–10.5)
CHLORIDE SERPL-SCNC: 106 MMOL/L — SIGNIFICANT CHANGE UP (ref 96–108)
CO2 SERPL-SCNC: 21 MMOL/L — LOW (ref 22–31)
CREAT SERPL-MCNC: 1.05 MG/DL — SIGNIFICANT CHANGE UP (ref 0.5–1.3)
EGFR: 70 ML/MIN/1.73M2 — SIGNIFICANT CHANGE UP
GLUCOSE SERPL-MCNC: 99 MG/DL — SIGNIFICANT CHANGE UP (ref 70–99)
HCT VFR BLD CALC: 40.6 % — SIGNIFICANT CHANGE UP (ref 39–50)
HGB BLD-MCNC: 13.6 G/DL — SIGNIFICANT CHANGE UP (ref 13–17)
MAGNESIUM SERPL-MCNC: 1.9 MG/DL — SIGNIFICANT CHANGE UP (ref 1.6–2.6)
MCHC RBC-ENTMCNC: 31.7 PG — SIGNIFICANT CHANGE UP (ref 27–34)
MCHC RBC-ENTMCNC: 33.5 GM/DL — SIGNIFICANT CHANGE UP (ref 32–36)
MCV RBC AUTO: 94.6 FL — SIGNIFICANT CHANGE UP (ref 80–100)
NRBC # BLD: 0 /100 WBCS — SIGNIFICANT CHANGE UP (ref 0–0)
PHOSPHATE SERPL-MCNC: 3.2 MG/DL — SIGNIFICANT CHANGE UP (ref 2.5–4.5)
PLATELET # BLD AUTO: 147 K/UL — LOW (ref 150–400)
POTASSIUM SERPL-MCNC: 4 MMOL/L — SIGNIFICANT CHANGE UP (ref 3.5–5.3)
POTASSIUM SERPL-SCNC: 4 MMOL/L — SIGNIFICANT CHANGE UP (ref 3.5–5.3)
RBC # BLD: 4.29 M/UL — SIGNIFICANT CHANGE UP (ref 4.2–5.8)
RBC # FLD: 14.6 % — HIGH (ref 10.3–14.5)
SODIUM SERPL-SCNC: 139 MMOL/L — SIGNIFICANT CHANGE UP (ref 135–145)
WBC # BLD: 7.74 K/UL — SIGNIFICANT CHANGE UP (ref 3.8–10.5)
WBC # FLD AUTO: 7.74 K/UL — SIGNIFICANT CHANGE UP (ref 3.8–10.5)

## 2023-11-13 PROCEDURE — 99497 ADVNCD CARE PLAN 30 MIN: CPT

## 2023-11-13 PROCEDURE — 75561 CARDIAC MRI FOR MORPH W/DYE: CPT | Mod: 26

## 2023-11-13 PROCEDURE — 99233 SBSQ HOSP IP/OBS HIGH 50: CPT | Mod: GC

## 2023-11-13 PROCEDURE — 99233 SBSQ HOSP IP/OBS HIGH 50: CPT

## 2023-11-13 RX ADMIN — LITHIUM CARBONATE 150 MILLIGRAM(S): 300 TABLET, EXTENDED RELEASE ORAL at 18:30

## 2023-11-13 RX ADMIN — Medication 81 MILLIGRAM(S): at 12:47

## 2023-11-13 RX ADMIN — ATORVASTATIN CALCIUM 80 MILLIGRAM(S): 80 TABLET, FILM COATED ORAL at 22:18

## 2023-11-13 RX ADMIN — SACUBITRIL AND VALSARTAN 1 TABLET(S): 24; 26 TABLET, FILM COATED ORAL at 06:31

## 2023-11-13 RX ADMIN — HEPARIN SODIUM 1300 UNIT(S)/HR: 5000 INJECTION INTRAVENOUS; SUBCUTANEOUS at 00:22

## 2023-11-13 RX ADMIN — HEPARIN SODIUM 1300 UNIT(S)/HR: 5000 INJECTION INTRAVENOUS; SUBCUTANEOUS at 22:18

## 2023-11-13 RX ADMIN — HEPARIN SODIUM 1300 UNIT(S)/HR: 5000 INJECTION INTRAVENOUS; SUBCUTANEOUS at 08:36

## 2023-11-13 RX ADMIN — LITHIUM CARBONATE 150 MILLIGRAM(S): 300 TABLET, EXTENDED RELEASE ORAL at 06:34

## 2023-11-13 RX ADMIN — CHLORHEXIDINE GLUCONATE 1 APPLICATION(S): 213 SOLUTION TOPICAL at 06:36

## 2023-11-13 NOTE — PROGRESS NOTE ADULT - SUBJECTIVE AND OBJECTIVE BOX
Patient seen and examined at bedside.    Overnight Events: none    REVIEW OF SYSTEMS:  CONSTITUTIONAL: No weakness, fevers or chills  EYES/ENT: No visual changes;  No dysphagia  NECK: No pain or stiffness  RESPIRATORY: No cough, wheezing, hemoptysis; No shortness of breath  CARDIOVASCULAR: No chest pain or palpitations; No lower extremity edema  GASTROINTESTINAL: No abdominal or epigastric pain. No nausea, vomiting, or hematemesis; No diarrhea or constipation. No melena or hematochezia.  BACK: No back pain  GENITOURINARY: No dysuria, frequency or hematuria  NEUROLOGICAL: No numbness or weakness  SKIN: No itching, burning, rashes, or lesions   All other review of systems is negative unless indicated above.            Current Meds:  acetaminophen     Tablet .. 650 milliGRAM(s) Oral every 6 hours PRN  aspirin enteric coated 81 milliGRAM(s) Oral daily  atorvastatin 80 milliGRAM(s) Oral at bedtime  chlorhexidine 2% Cloths 1 Application(s) Topical <User Schedule>  heparin   Injectable 6000 Unit(s) IV Push every 6 hours PRN  heparin   Injectable 3000 Unit(s) IV Push every 6 hours PRN  heparin  Infusion. 1500 Unit(s)/Hr IV Continuous <Continuous>  lithium 150 milliGRAM(s) Oral two times a day  metoprolol succinate ER 25 milliGRAM(s) Oral daily  sacubitril 24 mG/valsartan 26 mG 1 Tablet(s) Oral two times a day      Vitals:  T(F): 97.5 (11-13), Max: 98.3 (11-12)  HR: 60 (11-13) (55 - 79)  BP: 113/66 (11-13) (106/56 - 137/63)  RR: 18 (11-13)  SpO2: 94% (11-13)  I&O's Summary    12 Nov 2023 07:01  -  13 Nov 2023 07:00  --------------------------------------------------------  IN: 1338 mL / OUT: 1000 mL / NET: 338 mL        Physical Exam:  GEN: NAD  HEENT: EOMI, clear sclera  PULM: CTA b/l, no wheeze  CV: RRR S1 S2, 2/6 holosystolic murmur at apex, no JVD  ABD: S, NT, ND  EXT: WWP, no edema  PSYCH: normal affect  SKIN: No rash                          13.6   7.74  )-----------( 147      ( 13 Nov 2023 07:08 )             40.6     11-13    139  |  106  |  18  ----------------------------<  99  4.0   |  21<L>  |  1.05    Ca    9.4      13 Nov 2023 07:10  Phos  3.2     11-13  Mg     1.9     11-13      PTT - ( 13 Nov 2023 07:11 )  PTT:99.1 sec              New ECG(s): Personally reviewed    Echo:    Stress Testing:     Cath:    New Imaging:    Interpretation of Telemetry:

## 2023-11-13 NOTE — DIETITIAN INITIAL EVALUATION ADULT - PROBLEM SELECTOR PLAN 1
- tele  - tte  - trend CE  - cont asa, brilinta, hep gtt  - f/u card recs  - plan for [facility]  - will make npo for now, advance diet post procedure - tele  - tte  - trend CE  - cont asa, brilinta, hep gtt  - f/u card recs  - plan for [facility]  - will make npo for now, advance diet post procedure - tele  - tte  - trend CE  - cont asa, brilinta, hep gtt  - f/u card recs  - plan for [facility]  - will make npo for now, advance diet post procedure

## 2023-11-13 NOTE — DIETITIAN INITIAL EVALUATION ADULT - ADD RECOMMEND
- monitor for malnutrition risk   - Will continue to monitor PO intake, weight, labs, skin, GI status, diet.   - Nutrition education provided to pt with written materials; pt aware RD remains available to review education/ diet as needed.   - Nutrition care plan to remain consistent with pt goals of care  - Food preferences obtained and updated. Menu provided. RD to honor as able   - RD remains available to review diet education and adjust diet recommendations as needed.

## 2023-11-13 NOTE — PROGRESS NOTE ADULT - PROBLEM SELECTOR PLAN 1
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 It was confrimed with patient and nephew that patient wants DNR/DNI and patient had capacity for code status at the time of code status discussion. He wanted to allow for natural death and to not prolong his suffering. Nephew reports that he is the health care proxy (to bring in form). Attempted to assess patient's understanding of his poor cardiac condition (prior hospitalist last week went through cardiac findings). Patient gave vague answer stating he "has a bad heart" but did not want to elaborate further. 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 It was confrimed with patient and nephew that patient wants DNR/DNI and patient had capacity for code status at the time of code status discussion. He wanted to allow for natural death and to not prolong his suffering. Nephew reports that he is the health care proxy (to bring in form). Attempted to assess patient's understanding of his poor cardiac condition (prior hospitalist last week went through cardiac findings). Patient gave vague answer stating he "has a bad heart" but did not want to elaborate further. 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 It was confrimed with patient and nephew that patient wants DNR/DNI and patient had capacity for code status at the time of code status discussion. He wanted to allow for natural death and to not prolong his suffering. Nephew reports that he is the health care proxy (to bring in form). Attempted to assess patient's understanding of his poor cardiac condition (prior hospitalist last week went through cardiac findings). Patient gave vague answer stating he "has a bad heart" but did not want to elaborate further.

## 2023-11-13 NOTE — DIETITIAN INITIAL EVALUATION ADULT - OTHER INFO
Pt states UBW ~180 pounds  Dosing wt 159.4 pounds ?wt loss (180->159.4, 11%)  93% IBW ( pounds)     Home Medications:  lithium 450 mg oral tablet, extended release: 0.5 tab(s) orally once a day (10 Nov 2023 11:36)

## 2023-11-13 NOTE — DIETITIAN INITIAL EVALUATION ADULT - NSFNSGIIOFT_GEN_A_CORE
- Pt denies nausea, vomiting, diarrhea, or constipation at this time   - Last BM:11/12; not currently ordered for bowel regimen

## 2023-11-13 NOTE — CHART NOTE - NSCHARTNOTEFT_GEN_A_CORE
RD CHF education chart note    Patient was visited by RD for CHF education. Heart failure education provided to the patient in detail. Discussed heart failure nutrition therapy, sodium and fluid intake, importance of diet adherence, daily weights monitoring with the patient. Reinforced importance of weight gain parameters and importance of contacting MD’s about weight changes. Provided handouts on heart failure nutrition therapy, reading heart healthy nutrition labels, heart healthy shopping tips and low sodium food list. Patient verbalized understanding demonstrated by teach back method. RD contact information left with patient for any future questioning.    Fara Donis, MS, RD, CDN (Teams)

## 2023-11-13 NOTE — PROGRESS NOTE ADULT - ASSESSMENT
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 Pending surgical evaluation for CABG as well as MVR. 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 Pending surgical evaluation for CABG as well as MVR. 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 Pending surgical evaluation for CABG as well as MVR.

## 2023-11-13 NOTE — DIETITIAN INITIAL EVALUATION ADULT - PERTINENT MEDS FT
MEDICATIONS  (STANDING):  aspirin enteric coated 81 milliGRAM(s) Oral daily  atorvastatin 80 milliGRAM(s) Oral at bedtime  chlorhexidine 2% Cloths 1 Application(s) Topical <User Schedule>  heparin  Infusion. 1500 Unit(s)/Hr (15 mL/Hr) IV Continuous <Continuous>  lithium 150 milliGRAM(s) Oral two times a day  metoprolol succinate ER 25 milliGRAM(s) Oral daily  sacubitril 24 mG/valsartan 26 mG 1 Tablet(s) Oral two times a day    MEDICATIONS  (PRN):  acetaminophen     Tablet .. 650 milliGRAM(s) Oral every 6 hours PRN Temp greater or equal to 38C (100.4F), Mild Pain (1 - 3)  heparin   Injectable 6000 Unit(s) IV Push every 6 hours PRN For aPTT less than 40  heparin   Injectable 3000 Unit(s) IV Push every 6 hours PRN For aPTT between 40 - 57

## 2023-11-13 NOTE — DIETITIAN INITIAL EVALUATION ADULT - REASON INDICATOR FOR ASSESSMENT
pt seen for CHF education   Information obtained from pt, electronic medical record  Chart reviewed, events noted

## 2023-11-13 NOTE — DIETITIAN INITIAL EVALUATION ADULT - ETIOLOGY
limited prior exposure to nutrition therapy education  presumed reduced PO intake PTA vs fluid shifts

## 2023-11-13 NOTE — PROGRESS NOTE ADULT - ASSESSMENT
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 fib and chest pain with LBBB and rapidly rising troponins concerning for NSTEMI. [facility] with triple vessel disease. Pending CT surgery/CABG eval. TTE with EF 30%, LV thrombus.     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 fib and chest pain with LBBB and rapidly rising troponins concerning for NSTEMI. [facility] with triple vessel disease. Pending CT surgery/CABG eval. TTE with EF 30%, LV thrombus.     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 fib and chest pain with LBBB and rapidly rising troponins concerning for NSTEMI. [facility] with triple vessel disease. Pending CT surgery/CABG eval. TTE with EF 30%, LV thrombus.     Recs:  - c/w ASA, hold Brilinta for now while assessing CT surgery candidacy  - continue atorvastatin 80mg   - c/w heparin gtt, will need long term A/C for afib and LV thrombus  -c/w entresto 24/26, toprol 25  -start spironolactone 25mg daily  - f/u CT surg recs  -spoke with patient who is agreeable; please obtain cardiac MRI

## 2023-11-13 NOTE — PROGRESS NOTE ADULT - SUBJECTIVE AND OBJECTIVE BOX
Patient is a 83y old  Male who presents with a chief complaint of Acute pulmonary edema     (13 Nov 2023 11:37)        SUBJECTIVE / OVERNIGHT EVENTS: Patient had no acute events overnight. Patient seen and examined at bedside this morning. He reports he agrees to cardiac MRI. Revisited patient with nephew. Patient then stated he would not want the MRI stating he would not want any invasive produres or surgery for the heart. Reports frustration with multiple providers asking him the same questions and lack of sleep    ROS:   MEDICATIONS  (STANDING):  aspirin enteric coated 81 milliGRAM(s) Oral daily  atorvastatin 80 milliGRAM(s) Oral at bedtime  chlorhexidine 2% Cloths 1 Application(s) Topical <User Schedule>  heparin  Infusion. 1500 Unit(s)/Hr (15 mL/Hr) IV Continuous <Continuous>  lithium 150 milliGRAM(s) Oral two times a day  metoprolol succinate ER 25 milliGRAM(s) Oral daily  sacubitril 24 mG/valsartan 26 mG 1 Tablet(s) Oral two times a day    MEDICATIONS  (PRN):  acetaminophen     Tablet .. 650 milliGRAM(s) Oral every 6 hours PRN Temp greater or equal to 38C (100.4F), Mild Pain (1 - 3)  heparin   Injectable 6000 Unit(s) IV Push every 6 hours PRN For aPTT less than 40  heparin   Injectable 3000 Unit(s) IV Push every 6 hours PRN For aPTT between 40 - 57      Vital Signs Last 24 Hrs  T(C): 36.3 (13 Nov 2023 11:24), Max: 36.8 (12 Nov 2023 23:56)  T(F): 97.4 (13 Nov 2023 11:24), Max: 98.3 (12 Nov 2023 23:56)  HR: 60 (13 Nov 2023 11:24) (55 - 79)  BP: 102/51 (13 Nov 2023 11:24) (102/51 - 137/63)  BP(mean): --  RR: 18 (13 Nov 2023 11:24) (16 - 18)  SpO2: 97% (13 Nov 2023 11:24) (94% - 97%)    Parameters below as of 13 Nov 2023 11:24  Patient On (Oxygen Delivery Method): room air      CAPILLARY BLOOD GLUCOSE        I&O's Summary    12 Nov 2023 07:01  -  13 Nov 2023 07:00  --------------------------------------------------------  IN: 1338 mL / OUT: 1000 mL / NET: 338 mL    13 Nov 2023 07:01  -  13 Nov 2023 15:07  --------------------------------------------------------  IN: 360 mL / OUT: 0 mL / NET: 360 mL        PHYSICAL EXAM  GENERAL: NAD, lying comfortably in bed   HEENT:  Atraumatic, Normocephalic, EOMI, conjunctiva and sclera clear, no LAD  CHEST/LUNG: Clear to auscultation bilaterally; No wheeze  HEART: RRR, S1 and S2 + systolic murmur  ABDOMEN: Soft, Nontender, Nondistended; Bowel sounds present  EXTREMITIES:  2+ Peripheral Pulses, No clubbing, cyanosis, or edema  NEURO: awake alert, conversant, non-focal, moving all extremites  SKIN: multiple areas of ecchymosis/superficial hematomas on upper extremities BL    LABS:                        13.6   7.74  )-----------( 147      ( 13 Nov 2023 07:08 )             40.6     11-13    139  |  106  |  18  ----------------------------<  99  4.0   |  21<L>  |  1.05    Ca    9.4      13 Nov 2023 07:10  Phos  3.2     11-13  Mg     1.9     11-13      PTT - ( 13 Nov 2023 07:11 )  PTT:99.1 sec      Urinalysis Basic - ( 13 Nov 2023 07:10 )    Color: x / Appearance: x / SG: x / pH: x  Gluc: 99 mg/dL / Ketone: x  / Bili: x / Urobili: x   Blood: x / Protein: x / Nitrite: x   Leuk Esterase: x / RBC: x / WBC x   Sq Epi: x / Non Sq Epi: x / Bacteria: x          RADIOLOGY & ADDITIONAL TESTS:      Labs Personally Reviewed  Imaging Personally Reviewed  Consultant(s) Notes Reviewed

## 2023-11-13 NOTE — PROGRESS NOTE ADULT - NSPROGADDITIONALINFOA_GEN_ALL_CORE
d/w ACP and updated nephew      The necessity of the time spent during the encounter on this date of service was due to:   - Ordering, reviewing, and interpreting labs, testing, and imaging.  - Independently obtaining a review of systems and performing a physical exam  - Reviewing prior hospitalization and where necessary, outpatient records.  - Counselling and educating patient and family regarding interpretation of aforementioned items and plan of care.    Time Spent 60 minutes    Rashmi Man  Division of Hospital Medicine  Available on [employer] Teams

## 2023-11-13 NOTE — DIETITIAN INITIAL EVALUATION ADULT - LITERATURE/VIDEOS GIVEN
Heart Failure Nutrition Therapy, Heart Healthy Label Reading, Heart Healthy Shopping Tips, Sodium (salt) Content of Foods

## 2023-11-13 NOTE — DIETITIAN INITIAL EVALUATION ADULT - PERSON TAUGHT/METHOD
Reviewed CHF diet education. Discussed Na restriction, foods high in Na to avoid, reading food labels, tips for limiting Na in your diet. Reviewed daily weights, Wt gain parameters to contact MD. Discussed Na intake in relation to fluid retention, edema and Wt gain. Pt verbalized understanding and accepted Heart Failure Nutrition Therapy Handout.  RD remains available for diet education review./verbal instruction/written material/teach back - (Patient repeats in own words)/patient instructed

## 2023-11-14 LAB
ANION GAP SERPL CALC-SCNC: 14 MMOL/L — SIGNIFICANT CHANGE UP (ref 5–17)
APTT BLD: 81.3 SEC — HIGH (ref 24.5–35.6)
BUN SERPL-MCNC: 17 MG/DL — SIGNIFICANT CHANGE UP (ref 7–23)
CALCIUM SERPL-MCNC: 9.2 MG/DL — SIGNIFICANT CHANGE UP (ref 8.4–10.5)
CHLORIDE SERPL-SCNC: 105 MMOL/L — SIGNIFICANT CHANGE UP (ref 96–108)
CO2 SERPL-SCNC: 20 MMOL/L — LOW (ref 22–31)
CREAT SERPL-MCNC: 0.94 MG/DL — SIGNIFICANT CHANGE UP (ref 0.5–1.3)
EGFR: 80 ML/MIN/1.73M2 — SIGNIFICANT CHANGE UP
GLUCOSE SERPL-MCNC: 83 MG/DL — SIGNIFICANT CHANGE UP (ref 70–99)
LITHIUM SERPL-MCNC: 0.6 MMOL/L — SIGNIFICANT CHANGE UP (ref 0.6–1.2)
MAGNESIUM SERPL-MCNC: 1.8 MG/DL — SIGNIFICANT CHANGE UP (ref 1.6–2.6)
PHOSPHATE SERPL-MCNC: 2.7 MG/DL — SIGNIFICANT CHANGE UP (ref 2.5–4.5)
POTASSIUM SERPL-MCNC: 3.7 MMOL/L — SIGNIFICANT CHANGE UP (ref 3.5–5.3)
POTASSIUM SERPL-SCNC: 3.7 MMOL/L — SIGNIFICANT CHANGE UP (ref 3.5–5.3)
SODIUM SERPL-SCNC: 139 MMOL/L — SIGNIFICANT CHANGE UP (ref 135–145)

## 2023-11-14 PROCEDURE — 93010 ELECTROCARDIOGRAM REPORT: CPT

## 2023-11-14 PROCEDURE — 99232 SBSQ HOSP IP/OBS MODERATE 35: CPT

## 2023-11-14 PROCEDURE — 99497 ADVNCD CARE PLAN 30 MIN: CPT

## 2023-11-14 PROCEDURE — 99222 1ST HOSP IP/OBS MODERATE 55: CPT

## 2023-11-14 PROCEDURE — 99233 SBSQ HOSP IP/OBS HIGH 50: CPT | Mod: GC

## 2023-11-14 RX ORDER — APIXABAN 2.5 MG/1
1 TABLET, FILM COATED ORAL
Qty: 60 | Refills: 0
Start: 2023-11-14 | End: 2023-12-13

## 2023-11-14 RX ORDER — SPIRONOLACTONE 25 MG/1
25 TABLET, FILM COATED ORAL DAILY
Refills: 0 | Status: DISCONTINUED | OUTPATIENT
Start: 2023-11-14 | End: 2023-11-16

## 2023-11-14 RX ORDER — SACUBITRIL AND VALSARTAN 24; 26 MG/1; MG/1
1 TABLET, FILM COATED ORAL
Qty: 60 | Refills: 0
Start: 2023-11-14 | End: 2023-12-13

## 2023-11-14 RX ADMIN — HEPARIN SODIUM 1300 UNIT(S)/HR: 5000 INJECTION INTRAVENOUS; SUBCUTANEOUS at 08:35

## 2023-11-14 RX ADMIN — ATORVASTATIN CALCIUM 80 MILLIGRAM(S): 80 TABLET, FILM COATED ORAL at 21:09

## 2023-11-14 RX ADMIN — HEPARIN SODIUM 1300 UNIT(S)/HR: 5000 INJECTION INTRAVENOUS; SUBCUTANEOUS at 08:11

## 2023-11-14 RX ADMIN — SACUBITRIL AND VALSARTAN 1 TABLET(S): 24; 26 TABLET, FILM COATED ORAL at 12:42

## 2023-11-14 RX ADMIN — Medication 25 MILLIGRAM(S): at 12:43

## 2023-11-14 RX ADMIN — Medication 81 MILLIGRAM(S): at 12:42

## 2023-11-14 RX ADMIN — LITHIUM CARBONATE 150 MILLIGRAM(S): 300 TABLET, EXTENDED RELEASE ORAL at 18:33

## 2023-11-14 RX ADMIN — CHLORHEXIDINE GLUCONATE 1 APPLICATION(S): 213 SOLUTION TOPICAL at 05:48

## 2023-11-14 RX ADMIN — LITHIUM CARBONATE 150 MILLIGRAM(S): 300 TABLET, EXTENDED RELEASE ORAL at 05:47

## 2023-11-14 RX ADMIN — SACUBITRIL AND VALSARTAN 1 TABLET(S): 24; 26 TABLET, FILM COATED ORAL at 18:33

## 2023-11-14 RX ADMIN — HEPARIN SODIUM 1300 UNIT(S)/HR: 5000 INJECTION INTRAVENOUS; SUBCUTANEOUS at 21:10

## 2023-11-14 RX ADMIN — SPIRONOLACTONE 25 MILLIGRAM(S): 25 TABLET, FILM COATED ORAL at 18:32

## 2023-11-14 NOTE — PHARMACOTHERAPY INTERVENTION NOTE - COMMENTS
84 YO M, currently on lithum for bipolar disorder. At [facility], patient was taking Lithium carbonate ER 450mg, half tablet (total 225mg) once a day. Level was low at 0.2 on admission on 11/8 and dose was increased to current inpatient dose of 150mg twice a day (total 300mg). Level obtained after 5 days on new dose (takes 5 days for drug to reach steady state) and level is now 0.6 on 11/14 which is therapeutic. Recommend to continue on current inpatient dose.     Sheela Joshi, PharmD, BCPS  Clinical Pharmacy Specialist  Teams (preferred) or [phone]    84 YO M, currently on lithum for bipolar disorder. At [facility], patient was taking Lithium carbonate ER 450mg, half tablet (total 225mg) once a day. Level was low at 0.2 on admission on 11/8 and dose was increased to current inpatient dose of 150mg twice a day (total 300mg). Level obtained after 5 days on new dose (takes 5 days for drug to reach steady state) and level is now 0.6 on 11/14 which is therapeutic. Recommend to continue on current inpatient dose.     Sheela Joshi, PharmD, BCPS  Clinical Pharmacy Specialist  Teams (preferred) or [phone]    84 YO M, currently on lithum for bipolar disorder. At [facility], patient was taking Lithium carbonate ER 450mg, half tablet (total 225mg) once a day. Level was low at 0.2 on admission on 11/8 and dose was increased to current inpatient dose of 150mg twice a day (total 300mg). Level obtained after 5 days on new dose (takes 5 days for drug to reach steady state) and level is now 0.6 on 11/14 which is therapeutic. Recommend to continue on current inpatient dose.     Sheela Joshi, PharmD, BCPS  Clinical Pharmacy Specialist  Teams (preferred) or [phone]

## 2023-11-14 NOTE — PROGRESS NOTE ADULT - SUBJECTIVE AND OBJECTIVE BOX
Patient seen and examined at bedside.    Overnight Events: 3.5 second pause    REVIEW OF SYSTEMS:  CONSTITUTIONAL: No weakness, fevers or chills  EYES/ENT: No visual changes;  No dysphagia  NECK: No pain or stiffness  RESPIRATORY: No cough, wheezing, hemoptysis; No shortness of breath  CARDIOVASCULAR: No chest pain or palpitations; No lower extremity edema  GASTROINTESTINAL: No abdominal or epigastric pain. No nausea, vomiting, or hematemesis; No diarrhea or constipation. No melena or hematochezia.  BACK: No back pain  GENITOURINARY: No dysuria, frequency or hematuria  NEUROLOGICAL: No numbness or weakness  SKIN: No itching, burning, rashes, or lesions   All other review of systems is negative unless indicated above.            Current Meds:  acetaminophen     Tablet .. 650 milliGRAM(s) Oral every 6 hours PRN  aspirin enteric coated 81 milliGRAM(s) Oral daily  atorvastatin 80 milliGRAM(s) Oral at bedtime  chlorhexidine 2% Cloths 1 Application(s) Topical <User Schedule>  heparin   Injectable 6000 Unit(s) IV Push every 6 hours PRN  heparin   Injectable 3000 Unit(s) IV Push every 6 hours PRN  heparin  Infusion. 1500 Unit(s)/Hr IV Continuous <Continuous>  lithium 150 milliGRAM(s) Oral two times a day  metoprolol succinate ER 25 milliGRAM(s) Oral daily  sacubitril 24 mG/valsartan 26 mG 1 Tablet(s) Oral two times a day      Vitals:  T(F): 97.7 (11-14), Max: 98.3 (11-14)  HR: 52 (11-14) (50 - 63)  BP: 105/64 (11-14) (100/60 - 111/63)  RR: 18 (11-14)  SpO2: 98% (11-14)  I&O's Summary    13 Nov 2023 07:01  -  14 Nov 2023 07:00  --------------------------------------------------------  IN: 1216 mL / OUT: 550 mL / NET: 666 mL        Physical Exam:  GEN: NAD  HEENT: EOMI, clear sclera  PULM: CTA b/l, no wheeze  CV: RRR S1 S2, 2/6 holosystolic murmur at apex, no JVD  ABD: S, NT, ND  EXT: WWP, no edema  PSYCH: normal affect  SKIN: No rash                          13.6   7.74  )-----------( 147      ( 13 Nov 2023 07:08 )             40.6     11-13    139  |  106  |  18  ----------------------------<  99  4.0   |  21<L>  |  1.05    Ca    9.4      13 Nov 2023 07:10  Phos  3.2     11-13  Mg     1.9     11-13      PTT - ( 13 Nov 2023 07:11 )  PTT:99.1 sec       Patient seen and examined at bedside.    Overnight Events: 3.5 second pause    REVIEW OF SYSTEMS:  CONSTITUTIONAL: No weakness, fevers or chills  EYES/ENT: No visual changes;  No dysphagia  NECK: No pain or stiffness  RESPIRATORY: No cough, wheezing, hemoptysis; No shortness of breath  CARDIOVASCULAR: No chest pain or palpitations; No lower extremity edema  GASTROINTESTINAL: No abdominal or epigastric pain. No nausea, vomiting, or hematemesis; No diarrhea or constipation. No melena or hematochezia.  BACK: No back pain  GENITOURINARY: No dysuria, frequency or hematuria  NEUROLOGICAL: No numbness or weakness  SKIN: No itching, burning, rashes, or lesions   All other review of systems is negative unless indicated above.            Current Meds:  acetaminophen     Tablet .. 650 milliGRAM(s) Oral every 6 hours PRN  aspirin enteric coated 81 milliGRAM(s) Oral daily  atorvastatin 80 milliGRAM(s) Oral at bedtime  chlorhexidine 2% Cloths 1 Application(s) Topical <User Schedule>  heparin   Injectable 6000 Unit(s) IV Push every 6 hours PRN  heparin   Injectable 3000 Unit(s) IV Push every 6 hours PRN  heparin  Infusion. 1500 Unit(s)/Hr IV Continuous <Continuous>  lithium 150 milliGRAM(s) Oral two times a day  metoprolol succinate ER 25 milliGRAM(s) Oral daily  sacubitril 24 mG/valsartan 26 mG 1 Tablet(s) Oral two times a day      Vitals:  T(F): 97.7 (11-14), Max: 98.3 (11-14)  HR: 52 (11-14) (50 - 63)  BP: 105/64 (11-14) (100/60 - 111/63)  RR: 18 (11-14)  SpO2: 98% (11-14)  I&O's Summary    13 Nov 2023 07:01  -  14 Nov 2023 07:00  --------------------------------------------------------  IN: 1216 mL / OUT: 550 mL / NET: 666 mL        Physical Exam:  GEN: NAD  HEENT: EOMI, clear sclera  PULM: CTA b/l, no wheeze  CV: RRR S1 S2, 2/6 holosystolic murmur at apex, no JVD  ABD: S, NT, ND  EXT: WWP, no edema  PSYCH: normal affect  SKIN: No rash                          13.6   7.74  )-----------( 147      ( 13 Nov 2023 07:08 )             40.6     11-13    139  |  106  |  18  ----------------------------<  99  4.0   |  21<L>  |  1.05    Ca    9.4      13 Nov 2023 07:10  Phos  3.2     11-13  Mg     1.9     11-13      PTT - ( 13 Nov 2023 07:11 )  PTT:99.1 sec    < from: MR Cardiac w/wo IV Cont (11.13.23 @ 15:25) >  Enlarged left ventricular . Left ventricular apex thrombus. Global left   ventricular hypokinesis with relative sparing of the lateral wall.   Extensive areas transmural late gadolinium enhancement with approximately   50% subendocardial enhancement of the mid inferior wall and anterior   lateral wall.      < end of copied text >

## 2023-11-14 NOTE — BH CONSULTATION LIAISON ASSESSMENT NOTE - HPI (INCLUDE ILLNESS QUALITY, SEVERITY, DURATION, TIMING, CONTEXT, MODIFYING FACTORS, ASSOCIATED SIGNS AND SYMPTOMS)
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 Pt was evaluated for surgical intervention for CABG as well as MVR pending GOC.  Pt was hesitant so he was given the option for a PCI.  Pt's medical attending today attempted to have a goals of care discussions, however pt was not fully oriented.  His code status DNR DNI, MOLST form in chart signed by patient's nephew Glynn on 11/13. Pt now states that he is unaware about his code status choice and wants to discuss with cardiology regarding recommendation for PCI as he is interested in it.  A psychiatry consult was requested to confirm pt's ability to consent for medical/ surgical procedures.    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  He also does not fully recall the cardiac condition he has and states that he has an arrhythmia but says he does recall when reminded of the triple vessel disease.  He does not recall discussing and treatment options though says he assumes he would need surgery to correct the problem.    On a [place] test administered at bedside, pt is able to complete some of the tasks including the trail making test and clock drawing to some extent but has much difficulty recalling any of the 5 words though had practiced repeating them several times. He recalls the correct year as 2023, but reports the month as March, and season as Spring, and is unable to recall the name of the hospital or its location. His score on the [place] was 21/30.  Pt says he did used to have an excellent memory in his youth but acknowledges that he has much difficulty now.  He says that he did experience severe depression in the 1950s and was started on Lithium then but denies any history of suicidality or any history of tawanna.  He is calm and cooperative with no acute psychiatric symptoms at this time.               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 Pt was evaluated for surgical intervention for CABG as well as MVR pending GOC.  Pt was hesitant so he was given the option for a PCI.  Pt's medical attending today attempted to have a goals of care discussions, however pt was not fully oriented.  His code status DNR DNI, MOLST form in chart signed by patient's nephew Glynn on 11/13. Pt now states that he is unaware about his code status choice and wants to discuss with cardiology regarding recommendation for PCI as he is interested in it.  A psychiatry consult was requested to confirm pt's ability to consent for medical/ surgical procedures.    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  He also does not fully recall the cardiac condition he has and states that he has an arrhythmia but says he does recall when reminded of the triple vessel disease.  He does not recall discussing and treatment options though says he assumes he would need surgery to correct the problem.    On a [place] test administered at bedside, pt is able to complete some of the tasks including the trail making test and clock drawing to some extent but has much difficulty recalling any of the 5 words though had practiced repeating them several times. He recalls the correct year as 2023, but reports the month as March, and season as Spring, and is unable to recall the name of the hospital or its location. His score on the [place] was 21/30.  Pt says he did used to have an excellent memory in his youth but acknowledges that he has much difficulty now.  He says that he did experience severe depression in the 1950s and was started on Lithium then but denies any history of suicidality or any history of tawanna.  He is calm and cooperative with no acute psychiatric symptoms at this time.               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 Pt was evaluated for surgical intervention for CABG as well as MVR pending GOC.  Pt was hesitant so he was given the option for a PCI.  Pt's medical attending today attempted to have a goals of care discussions, however pt was not fully oriented.  His code status DNR DNI, MOLST form in chart signed by patient's nephew Glynn on 11/13. Pt now states that he is unaware about his code status choice and wants to discuss with cardiology regarding recommendation for PCI as he is interested in it.  A psychiatry consult was requested to confirm pt's ability to consent for medical/ surgical procedures.    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  He also does not fully recall the cardiac condition he has and states that he has an arrhythmia but says he does recall when reminded of the triple vessel disease.  He does not recall discussing and treatment options though says he assumes he would need surgery to correct the problem.    On a [place] test administered at bedside, pt is able to complete some of the tasks including the trail making test and clock drawing to some extent but has much difficulty recalling any of the 5 words though had practiced repeating them several times. He recalls the correct year as 2023, but reports the month as March, and season as Spring, and is unable to recall the name of the hospital or its location. His score on the [place] was 21/30.  Pt says he did used to have an excellent memory in his youth but acknowledges that he has much difficulty now.  He says that he did experience severe depression in the 1950s and was started on Lithium then but denies any history of suicidality or any history of tawanna.  He is calm and cooperative with no acute psychiatric symptoms at this time.

## 2023-11-14 NOTE — BH CONSULTATION LIAISON ASSESSMENT NOTE - ADDITIONAL DETAILS / COMMENTS
pt is able to compensate well for his severe memory impairment but is unable to recall simple words within minutes.

## 2023-11-14 NOTE — BH CONSULTATION LIAISON ASSESSMENT NOTE - NSICDXBHSECONDARYDX_PSY_ALL_CORE
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

## 2023-11-14 NOTE — PROGRESS NOTE ADULT - ASSESSMENT
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 fib and chest pain with LBBB and rapidly rising troponins concerning for NSTEMI. [facility] with triple vessel disease. Pending CT surgery/CABG eval. TTE with EF 30%, LV thrombus.     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 fib and chest pain with LBBB and rapidly rising troponins concerning for NSTEMI. [facility] with triple vessel disease. Pending CT surgery/CABG eval. TTE with EF 30%, LV thrombus.     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 fib and chest pain with LBBB and rapidly rising troponins concerning for NSTEMI. [facility] with triple vessel disease. Pending CT surgery/CABG eval. TTE with EF 30%, LV thrombus.     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 fib and chest pain with LBBB and rapidly rising troponins concerning for NSTEMI. [facility] with triple vessel disease. Pending CT surgery/CABG eval. TTE with EF 30%, LV thrombus.     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 fib and chest pain with LBBB and rapidly rising troponins concerning for NSTEMI. [facility] with triple vessel disease. Pending CT surgery/CABG eval. TTE with EF 30%, LV thrombus.     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 fib and chest pain with LBBB and rapidly rising troponins concerning for NSTEMI. [facility] with triple vessel disease. Pending CT surgery/CABG eval. TTE with EF 30%, LV thrombus.     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 fib and chest pain with LBBB and rapidly rising troponins concerning for NSTEMI. [facility] with triple vessel disease. Pending CT surgery/CABG eval. TTE with EF 30%, LV thrombus.     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 fib and chest pain with LBBB and rapidly rising troponins concerning for NSTEMI. 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 Pending CT surgery/CABG eval. TTE with EF 30%, LV thrombus.     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 fib and chest pain with LBBB and rapidly rising troponins concerning for NSTEMI. [facility] with triple vessel disease. Pending CT surgery/CABG eval. TTE with EF 30%, LV thrombus.     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 fib and chest pain with LBBB and rapidly rising troponins concerning for NSTEMI. [facility] with triple vessel disease. Pending CT surgery/CABG eval. TTE with EF 30%, LV thrombus.     Had a discussion with patient and patient's NOK (Glynn) who stated that patient would not want DNR/DNI to be reversed for LHC, and would instead opt for medical management and palliative care. Will defer revascularization and pursue medical management.     Recs:  - c/w ASA, ticagrelor, atorvastatin 80mg  - transition to eliquis for afib and LV thrombus (rather than warfarin to minimize blood draws)  - c/w entresto 24/26, toprol 25mg  - start spironolactone 25mg daily  - palliative care consult    Note not final

## 2023-11-14 NOTE — BH CONSULTATION LIAISON ASSESSMENT NOTE - SUMMARY
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 Pt was evaluated for surgical intervention for CABG as well as MVR pending GOC.  Pt was hesitant so he was given the option for a PCI.  Pt's medical attending today attempted to have a goals of care discussions, however pt was not fully oriented.  His code status DNR DNI, MOLST form in chart signed by patient's nephew Glynn on 11/13. Pt now states that he is unaware about his code status choice and wants to discuss with cardiology regarding recommendation for PCI as he is interested in it.  A psychiatry consult was requested to confirm pt's ability to consent for medical/ surgical procedures.    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  He also does not fully recall the cardiac condition he has and states that he has an arrhythmia but says he does recall when reminded of the triple vessel disease.  He does not recall discussing and treatment options though says he assumes he would need surgery to correct the problem.    On a [place] test administered at bedside, pt is able to complete some of the tasks including the trail making test and clock drawing to some extent but has much difficulty recalling any of the 5 words though had practiced repeating them several times. He recalls the correct year as 2023, but reports the month as March, and season as Spring, and is unable to recall the name of the hospital or its location. His score on the [place] was 21/30.  Pt currently lacks capacity to consent to medical/surgical procedures because of his severe memory impairment.  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 Pt was evaluated for surgical intervention for CABG as well as MVR pending GOC.  Pt was hesitant so he was given the option for a PCI.  Pt's medical attending today attempted to have a goals of care discussions, however pt was not fully oriented.  His code status DNR DNI, MOLST form in chart signed by patient's nephew Glynn on 11/13. Pt now states that he is unaware about his code status choice and wants to discuss with cardiology regarding recommendation for PCI as he is interested in it.  A psychiatry consult was requested to confirm pt's ability to consent for medical/ surgical procedures.    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  He also does not fully recall the cardiac condition he has and states that he has an arrhythmia but says he does recall when reminded of the triple vessel disease.  He does not recall discussing and treatment options though says he assumes he would need surgery to correct the problem.    On a [place] test administered at bedside, pt is able to complete some of the tasks including the trail making test and clock drawing to some extent but has much difficulty recalling any of the 5 words though had practiced repeating them several times. He recalls the correct year as 2023, but reports the month as March, and season as Spring, and is unable to recall the name of the hospital or its location. His score on the [place] was 21/30.  Pt currently lacks capacity to consent to medical/surgical procedures because of his severe memory impairment.  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 Pt was evaluated for surgical intervention for CABG as well as MVR pending GOC.  Pt was hesitant so he was given the option for a PCI.  Pt's medical attending today attempted to have a goals of care discussions, however pt was not fully oriented.  His code status DNR DNI, MOLST form in chart signed by patient's nephew Glynn on 11/13. Pt now states that he is unaware about his code status choice and wants to discuss with cardiology regarding recommendation for PCI as he is interested in it.  A psychiatry consult was requested to confirm pt's ability to consent for medical/ surgical procedures.    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  He also does not fully recall the cardiac condition he has and states that he has an arrhythmia but says he does recall when reminded of the triple vessel disease.  He does not recall discussing and treatment options though says he assumes he would need surgery to correct the problem.    On a [place] test administered at bedside, pt is able to complete some of the tasks including the trail making test and clock drawing to some extent but has much difficulty recalling any of the 5 words though had practiced repeating them several times. He recalls the correct year as 2023, but reports the month as March, and season as Spring, and is unable to recall the name of the hospital or its location. His score on the [place] was 21/30.  Pt currently lacks capacity to consent to medical/surgical procedures because of his severe memory impairment.

## 2023-11-14 NOTE — BH CONSULTATION LIAISON ASSESSMENT NOTE - NSBHCHARTREVIEWVS_PSY_A_CORE FT
Vital Signs Last 24 Hrs  T(C): 36.7 (14 Nov 2023 11:15), Max: 36.8 (14 Nov 2023 00:29)  T(F): 98.1 (14 Nov 2023 11:15), Max: 98.3 (14 Nov 2023 00:29)  HR: 55 (14 Nov 2023 11:15) (50 - 63)  BP: 113/60 (14 Nov 2023 11:15) (100/60 - 113/60)  BP(mean): --  RR: 18 (14 Nov 2023 11:15) (18 - 18)  SpO2: 97% (14 Nov 2023 11:15) (95% - 98%)    Parameters below as of 14 Nov 2023 11:15  Patient On (Oxygen Delivery Method): nasal cannula  O2 Flow (L/min): 3

## 2023-11-14 NOTE — PROGRESS NOTE ADULT - PROBLEM SELECTOR PLAN 1
#NSTEMI , ACS  #TTE: Severe MR, global akinesis   #obstructive CAD multiple vessel disease     - s/p [facility] on 11/9 with triple vessel disease  - 11/10, 11/14: case discussed with cardiology team , per their recs: Continue ASA, ticagrelor, atorvastatin 80mg. Switched to ToprolXL 25mg po qd. Continue Entresto.   - CT surgery consulted for MVR and CABG evaluation. cardiac MRI done, cardiology team recommending complex PCI as patient "may benefit from intervention on lesion at distal margin of LAD stent." > however per GOC discussion /shared decision making on 11/14, now the recommendation is to defer revascularization and pursue medical management.     11/14 GOC: Per previous [facility] discussions noted, code status DNR DNI, MOLST form in chart signed by patient's nephew Glynn on 11/13. Tried having GOC discussion with patient this morning, however he is AxOx2, and does not appear to have full capacity to made decision regarding PCI. He states he is unaware about his code status choice and wants to discuss with cardiology regarding recommendation for PCI as he is interested in it.  I discussed with patient's GORDON Maki as well, confirmed signing of MOLST form, discussed cardiology recs, regarding PCI and code status for procedure, he states he wants to commit to patient's initial wishes that "he wants to pass with dignity" , that patient does not want any surgery, and states he does not want any further surgery or invasive procedure for which his code status will need to be reversed. I confirmed again and he does not want DNR/DNI to be rescinded for PCI. I discussed this with cardiology team as well and they confirmed patient's nephew's wishes as well as documented in their note.   Consulted psych team for to evaluate patient for capacity as well for decision making regarding code status and invasive cardiac procedure PCI. #NSTEMI , ACS  #TTE: Severe MR, global akinesis   #obstructive CAD multiple vessel disease     - s/p [facility] on 11/9 with triple vessel disease  - 11/10, 11/14: case discussed with cardiology team , per their recs: Continue ASA, ticagrelor, atorvastatin 80mg. Switched to ToprolXL 25mg po qd. Continue Entresto.   - CT surgery consulted for MVR and CABG evaluation. cardiac MRI done, cardiology team recommending complex PCI as patient "may benefit from intervention on lesion at distal margin of LAD stent." > however per GOC discussion /shared decision making on 11/14, now the recommendation is to defer revascularization and pursue medical management.     11/14 GOC: Per previous [facility] discussions noted, code status DNR DNI, MOLST form in chart signed by patient's nephew Glynn on 11/13. Tried having GOC discussion with patient this morning, however he is AxOx2, and does not appear to have full capacity to made decision regarding PCI. He states he is unaware about his code status choice and wants to discuss with cardiology regarding recommendation for PCI as he is interested in it.  I discussed with patient's GORDON Maki as well, confirmed signing of MOLST form, discussed cardiology recs, regarding PCI and code status for procedure, he states he wants to commit to patient's initial wishes that "he wants to pass with dignity" , that patient does not want any surgery, and states he does not want any further surgery or invasive procedure for which his code status will need to be reversed. I confirmed again and he does not want DNR/DNI to be rescinded for PCI. I discussed this with cardiology team as well and they confirmed patient's nephew's wishes as well as documented in their note.   Consulted psych team for to evaluate patient for capacity as well for decision making regarding code status and invasive cardiac procedure PCI. #NSTEMI , ACS  #TTE: Severe MR, global akinesis   #obstructive CAD multiple vessel disease     - s/p [facility] on 11/9 with triple vessel disease  - 11/10, 11/14: case discussed with cardiology team , per their recs: Continue ASA, ticagrelor, atorvastatin 80mg. Switched to ToprolXL 25mg po qd. Continue Entresto.   - CT surgery consulted for MVR and CABG evaluation. cardiac MRI done, cardiology team recommending complex PCI as patient "may benefit from intervention on lesion at distal margin of LAD stent." > however per GOC discussion /shared decision making on 11/14, now the recommendation is to defer revascularization and pursue medical management.     11/14 GOC: Per previous [facility] discussions noted, code status DNR DNI, MOLST form in chart signed by patient's nephew Glynn on 11/13. Tried having GOC discussion with patient this morning, however he is AxOx2, and does not appear to have full capacity to made decision regarding PCI. He states he is unaware about his code status choice and wants to discuss with cardiology regarding recommendation for PCI as he is interested in it.  I discussed with patient's GORDON Maki as well, confirmed signing of MOLST form, discussed cardiology recs, regarding PCI and code status for procedure, he states he wants to commit to patient's initial wishes that "he wants to pass with dignity" , that patient does not want any surgery, and states he does not want any further surgery or invasive procedure for which his code status will need to be reversed. I confirmed again and he does not want DNR/DNI to be rescinded for PCI. I discussed this with cardiology team as well and they confirmed patient's nephew's wishes as well as documented in their note.   Consulted psych team for to evaluate patient for capacity as well for decision making regarding code status and invasive cardiac procedure PCI.

## 2023-11-14 NOTE — CHART NOTE - NSCHARTNOTEFT_GEN_A_CORE
MEDICINE PA NOTE    JARRETT DIAZ    Notified by RN, patient had a 3.54 second pause on tele. Pt hemodynamically stable and asymptomatic. EKG performed, Afib with HR of 61, no significant change from previous. AM labs to be drawn. Discussed with Cardiology fellow Jose M Blackwood. Will continue to monitor on tele. Will endorse to AM team.    ICU Vital Signs Last 24 Hrs  T(C): 36.8 (14 Nov 2023 00:29), Max: 36.8 (14 Nov 2023 00:29)  T(F): 98.3 (14 Nov 2023 00:29), Max: 98.3 (14 Nov 2023 00:29)  HR: 50 (14 Nov 2023 00:29) (50 - 63)  BP: 105/60 (14 Nov 2023 00:29) (100/60 - 113/66)  BP(mean): --  ABP: --  ABP(mean): --  RR: 18 (14 Nov 2023 00:29) (18 - 18)  SpO2: 98% (14 Nov 2023 00:29) (94% - 98%)    O2 Parameters below as of 14 Nov 2023 00:29  Patient On (Oxygen Delivery Method): nasal cannula  O2 Flow (L/min): 3    Kimber English PA-C   Dept of Medicine  [id number] MEDICINE PA NOTE    JARRETT DIAZ    Notified by RN, patient had a 3.54 second pause on tele. Pt hemodynamically stable and asymptomatic. EKG performed, Afib with HR of 61, no significant change from previous. AM labs to be drawn. Discussed with Cardiology fellow Jose M Blackwood. Will continue to monitor on tele. Will endorse to AM team.    ICU Vital Signs Last 24 Hrs  T(C): 36.8 (14 Nov 2023 00:29), Max: 36.8 (14 Nov 2023 00:29)  T(F): 98.3 (14 Nov 2023 00:29), Max: 98.3 (14 Nov 2023 00:29)  HR: 50 (14 Nov 2023 00:29) (50 - 63)  BP: 105/60 (14 Nov 2023 00:29) (100/60 - 113/66)  BP(mean): --  ABP: --  ABP(mean): --  RR: 18 (14 Nov 2023 00:29) (18 - 18)  SpO2: 98% (14 Nov 2023 00:29) (94% - 98%)    O2 Parameters below as of 14 Nov 2023 00:29  Patient On (Oxygen Delivery Method): nasal cannula  O2 Flow (L/min): 3    Kimber English PA-C   Dept of Medicine  [id number] MEDICINE PA NOTE    JARRETT DIAZ    Notified by RN, patient had a 3.54 second pause on tele. Pt hemodynamically stable and asymptomatic. EKG performed, Afib with HR of 61, no significant change from previous. AM labs to be drawn. Discussed with Cardiology fellow Jose M Blackwood. Will continue to monitor on tele. Will endorse to AM team.    ICU Vital Signs Last 24 Hrs  T(C): 36.8 (14 Nov 2023 00:29), Max: 36.8 (14 Nov 2023 00:29)  T(F): 98.3 (14 Nov 2023 00:29), Max: 98.3 (14 Nov 2023 00:29)  HR: 50 (14 Nov 2023 00:29) (50 - 63)  BP: 105/60 (14 Nov 2023 00:29) (100/60 - 113/66)  BP(mean): --  ABP: --  ABP(mean): --  RR: 18 (14 Nov 2023 00:29) (18 - 18)  SpO2: 98% (14 Nov 2023 00:29) (94% - 98%)    O2 Parameters below as of 14 Nov 2023 00:29  Patient On (Oxygen Delivery Method): nasal cannula  O2 Flow (L/min): 3    Kimber English PA-C   Dept of Medicine  [id number]

## 2023-11-14 NOTE — PROGRESS NOTE ADULT - ASSESSMENT
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 Pending surgical evaluation for CABG as well as MVR pending GOC.  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 Pending surgical evaluation for CABG as well as MVR pending GOC.  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 Pending surgical evaluation for CABG as well as MVR pending GOC.

## 2023-11-14 NOTE — PROGRESS NOTE ADULT - NSPROGADDITIONALINFOA_GEN_ALL_CORE
Case and plan discussed with ACP: Nena RN, CM Case and plan discussed with ACP: Nena RN, CM       advanced care planning, GOC discussions time spent 35mins

## 2023-11-14 NOTE — PHARMACOTHERAPY INTERVENTION NOTE - COMMENTS
Counseled patient's family member (Johny Pruitt- nephew/ emergency contact) on new inpatient medication doses, indications, and possible side effects. Provided and reviewed medication cards. Answered all of the patient family memeber's questions to the best of my ability. Patient's family member exhibited understanding of new inpatient medication regimen.    Eliquis 5mg tablets: One tablet twice a day.  Entresto 24/26mg tablets: One tablet twice a day.    Jose Luis Vargas, PharmD  PGY1 Pharmacy Resident   Available on [facility]  SpectraLink: [phone]   Counseled patient's family member (Johny Pruitt- nephew/ emergency contact) on new inpatient medication doses, indications, and possible side effects. Provided and reviewed medication cards. Answered all of the patient family memeber's questions to the best of my ability. Patient's family member exhibited understanding of new inpatient medication regimen.    Eliquis 5mg tablets: One tablet twice a day.  Entresto 24/26mg tablets: One tablet twice a day.    Jose Luis Vargas, PharmD  PGY1 Pharmacy Resident   Available on [facility]  SpectraLink: [phone]   Counseled patient's family member (Johny Pruitt- nephew/ emergency contact) on new inpatient medication doses, indications, and possible side effects. Provided and reviewed medication cards. Answered all of the patient family memeber's questions to the best of my ability. Patient's family member exhibited understanding of new inpatient medication regimen.    Eliquis 5mg tablets: One tablet twice a day.  Entresto 24/26mg tablets: One tablet twice a day.    Jose Luis Vargas, PharmD  PGY1 Pharmacy Resident   Available on [facility]  SpectraLink: [phone]   Counseled patient's family member (Johny Pruitt- nephew/ emergency contact) on new inpatient medication doses, indications, and possible side effects. Provided and reviewed medication cards. Answered all of the patient family memeber's questions to the best of my ability. Patient's family member exhibited understanding of new inpatient medication regimen.    Eliquis 5mg tablets: One tablet twice a day.  Entresto 24/26mg tablets: One tablet twice a day.    RXs sent to [facility]. Copay is $38/ month for both medications. Patient agrees to cost.     Jose Luis Vargas, PharmD  PGY1 Pharmacy Resident   Available on teams  SpectraLink: [phone]    Sheela Joshi PharmD, [facility]  Clinical Pharmacy Specialist  Teams (preferred) or [phone]    Counseled patient's family member (Johny Pruitt- nephew/ emergency contact) on new inpatient medication doses, indications, and possible side effects. Provided and reviewed medication cards. Answered all of the patient family memeber's questions to the best of my ability. Patient's family member exhibited understanding of new inpatient medication regimen.    Eliquis 5mg tablets: One tablet twice a day.  Entresto 24/26mg tablets: One tablet twice a day.    RXs sent to [facility]. Copay is $38/ month for both medications. Patient agrees to cost.     Jose Luis Vargas, PharmD  PGY1 Pharmacy Resident   Available on teams  SpectraLink: [phone]    She[facility] Joshi PharmD, [facility]  Clinical Pharmacy Specialist  Teams (preferred) or [phone]    Counseled patient's family member (Johny Pruitt- nephew/ emergency contact) on new inpatient medication doses, indications, and possible side effects. Provided and reviewed medication cards. Answered all of the patient family memeber's questions to the best of my ability. Patient's family member exhibited understanding of new inpatient medication regimen.    Eliquis 5mg tablets: One tablet twice a day.  Entresto 24/26mg tablets: One tablet twice a day.    RXs sent to [facility]. Copay is $38/ month for both medications. Patient agrees to cost.     Jose Luis Vargas, PharmD  PGY1 Pharmacy Resident   Available on teams  SpectraLink: [phone]    Sheela Joshi PharmD, [facility]  Clinical Pharmacy Specialist  Teams (preferred) or [phone]

## 2023-11-14 NOTE — BH CONSULTATION LIAISON ASSESSMENT NOTE - PATIENT'S CHIEF COMPLAINT
"I don't remember signing any DNR forms.  I think I have an arrythmia, and I may need surgery, but no one really explained well. "

## 2023-11-14 NOTE — PROGRESS NOTE ADULT - SUBJECTIVE AND OBJECTIVE BOX
[facility] Division of Hospital Medicine  America Toro MD M-F, 8A-5P: MS Teams, Pager  Other Times: HIC Extension / HIC Pager      Patient is a 83y old  Male who presents with a chief complaint of chest pain (2023 08:06)      SUBJECTIVE / OVERNIGHT EVENTS:  Patient was examined this morning. He is AxOx2, he denies any acute problem, tried discussing clinical updates, specialist cardiology recs and GOC as below.       ADDITIONAL REVIEW OF SYSTEMS:    MEDICATIONS  (STANDING):  aspirin enteric coated 81 milliGRAM(s) Oral daily  atorvastatin 80 milliGRAM(s) Oral at bedtime  chlorhexidine 2% Cloths 1 Application(s) Topical <User Schedule>  heparin  Infusion. 1500 Unit(s)/Hr (15 mL/Hr) IV Continuous <Continuous>  lithium 150 milliGRAM(s) Oral two times a day  metoprolol succinate ER 25 milliGRAM(s) Oral daily  sacubitril 24 mG/valsartan 26 mG 1 Tablet(s) Oral two times a day  spironolactone 25 milliGRAM(s) Oral daily    MEDICATIONS  (PRN):  acetaminophen     Tablet .. 650 milliGRAM(s) Oral every 6 hours PRN Temp greater or equal to 38C (100.4F), Mild Pain (1 - 3)  heparin   Injectable 6000 Unit(s) IV Push every 6 hours PRN For aPTT less than 40  heparin   Injectable 3000 Unit(s) IV Push every 6 hours PRN For aPTT between 40 - 57      CAPILLARY BLOOD GLUCOSE          I&O's Summary    2023 07:01  -  2023 07:00  --------------------------------------------------------  IN: 1216 mL / OUT: 550 mL / NET: 666 mL        Daily     Daily Weight in k.7 (2023 07:40)    PHYSICAL EXAM:  Vital Signs Last 24 Hrs  T(C): 36.7 (2023 11:15), Max: 36.8 (2023 00:29)  T(F): 98.1 (2023 11:15), Max: 98.3 (2023 00:29)  HR: 55 (2023 11:15) (50 - 63)  BP: 113/60 (2023 11:15) (100/60 - 113/60)  BP(mean): --  RR: 18 (2023 11:15) (18 - 18)  SpO2: 97% (2023 11:15) (95% - 98%)    Parameters below as of 2023 11:15  Patient On (Oxygen Delivery Method): room air      PHYSICAL EXAM:  CONSTITUTIONAL: NAD, well-developed  EYES: PERRLA; conjunctiva and sclera clear  ENMT: Moist oral mucosa  RESPIRATORY: Normal respiratory effort; lungs are clear to auscultation bilaterally  CARDIOVASCULAR: Regular rate and rhythm, normal S1 and S2. + murmur in mitral area  No lower extremity edema; Peripheral pulses are 2+ bilaterally  ABDOMEN: Nontender to palpation, normoactive bowel sounds, no rebound/guarding;  MUSCULOSKELETAL:  no clubbing or cyanosis of digits; no joint swelling or tenderness to palpation, moving all extremities   PSYCH: A+O to person, place, and time; affect appropriate  NEUROLOGY: CN 2-12 are intact and symmetric; no gross sensory/motor deficits   SKIN: multiple areas of ecchymosis/superficial hematomas on upper extremities BL    LABS:                        13.6   7.74  )-----------( 147      ( 2023 07:08 )             40.6     11-14    139  |  105  |  17  ----------------------------<  83  3.7   |  20<L>  |  0.94    Ca    9.2      2023 07:41  Phos  2.7     11-14  Mg     1.8     11-14        PTT - ( 2023 07:41 )  PTT:81.3 sec      Urinalysis Basic - ( 2023 07:41 )    Color: x / Appearance: x / SG: x / pH: x  Gluc: 83 mg/dL / Ketone: x  / Bili: x / Urobili: x   Blood: x / Protein: x / Nitrite: x   Leuk Esterase: x / RBC: x / WBC x   Sq Epi: x / Non Sq Epi: x / Bacteria: x            RADIOLOGY & ADDITIONAL TESTS:  Results Reviewed:   Imaging Personally Reviewed:  Electrocardiogram Personally Reviewed:    COORDINATION OF CARE:  Care Discussed with Consultants/Other Providers [Y/N]:  Prior or Outpatient Records Reviewed [Y/N]:   [facility] Division of Hospital Medicine  America Toro MD M-F, 8A-5P: MS Teams, Pager  Other Times: HIC Extension / HIC Pager      Patient is a 83y old  Male who presents with a chief complaint of chest pain (2023 08:06)      SUBJECTIVE / OVERNIGHT EVENTS:  Patient was examined this morning. He is AxOx2, he denies any acute problem, tried discussing clinical updates, specialist cardiology recs and GOC as below.       ADDITIONAL REVIEW OF SYSTEMS:    MEDICATIONS  (STANDING):  aspirin enteric coated 81 milliGRAM(s) Oral daily  atorvastatin 80 milliGRAM(s) Oral at bedtime  chlorhexidine 2% Cloths 1 Application(s) Topical <User Schedule>  heparin  Infusion. 1500 Unit(s)/Hr (15 mL/Hr) IV Continuous <Continuous>  lithium 150 milliGRAM(s) Oral two times a day  metoprolol succinate ER 25 milliGRAM(s) Oral daily  sacubitril 24 mG/valsartan 26 mG 1 Tablet(s) Oral two times a day  spironolactone 25 milliGRAM(s) Oral daily    MEDICATIONS  (PRN):  acetaminophen     Tablet .. 650 milliGRAM(s) Oral every 6 hours PRN Temp greater or equal to 38C (100.4F), Mild Pain (1 - 3)  heparin   Injectable 6000 Unit(s) IV Push every 6 hours PRN For aPTT less than 40  heparin   Injectable 3000 Unit(s) IV Push every 6 hours PRN For aPTT between 40 - 57      CAPILLARY BLOOD GLUCOSE          I&O's Summary    2023 07:01  -  2023 07:00  --------------------------------------------------------  IN: 1216 mL / OUT: 550 mL / NET: 666 mL        Daily     Daily Weight in k.7 (2023 07:40)    PHYSICAL EXAM:  Vital Signs Last 24 Hrs  T(C): 36.7 (2023 11:15), Max: 36.8 (2023 00:29)  T(F): 98.1 (2023 11:15), Max: 98.3 (2023 00:29)  HR: 55 (2023 11:15) (50 - 63)  BP: 113/60 (2023 11:15) (100/60 - 113/60)  BP(mean): --  RR: 18 (2023 11:15) (18 - 18)  SpO2: 97% (2023 11:15) (95% - 98%)    Parameters below as of 2023 11:15  Patient On (Oxygen Delivery Method): room air      PHYSICAL EXAM:  CONSTITUTIONAL: NAD, well-developed  EYES: PERRLA; conjunctiva and sclera clear  ENMT: Moist oral mucosa  RESPIRATORY: Normal respiratory effort; lungs are clear to auscultation bilaterally  CARDIOVASCULAR: Regular rate and rhythm, normal S1 and S2. + murmur in mitral area  No lower extremity edema; Peripheral pulses are 2+ bilaterally  ABDOMEN: Nontender to palpation, normoactive bowel sounds, no rebound/guarding;  MUSCULOSKELETAL:  no clubbing or cyanosis of digits; no joint swelling or tenderness to palpation, moving all extremities   PSYCH: A+O to person, place, and time; affect appropriate  NEUROLOGY: CN 2-12 are intact and symmetric; no gross sensory/motor deficits   SKIN: multiple areas of ecchymosis/superficial hematomas on upper extremities BL    LABS:                        13.6   7.74  )-----------( 147      ( 2023 07:08 )             40.6     11-14    139  |  105  |  17  ----------------------------<  83  3.7   |  20<L>  |  0.94    Ca    9.2      2023 07:41  Phos  2.7     11-14  Mg     1.8     11-14        PTT - ( 2023 07:41 )  PTT:81.3 sec      Urinalysis Basic - ( 2023 07:41 )    Color: x / Appearance: x / SG: x / pH: x  Gluc: 83 mg/dL / Ketone: x  / Bili: x / Urobili: x   Blood: x / Protein: x / Nitrite: x   Leuk Esterase: x / RBC: x / WBC x   Sq Epi: x / Non Sq Epi: x / Bacteria: x            RADIOLOGY & ADDITIONAL TESTS:  Results Reviewed:   Imaging Personally Reviewed:  Electrocardiogram Personally Reviewed:    COORDINATION OF CARE:  Care Discussed with Consultants/Other Providers [Y/N]:  Prior or Outpatient Records Reviewed [Y/N]:   [facility] Division of Hospital Medicine  America Toro MD M-F, 8A-5P: MS Teams, Pager  Other Times: HIC Extension / HIC Pager      Patient is a 83y old  Male who presents with a chief complaint of chest pain (2023 08:06)      SUBJECTIVE / OVERNIGHT EVENTS:  Patient was examined this morning. He is AxOx2, he denies any acute problem, tried discussing clinical updates, specialist cardiology recs and GOC as below.       ADDITIONAL REVIEW OF SYSTEMS:    MEDICATIONS  (STANDING):  aspirin enteric coated 81 milliGRAM(s) Oral daily  atorvastatin 80 milliGRAM(s) Oral at bedtime  chlorhexidine 2% Cloths 1 Application(s) Topical <User Schedule>  heparin  Infusion. 1500 Unit(s)/Hr (15 mL/Hr) IV Continuous <Continuous>  lithium 150 milliGRAM(s) Oral two times a day  metoprolol succinate ER 25 milliGRAM(s) Oral daily  sacubitril 24 mG/valsartan 26 mG 1 Tablet(s) Oral two times a day  spironolactone 25 milliGRAM(s) Oral daily    MEDICATIONS  (PRN):  acetaminophen     Tablet .. 650 milliGRAM(s) Oral every 6 hours PRN Temp greater or equal to 38C (100.4F), Mild Pain (1 - 3)  heparin   Injectable 6000 Unit(s) IV Push every 6 hours PRN For aPTT less than 40  heparin   Injectable 3000 Unit(s) IV Push every 6 hours PRN For aPTT between 40 - 57      CAPILLARY BLOOD GLUCOSE          I&O's Summary    2023 07:01  -  2023 07:00  --------------------------------------------------------  IN: 1216 mL / OUT: 550 mL / NET: 666 mL        Daily     Daily Weight in k.7 (2023 07:40)    PHYSICAL EXAM:  Vital Signs Last 24 Hrs  T(C): 36.7 (2023 11:15), Max: 36.8 (2023 00:29)  T(F): 98.1 (2023 11:15), Max: 98.3 (2023 00:29)  HR: 55 (2023 11:15) (50 - 63)  BP: 113/60 (2023 11:15) (100/60 - 113/60)  BP(mean): --  RR: 18 (2023 11:15) (18 - 18)  SpO2: 97% (2023 11:15) (95% - 98%)    Parameters below as of 2023 11:15  Patient On (Oxygen Delivery Method): room air      PHYSICAL EXAM:  CONSTITUTIONAL: NAD, well-developed  EYES: PERRLA; conjunctiva and sclera clear  ENMT: Moist oral mucosa  RESPIRATORY: Normal respiratory effort; lungs are clear to auscultation bilaterally  CARDIOVASCULAR: Regular rate and rhythm, normal S1 and S2. + murmur in mitral area  No lower extremity edema; Peripheral pulses are 2+ bilaterally  ABDOMEN: Nontender to palpation, normoactive bowel sounds, no rebound/guarding;  MUSCULOSKELETAL:  no clubbing or cyanosis of digits; no joint swelling or tenderness to palpation, moving all extremities   PSYCH: A+O to person, place, and time; affect appropriate  NEUROLOGY: CN 2-12 are intact and symmetric; no gross sensory/motor deficits   SKIN: multiple areas of ecchymosis/superficial hematomas on upper extremities BL    LABS:                        13.6   7.74  )-----------( 147      ( 2023 07:08 )             40.6     11-14    139  |  105  |  17  ----------------------------<  83  3.7   |  20<L>  |  0.94    Ca    9.2      2023 07:41  Phos  2.7     11-14  Mg     1.8     11-14        PTT - ( 2023 07:41 )  PTT:81.3 sec      Urinalysis Basic - ( 2023 07:41 )    Color: x / Appearance: x / SG: x / pH: x  Gluc: 83 mg/dL / Ketone: x  / Bili: x / Urobili: x   Blood: x / Protein: x / Nitrite: x   Leuk Esterase: x / RBC: x / WBC x   Sq Epi: x / Non Sq Epi: x / Bacteria: x            RADIOLOGY & ADDITIONAL TESTS:  Results Reviewed:   Imaging Personally Reviewed:  Electrocardiogram Personally Reviewed:    COORDINATION OF CARE:  Care Discussed with Consultants/Other Providers [Y/N]:  Prior or Outpatient Records Reviewed [Y/N]:   [facility] Division of Hospital Medicine  America Toro MD M-F, 8A-5P: MS Teams, Pager  Other Times: HIC Extension / HIC Pager      Patient is a 83y old  Male who presents with a chief complaint of chest pain (2023 08:06)      SUBJECTIVE / OVERNIGHT EVENTS:  Patient was examined this morning. He is AxOx2, he denies any acute problem, tried discussing clinical updates, specialist cardiology recs and GOC as below.       ADDITIONAL REVIEW OF SYSTEMS:    MEDICATIONS  (STANDING):  aspirin enteric coated 81 milliGRAM(s) Oral daily  atorvastatin 80 milliGRAM(s) Oral at bedtime  chlorhexidine 2% Cloths 1 Application(s) Topical <User Schedule>  heparin  Infusion. 1500 Unit(s)/Hr (15 mL/Hr) IV Continuous <Continuous>  lithium 150 milliGRAM(s) Oral two times a day  metoprolol succinate ER 25 milliGRAM(s) Oral daily  sacubitril 24 mG/valsartan 26 mG 1 Tablet(s) Oral two times a day  spironolactone 25 milliGRAM(s) Oral daily    MEDICATIONS  (PRN):  acetaminophen     Tablet .. 650 milliGRAM(s) Oral every 6 hours PRN Temp greater or equal to 38C (100.4F), Mild Pain (1 - 3)  heparin   Injectable 6000 Unit(s) IV Push every 6 hours PRN For aPTT less than 40  heparin   Injectable 3000 Unit(s) IV Push every 6 hours PRN For aPTT between 40 - 57      CAPILLARY BLOOD GLUCOSE          I&O's Summary    2023 07:01  -  2023 07:00  --------------------------------------------------------  IN: 1216 mL / OUT: 550 mL / NET: 666 mL        Daily     Daily Weight in k.7 (2023 07:40)    PHYSICAL EXAM:  Vital Signs Last 24 Hrs  T(C): 36.7 (2023 11:15), Max: 36.8 (2023 00:29)  T(F): 98.1 (2023 11:15), Max: 98.3 (2023 00:29)  HR: 55 (2023 11:15) (50 - 63)  BP: 113/60 (2023 11:15) (100/60 - 113/60)  BP(mean): --  RR: 18 (2023 11:15) (18 - 18)  SpO2: 97% (2023 11:15) (95% - 98%)    Parameters below as of 2023 11:15  Patient On (Oxygen Delivery Method): room air      PHYSICAL EXAM:  CONSTITUTIONAL: NAD, well-developed  EYES: PERRLA; conjunctiva and sclera clear  ENMT: Moist oral mucosa  RESPIRATORY: Normal respiratory effort; lungs are clear to auscultation bilaterally  CARDIOVASCULAR: Regular rate and rhythm, normal S1 and S2. + murmur in mitral area  No lower extremity edema; Peripheral pulses are 2+ bilaterally  ABDOMEN: Nontender to palpation, normoactive bowel sounds, no rebound/guarding;  MUSCULOSKELETAL:  no clubbing or cyanosis of digits; no joint swelling or tenderness to palpation, moving all extremities   PSYCH: A+O to person, place, does not know year or month; affect appropriate  NEUROLOGY: CN 2-12 are intact and symmetric; no gross sensory/motor deficits   SKIN: multiple areas of ecchymosis/superficial hematomas on upper extremities BL    LABS:                        13.6   7.74  )-----------( 147      ( 2023 07:08 )             40.6     11-14    139  |  105  |  17  ----------------------------<  83  3.7   |  20<L>  |  0.94    Ca    9.2      2023 07:41  Phos  2.7     11-14  Mg     1.8     11-14        PTT - ( 2023 07:41 )  PTT:81.3 sec      Urinalysis Basic - ( 2023 07:41 )    Color: x / Appearance: x / SG: x / pH: x  Gluc: 83 mg/dL / Ketone: x  / Bili: x / Urobili: x   Blood: x / Protein: x / Nitrite: x   Leuk Esterase: x / RBC: x / WBC x   Sq Epi: x / Non Sq Epi: x / Bacteria: x            RADIOLOGY & ADDITIONAL TESTS:  Results Reviewed:   Imaging Personally Reviewed:  Electrocardiogram Personally Reviewed:    COORDINATION OF CARE:  Care Discussed with Consultants/Other Providers [Y/N]:  Prior or Outpatient Records Reviewed [Y/N]:   [facility] Division of Hospital Medicine  America Toro MD M-F, 8A-5P: MS Teams, Pager  Other Times: HIC Extension / HIC Pager      Patient is a 83y old  Male who presents with a chief complaint of chest pain (2023 08:06)      SUBJECTIVE / OVERNIGHT EVENTS:  Patient was examined this morning. He is AxOx2, he denies any acute problem, tried discussing clinical updates, specialist cardiology recs and GOC as below.       ADDITIONAL REVIEW OF SYSTEMS:    MEDICATIONS  (STANDING):  aspirin enteric coated 81 milliGRAM(s) Oral daily  atorvastatin 80 milliGRAM(s) Oral at bedtime  chlorhexidine 2% Cloths 1 Application(s) Topical <User Schedule>  heparin  Infusion. 1500 Unit(s)/Hr (15 mL/Hr) IV Continuous <Continuous>  lithium 150 milliGRAM(s) Oral two times a day  metoprolol succinate ER 25 milliGRAM(s) Oral daily  sacubitril 24 mG/valsartan 26 mG 1 Tablet(s) Oral two times a day  spironolactone 25 milliGRAM(s) Oral daily    MEDICATIONS  (PRN):  acetaminophen     Tablet .. 650 milliGRAM(s) Oral every 6 hours PRN Temp greater or equal to 38C (100.4F), Mild Pain (1 - 3)  heparin   Injectable 6000 Unit(s) IV Push every 6 hours PRN For aPTT less than 40  heparin   Injectable 3000 Unit(s) IV Push every 6 hours PRN For aPTT between 40 - 57      CAPILLARY BLOOD GLUCOSE          I&O's Summary    2023 07:01  -  2023 07:00  --------------------------------------------------------  IN: 1216 mL / OUT: 550 mL / NET: 666 mL        Daily     Daily Weight in k.7 (2023 07:40)    PHYSICAL EXAM:  Vital Signs Last 24 Hrs  T(C): 36.7 (2023 11:15), Max: 36.8 (2023 00:29)  T(F): 98.1 (2023 11:15), Max: 98.3 (2023 00:29)  HR: 55 (2023 11:15) (50 - 63)  BP: 113/60 (2023 11:15) (100/60 - 113/60)  BP(mean): --  RR: 18 (2023 11:15) (18 - 18)  SpO2: 97% (2023 11:15) (95% - 98%)    Parameters below as of 2023 11:15  Patient On (Oxygen Delivery Method): room air      PHYSICAL EXAM:  CONSTITUTIONAL: NAD, well-developed  EYES: PERRLA; conjunctiva and sclera clear  ENMT: Moist oral mucosa  RESPIRATORY: Normal respiratory effort; lungs are clear to auscultation bilaterally  CARDIOVASCULAR: Regular rate and rhythm, normal S1 and S2. + murmur in mitral area  No lower extremity edema; Peripheral pulses are 2+ bilaterally  ABDOMEN: Nontender to palpation, normoactive bowel sounds, no rebound/guarding;  MUSCULOSKELETAL:  no clubbing or cyanosis of digits; no joint swelling or tenderness to palpation, moving all extremities   PSYCH: A+O to person, place, does not know year or month; affect appropriate  NEUROLOGY: CN 2-12 are intact and symmetric; no gross sensory/motor deficits   SKIN: multiple areas of ecchymosis/superficial hematomas on upper extremities BL    LABS:                        13.6   7.74  )-----------( 147      ( 2023 07:08 )             40.6     11-14    139  |  105  |  17  ----------------------------<  83  3.7   |  20<L>  |  0.94    Ca    9.2      2023 07:41  Phos  2.7     11-14  Mg     1.8     11-14        PTT - ( 2023 07:41 )  PTT:81.3 sec      Urinalysis Basic - ( 2023 07:41 )    Color: x / Appearance: x / SG: x / pH: x  Gluc: 83 mg/dL / Ketone: x  / Bili: x / Urobili: x   Blood: x / Protein: x / Nitrite: x   Leuk Esterase: x / RBC: x / WBC x   Sq Epi: x / Non Sq Epi: x / Bacteria: x            RADIOLOGY & ADDITIONAL TESTS:  Results Reviewed:   Imaging Personally Reviewed:  Electrocardiogram Personally Reviewed:    COORDINATION OF CARE:  Care Discussed with Consultants/Other Providers [Y/N]:  Prior or Outpatient Records Reviewed [Y/N]:   [facility] Division of Hospital Medicine  America Toro MD M-F, 8A-5P: MS Teams, Pager  Other Times: HIC Extension / HIC Pager      Patient is a 83y old  Male who presents with a chief complaint of chest pain (2023 08:06)      SUBJECTIVE / OVERNIGHT EVENTS:  Patient was examined this morning. He is AxOx2, he denies any acute problem, tried discussing clinical updates, specialist cardiology recs and GOC as below.       ADDITIONAL REVIEW OF SYSTEMS:    MEDICATIONS  (STANDING):  aspirin enteric coated 81 milliGRAM(s) Oral daily  atorvastatin 80 milliGRAM(s) Oral at bedtime  chlorhexidine 2% Cloths 1 Application(s) Topical <User Schedule>  heparin  Infusion. 1500 Unit(s)/Hr (15 mL/Hr) IV Continuous <Continuous>  lithium 150 milliGRAM(s) Oral two times a day  metoprolol succinate ER 25 milliGRAM(s) Oral daily  sacubitril 24 mG/valsartan 26 mG 1 Tablet(s) Oral two times a day  spironolactone 25 milliGRAM(s) Oral daily    MEDICATIONS  (PRN):  acetaminophen     Tablet .. 650 milliGRAM(s) Oral every 6 hours PRN Temp greater or equal to 38C (100.4F), Mild Pain (1 - 3)  heparin   Injectable 6000 Unit(s) IV Push every 6 hours PRN For aPTT less than 40  heparin   Injectable 3000 Unit(s) IV Push every 6 hours PRN For aPTT between 40 - 57      CAPILLARY BLOOD GLUCOSE          I&O's Summary    2023 07:01  -  2023 07:00  --------------------------------------------------------  IN: 1216 mL / OUT: 550 mL / NET: 666 mL        Daily     Daily Weight in k.7 (2023 07:40)    PHYSICAL EXAM:  Vital Signs Last 24 Hrs  T(C): 36.7 (2023 11:15), Max: 36.8 (2023 00:29)  T(F): 98.1 (2023 11:15), Max: 98.3 (2023 00:29)  HR: 55 (2023 11:15) (50 - 63)  BP: 113/60 (2023 11:15) (100/60 - 113/60)  BP(mean): --  RR: 18 (2023 11:15) (18 - 18)  SpO2: 97% (2023 11:15) (95% - 98%)    Parameters below as of 2023 11:15  Patient On (Oxygen Delivery Method): room air      PHYSICAL EXAM:  CONSTITUTIONAL: NAD, well-developed  EYES: PERRLA; conjunctiva and sclera clear  ENMT: Moist oral mucosa  RESPIRATORY: Normal respiratory effort; lungs are clear to auscultation bilaterally  CARDIOVASCULAR: Regular rate and rhythm, normal S1 and S2. + murmur in mitral area  No lower extremity edema; Peripheral pulses are 2+ bilaterally  ABDOMEN: Nontender to palpation, normoactive bowel sounds, no rebound/guarding;  MUSCULOSKELETAL:  no clubbing or cyanosis of digits; no joint swelling or tenderness to palpation, moving all extremities   PSYCH: A+O to person, place, does not know year or month; affect appropriate  NEUROLOGY: CN 2-12 are intact and symmetric; no gross sensory/motor deficits   SKIN: multiple areas of ecchymosis/superficial hematomas on upper extremities BL    LABS:                        13.6   7.74  )-----------( 147      ( 2023 07:08 )             40.6     11-14    139  |  105  |  17  ----------------------------<  83  3.7   |  20<L>  |  0.94    Ca    9.2      2023 07:41  Phos  2.7     11-14  Mg     1.8     11-14        PTT - ( 2023 07:41 )  PTT:81.3 sec      Urinalysis Basic - ( 2023 07:41 )    Color: x / Appearance: x / SG: x / pH: x  Gluc: 83 mg/dL / Ketone: x  / Bili: x / Urobili: x   Blood: x / Protein: x / Nitrite: x   Leuk Esterase: x / RBC: x / WBC x   Sq Epi: x / Non Sq Epi: x / Bacteria: x            RADIOLOGY & ADDITIONAL TESTS:  Results Reviewed:   Imaging Personally Reviewed:  Electrocardiogram Personally Reviewed:    COORDINATION OF CARE:  Care Discussed with Consultants/Other Providers [Y/N]:  Prior or Outpatient Records Reviewed [Y/N]:

## 2023-11-14 NOTE — BH CONSULTATION LIAISON ASSESSMENT NOTE - CURRENT MEDICATION
MEDICATIONS  (STANDING):  aspirin enteric coated 81 milliGRAM(s) Oral daily  atorvastatin 80 milliGRAM(s) Oral at bedtime  chlorhexidine 2% Cloths 1 Application(s) Topical <User Schedule>  heparin  Infusion. 1500 Unit(s)/Hr (15 mL/Hr) IV Continuous <Continuous>  lithium 150 milliGRAM(s) Oral two times a day  metoprolol succinate ER 25 milliGRAM(s) Oral daily  sacubitril 24 mG/valsartan 26 mG 1 Tablet(s) Oral two times a day  spironolactone 25 milliGRAM(s) Oral daily    MEDICATIONS  (PRN):  acetaminophen     Tablet .. 650 milliGRAM(s) Oral every 6 hours PRN Temp greater or equal to 38C (100.4F), Mild Pain (1 - 3)  heparin   Injectable 6000 Unit(s) IV Push every 6 hours PRN For aPTT less than 40  heparin   Injectable 3000 Unit(s) IV Push every 6 hours PRN For aPTT between 40 - 57

## 2023-11-15 LAB
ANION GAP SERPL CALC-SCNC: 11 MMOL/L — SIGNIFICANT CHANGE UP (ref 5–17)
APTT BLD: 80.6 SEC — HIGH (ref 24.5–35.6)
BUN SERPL-MCNC: 18 MG/DL — SIGNIFICANT CHANGE UP (ref 7–23)
CALCIUM SERPL-MCNC: 9.3 MG/DL — SIGNIFICANT CHANGE UP (ref 8.4–10.5)
CHLORIDE SERPL-SCNC: 105 MMOL/L — SIGNIFICANT CHANGE UP (ref 96–108)
CO2 SERPL-SCNC: 23 MMOL/L — SIGNIFICANT CHANGE UP (ref 22–31)
CREAT SERPL-MCNC: 1.02 MG/DL — SIGNIFICANT CHANGE UP (ref 0.5–1.3)
EGFR: 73 ML/MIN/1.73M2 — SIGNIFICANT CHANGE UP
GLUCOSE SERPL-MCNC: 86 MG/DL — SIGNIFICANT CHANGE UP (ref 70–99)
MAGNESIUM SERPL-MCNC: 1.9 MG/DL — SIGNIFICANT CHANGE UP (ref 1.6–2.6)
PHOSPHATE SERPL-MCNC: 2.9 MG/DL — SIGNIFICANT CHANGE UP (ref 2.5–4.5)
POTASSIUM SERPL-MCNC: 4.1 MMOL/L — SIGNIFICANT CHANGE UP (ref 3.5–5.3)
POTASSIUM SERPL-SCNC: 4.1 MMOL/L — SIGNIFICANT CHANGE UP (ref 3.5–5.3)
SODIUM SERPL-SCNC: 139 MMOL/L — SIGNIFICANT CHANGE UP (ref 135–145)

## 2023-11-15 PROCEDURE — 99233 SBSQ HOSP IP/OBS HIGH 50: CPT

## 2023-11-15 PROCEDURE — 99233 SBSQ HOSP IP/OBS HIGH 50: CPT | Mod: GC

## 2023-11-15 PROCEDURE — 99232 SBSQ HOSP IP/OBS MODERATE 35: CPT

## 2023-11-15 PROCEDURE — 93010 ELECTROCARDIOGRAM REPORT: CPT

## 2023-11-15 RX ORDER — TICAGRELOR 90 MG/1
180 TABLET ORAL ONCE
Refills: 0 | Status: COMPLETED | OUTPATIENT
Start: 2023-11-15 | End: 2023-11-15

## 2023-11-15 RX ORDER — TICAGRELOR 90 MG/1
90 TABLET ORAL EVERY 12 HOURS
Refills: 0 | Status: DISCONTINUED | OUTPATIENT
Start: 2023-11-16 | End: 2023-11-16

## 2023-11-15 RX ORDER — TICAGRELOR 90 MG/1
1 TABLET ORAL
Qty: 60 | Refills: 0
Start: 2023-11-15 | End: 2023-12-14

## 2023-11-15 RX ORDER — APIXABAN 2.5 MG/1
5 TABLET, FILM COATED ORAL
Refills: 0 | Status: DISCONTINUED | OUTPATIENT
Start: 2023-11-15 | End: 2023-11-16

## 2023-11-15 RX ADMIN — TICAGRELOR 180 MILLIGRAM(S): 90 TABLET ORAL at 15:22

## 2023-11-15 RX ADMIN — ATORVASTATIN CALCIUM 80 MILLIGRAM(S): 80 TABLET, FILM COATED ORAL at 21:52

## 2023-11-15 RX ADMIN — HEPARIN SODIUM 1300 UNIT(S)/HR: 5000 INJECTION INTRAVENOUS; SUBCUTANEOUS at 09:27

## 2023-11-15 RX ADMIN — Medication 81 MILLIGRAM(S): at 13:10

## 2023-11-15 RX ADMIN — CHLORHEXIDINE GLUCONATE 1 APPLICATION(S): 213 SOLUTION TOPICAL at 06:09

## 2023-11-15 RX ADMIN — SACUBITRIL AND VALSARTAN 1 TABLET(S): 24; 26 TABLET, FILM COATED ORAL at 17:21

## 2023-11-15 RX ADMIN — APIXABAN 5 MILLIGRAM(S): 2.5 TABLET, FILM COATED ORAL at 17:22

## 2023-11-15 RX ADMIN — LITHIUM CARBONATE 150 MILLIGRAM(S): 300 TABLET, EXTENDED RELEASE ORAL at 17:20

## 2023-11-15 RX ADMIN — SPIRONOLACTONE 25 MILLIGRAM(S): 25 TABLET, FILM COATED ORAL at 06:17

## 2023-11-15 RX ADMIN — LITHIUM CARBONATE 150 MILLIGRAM(S): 300 TABLET, EXTENDED RELEASE ORAL at 06:10

## 2023-11-15 NOTE — PROGRESS NOTE ADULT - SUBJECTIVE AND OBJECTIVE BOX
[facility] Division of Hospital Medicine  Ivelisse Patricio MD  Pager (JOHN PAUL-MARGIE, 6J-5P): [phone]  Other Times:  [phone]    Patient is a 83y old  Male who presents with a chief complaint of chest pain (15 Nov 2023 07:56)      SUBJECTIVE / OVERNIGHT EVENTS:  alert and responsive. afebrile. still with intermittent bradycardia. asymptomatic    ADDITIONAL REVIEW OF SYSTEMS: otherwise neg    MEDICATIONS  (STANDING):  aspirin enteric coated 81 milliGRAM(s) Oral daily  atorvastatin 80 milliGRAM(s) Oral at bedtime  chlorhexidine 2% Cloths 1 Application(s) Topical <User Schedule>  heparin  Infusion. 1500 Unit(s)/Hr (15 mL/Hr) IV Continuous <Continuous>  lithium 150 milliGRAM(s) Oral two times a day  sacubitril 24 mG/valsartan 26 mG 1 Tablet(s) Oral two times a day  spironolactone 25 milliGRAM(s) Oral daily    MEDICATIONS  (PRN):  acetaminophen     Tablet .. 650 milliGRAM(s) Oral every 6 hours PRN Temp greater or equal to 38C (100.4F), Mild Pain (1 - 3)  heparin   Injectable 6000 Unit(s) IV Push every 6 hours PRN For aPTT less than 40  heparin   Injectable 3000 Unit(s) IV Push every 6 hours PRN For aPTT between 40 - 57      CAPILLARY BLOOD GLUCOSE        I&O's Summary    14 Nov 2023 07:01  -  15 Nov 2023 07:00  --------------------------------------------------------  IN: 320 mL / OUT: 1350 mL / NET: -1030 mL    15 Nov 2023 07:01  -  15 Nov 2023 14:41  --------------------------------------------------------  IN: 360 mL / OUT: 0 mL / NET: 360 mL        PHYSICAL EXAM:  Vital Signs Last 24 Hrs  T(C): 36.9 (15 Nov 2023 11:13), Max: 36.9 (15 Nov 2023 11:13)  T(F): 98.5 (15 Nov 2023 11:13), Max: 98.5 (15 Nov 2023 11:13)  HR: 54 (15 Nov 2023 13:44) (42 - 55)  BP: 102/65 (15 Nov 2023 11:13) (102/65 - 111/69)  BP(mean): --  RR: 18 (15 Nov 2023 11:13) (18 - 18)  SpO2: 92% (15 Nov 2023 13:44) (92% - 96%)    Parameters below as of 15 Nov 2023 13:44  Patient On (Oxygen Delivery Method): room air    PHYSICAL EXAM:  CONSTITUTIONAL: NAD, well-developed  EYES: PERRLA; conjunctiva and sclera clear  ENMT: Moist oral mucosa  RESPIRATORY: Normal respiratory effort; lungs are clear to auscultation bilaterally  CARDIOVASCULAR: nolan, normal S1 and S2. + murmur in mitral area  No lower extremity edema; Peripheral pulses are 2+ bilaterally  ABDOMEN: Nontender to palpation, normoactive bowel sounds, no rebound/guarding;  MUSCULOSKELETAL:  no clubbing or cyanosis of digits; no joint swelling or tenderness to palpation, moving all extremities   PSYCH: A+O to person, place, does not know year or month; affect appropriate  NEUROLOGY: CN 2-12 are intact and symmetric; no gross sensory/motor deficits   SKIN: multiple areas of ecchymosis/superficial hematomas on upper extremities BL    LABS:    11-15    139  |  105  |  18  ----------------------------<  86  4.1   |  23  |  1.02    Ca    9.3      15 Nov 2023 07:38  Phos  2.9     11-15  Mg     1.9     11-15      PTT - ( 15 Nov 2023 07:39 )  PTT:80.6 sec      Urinalysis Basic - ( 15 Nov 2023 07:38 )    Color: x / Appearance: x / SG: x / pH: x  Gluc: 86 mg/dL / Ketone: x  / Bili: x / Urobili: x   Blood: x / Protein: x / Nitrite: x   Leuk Esterase: x / RBC: x / WBC x   Sq Epi: x / Non Sq Epi: x / Bacteria: x          RADIOLOGY & ADDITIONAL TESTS:  Results Reviewed:   Imaging Personally Reviewed:  Electrocardiogram Personally Reviewed:    COORDINATION OF CARE:  Care Discussed with Consultants/Other Providers [Y/N]:  Prior or Outpatient Records Reviewed [Y/N]:   [facility] Division of Hospital Medicine  Ivelisse Patricio MD  Pager (JOHN PAUL-MARGIE, 9J-5P): [phone]  Other Times:  [phone]    Patient is a 83y old  Male who presents with a chief complaint of chest pain (15 Nov 2023 07:56)      SUBJECTIVE / OVERNIGHT EVENTS:  alert and responsive. afebrile. still with intermittent bradycardia. asymptomatic    ADDITIONAL REVIEW OF SYSTEMS: otherwise neg    MEDICATIONS  (STANDING):  aspirin enteric coated 81 milliGRAM(s) Oral daily  atorvastatin 80 milliGRAM(s) Oral at bedtime  chlorhexidine 2% Cloths 1 Application(s) Topical <User Schedule>  heparin  Infusion. 1500 Unit(s)/Hr (15 mL/Hr) IV Continuous <Continuous>  lithium 150 milliGRAM(s) Oral two times a day  sacubitril 24 mG/valsartan 26 mG 1 Tablet(s) Oral two times a day  spironolactone 25 milliGRAM(s) Oral daily    MEDICATIONS  (PRN):  acetaminophen     Tablet .. 650 milliGRAM(s) Oral every 6 hours PRN Temp greater or equal to 38C (100.4F), Mild Pain (1 - 3)  heparin   Injectable 6000 Unit(s) IV Push every 6 hours PRN For aPTT less than 40  heparin   Injectable 3000 Unit(s) IV Push every 6 hours PRN For aPTT between 40 - 57      CAPILLARY BLOOD GLUCOSE        I&O's Summary    14 Nov 2023 07:01  -  15 Nov 2023 07:00  --------------------------------------------------------  IN: 320 mL / OUT: 1350 mL / NET: -1030 mL    15 Nov 2023 07:01  -  15 Nov 2023 14:41  --------------------------------------------------------  IN: 360 mL / OUT: 0 mL / NET: 360 mL        PHYSICAL EXAM:  Vital Signs Last 24 Hrs  T(C): 36.9 (15 Nov 2023 11:13), Max: 36.9 (15 Nov 2023 11:13)  T(F): 98.5 (15 Nov 2023 11:13), Max: 98.5 (15 Nov 2023 11:13)  HR: 54 (15 Nov 2023 13:44) (42 - 55)  BP: 102/65 (15 Nov 2023 11:13) (102/65 - 111/69)  BP(mean): --  RR: 18 (15 Nov 2023 11:13) (18 - 18)  SpO2: 92% (15 Nov 2023 13:44) (92% - 96%)    Parameters below as of 15 Nov 2023 13:44  Patient On (Oxygen Delivery Method): room air    PHYSICAL EXAM:  CONSTITUTIONAL: NAD, well-developed  EYES: PERRLA; conjunctiva and sclera clear  ENMT: Moist oral mucosa  RESPIRATORY: Normal respiratory effort; lungs are clear to auscultation bilaterally  CARDIOVASCULAR: nolan, normal S1 and S2. + murmur in mitral area  No lower extremity edema; Peripheral pulses are 2+ bilaterally  ABDOMEN: Nontender to palpation, normoactive bowel sounds, no rebound/guarding;  MUSCULOSKELETAL:  no clubbing or cyanosis of digits; no joint swelling or tenderness to palpation, moving all extremities   PSYCH: A+O to person, place, does not know year or month; affect appropriate  NEUROLOGY: CN 2-12 are intact and symmetric; no gross sensory/motor deficits   SKIN: multiple areas of ecchymosis/superficial hematomas on upper extremities BL    LABS:    11-15    139  |  105  |  18  ----------------------------<  86  4.1   |  23  |  1.02    Ca    9.3      15 Nov 2023 07:38  Phos  2.9     11-15  Mg     1.9     11-15      PTT - ( 15 Nov 2023 07:39 )  PTT:80.6 sec      Urinalysis Basic - ( 15 Nov 2023 07:38 )    Color: x / Appearance: x / SG: x / pH: x  Gluc: 86 mg/dL / Ketone: x  / Bili: x / Urobili: x   Blood: x / Protein: x / Nitrite: x   Leuk Esterase: x / RBC: x / WBC x   Sq Epi: x / Non Sq Epi: x / Bacteria: x          RADIOLOGY & ADDITIONAL TESTS:  Results Reviewed:   Imaging Personally Reviewed:  Electrocardiogram Personally Reviewed:    COORDINATION OF CARE:  Care Discussed with Consultants/Other Providers [Y/N]:  Prior or Outpatient Records Reviewed [Y/N]:   [facility] Division of Hospital Medicine  Ivelisse Patricio MD  Pager (JOHN PAUL-MARGIE, 0H-5P): [phone]  Other Times:  [phone]    Patient is a 83y old  Male who presents with a chief complaint of chest pain (15 Nov 2023 07:56)      SUBJECTIVE / OVERNIGHT EVENTS:  alert and responsive. afebrile. still with intermittent bradycardia. asymptomatic    ADDITIONAL REVIEW OF SYSTEMS: otherwise neg    MEDICATIONS  (STANDING):  aspirin enteric coated 81 milliGRAM(s) Oral daily  atorvastatin 80 milliGRAM(s) Oral at bedtime  chlorhexidine 2% Cloths 1 Application(s) Topical <User Schedule>  heparin  Infusion. 1500 Unit(s)/Hr (15 mL/Hr) IV Continuous <Continuous>  lithium 150 milliGRAM(s) Oral two times a day  sacubitril 24 mG/valsartan 26 mG 1 Tablet(s) Oral two times a day  spironolactone 25 milliGRAM(s) Oral daily    MEDICATIONS  (PRN):  acetaminophen     Tablet .. 650 milliGRAM(s) Oral every 6 hours PRN Temp greater or equal to 38C (100.4F), Mild Pain (1 - 3)  heparin   Injectable 6000 Unit(s) IV Push every 6 hours PRN For aPTT less than 40  heparin   Injectable 3000 Unit(s) IV Push every 6 hours PRN For aPTT between 40 - 57      CAPILLARY BLOOD GLUCOSE        I&O's Summary    14 Nov 2023 07:01  -  15 Nov 2023 07:00  --------------------------------------------------------  IN: 320 mL / OUT: 1350 mL / NET: -1030 mL    15 Nov 2023 07:01  -  15 Nov 2023 14:41  --------------------------------------------------------  IN: 360 mL / OUT: 0 mL / NET: 360 mL        PHYSICAL EXAM:  Vital Signs Last 24 Hrs  T(C): 36.9 (15 Nov 2023 11:13), Max: 36.9 (15 Nov 2023 11:13)  T(F): 98.5 (15 Nov 2023 11:13), Max: 98.5 (15 Nov 2023 11:13)  HR: 54 (15 Nov 2023 13:44) (42 - 55)  BP: 102/65 (15 Nov 2023 11:13) (102/65 - 111/69)  BP(mean): --  RR: 18 (15 Nov 2023 11:13) (18 - 18)  SpO2: 92% (15 Nov 2023 13:44) (92% - 96%)    Parameters below as of 15 Nov 2023 13:44  Patient On (Oxygen Delivery Method): room air    PHYSICAL EXAM:  CONSTITUTIONAL: NAD, well-developed  EYES: PERRLA; conjunctiva and sclera clear  ENMT: Moist oral mucosa  RESPIRATORY: Normal respiratory effort; lungs are clear to auscultation bilaterally  CARDIOVASCULAR: nolan, normal S1 and S2. + murmur in mitral area  No lower extremity edema; Peripheral pulses are 2+ bilaterally  ABDOMEN: Nontender to palpation, normoactive bowel sounds, no rebound/guarding;  MUSCULOSKELETAL:  no clubbing or cyanosis of digits; no joint swelling or tenderness to palpation, moving all extremities   PSYCH: A+O to person, place, does not know year or month; affect appropriate  NEUROLOGY: CN 2-12 are intact and symmetric; no gross sensory/motor deficits   SKIN: multiple areas of ecchymosis/superficial hematomas on upper extremities BL    LABS:    11-15    139  |  105  |  18  ----------------------------<  86  4.1   |  23  |  1.02    Ca    9.3      15 Nov 2023 07:38  Phos  2.9     11-15  Mg     1.9     11-15      PTT - ( 15 Nov 2023 07:39 )  PTT:80.6 sec      Urinalysis Basic - ( 15 Nov 2023 07:38 )    Color: x / Appearance: x / SG: x / pH: x  Gluc: 86 mg/dL / Ketone: x  / Bili: x / Urobili: x   Blood: x / Protein: x / Nitrite: x   Leuk Esterase: x / RBC: x / WBC x   Sq Epi: x / Non Sq Epi: x / Bacteria: x          RADIOLOGY & ADDITIONAL TESTS:  Results Reviewed:   Imaging Personally Reviewed:  Electrocardiogram Personally Reviewed:    COORDINATION OF CARE:  Care Discussed with Consultants/Other Providers [Y/N]:  Prior or Outpatient Records Reviewed [Y/N]:

## 2023-11-15 NOTE — PROGRESS NOTE ADULT - PROBLEM SELECTOR PLAN 7
DVT PPX: on heparin gtt currently  Dispo: pending further cardiac eval
DVT PPX: on heparin gtt currently  Dispo: pending further cardiac eval
DVT PPX: AC as above      Discharge planning  Pending clearance from cardiology team.
DVT PPX: AC as above      Discharge planning

## 2023-11-15 NOTE — PROGRESS NOTE ADULT - PROVIDER SPECIALTY LIST ADULT
Cardiology
Hospitalist
Internal Medicine

## 2023-11-15 NOTE — PROGRESS NOTE ADULT - PROBLEM SELECTOR PLAN 1
#NSTEMI , ACS  #TTE: Severe MR, global akinesis   #obstructive CAD multiple vessel disease     - s/p C on 11/9 with triple vessel disease  - 11/10, 11/14: case discussed with cardiology team , per their recs: Continue ASA, ticagrelor, atorvastatin 80mg.   Continue Entresto. hold entresto. can stop ASA after 1 week   - CT surgery consulted for MVR and CABG evaluation. cardiac MRI done, cardiology team recommending complex PCI as patient "may benefit from intervention on lesion at distal margin of LAD stent." > however per GO discussion /shared decision making on 11/14, now the recommendation is to defer revascularization and pursue medical management.     11/14 [facility]: Per previous [facility] discussions noted, code status DNR DNI, MOLST form in chart signed by patient's nephew Glynn on 11/13.  psyc deemed pt NOT to have capacity at this time.   previous provider discussed with patient's GORDON Maki as well, confirmed signing of MOLST form, discussed cardiology recs, regarding PCI and code status for procedure, he states he wants to commit to patient's initial wishes that "he wants to pass with dignity" , that patient does not want any surgery, and states he does not want any further surgery or invasive procedure for which his code status will need to be reversed. I confirmed again and he does not want DNR/DNI to be rescinded for PCI. I discussed this with cardiology team as well and they confirmed patient's nephew's wishes as well as documented in their note. #NSTEMI , ACS  #TTE: Severe MR, global akinesis   #obstructive CAD multiple vessel disease     - s/p C on 11/9 with triple vessel disease  - 11/10, 11/14: case discussed with cardiology team , per their recs: Continue ASA, ticagrelor, atorvastatin 80mg.   Continue Entresto. hold entresto. can stop ASA after 1 week   - CT surgery consulted for MVR and CABG evaluation. cardiac MRI done, cardiology team recommending complex PCI as patient "may benefit from intervention on lesion at distal margin of LAD stent." > however per GO discussion /shared decision making on 11/14, now the recommendation is to defer revascularization and pursue medical management.     11/14 [facility]: Per previous [facility] discussions noted, code status DNR DNI, MOLST form in chart signed by patient's nephew Glynn on 11/13.  psyc deemed pt NOT to have capacity at this time.   previous provider discussed with patient's GORDON Maki as well, confirmed signing of MOLST form, discussed cardiology recs, regarding PCI and code status for procedure, he states he wants to commit to patient's initial wishes that "he wants to pass with dignity" , that patient does not want any surgery, and states he does not want any further surgery or invasive procedure for which his code status will need to be reversed. I confirmed again and he does not want DNR/DNI to be rescinded for PCI. I discussed this with cardiology team as well and they confirmed patient's nephew's wishes as well as documented in their note. #NSTEMI , ACS  #TTE: Severe MR, global akinesis   #obstructive CAD multiple vessel disease     - s/p C on 11/9 with triple vessel disease  - 11/10, 11/14: case discussed with cardiology team , per their recs: Continue ASA, ticagrelor, atorvastatin 80mg.   Continue Entresto. hold entresto. can stop ASA after 1 week   - CT surgery consulted for MVR and CABG evaluation. cardiac MRI done, cardiology team recommending complex PCI as patient "may benefit from intervention on lesion at distal margin of LAD stent." > however per GO discussion /shared decision making on 11/14, now the recommendation is to defer revascularization and pursue medical management.     11/14 [facility]: Per previous [facility] discussions noted, code status DNR DNI, MOLST form in chart signed by patient's nephew Glynn on 11/13.  psyc deemed pt NOT to have capacity at this time.   previous provider discussed with patient's GORDON Maki as well, confirmed signing of MOLST form, discussed cardiology recs, regarding PCI and code status for procedure, he states he wants to commit to patient's initial wishes that "he wants to pass with dignity" , that patient does not want any surgery, and states he does not want any further surgery or invasive procedure for which his code status will need to be reversed. I confirmed again and he does not want DNR/DNI to be rescinded for PCI. I discussed this with cardiology team as well and they confirmed patient's nephew's wishes as well as documented in their note.

## 2023-11-15 NOTE — PROGRESS NOTE ADULT - PROBLEM SELECTOR PLAN 6
- Continue lithium 150 bid for now -> it is higher than home dose, lithium level is low so will continue with this dose for now and re-asses (clinical pharmacist double checked med recs, used to be on 225mg daily dose in the past).  - May need to increase dosage, follow-up outpatient with repeat lithium levels.
DVT PPX: on heparin gtt currently  Dispo: pending further cardiac eval
- Continue lithium 150 bid for now -> it is higher than home dose, lithium level is low so will continue with this dose for now and re-asses (clinical pharmacist double checked med recs, used to be on 225mg daily dose in the past).  - May need to increase dosage, follow-up with psych/pharmacy
- Continue lithium 150 bid for now -> it is higher than home dose, lithium level is low so will continue with this dose for now and re-asses (clinical pharmacist double checked med recs, used to be on 225mg daily dose in the past).  - May need to increase dosage, follow-up outpatient with repeat lithium levels.
- Continue lithium 150 bid for now -> it is higher than home dose, lithium level is low so will continue with this dose for now and re-asses (clinical pharmacist double checked med recs, used to be on 225mg daily dose in the past).  - May need to increase dosage, follow-up with pharamcy redose

## 2023-11-15 NOTE — PROGRESS NOTE ADULT - PROBLEM SELECTOR PLAN 3
- rates controlled  - TTE EF 29%, severe MR and LV thrombus  - c/w heparin drip
- CHADS-VASc score: 4  - Tele monitoring  - Rate control as above  - AC: continue heparin gtt pending shared decision making regarding surgical intervention
- TTE EF 29%, severe MR and LV thrombus    GDMT:  - Metoprolol succinate 25 qd   - Entresto 24-26 bid  - Would benefit from uptitration of the above as tolerated with addition of spironolactone and SGLT2i
- TTE EF 29%, severe MR and LV thrombus    GDMT:  - Metoprolol succinate 25 qd   - Entresto 24-26 bid  - Start spironolactone 25mg daily
- TTE EF 29%, severe MR and LV thrombus    GDMT:  - Metoprolol succinate 25 qd   - Entresto 24-26 bid  - Would benefit from uptitration of the above as tolerated with addition of spironolactone and SGLT2i
#new onset Afib  #LV thrombus    - TTE EF 29%, severe MR and LV thrombus  - Tele monitoring  - AC: continue heparin gtt pending shared decision making regarding surgical intervention
- TTE EF 29%, severe MR and LV thrombus    GDMT:  - holding BB now given pauses. d/w card  - Entresto 24-26 bid  -  spironolactone 25mg daily

## 2023-11-15 NOTE — BH CONSULTATION LIAISON PROGRESS NOTE - NSBHFUPINTERVALHXFT_PSY_A_CORE
Pt is calm and comfortable and now says that he is not interested in any further invasive procedures.  He wants to go home and says that he agrees with his nephew.  He states that he has been on Lithium 150mg BID since the 1950s for severe depression and has no past history of tawanna.

## 2023-11-15 NOTE — PROVIDER CONTACT NOTE (OTHER) - ACTION/TREATMENT ORDERED:
pressure dressing applied, will continue to monitor
provider aware, labs ordered to be drawn with AM collection
Provider notified, STAT labs ordered. no other interventions ordered at this time
Provider notified, no other interventions ordered at this time
Provider notified, EKG ordered.
provider aware, no intervention ordered at this time
EKG ordered, continue to monitor
Provider notified. Labs ordered. No further intervention. Will continue to monitor
Provider made aware. EKG being obtained

## 2023-11-15 NOTE — PROGRESS NOTE ADULT - ASSESSMENT
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 Pending surgical evaluation for CABG as well as MVR pending GOC.  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 Pending surgical evaluation for CABG as well as MVR pending GOC.  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 Pending surgical evaluation for CABG as well as MVR pending GOC.

## 2023-11-15 NOTE — PROGRESS NOTE ADULT - PROBLEM SELECTOR PROBLEM 3
Acute systolic congestive heart failure
Atrial fibrillation
Acute systolic congestive heart failure
Atrial fibrillation
Atrial fibrillation
Acute systolic congestive heart failure
Acute systolic congestive heart failure

## 2023-11-15 NOTE — PROGRESS NOTE ADULT - PROBLEM SELECTOR PROBLEM 5
LV (left ventricular) mural thrombus
LV (left ventricular) mural thrombus
Need for prophylactic measure
LV (left ventricular) mural thrombus
Bipolar disorder
LV (left ventricular) mural thrombus

## 2023-11-15 NOTE — BH CONSULTATION LIAISON PROGRESS NOTE - NSBHCHARTREVIEWVS_PSY_A_CORE FT
Vital Signs Last 24 Hrs  T(C): 36.9 (15 Nov 2023 11:13), Max: 36.9 (15 Nov 2023 11:13)  T(F): 98.5 (15 Nov 2023 11:13), Max: 98.5 (15 Nov 2023 11:13)  HR: 54 (15 Nov 2023 13:44) (42 - 55)  BP: 102/65 (15 Nov 2023 11:13) (102/65 - 111/69)  BP(mean): --  RR: 18 (15 Nov 2023 11:13) (18 - 18)  SpO2: 92% (15 Nov 2023 13:44) (92% - 96%)    Parameters below as of 15 Nov 2023 13:44  Patient On (Oxygen Delivery Method): room air

## 2023-11-15 NOTE — PROGRESS NOTE ADULT - PROBLEM SELECTOR PROBLEM 4
Atrial fibrillation
Atrial fibrillation
Bipolar disorder
Bipolar disorder
LV (left ventricular) mural thrombus
Atrial fibrillation
Atrial fibrillation

## 2023-11-15 NOTE — PROGRESS NOTE ADULT - PROBLEM SELECTOR PLAN 4
- Continue lithium 150 bid for now -> it is higher than home dose, lithium level is low so will continue with this dose for now and re-asses (clinical pharmacist double checked med recs, used to be on 225mg daily dose in the past).  - Recheck lithium level
- c/w lithium 150 bid for now
- Heparin gtt
- CHADS-VASc score: 4  - Tele monitoring  - Rate control as above  - AC: continue heparin gtt pending shared decision making regarding surgical intervention
- CHADS-VASc score: 4  - Tele monitoring  - Rate control as above  - AC:  transition to eliquis for afib and LV thrombus (rather than warfarin to minimize blood draws) per cards recs   - Switched to ToprolXL 25mg po qd
- CHADS-VASc score: 4  - Tele monitoring  - Rate control as above  - AC: continue heparin gtt pending shared decision making regarding surgical intervention  -metoprolol 25 daily ER
- CHADS-VASc score: 4  - Tele monitoring  - Rate control as above  - AC:  transition to eliquis for afib and LV thrombus (rather than warfarin to minimize blood draws) per cards recs   - stop BB given multiple pauses.

## 2023-11-15 NOTE — PROGRESS NOTE ADULT - SUBJECTIVE AND OBJECTIVE BOX
Patient seen and examined at bedside.    Overnight Events: none    REVIEW OF SYSTEMS:  CONSTITUTIONAL: No weakness, fevers or chills  EYES/ENT: No visual changes;  No dysphagia  NECK: No pain or stiffness  RESPIRATORY: No cough, wheezing, hemoptysis; No shortness of breath  CARDIOVASCULAR: No chest pain or palpitations; No lower extremity edema  GASTROINTESTINAL: No abdominal or epigastric pain. No nausea, vomiting, or hematemesis; No diarrhea or constipation. No melena or hematochezia.  BACK: No back pain  GENITOURINARY: No dysuria, frequency or hematuria  NEUROLOGICAL: No numbness or weakness  SKIN: No itching, burning, rashes, or lesions   All other review of systems is negative unless indicated above.            Current Meds:  acetaminophen     Tablet .. 650 milliGRAM(s) Oral every 6 hours PRN  aspirin enteric coated 81 milliGRAM(s) Oral daily  atorvastatin 80 milliGRAM(s) Oral at bedtime  chlorhexidine 2% Cloths 1 Application(s) Topical <User Schedule>  heparin   Injectable 6000 Unit(s) IV Push every 6 hours PRN  heparin   Injectable 3000 Unit(s) IV Push every 6 hours PRN  heparin  Infusion. 1500 Unit(s)/Hr IV Continuous <Continuous>  lithium 150 milliGRAM(s) Oral two times a day  metoprolol succinate ER 25 milliGRAM(s) Oral daily  sacubitril 24 mG/valsartan 26 mG 1 Tablet(s) Oral two times a day  spironolactone 25 milliGRAM(s) Oral daily      Vitals:  T(F): 98.3 (11-15), Max: 98.3 (11-15)  HR: 51 (11-15) (50 - 55)  BP: 111/69 (11-15) (106/56 - 113/60)  RR: 18 (11-15)  SpO2: 95% (11-15)  I&O's Summary    14 Nov 2023 07:01  -  15 Nov 2023 07:00  --------------------------------------------------------  IN: 320 mL / OUT: 1350 mL / NET: -1030 mL        Physical Exam:  GEN: NAD  HEENT: EOMI, clear sclera  PULM: CTA b/l, no wheeze  CV: RRR S1 S2, 2/6 holosystolic murmur at apex, no JVD  ABD: S, NT, ND  EXT: WWP, no edema  PSYCH: normal affect  SKIN: No rash      11-14    139  |  105  |  17  ----------------------------<  83  3.7   |  20<L>  |  0.94    Ca    9.2      14 Nov 2023 07:41  Phos  2.7     11-14  Mg     1.8     11-14      PTT - ( 14 Nov 2023 07:41 )  PTT:81.3 sec              New ECG(s): Personally reviewed    Echo:    Stress Testing:     Cath:    New Imaging:    Interpretation of Telemetry:

## 2023-11-15 NOTE — PROVIDER CONTACT NOTE (OTHER) - RECOMMENDATIONS
Notify provider
Order labs and continue to monitor
notify provider
EKG? notify provider
Ekg? Notify provider
notify provider
provider made aware
notify provider
provider made aware

## 2023-11-15 NOTE — PROGRESS NOTE ADULT - ASSESSMENT
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 fib and chest pain with LBBB and rapidly rising troponins concerning for NSTEMI. LHC with triple vessel disease. TTE with EF 30%, LV thrombus. Had a discussion with patient and patient's NOK (Glynn) who stated that patient would not want DNR/DNI to be reversed for LHC, and would instead opt for medical management and palliative care. Will defer revascularization and pursue medical management.     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 fib and chest pain with LBBB and rapidly rising troponins concerning for NSTEMI. LHC with triple vessel disease. TTE with EF 30%, LV thrombus. Had a discussion with patient and patient's NOK (Glynn) who stated that patient would not want DNR/DNI to be reversed for LHC, and would instead opt for medical management and palliative care. Will defer revascularization and pursue medical management.     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 fib and chest pain with LBBB and rapidly rising troponins concerning for NSTEMI. LHC with triple vessel disease. TTE with EF 30%, LV thrombus. Had a discussion with patient and patient's NOK (Glynn) who stated that patient would not want DNR/DNI to be reversed for LHC, and would instead opt for medical management and palliative care. Will defer revascularization and pursue medical management.     Recs:  - c/w ASA, ticagrelor, atorvastatin 80mg  - transition to eliquis for afib and LV thrombus (rather than warfarin to minimize blood draws)  - c/w entresto 24/26, spironolactone 25mg daily  - hold metoprolol due to afib with slow VR  - patient should follow up in cardiology clinic as outpatient  - continue triple therapy for now, can stop aspirin in 1 week

## 2023-11-15 NOTE — BH CONSULTATION LIAISON PROGRESS NOTE - CURRENT MEDICATION
MEDICATIONS  (STANDING):  apixaban 5 milliGRAM(s) Oral two times a day  aspirin enteric coated 81 milliGRAM(s) Oral daily  atorvastatin 80 milliGRAM(s) Oral at bedtime  chlorhexidine 2% Cloths 1 Application(s) Topical <User Schedule>  lithium 150 milliGRAM(s) Oral two times a day  sacubitril 24 mG/valsartan 26 mG 1 Tablet(s) Oral two times a day  spironolactone 25 milliGRAM(s) Oral daily    MEDICATIONS  (PRN):  acetaminophen     Tablet .. 650 milliGRAM(s) Oral every 6 hours PRN Temp greater or equal to 38C (100.4F), Mild Pain (1 - 3)

## 2023-11-15 NOTE — PROGRESS NOTE ADULT - PROBLEM SELECTOR PROBLEM 1
Type 1 myocardial infarction
3-vessel coronary artery disease

## 2023-11-15 NOTE — PROVIDER CONTACT NOTE (OTHER) - REASON
Cardiac pause on tele
Pt had a 3.54 seconds pause
Patient HR went down to 45 on tele monitor
As per tele, Patient had 4 beats of WCT, then 9 beats, and then 5 beats from 5:40am - 5:52am and then 4 beats of WCT 3x at 608 am :
Tele notification of active V tach
pt HR 45-50 bpm
Pause on Tele
pt had 6 beats WCT on tele

## 2023-11-15 NOTE — PROGRESS NOTE ADULT - PROBLEM SELECTOR PLAN 2
- appears euvolemic  - TTE EF 29%, severe MR and LV thrombus    - Monitor volume status, BMP  - Started Entresto 11/10.  - Switched to ToprolXL 25mg po qd.   - F/U cardiology team for further recs
- TTE EF 29%, severe MR and LV thrombus    GDMT:  - Metoprolol succinate 25 qd   - Entresto 24-26 bid  - Would benefit from uptitration of the above as tolerated with addition of spironolactone and SGLT2i
- appears euvolemic  - TTE EF 29%, severe MR and LV thrombus  - cardiac MRI pending  - CHF consult pending
- TTE EF 29%, severe MR and LV thrombus  - Pending CTS evaluation for MVR in addition to CABG as above
- TTE EF 29%, severe MR and LV thrombus  - Pending CTS evaluation for MVR in addition to CABG as above
- TTE EF 29%, severe MR and LV thrombus  - no further card or CTS intervention planned at this time.
- TTE EF 29%, severe MR and LV thrombus  - Pending CTS evaluation for MVR in addition to CABG as above

## 2023-11-15 NOTE — PROVIDER CONTACT NOTE (OTHER) - SITUATION
pt HR dropping into 45-50s while asleep
Patient had 3 pauses on tele
Tele notification of active V tach
Pt had a 3.54 seconds pause.
increasing ecchymosis on the right arm
As per tele, Patient had 4 beats of WCT, then 9 beats, and then 5 beats from 5:40am - 5:52am and then 4 beats of WCT 3x at 608 am :
Patient HR went down to 45 on tele monitor
pt had 6 beats WCT on tele
Patient had 4 second pause on tele

## 2023-11-15 NOTE — PROGRESS NOTE ADULT - PROBLEM SELECTOR PROBLEM 2
Acute systolic congestive heart failure
Severe mitral regurgitation

## 2023-11-15 NOTE — PROVIDER CONTACT NOTE (OTHER) - BACKGROUND
84 y/o male with PMH bipolar disorder (on lithium) and no known cardiac history presenting with crushing substernal chest pain. Pt has a hx of cardiac pauses on tele. 82 y/o male with PMH bipolar disorder (on lithium) and no known cardiac history presenting with crushing substernal chest pain. Pt has a hx of cardiac pauses on tele.

## 2023-11-15 NOTE — PROVIDER CONTACT NOTE (OTHER) - DATE AND TIME:
15-Nov-2023 08:15
13-Nov-2023 02:53
10-Nov-2023 06:57
10-Nov-2023 00:20
15-Nov-2023 19:05
10-Nov-2023 06:04
12-Nov-2023 22:15
14-Nov-2023 03:42
15-Nov-2023 04:35

## 2023-11-15 NOTE — BH CONSULTATION LIAISON PROGRESS NOTE - NSBHASSESSMENTFT_PSY_ALL_CORE
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 Pt was evaluated for surgical intervention for CABG as well as MVR pending GOC.  Pt was hesitant so he was given the option for a PCI.  Pt's medical attending today attempted to have a goals of care discussions, however pt was not fully oriented.  His code status DNR DNI, MOLST form in chart signed by patient's nephew Glynn on 11/13. Pt now states that he is unaware about his code status choice and wants to discuss with cardiology regarding recommendation for PCI as he is interested in it.  A psychiatry consult was requested to confirm pt's ability to consent for medical/ surgical procedures.    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  He also does not fully recall the cardiac condition he has and states that he has an arrhythmia but says he does recall when reminded of the triple vessel disease.  He does not recall discussing and treatment options though says he assumes he would need surgery to correct the problem.    On a [place] test administered at bedside, pt is able to complete some of the tasks including the trail making test and clock drawing to some extent but has much difficulty recalling any of the 5 words though had practiced repeating them several times. He recalls the correct year as 2023, but reports the month as March, and season as Spring, and is unable to recall the name of the hospital or its location. His score on the [place] was 21/30.  Pt is calm and comfortable and now says that he is not interested in any further invasive procedures.  He wants to go home and says that he agrees with his nephew.  He states that he has been on Lithium 150mg BID since the 1950s for severe depression and has no past history of tawanna.     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 Pt was evaluated for surgical intervention for CABG as well as MVR pending GOC.  Pt was hesitant so he was given the option for a PCI.  Pt's medical attending today attempted to have a goals of care discussions, however pt was not fully oriented.  His code status DNR DNI, MOLST form in chart signed by patient's nephew Glynn on 11/13. Pt now states that he is unaware about his code status choice and wants to discuss with cardiology regarding recommendation for PCI as he is interested in it.  A psychiatry consult was requested to confirm pt's ability to consent for medical/ surgical procedures.    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  He also does not fully recall the cardiac condition he has and states that he has an arrhythmia but says he does recall when reminded of the triple vessel disease.  He does not recall discussing and treatment options though says he assumes he would need surgery to correct the problem.    On a [place] test administered at bedside, pt is able to complete some of the tasks including the trail making test and clock drawing to some extent but has much difficulty recalling any of the 5 words though had practiced repeating them several times. He recalls the correct year as 2023, but reports the month as March, and season as Spring, and is unable to recall the name of the hospital or its location. His score on the [place] was 21/30.  Pt is calm and comfortable and now says that he is not interested in any further invasive procedures.  He wants to go home and says that he agrees with his nephew.  He states that he has been on Lithium 150mg BID since the 1950s for severe depression and has no past history of tawanna.     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 Pt was evaluated for surgical intervention for CABG as well as MVR pending GOC.  Pt was hesitant so he was given the option for a PCI.  Pt's medical attending today attempted to have a goals of care discussions, however pt was not fully oriented.  His code status DNR DNI, MOLST form in chart signed by patient's nephew Glynn on 11/13. Pt now states that he is unaware about his code status choice and wants to discuss with cardiology regarding recommendation for PCI as he is interested in it.  A psychiatry consult was requested to confirm pt's ability to consent for medical/ surgical procedures.    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  He also does not fully recall the cardiac condition he has and states that he has an arrhythmia but says he does recall when reminded of the triple vessel disease.  He does not recall discussing and treatment options though says he assumes he would need surgery to correct the problem.    On a [place] test administered at bedside, pt is able to complete some of the tasks including the trail making test and clock drawing to some extent but has much difficulty recalling any of the 5 words though had practiced repeating them several times. He recalls the correct year as 2023, but reports the month as March, and season as Spring, and is unable to recall the name of the hospital or its location. His score on the [place] was 21/30.  Pt is calm and comfortable and now says that he is not interested in any further invasive procedures.  He wants to go home and says that he agrees with his nephew.  He states that he has been on Lithium 150mg BID since the 1950s for severe depression and has no past history of tawanna.

## 2023-11-15 NOTE — PROGRESS NOTE ADULT - PROBLEM SELECTOR PLAN 5
- Heparin gtt  - AC:  transition to eliquis for afib and LV thrombus (rather than warfarin to minimize blood draws) per cards recs
- Continue lithium 150 bid for now -> it is higher than home dose, lithium level is low so will continue with this dose for now and re-asses (clinical pharmacist double checked med recs, used to be on 225mg daily dose in the past).  - Recheck lithium level
- Heparin gtt  - AC:  transition to eliquis for afib and LV thrombus (rather than warfarin to minimize blood draws) per cards recs
- Heparin gtt  - Will need bridge to warfarin for long-term AC
- Heparin gtt  - Will need bridge to warfarin for long-term AC
DVT PPX: on heparin gtt currently    Dispo: pending further cardiac eval

## 2023-11-15 NOTE — PROGRESS NOTE ADULT - ATTENDING COMMENTS
83 year old man with ischemic cardiomyopathy, presented with NSTEMI, confirmed 3 vessel disease, possible culprit lesion in LAD at distal margin of prior stent. Obtaining cardiac MRI. Considering all circumstances, including his hesitancy to have surgery, and pending result of cardiac MRI may benefit most from intervention on lesion at distal margin of LAD stent.    To contact call Cardiology Fellow or Attending as listed on [email or web address] password: [email or web address].
83 year old man with ischemic cardiomyopathy, presented with NSTEMI, confirmed 3 vessel disease, possible culprit lesion in LAD at distal margin of prior stent. Also with left ventricular apical thrombus. Confirmed on cardiac MRI which indicates lack of viability of inferior and anterolateral wall, but likely viability in parts of LAD territory. Considering all circumstances, including his hesitancy to have surgery, may benefit most from intervention on lesion at distal margin of LAD stent. However, patient and family have concluded unwilling to have any invasive procedures. Thus managing medically with clopidogrel and apixaban, sacubitril/valsartan, but no beta blocker due to periods of bradycardia and pauses.    Discussed with Medicine. Please call if we can be of further assistance.    To contact call Cardiology Fellow or Attending as listed on [email or web address] password: [email or web address].
83 year old man with ischemic cardiomyopathy, presented with NSTEMI, confirmed 3 vessel disease, possible culprit lesion in LAD at distal margin of prior stent. Also with left ventricular apical thrombus. Confirmed on cardiac MRI which indicates lack of viability of inferior and anterolateral wall, but likely viability in parts of LAD territory. Considering all circumstances, including his hesitancy to have surgery, may benefit most from intervention on lesion at distal margin of LAD stent. Evaluating for complex PCI.    To contact call Cardiology Fellow or Attending as listed on [email or web address] password: [email or web address].

## 2023-11-15 NOTE — BH CONSULTATION LIAISON PROGRESS NOTE - MSE OPTIONS
Consent 3/Introductory Paragraph: I gave the patient a chance to ask questions they had about the procedure.  Following this I explained the Mohs procedure and consent was obtained. The risks, benefits and alternatives to therapy were discussed in detail. Specifically, the risks of infection, scarring, bleeding, prolonged wound healing, incomplete removal, allergy to anesthesia, nerve injury and recurrence were addressed. Prior to the procedure, the treatment site was clearly identified and confirmed by the patient. All components of Universal Protocol/PAUSE Rule completed. Structured MSE

## 2023-11-15 NOTE — PROVIDER CONTACT NOTE (OTHER) - ASSESSMENT
Patient A&Ox2, disoriented to time and situation.  No complaints or indicators of chest pain, palpitations, SOB, or dizziness.
Patient A&Ox2.  No complaints or indicators of chest pain, palpitations, SOB, or dizziness. Patient vital signs stable.
Patient A&Ox2.  No complaints or indicators of chest pain, palpitations, SOB, or dizziness. Patient vital signs stable.
increasing ecchymosis on the right arm, s/p heparin infusion
patient asymptomatic, vss
/57 HR 55 Temp 98.3 O2 SPO2 95% on 3L O2. Asymptomatic. Patient had 3 pauses: 2 pauses lasting 3 secs and 1 pauses lasting 3.5 secs.
pt a&ox2-3, asymptomatic, asleep while it happened. pt sustains afib HR 44-55 bpm then HR increases once awoken. hep gtt running at 13ml/hr
pt a&ox2-3 , asymptomatic, /58, HR60, O2 95%
aox3. Pt asleep during event. Denies chest pain, dizziness and palpations.  112/75, 55, 98, 97.5

## 2023-11-16 ENCOUNTER — TRANSCRIPTION ENCOUNTER (OUTPATIENT)
Age: 83
End: 2023-11-16

## 2023-11-16 VITALS
HEART RATE: 72 BPM | RESPIRATION RATE: 18 BRPM | OXYGEN SATURATION: 97 % | SYSTOLIC BLOOD PRESSURE: 152 MMHG | DIASTOLIC BLOOD PRESSURE: 77 MMHG | TEMPERATURE: 98 F

## 2023-11-16 LAB
APTT BLD: 37.1 SEC — HIGH (ref 24.5–35.6)

## 2023-11-16 PROCEDURE — 83036 HEMOGLOBIN GLYCOSYLATED A1C: CPT

## 2023-11-16 PROCEDURE — 81001 URINALYSIS AUTO W/SCOPE: CPT

## 2023-11-16 PROCEDURE — 85014 HEMATOCRIT: CPT

## 2023-11-16 PROCEDURE — 80048 BASIC METABOLIC PNL TOTAL CA: CPT

## 2023-11-16 PROCEDURE — 97110 THERAPEUTIC EXERCISES: CPT

## 2023-11-16 PROCEDURE — 93308 TTE F-UP OR LMTD: CPT

## 2023-11-16 PROCEDURE — 85018 HEMOGLOBIN: CPT

## 2023-11-16 PROCEDURE — 82803 BLOOD GASES ANY COMBINATION: CPT

## 2023-11-16 PROCEDURE — 84100 ASSAY OF PHOSPHORUS: CPT

## 2023-11-16 PROCEDURE — 80053 COMPREHEN METABOLIC PANEL: CPT

## 2023-11-16 PROCEDURE — 83605 ASSAY OF LACTIC ACID: CPT

## 2023-11-16 PROCEDURE — 85730 THROMBOPLASTIN TIME PARTIAL: CPT

## 2023-11-16 PROCEDURE — 84484 ASSAY OF TROPONIN QUANT: CPT

## 2023-11-16 PROCEDURE — 83880 ASSAY OF NATRIURETIC PEPTIDE: CPT

## 2023-11-16 PROCEDURE — 93005 ELECTROCARDIOGRAM TRACING: CPT

## 2023-11-16 PROCEDURE — C1769: CPT

## 2023-11-16 PROCEDURE — C8929: CPT

## 2023-11-16 PROCEDURE — 71045 X-RAY EXAM CHEST 1 VIEW: CPT

## 2023-11-16 PROCEDURE — A9585: CPT

## 2023-11-16 PROCEDURE — 82330 ASSAY OF CALCIUM: CPT

## 2023-11-16 PROCEDURE — 99239 HOSP IP/OBS DSCHRG MGMT >30: CPT

## 2023-11-16 PROCEDURE — 97162 PT EVAL MOD COMPLEX 30 MIN: CPT

## 2023-11-16 PROCEDURE — 99291 CRITICAL CARE FIRST HOUR: CPT | Mod: 25

## 2023-11-16 PROCEDURE — 82947 ASSAY GLUCOSE BLOOD QUANT: CPT

## 2023-11-16 PROCEDURE — 84132 ASSAY OF SERUM POTASSIUM: CPT

## 2023-11-16 PROCEDURE — 82550 ASSAY OF CK (CPK): CPT

## 2023-11-16 PROCEDURE — 80061 LIPID PANEL: CPT

## 2023-11-16 PROCEDURE — 85610 PROTHROMBIN TIME: CPT

## 2023-11-16 PROCEDURE — 80076 HEPATIC FUNCTION PANEL: CPT

## 2023-11-16 PROCEDURE — 94660 CPAP INITIATION&MGMT: CPT

## 2023-11-16 PROCEDURE — 93454 CORONARY ARTERY ANGIO S&I: CPT

## 2023-11-16 PROCEDURE — 84295 ASSAY OF SERUM SODIUM: CPT

## 2023-11-16 PROCEDURE — 80178 ASSAY OF LITHIUM: CPT

## 2023-11-16 PROCEDURE — 85025 COMPLETE CBC W/AUTO DIFF WBC: CPT

## 2023-11-16 PROCEDURE — C1887: CPT

## 2023-11-16 PROCEDURE — C1894: CPT

## 2023-11-16 PROCEDURE — 83735 ASSAY OF MAGNESIUM: CPT

## 2023-11-16 PROCEDURE — 82435 ASSAY OF BLOOD CHLORIDE: CPT

## 2023-11-16 PROCEDURE — 96374 THER/PROPH/DIAG INJ IV PUSH: CPT

## 2023-11-16 PROCEDURE — 85027 COMPLETE CBC AUTOMATED: CPT

## 2023-11-16 PROCEDURE — 36415 COLL VENOUS BLD VENIPUNCTURE: CPT

## 2023-11-16 PROCEDURE — 75561 CARDIAC MRI FOR MORPH W/DYE: CPT

## 2023-11-16 PROCEDURE — 82553 CREATINE MB FRACTION: CPT

## 2023-11-16 PROCEDURE — 97116 GAIT TRAINING THERAPY: CPT

## 2023-11-16 RX ORDER — ASPIRIN/CALCIUM CARB/MAGNESIUM 324 MG
81 TABLET ORAL DAILY
Refills: 0 | Status: DISCONTINUED | OUTPATIENT
Start: 2023-11-16 | End: 2023-11-16

## 2023-11-16 RX ORDER — ATORVASTATIN CALCIUM 80 MG/1
1 TABLET, FILM COATED ORAL
Qty: 30 | Refills: 0
Start: 2023-11-16 | End: 2023-12-15

## 2023-11-16 RX ORDER — SPIRONOLACTONE 25 MG/1
1 TABLET, FILM COATED ORAL
Qty: 30 | Refills: 0
Start: 2023-11-16 | End: 2023-12-15

## 2023-11-16 RX ORDER — ASPIRIN/CALCIUM CARB/MAGNESIUM 324 MG
1 TABLET ORAL
Qty: 6 | Refills: 0
Start: 2023-11-16 | End: 2023-11-21

## 2023-11-16 RX ORDER — LITHIUM CARBONATE 300 MG/1
1 TABLET, EXTENDED RELEASE ORAL
Qty: 60 | Refills: 0
Start: 2023-11-16 | End: 2023-12-15

## 2023-11-16 RX ADMIN — LITHIUM CARBONATE 150 MILLIGRAM(S): 300 TABLET, EXTENDED RELEASE ORAL at 17:20

## 2023-11-16 RX ADMIN — APIXABAN 5 MILLIGRAM(S): 2.5 TABLET, FILM COATED ORAL at 06:14

## 2023-11-16 RX ADMIN — TICAGRELOR 90 MILLIGRAM(S): 90 TABLET ORAL at 17:20

## 2023-11-16 RX ADMIN — SPIRONOLACTONE 25 MILLIGRAM(S): 25 TABLET, FILM COATED ORAL at 06:15

## 2023-11-16 RX ADMIN — TICAGRELOR 90 MILLIGRAM(S): 90 TABLET ORAL at 06:15

## 2023-11-16 RX ADMIN — CHLORHEXIDINE GLUCONATE 1 APPLICATION(S): 213 SOLUTION TOPICAL at 06:55

## 2023-11-16 RX ADMIN — Medication 81 MILLIGRAM(S): at 13:18

## 2023-11-16 RX ADMIN — APIXABAN 5 MILLIGRAM(S): 2.5 TABLET, FILM COATED ORAL at 17:19

## 2023-11-16 RX ADMIN — SACUBITRIL AND VALSARTAN 1 TABLET(S): 24; 26 TABLET, FILM COATED ORAL at 07:50

## 2023-11-16 RX ADMIN — SACUBITRIL AND VALSARTAN 1 TABLET(S): 24; 26 TABLET, FILM COATED ORAL at 17:20

## 2023-11-16 RX ADMIN — LITHIUM CARBONATE 150 MILLIGRAM(S): 300 TABLET, EXTENDED RELEASE ORAL at 06:15

## 2023-11-16 NOTE — DISCHARGE NOTE PROVIDER - ATTENDING DISCHARGE PHYSICAL EXAMINATION:
PHYSICAL EXAM  GENERAL: NAD, sitting in chair  HEENT:  Atraumatic, Normocephalic, EOMI, conjunctiva and sclera clear, no LAD  CHEST/LUNG: Clear to auscultation bilaterally; No wheeze  HEART: RRR, S1 and S2 + systolic murmur  ABDOMEN: Soft, Nontender, Nondistended; Bowel sounds present  EXTREMITIES:  2+ Peripheral Pulses, No clubbing, cyanosis, or edema  NEURO: AxOx1-2 , awake alert, conversant, non-focal, moving all extremities  SKIN: multiple areas of ecchymosis/superficial hematomas on upper extremities BL

## 2023-11-16 NOTE — DISCHARGE NOTE PROVIDER - CARE PROVIDER_API CALL
Garett Patricia  Cardiovascular Disease  [address], [address]  [place] [address]  Phone: [phone]  Fax: [phone]  Follow Up Time: 1 week    Brent Dior  Geriatric Medicine  [address], [address]  [place] [address]  Phone: [phone]  Fax: [phone]  Follow Up Time: 1 week   Garett Patricia  Cardiovascular Disease  [address], [address]  [place] [address]  Phone: [phone]  Fax: [phone]  Follow Up Time: 1 week    Brent Dior  Geriatric Medicine  [address], [address]  [place] [address]  Phone: [phone]  Fax: [phone]  Follow Up Time: 1 week   Garett Patricia  Cardiovascular Disease  [address], [address]  [place] [address]  Phone: [phone]  Fax: [phone]  Follow Up Time: 1 week    Brent Dior  Geriatric Medicine  [address], [address]  [place] [address]  Phone: [phone]  Fax: [phone]  Follow Up Time: 1 week

## 2023-11-16 NOTE — DISCHARGE NOTE PROVIDER - PROVIDER TOKENS
PROVIDER:[TOKEN:[[id number]],FOLLOWUP:[1 week]],PROVIDER:[TOKEN:[[id number]],FOLLOWUP:[1 week]]

## 2023-11-16 NOTE — DISCHARGE NOTE PROVIDER - CARE PROVIDERS DIRECT ADDRESSES
[email or web address]

## 2023-11-16 NOTE — DISCHARGE NOTE NURSING/CASE MANAGEMENT/SOCIAL WORK - NSDCPEFALRISK_GEN_ALL_CORE
For information on Fall & Injury Prevention, visit: [email or web address]/[email or web address] OR  [email or web address]://[email or web address]-[email or web address]-tips-to-avoid-injury OR  https://www.cdc.gov/steadi/patient.html For information on Fall & Injury Prevention, visit: [email or web address]://www.[email or web address]/[email or web address]-[email or web address]-[email or web address]-[email or web address]-mobility OR  https://www.[facility]/news/fall-prevention-tips-to-avoid-injury OR  https://www.cdc.gov/steadi/patient.html For information on Fall & Injury Prevention, visit: [email or web address]://www.[facility]/news/fall-[email or web address]-[email or web address]-maintains-health-and-mobility OR  https://www.[facility]/news/fall-prevention-tips-to-avoid-injury OR  https://www.cdc.gov/steadi/patient.html

## 2023-11-16 NOTE — DISCHARGE NOTE PROVIDER - NSDCFUADDAPPT_GEN_ALL_CORE_FT
APPTS ARE READY TO BE MADE: [X ] YES    Best Family or Patient Contact (if needed):    Additional Information about above appointments (if needed):    1: Dr. Dior  2: Dr. Patricia  3:     Other comments or requests:    Stop Aspirin 11/23/23  APPTS ARE READY TO BE MADE: [X ] YES    Best Family or Patient Contact (if needed):    Additional Information about above appointments (if needed):    1: Dr. Dior  2: Dr. Patricia  3:     Other comments or requests:

## 2023-11-16 NOTE — DISCHARGE NOTE NURSING/CASE MANAGEMENT/SOCIAL WORK - PATIENT PORTAL LINK FT
You can access the FollowMyHealth Patient Portal offered by [facility] by registering at the following website: [email or web address]. By joining [employer]’s FollowMyHealth portal, you will also be able to view your health information using other applications (apps) compatible with our system. You can access the FollowMyHealth Patient Portal offered by [facility] by registering at the following website: [email or web address]. By joining [employer]’s FollowMyHealth portal, you will also be able to view your health information using other applications (apps) compatible with our system. You can access the FollowMyHealth Patient Portal offered by [facility] by registering at the following website: [email or web address]. By joining [employer]’s FollowMyHealth portal, you will also be able to view your health information using other applications (apps) compatible with our system.

## 2023-11-16 NOTE — DISCHARGE NOTE NURSING/CASE MANAGEMENT/SOCIAL WORK - NSDCFUADDAPPT_GEN_ALL_CORE_FT
APPTS ARE READY TO BE MADE: [X ] YES    Best Family or Patient Contact (if needed):    Additional Information about above appointments (if needed):    1: Dr. Dior  2: Dr. Patricia  3:     Other comments or requests:

## 2023-11-16 NOTE — DISCHARGE NOTE PROVIDER - NSDCMRMEDTOKEN_GEN_ALL_CORE_FT
Brilinta (ticagrelor) 90 mg oral tablet: 1 tab(s) orally 2 times a day  Eliquis 5 mg oral tablet: 1 tab(s) orally 2 times a day  lithium 450 mg oral tablet, extended release: 0.5 tab(s) orally once a day  sacubitril-valsartan 24 mg-26 mg oral tablet: 1 tab(s) orally 2 times a day   aspirin 81 mg oral delayed release tablet: 1 tab(s) orally once a day last dose 11/23/23  atorvastatin 80 mg oral tablet: 1 tab(s) orally once a day (at bedtime)  Brilinta (ticagrelor) 90 mg oral tablet: 1 tab(s) orally 2 times a day  Eliquis 5 mg oral tablet: 1 tab(s) orally 2 times a day  lithium 150 mg oral capsule: 1 cap(s) orally 2 times a day  sacubitril-valsartan 24 mg-26 mg oral tablet: 1 tab(s) orally 2 times a day  spironolactone 25 mg oral tablet: 1 tab(s) orally once a day

## 2023-11-16 NOTE — DISCHARGE NOTE PROVIDER - HOSPITAL COURSE
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    Pt reports sitting and watching TV when in the late afternoon reports 9/10, substernal, pressure-like, non-radiating pain. Lasted 30 minutes until EMS arrived and gave him 4 aspirins to chew, after which the pain eased up.  Pain was non-pleuritic, non-positional, and unknown if worsened by exertion, but he was not exerting himself at the onset. No associated SOB. No diaphoresis or n/v. No recent fevers or infectious symptoms. Never had any similar pain.  Last stress test and TTE was more than 40 yrs ago. No one has told him that he has Afib. Is not aware of any abnormal EKG findings in the past.    Non-diabetic. Smoked in his youth, but quit > 50 years ago. Occasional glass of wine, no drugs. (08 Nov 2023 23:29)    Hospital Course: 83 year old man with ischemic cardiomyopathy, presented with NSTEMI, confirmed 3 vessel disease, possible culprit lesion in LAD at distal margin of prior stent. Also with left ventricular apical thrombus. Confirmed on cardiac MRI which indicates lack of viability of inferior and anterolateral wall, but likely viability in parts of LAD territory. Considering all circumstances, including his hesitancy to have surgery, may benefit most from intervention on lesion at distal margin of LAD stent. However, patient and family have concluded unwilling to have any invasive procedures. Managing medically with clopidogrel and apixaban, sacubitril/valsartan, but no beta blocker due to periods of bradycardia and pause.    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    Pt reports sitting and watching TV when in the late afternoon reports 9/10, substernal, pressure-like, non-radiating pain. Lasted 30 minutes until EMS arrived and gave him 4 aspirins to chew, after which the pain eased up.  Pain was non-pleuritic, non-positional, and unknown if worsened by exertion, but he was not exerting himself at the onset. No associated SOB. No diaphoresis or n/v. No recent fevers or infectious symptoms. Never had any similar pain.  Last stress test and TTE was more than 40 yrs ago. No one has told him that he has Afib. Is not aware of any abnormal EKG findings in the past.    Non-diabetic. Smoked in his youth, but quit > 50 years ago. Occasional glass of wine, no drugs. (08 Nov 2023 23:29)    Hospital Course: 83 year old man with ischemic cardiomyopathy, presented with NSTEMI, confirmed 3 vessel disease, possible culprit lesion in LAD at distal margin of prior stent. Also with left ventricular apical thrombus. Confirmed on cardiac MRI which indicates lack of viability of inferior and anterolateral wall, but likely viability in parts of LAD territory. Considering all circumstances, including his hesitancy to have surgery, may benefit most from intervention on lesion at distal margin of LAD stent. However, patient and family have concluded unwilling to have any invasive procedures. Managing medically with clopidogrel and apixaban, sacubitril/valsartan, but no beta blocker due to periods of bradycardia and pause.    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    Pt reports sitting and watching TV when in the late afternoon reports 9/10, substernal, pressure-like, non-radiating pain. Lasted 30 minutes until EMS arrived and gave him 4 aspirins to chew, after which the pain eased up.  Pain was non-pleuritic, non-positional, and unknown if worsened by exertion, but he was not exerting himself at the onset. No associated SOB. No diaphoresis or n/v. No recent fevers or infectious symptoms. Never had any similar pain.  Last stress test and TTE was more than 40 yrs ago. No one has told him that he has Afib. Is not aware of any abnormal EKG findings in the past.    Non-diabetic. Smoked in his youth, but quit > 50 years ago. Occasional glass of wine, no drugs. (08 Nov 2023 23:29)    Hospital Course: 83 year old man with ischemic cardiomyopathy, presented with NSTEMI, confirmed 3 vessel disease, possible culprit lesion in LAD at distal margin of prior stent. Also with left ventricular apical thrombus. Confirmed on cardiac MRI which indicates lack of viability of inferior and anterolateral wall, but likely viability in parts of LAD territory. Considering all circumstances, including his hesitancy to have surgery, may benefit most from intervention on lesion at distal margin of LAD stent. However, patient and family have concluded unwilling to have any invasive procedures. Managing medically with clopidogrel and apixaban, sacubitril/valsartan, but no beta blocker due to periods of bradycardia and pause.    Important Medication Changes and Reason:  Aspirin for 1 week and then Stop Aspirin 11/23/23.   Eliquis for LV thrombus.  Brilinta for CAD.   Continue  entresto 24/26 BID, spironolactone 25mg daily.      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    Pt reports sitting and watching TV when in the late afternoon reports 9/10, substernal, pressure-like, non-radiating pain. Lasted 30 minutes until EMS arrived and gave him 4 aspirins to chew, after which the pain eased up.  Pain was non-pleuritic, non-positional, and unknown if worsened by exertion, but he was not exerting himself at the onset. No associated SOB. No diaphoresis or n/v. No recent fevers or infectious symptoms. Never had any similar pain.  Last stress test and TTE was more than 40 yrs ago. No one has told him that he has Afib. Is not aware of any abnormal EKG findings in the past.    Non-diabetic. Smoked in his youth, but quit > 50 years ago. Occasional glass of wine, no drugs. (08 Nov 2023 23:29)    Hospital Course: 83 year old man with ischemic cardiomyopathy, presented with NSTEMI, confirmed 3 vessel disease, possible culprit lesion in LAD at distal margin of prior stent. Also with left ventricular apical thrombus. Confirmed on cardiac MRI which indicates lack of viability of inferior and anterolateral wall, but likely viability in parts of LAD territory. Per cardiology team > considering all circumstances, including his hesitancy to have surgery, may benefit most from intervention on lesion at distal margin of LAD stent. However, per GO discussions, shared decision making, evaluation for patient's capacity for decision making, NOK has concluded unwilling to have any invasive procedures.   Plan is to Manage medically with clopidogrel and apixaban, sacubitril/valsartan, but no beta blocker due to periods of bradycardia and pause.    Discussed final medication list with cardiology team 11/16, changes as noted. Case and plan discussed with ACP: Lizette.       Important Medication Changes and Reason:  Aspirin for 1 week and then Stop Aspirin 11/23/23.   Eliquis for LV thrombus.  Brilinta for CAD.   Continue  entresto 24/26 BID, spironolactone 25mg daily.      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    Pt reports sitting and watching TV when in the late afternoon reports 9/10, substernal, pressure-like, non-radiating pain. Lasted 30 minutes until EMS arrived and gave him 4 aspirins to chew, after which the pain eased up.  Pain was non-pleuritic, non-positional, and unknown if worsened by exertion, but he was not exerting himself at the onset. No associated SOB. No diaphoresis or n/v. No recent fevers or infectious symptoms. Never had any similar pain.  Last stress test and TTE was more than 40 yrs ago. No one has told him that he has Afib. Is not aware of any abnormal EKG findings in the past.    Non-diabetic. Smoked in his youth, but quit > 50 years ago. Occasional glass of wine, no drugs. (08 Nov 2023 23:29)    Hospital Course: 83 year old man with ischemic cardiomyopathy, presented with NSTEMI, confirmed 3 vessel disease, possible culprit lesion in LAD at distal margin of prior stent. Also with left ventricular apical thrombus. Confirmed on cardiac MRI which indicates lack of viability of inferior and anterolateral wall, but likely viability in parts of LAD territory. Per cardiology team > considering all circumstances, including his hesitancy to have surgery, may benefit most from intervention on lesion at distal margin of LAD stent. However, per GO discussions, shared decision making, evaluation for patient's capacity for decision making, NOK has concluded unwilling to have any invasive procedures.   Plan is to Manage medically with clopidogrel and apixaban, sacubitril/valsartan, but no beta blocker due to periods of bradycardia and pause.    Discussed final medication list with cardiology team 11/16, changes as noted. Case and plan discussed with ACP: Lizette.     Important Medication Changes and Reason:  Aspirin for 1 week and then Stop Aspirin 11/23/23.   Eliquis for LV thrombus.  Brilinta for CAD.   Continue  Entresto 24/26 BID, spironolactone 25mg daily.    Active or Pending Issues Requiring Follow-up:  Follow-up with your Primary Care Doctor and Cardiologist     Advanced Directives:   [ ] Full code  [X] DNR  [ ] Hospice    Discharge Diagnoses:  CAD  LV thrombus

## 2023-11-16 NOTE — DISCHARGE NOTE PROVIDER - NSDCCPCAREPLAN_GEN_ALL_CORE_FT
PRINCIPAL DISCHARGE DIAGNOSIS  Diagnosis: Chest pain  Assessment and Plan of Treatment: Left heart catherization with triple vessel disease  Patient refusing surgery  Take medication as directed  Follow-up with Cardiology      SECONDARY DISCHARGE DIAGNOSES  Diagnosis: LV (left ventricular) mural thrombus  Assessment and Plan of Treatment: Continue with Eliquis as directed  Follow-up with your Primary Care Doctor or Cardiologist    Diagnosis: Acute heart failure  Assessment and Plan of Treatment: Take medication as directed  Follow-up with Cardiology    Diagnosis: NSTEMI (non-ST elevation myocardial infarction)  Assessment and Plan of Treatment: Take medication as directed  Follow-up with your Cardiology

## 2023-11-17 ENCOUNTER — TRANSCRIPTION ENCOUNTER (OUTPATIENT)
Age: 83
End: 2023-11-17

## 2023-11-17 ENCOUNTER — NON-APPOINTMENT (OUTPATIENT)
Age: 83
End: 2023-11-17

## 2023-11-20 ENCOUNTER — APPOINTMENT (OUTPATIENT)
Dept: CARE COORDINATION | Facility: HOME HEALTH | Age: 83
End: 2023-11-20
Payer: MEDICARE

## 2023-11-20 VITALS
RESPIRATION RATE: 16 BRPM | TEMPERATURE: 97.8 F | HEART RATE: 60 BPM | SYSTOLIC BLOOD PRESSURE: 118 MMHG | OXYGEN SATURATION: 96 % | DIASTOLIC BLOOD PRESSURE: 62 MMHG

## 2023-11-20 DIAGNOSIS — F31.9 BIPOLAR DISORDER, UNSPECIFIED: ICD-10-CM

## 2023-11-20 DIAGNOSIS — I25.2 OLD MYOCARDIAL INFARCTION: ICD-10-CM

## 2023-11-20 PROCEDURE — 99495 TRANSJ CARE MGMT MOD F2F 14D: CPT

## 2023-11-21 ENCOUNTER — APPOINTMENT (OUTPATIENT)
Dept: GERIATRICS | Facility: ASSISTED LIVING FACILITY | Age: 83
End: 2023-11-21
Payer: MEDICARE

## 2023-11-21 VITALS — DIASTOLIC BLOOD PRESSURE: 70 MMHG | SYSTOLIC BLOOD PRESSURE: 120 MMHG

## 2023-11-21 PROBLEM — I25.2 HISTORY OF NON-ST ELEVATION MYOCARDIAL INFARCTION (NSTEMI): Status: ACTIVE | Noted: 2023-11-20

## 2023-11-21 PROBLEM — F31.9 BIPOLAR DISORDER WITH DEPRESSION: Status: ACTIVE | Noted: 2023-11-21

## 2023-11-21 PROCEDURE — 99348 HOME/RES VST EST LOW MDM 30: CPT

## 2023-11-21 RX ORDER — LITHIUM CARBONATE 450 MG/1
450 TABLET ORAL
Qty: 60 | Refills: 3 | Status: DISCONTINUED | COMMUNITY
Start: 2022-01-13 | End: 2023-11-21

## 2023-11-21 RX ORDER — NIRMATRELVIR AND RITONAVIR 300-100 MG
20 X 150 MG & KIT ORAL
Qty: 20 | Refills: 0 | Status: DISCONTINUED | COMMUNITY
Start: 2022-06-29 | End: 2023-11-21

## 2023-11-29 ENCOUNTER — TRANSCRIPTION ENCOUNTER (OUTPATIENT)
Age: 83
End: 2023-11-29

## 2023-11-29 ENCOUNTER — NON-APPOINTMENT (OUTPATIENT)
Age: 83
End: 2023-11-29

## 2023-11-30 ENCOUNTER — TRANSCRIPTION ENCOUNTER (OUTPATIENT)
Age: 83
End: 2023-11-30

## 2023-11-30 RX ORDER — TICAGRELOR 90 MG/1
90 TABLET ORAL TWICE DAILY
Qty: 120 | Refills: 0 | Status: DISCONTINUED | COMMUNITY
Start: 2023-11-21 | End: 2023-11-30

## 2023-12-05 ENCOUNTER — APPOINTMENT (OUTPATIENT)
Dept: GERIATRICS | Facility: ASSISTED LIVING FACILITY | Age: 83
End: 2023-12-05
Payer: MEDICARE

## 2023-12-05 DIAGNOSIS — R41.3 OTHER AMNESIA: ICD-10-CM

## 2023-12-05 DIAGNOSIS — R04.0 EPISTAXIS: ICD-10-CM

## 2023-12-05 DIAGNOSIS — F43.21 ADJUSTMENT DISORDER WITH DEPRESSED MOOD: ICD-10-CM

## 2023-12-05 PROCEDURE — 99348 HOME/RES VST EST LOW MDM 30: CPT

## 2023-12-08 ENCOUNTER — TRANSCRIPTION ENCOUNTER (OUTPATIENT)
Age: 83
End: 2023-12-08

## 2023-12-21 ENCOUNTER — INPATIENT (INPATIENT)
Facility: HOSPITAL | Age: 83
LOS: 12 days | Discharge: NURSING, REHAB OR LONG-TERM CARE | End: 2024-01-03
Attending: STUDENT IN AN ORGANIZED HEALTH CARE EDUCATION/TRAINING PROGRAM | Admitting: STUDENT IN AN ORGANIZED HEALTH CARE EDUCATION/TRAINING PROGRAM
Payer: MEDICARE

## 2023-12-21 VITALS
SYSTOLIC BLOOD PRESSURE: 110 MMHG | HEART RATE: 79 BPM | RESPIRATION RATE: 16 BRPM | OXYGEN SATURATION: 99 % | TEMPERATURE: 98 F | DIASTOLIC BLOOD PRESSURE: 56 MMHG

## 2023-12-21 DIAGNOSIS — D72.829 ELEVATED WHITE BLOOD CELL COUNT, UNSPECIFIED: ICD-10-CM

## 2023-12-21 DIAGNOSIS — I50.22 CHRONIC SYSTOLIC (CONGESTIVE) HEART FAILURE: ICD-10-CM

## 2023-12-21 DIAGNOSIS — Z29.9 ENCOUNTER FOR PROPHYLACTIC MEASURES, UNSPECIFIED: ICD-10-CM

## 2023-12-21 DIAGNOSIS — I21.4 NON-ST ELEVATION (NSTEMI) MYOCARDIAL INFARCTION: ICD-10-CM

## 2023-12-21 DIAGNOSIS — I48.0 PAROXYSMAL ATRIAL FIBRILLATION: ICD-10-CM

## 2023-12-21 DIAGNOSIS — I10 ESSENTIAL (PRIMARY) HYPERTENSION: ICD-10-CM

## 2023-12-21 DIAGNOSIS — R07.9 CHEST PAIN, UNSPECIFIED: ICD-10-CM

## 2023-12-21 DIAGNOSIS — F31.9 BIPOLAR DISORDER, UNSPECIFIED: ICD-10-CM

## 2023-12-21 DIAGNOSIS — I20.0 UNSTABLE ANGINA: ICD-10-CM

## 2023-12-21 DIAGNOSIS — I51.3 INTRACARDIAC THROMBOSIS, NOT ELSEWHERE CLASSIFIED: ICD-10-CM

## 2023-12-21 DIAGNOSIS — I25.10 ATHEROSCLEROTIC HEART DISEASE OF NATIVE CORONARY ARTERY WITHOUT ANGINA PECTORIS: ICD-10-CM

## 2023-12-21 DIAGNOSIS — N17.9 ACUTE KIDNEY FAILURE, UNSPECIFIED: ICD-10-CM

## 2023-12-21 LAB
ALBUMIN SERPL ELPH-MCNC: 4.1 G/DL — SIGNIFICANT CHANGE UP (ref 3.3–5)
ALBUMIN SERPL ELPH-MCNC: 4.1 G/DL — SIGNIFICANT CHANGE UP (ref 3.3–5)
ALP SERPL-CCNC: 89 U/L — SIGNIFICANT CHANGE UP (ref 40–120)
ALP SERPL-CCNC: 89 U/L — SIGNIFICANT CHANGE UP (ref 40–120)
ALT FLD-CCNC: 16 U/L — SIGNIFICANT CHANGE UP (ref 4–41)
ALT FLD-CCNC: 16 U/L — SIGNIFICANT CHANGE UP (ref 4–41)
ANION GAP SERPL CALC-SCNC: 12 MMOL/L — SIGNIFICANT CHANGE UP (ref 7–14)
ANION GAP SERPL CALC-SCNC: 12 MMOL/L — SIGNIFICANT CHANGE UP (ref 7–14)
ANION GAP SERPL CALC-SCNC: 9 MMOL/L — SIGNIFICANT CHANGE UP (ref 7–14)
ANION GAP SERPL CALC-SCNC: 9 MMOL/L — SIGNIFICANT CHANGE UP (ref 7–14)
APTT BLD: 34.3 SEC — SIGNIFICANT CHANGE UP (ref 24.5–35.6)
APTT BLD: 34.3 SEC — SIGNIFICANT CHANGE UP (ref 24.5–35.6)
AST SERPL-CCNC: 33 U/L — SIGNIFICANT CHANGE UP (ref 4–40)
AST SERPL-CCNC: 33 U/L — SIGNIFICANT CHANGE UP (ref 4–40)
BASOPHILS # BLD AUTO: 0.06 K/UL — SIGNIFICANT CHANGE UP (ref 0–0.2)
BASOPHILS # BLD AUTO: 0.06 K/UL — SIGNIFICANT CHANGE UP (ref 0–0.2)
BASOPHILS NFR BLD AUTO: 0.3 % — SIGNIFICANT CHANGE UP (ref 0–2)
BASOPHILS NFR BLD AUTO: 0.3 % — SIGNIFICANT CHANGE UP (ref 0–2)
BILIRUB SERPL-MCNC: 1.2 MG/DL — SIGNIFICANT CHANGE UP (ref 0.2–1.2)
BILIRUB SERPL-MCNC: 1.2 MG/DL — SIGNIFICANT CHANGE UP (ref 0.2–1.2)
BUN SERPL-MCNC: 36 MG/DL — HIGH (ref 7–23)
BUN SERPL-MCNC: 36 MG/DL — HIGH (ref 7–23)
BUN SERPL-MCNC: 38 MG/DL — HIGH (ref 7–23)
BUN SERPL-MCNC: 38 MG/DL — HIGH (ref 7–23)
CALCIUM SERPL-MCNC: 9.2 MG/DL — SIGNIFICANT CHANGE UP (ref 8.4–10.5)
CALCIUM SERPL-MCNC: 9.2 MG/DL — SIGNIFICANT CHANGE UP (ref 8.4–10.5)
CALCIUM SERPL-MCNC: 9.8 MG/DL — SIGNIFICANT CHANGE UP (ref 8.4–10.5)
CALCIUM SERPL-MCNC: 9.8 MG/DL — SIGNIFICANT CHANGE UP (ref 8.4–10.5)
CHLORIDE SERPL-SCNC: 102 MMOL/L — SIGNIFICANT CHANGE UP (ref 98–107)
CHLORIDE SERPL-SCNC: 102 MMOL/L — SIGNIFICANT CHANGE UP (ref 98–107)
CHLORIDE SERPL-SCNC: 99 MMOL/L — SIGNIFICANT CHANGE UP (ref 98–107)
CHLORIDE SERPL-SCNC: 99 MMOL/L — SIGNIFICANT CHANGE UP (ref 98–107)
CO2 SERPL-SCNC: 19 MMOL/L — LOW (ref 22–31)
CREAT SERPL-MCNC: 1.31 MG/DL — HIGH (ref 0.5–1.3)
CREAT SERPL-MCNC: 1.31 MG/DL — HIGH (ref 0.5–1.3)
CREAT SERPL-MCNC: 1.5 MG/DL — HIGH (ref 0.5–1.3)
CREAT SERPL-MCNC: 1.5 MG/DL — HIGH (ref 0.5–1.3)
EGFR: 46 ML/MIN/1.73M2 — LOW
EGFR: 46 ML/MIN/1.73M2 — LOW
EGFR: 54 ML/MIN/1.73M2 — LOW
EGFR: 54 ML/MIN/1.73M2 — LOW
EOSINOPHIL # BLD AUTO: 0.01 K/UL — SIGNIFICANT CHANGE UP (ref 0–0.5)
EOSINOPHIL # BLD AUTO: 0.01 K/UL — SIGNIFICANT CHANGE UP (ref 0–0.5)
EOSINOPHIL NFR BLD AUTO: 0.1 % — SIGNIFICANT CHANGE UP (ref 0–6)
EOSINOPHIL NFR BLD AUTO: 0.1 % — SIGNIFICANT CHANGE UP (ref 0–6)
GLUCOSE SERPL-MCNC: 107 MG/DL — HIGH (ref 70–99)
GLUCOSE SERPL-MCNC: 107 MG/DL — HIGH (ref 70–99)
GLUCOSE SERPL-MCNC: 98 MG/DL — SIGNIFICANT CHANGE UP (ref 70–99)
GLUCOSE SERPL-MCNC: 98 MG/DL — SIGNIFICANT CHANGE UP (ref 70–99)
HCT VFR BLD CALC: 40.9 % — SIGNIFICANT CHANGE UP (ref 39–50)
HCT VFR BLD CALC: 40.9 % — SIGNIFICANT CHANGE UP (ref 39–50)
HGB BLD-MCNC: 13.8 G/DL — SIGNIFICANT CHANGE UP (ref 13–17)
HGB BLD-MCNC: 13.8 G/DL — SIGNIFICANT CHANGE UP (ref 13–17)
IANC: 15.05 K/UL — HIGH (ref 1.8–7.4)
IANC: 15.05 K/UL — HIGH (ref 1.8–7.4)
IMM GRANULOCYTES NFR BLD AUTO: 0.4 % — SIGNIFICANT CHANGE UP (ref 0–0.9)
IMM GRANULOCYTES NFR BLD AUTO: 0.4 % — SIGNIFICANT CHANGE UP (ref 0–0.9)
INR BLD: 2.5 RATIO — HIGH (ref 0.85–1.18)
INR BLD: 2.5 RATIO — HIGH (ref 0.85–1.18)
LYMPHOCYTES # BLD AUTO: 1.54 K/UL — SIGNIFICANT CHANGE UP (ref 1–3.3)
LYMPHOCYTES # BLD AUTO: 1.54 K/UL — SIGNIFICANT CHANGE UP (ref 1–3.3)
LYMPHOCYTES # BLD AUTO: 8.6 % — LOW (ref 13–44)
LYMPHOCYTES # BLD AUTO: 8.6 % — LOW (ref 13–44)
MAGNESIUM SERPL-MCNC: 2 MG/DL — SIGNIFICANT CHANGE UP (ref 1.6–2.6)
MAGNESIUM SERPL-MCNC: 2 MG/DL — SIGNIFICANT CHANGE UP (ref 1.6–2.6)
MCHC RBC-ENTMCNC: 31.6 PG — SIGNIFICANT CHANGE UP (ref 27–34)
MCHC RBC-ENTMCNC: 31.6 PG — SIGNIFICANT CHANGE UP (ref 27–34)
MCHC RBC-ENTMCNC: 33.7 GM/DL — SIGNIFICANT CHANGE UP (ref 32–36)
MCHC RBC-ENTMCNC: 33.7 GM/DL — SIGNIFICANT CHANGE UP (ref 32–36)
MCV RBC AUTO: 93.6 FL — SIGNIFICANT CHANGE UP (ref 80–100)
MCV RBC AUTO: 93.6 FL — SIGNIFICANT CHANGE UP (ref 80–100)
MONOCYTES # BLD AUTO: 1.2 K/UL — HIGH (ref 0–0.9)
MONOCYTES # BLD AUTO: 1.2 K/UL — HIGH (ref 0–0.9)
MONOCYTES NFR BLD AUTO: 6.7 % — SIGNIFICANT CHANGE UP (ref 2–14)
MONOCYTES NFR BLD AUTO: 6.7 % — SIGNIFICANT CHANGE UP (ref 2–14)
NEUTROPHILS # BLD AUTO: 15.05 K/UL — HIGH (ref 1.8–7.4)
NEUTROPHILS # BLD AUTO: 15.05 K/UL — HIGH (ref 1.8–7.4)
NEUTROPHILS NFR BLD AUTO: 83.9 % — HIGH (ref 43–77)
NEUTROPHILS NFR BLD AUTO: 83.9 % — HIGH (ref 43–77)
NRBC # BLD: 0 /100 WBCS — SIGNIFICANT CHANGE UP (ref 0–0)
NRBC # BLD: 0 /100 WBCS — SIGNIFICANT CHANGE UP (ref 0–0)
NRBC # FLD: 0 K/UL — SIGNIFICANT CHANGE UP (ref 0–0)
NRBC # FLD: 0 K/UL — SIGNIFICANT CHANGE UP (ref 0–0)
NT-PROBNP SERPL-SCNC: 5550 PG/ML — HIGH
NT-PROBNP SERPL-SCNC: 5550 PG/ML — HIGH
PHOSPHATE SERPL-MCNC: 3.2 MG/DL — SIGNIFICANT CHANGE UP (ref 2.5–4.5)
PHOSPHATE SERPL-MCNC: 3.2 MG/DL — SIGNIFICANT CHANGE UP (ref 2.5–4.5)
PLATELET # BLD AUTO: 180 K/UL — SIGNIFICANT CHANGE UP (ref 150–400)
PLATELET # BLD AUTO: 180 K/UL — SIGNIFICANT CHANGE UP (ref 150–400)
POTASSIUM SERPL-MCNC: 5.4 MMOL/L — HIGH (ref 3.5–5.3)
POTASSIUM SERPL-MCNC: 5.4 MMOL/L — HIGH (ref 3.5–5.3)
POTASSIUM SERPL-MCNC: SIGNIFICANT CHANGE UP MMOL/L (ref 3.5–5.3)
POTASSIUM SERPL-MCNC: SIGNIFICANT CHANGE UP MMOL/L (ref 3.5–5.3)
POTASSIUM SERPL-SCNC: 5.4 MMOL/L — HIGH (ref 3.5–5.3)
POTASSIUM SERPL-SCNC: 5.4 MMOL/L — HIGH (ref 3.5–5.3)
POTASSIUM SERPL-SCNC: SIGNIFICANT CHANGE UP MMOL/L (ref 3.5–5.3)
POTASSIUM SERPL-SCNC: SIGNIFICANT CHANGE UP MMOL/L (ref 3.5–5.3)
PROT SERPL-MCNC: 8 G/DL — SIGNIFICANT CHANGE UP (ref 6–8.3)
PROT SERPL-MCNC: 8 G/DL — SIGNIFICANT CHANGE UP (ref 6–8.3)
PROTHROM AB SERPL-ACNC: 27.5 SEC — HIGH (ref 9.5–13)
PROTHROM AB SERPL-ACNC: 27.5 SEC — HIGH (ref 9.5–13)
RBC # BLD: 4.37 M/UL — SIGNIFICANT CHANGE UP (ref 4.2–5.8)
RBC # BLD: 4.37 M/UL — SIGNIFICANT CHANGE UP (ref 4.2–5.8)
RBC # FLD: 15.3 % — HIGH (ref 10.3–14.5)
RBC # FLD: 15.3 % — HIGH (ref 10.3–14.5)
SODIUM SERPL-SCNC: 130 MMOL/L — LOW (ref 135–145)
TROPONIN T, HIGH SENSITIVITY RESULT: 54 NG/L — CRITICAL HIGH
TROPONIN T, HIGH SENSITIVITY RESULT: 54 NG/L — CRITICAL HIGH
TROPONIN T, HIGH SENSITIVITY RESULT: 69 NG/L — CRITICAL HIGH
TROPONIN T, HIGH SENSITIVITY RESULT: 69 NG/L — CRITICAL HIGH
WBC # BLD: 17.94 K/UL — HIGH (ref 3.8–10.5)
WBC # BLD: 17.94 K/UL — HIGH (ref 3.8–10.5)
WBC # FLD AUTO: 17.94 K/UL — HIGH (ref 3.8–10.5)
WBC # FLD AUTO: 17.94 K/UL — HIGH (ref 3.8–10.5)

## 2023-12-21 PROCEDURE — 71046 X-RAY EXAM CHEST 2 VIEWS: CPT | Mod: 26

## 2023-12-21 PROCEDURE — 99285 EMERGENCY DEPT VISIT HI MDM: CPT

## 2023-12-21 PROCEDURE — 99223 1ST HOSP IP/OBS HIGH 75: CPT

## 2023-12-21 RX ORDER — ACETAMINOPHEN 500 MG
650 TABLET ORAL EVERY 6 HOURS
Refills: 0 | Status: DISCONTINUED | OUTPATIENT
Start: 2023-12-21 | End: 2024-01-03

## 2023-12-21 RX ORDER — ASPIRIN/CALCIUM CARB/MAGNESIUM 324 MG
324 TABLET ORAL ONCE
Refills: 0 | Status: COMPLETED | OUTPATIENT
Start: 2023-12-21 | End: 2023-12-21

## 2023-12-21 RX ORDER — ASPIRIN/CALCIUM CARB/MAGNESIUM 324 MG
243 TABLET ORAL ONCE
Refills: 0 | Status: DISCONTINUED | OUTPATIENT
Start: 2023-12-21 | End: 2023-12-21

## 2023-12-21 RX ORDER — SODIUM ZIRCONIUM CYCLOSILICATE 10 G/10G
5 POWDER, FOR SUSPENSION ORAL ONCE
Refills: 0 | Status: COMPLETED | OUTPATIENT
Start: 2023-12-21 | End: 2023-12-21

## 2023-12-21 RX ORDER — APIXABAN 2.5 MG/1
5 TABLET, FILM COATED ORAL
Refills: 0 | Status: DISCONTINUED | OUTPATIENT
Start: 2023-12-21 | End: 2023-12-27

## 2023-12-21 RX ORDER — ATORVASTATIN CALCIUM 80 MG/1
1 TABLET, FILM COATED ORAL
Refills: 0 | DISCHARGE

## 2023-12-21 RX ORDER — ATORVASTATIN CALCIUM 80 MG/1
80 TABLET, FILM COATED ORAL AT BEDTIME
Refills: 0 | Status: DISCONTINUED | OUTPATIENT
Start: 2023-12-21 | End: 2024-01-02

## 2023-12-21 RX ADMIN — Medication 650 MILLIGRAM(S): at 22:44

## 2023-12-21 RX ADMIN — SODIUM ZIRCONIUM CYCLOSILICATE 5 GRAM(S): 10 POWDER, FOR SUSPENSION ORAL at 22:44

## 2023-12-21 RX ADMIN — APIXABAN 5 MILLIGRAM(S): 2.5 TABLET, FILM COATED ORAL at 22:44

## 2023-12-21 RX ADMIN — ATORVASTATIN CALCIUM 80 MILLIGRAM(S): 80 TABLET, FILM COATED ORAL at 22:44

## 2023-12-21 RX ADMIN — Medication 324 MILLIGRAM(S): at 14:58

## 2023-12-21 NOTE — H&P ADULT - NSHPPHYSICALEXAM_GEN_ALL_CORE
VITAL SIGNS:  T(C): 36.9 (12-21-23 @ 18:21), Max: 36.9 (12-21-23 @ 18:21)  T(F): 98.5 (12-21-23 @ 18:21), Max: 98.5 (12-21-23 @ 18:21)  HR: 63 (12-21-23 @ 18:21) (63 - 79)  BP: 95/47 (12-21-23 @ 18:21) (95/47 - 110/56)  BP(mean): --  RR: 22 (12-21-23 @ 18:21) (16 - 22)  SpO2: 97% (12-21-23 @ 18:21) (97% - 99%)  Wt(kg): --    PHYSICAL EXAM:    Constitutional: resting comfortably in bed; NAD  Head: NC/AT  Eyes: PERRL, EOMI, anicteric sclera  ENT: no nasal discharge; uvula midline, no oropharyngeal erythema or exudates; MMM  Neck: supple  Respiratory: CTA B/L; no W/R/R, no retractions  Cardiac: +S1/S2; RRR; no M/R/G  Gastrointestinal: abdomen soft, NT/ND; no rebound or guarding; +BSx4  Back: spine midline, no bony tenderness  Extremities: WWP, no clubbing or cyanosis; no peripheral edema  Musculoskeletal: NROM x4; no joint swelling, tenderness or erythema  Vascular: distal pulses intact  Dermatologic: skin warm, dry and intact; no rashes  Lymphatic: no submandibular or cervical LAD  Neurologic: AAOx3; moves all 4 extremities  Psychiatric: affect and characteristics of appearance, verbalizations, behaviors are appropriate VITAL SIGNS:  T(C): 36.9 (12-21-23 @ 18:21), Max: 36.9 (12-21-23 @ 18:21)  T(F): 98.5 (12-21-23 @ 18:21), Max: 98.5 (12-21-23 @ 18:21)  HR: 63 (12-21-23 @ 18:21) (63 - 79)  BP: 95/47 (12-21-23 @ 18:21) (95/47 - 110/56)  BP(mean): --  RR: 22 (12-21-23 @ 18:21) (16 - 22)  SpO2: 97% (12-21-23 @ 18:21) (97% - 99%)  Wt(kg): --    PHYSICAL EXAM:    Constitutional: resting comfortably in bed; NAD  Head: NC/AT  Eyes: PERRL, EOMI, anicteric sclera  ENT: no nasal discharge; uvula midline, no oropharyngeal erythema or exudates; MMM  Neck: supple  Respiratory: CTA B/L; no W/R/R, no retractions  Cardiac: +S1/S2; RRR; no M/R/G  Gastrointestinal: abdomen soft, NT/ND; no rebound or guarding; +BSx4  Extremities: WWP, no clubbing or cyanosis; no peripheral edema  Musculoskeletal: NROM x4; no joint swelling, tenderness or erythema  Vascular: distal pulses intact  Dermatologic: skin warm, dry and intact; no rashes  Lymphatic: no submandibular or cervical LAD  Neurologic: AAOx2; moves all 4 extremities  Psychiatric: calm

## 2023-12-21 NOTE — H&P ADULT - PROBLEM SELECTOR PLAN 1
Pt presenting with chest pain, currently resolved. Previous admission at [facility] last month for NSTEM s/p LHC w/ known multivessel disease with decision to treat medically per pt/family preference. Seen by cardiology and recommend continuing medical management w/ no plans for cardiac testing.  - monitor on tele  - trend cardiac enzymes Pt presenting with chest pain, currently resolved. Previous admission at [facility] last month for NSTEM s/p LHC w/ known multivessel disease with decision to treat medically per pt/family preference. Seen by cardiology and recommend continuing medical management w/ no plans for cardiac testing.  - monitor on tele  - trend cardiac enzymes

## 2023-12-21 NOTE — ED ADULT NURSE NOTE - NSFALLLASTSIX_ED_ALL_ED
I have discussed with the patient the results of her testing which primarily indicate more likely a central etiology for her vertigo. I have discussed with her that I do believe that physical therapy for dizziness and balance would be the best appropriate therapy and we will go ahead and schedule that. No.

## 2023-12-21 NOTE — CONSULT NOTE ADULT - ATTENDING COMMENTS
personally saw and examined patient   labs and vitals reviewed  agree with above assessment and plan  83M with CAD (known multi-vessel dz, med mgmt), LV EF 30 with severe MR, +LV thrombus, dementia presents from nursing home.  pt confused (baseline A and O x1-2), not sure why he is here.   pt prev at [facility] where he underwent cath and echo, opted for med mgmt, not interested in invasive procedures  pt now DNR as well, not interested in invasive procedure  bp stable. EKG with LBBB, seen on prev ekg as well.   pt not on resp distress, denies cp, laying flat, does not appear to be hypervolemic  cont med mgmt of CAD, a/c for LV thrombus.   cont entresto, aldactone per home meds. pre notes pt not on bb due to bradycardia  can monitor on tele  can trend CE  no plans for cardiac testing at this time.   will follow with you personally saw and examined patient   labs and vitals reviewed  agree with above assessment and plan  83M with CAD (known multi-vessel dz, med mgmt), LV EF 30 with severe MR, +LV thrombus, dementia presents from nursing home.  pt confused (baseline A and O x1-2), not sure why he is here.   pt prev at [facility] where he underwent cath and echo, opted for med mgmt, not interested in invasive procedures  pt now DNR as well, not interested in invasive procedure  bp stable. EKG with LBBB, seen on prev ekg as well.   pt not on resp distress, denies cp, laying flat, does not appear to be hypervolemic  cont med mgmt of CAD, a/c for LV thrombus.   cont entresto, aldactone per home meds. pre notes pt not on bb due to bradycardia  can monitor on tele  can trend CE  no plans for cardiac testing at this time.   will follow with you

## 2023-12-21 NOTE — H&P ADULT - PROBLEM SELECTOR PLAN 7
Holding entresto and spironolactone for now  - restart when glory improved c/w eliquis. Not on beta blockers due to hx of bradycardia

## 2023-12-21 NOTE — ED PROVIDER NOTE - PHYSICAL EXAMINATION
CONSTITUTIONAL: Appears well, in no apparent distress  HEAD: Normocephalic, no obvious signs of trauma  EENT: PERRL, EOMI, nares patent no drainage, no pharyngeal erythema, swelling, or exudates  NECK: Trachea midline, no goiter  RESP: L/S equal clr, bilat, apices and bases, no accessory muscle use, speaking full sentences  CARDIC: RRR, +S1/S2, no peripheral edema  GI: ABD soft, nondistended, nontender on palpation, no palpable masses, -Russo's Sign  : No CVA Tenderness  MSK: +4 strength upper extremities, +3 Lower extremities, full ROM without pain, no spinal tenderness on palpation  NEURO: A&OX2, No focal motor deficits/weakness, no slurred speech, not ambulatory

## 2023-12-21 NOTE — ED PROVIDER NOTE - ATTENDING APP SHARED VISIT CONTRIBUTION OF CARE
Shantanu: I have seen and examined the patient face to face, have reviewed and addended the HPI, PE and a/p as necessary.     84 yo M with CAD, no stents, HTN, HLD, bipolar disorder on lithium and recent admission in 11/2023 for chest pain to [facility] (MRN [id number]) a/w chest pain from [facility].  Pt here denying all complaints, noted to be AAOx2 (person and place).  Pt reports no acute symptoms.  On chart review, patient noted to have severe MR, EF 29% and triple vessel disease. CABG not performed s/o goals of care convo in which patient did not want any invasive procedures and opted for medical management.  In addition, patient was offered cath, however patient refused.      GEN - NAD; well appearing; A+O x2  CARD -s1s2, RRR, +M   PULM - CTA b/l, symmetric breath sounds;   ABD -  +BS, ND, NT, soft, no guarding, no rebound, no masses;   BACK - no CVA tenderness, Normal  spine;   EXT - symmetric pulses, 2+ dp, capillary refill < 2 seconds, no cyanosis, no edema;   NEURO - no focal neuro deficits, no slurred speech    EKG LBBB with ST depression in V5 and V6      84 yo M with CAD, no stents, HTN, HLD, bipolar disorder on lithium and recent admission in 11/2023 for chest pain to [facility] (MRN [id number]) a/w chest pain from [facility].  Unclear if patient has capacity at this time.  Pt has new ekg changes, in comparison to old, will r/o NSTEMI and check cbc, cmp, trop.  Will likely require admission for further work up. Shantanu: I have seen and examined the patient face to face, have reviewed and addended the HPI, PE and a/p as necessary.     82 yo M with CAD, no stents, HTN, HLD, bipolar disorder on lithium and recent admission in 11/2023 for chest pain to [facility] (MRN [id number]) a/w chest pain from [facility].  Pt here denying all complaints, noted to be AAOx2 (person and place).  Pt reports no acute symptoms.  On chart review, patient noted to have severe MR, EF 29% and triple vessel disease. CABG not performed s/o goals of care convo in which patient did not want any invasive procedures and opted for medical management.  In addition, patient was offered cath, however patient refused.      GEN - NAD; well appearing; A+O x2  CARD -s1s2, RRR, +M   PULM - CTA b/l, symmetric breath sounds;   ABD -  +BS, ND, NT, soft, no guarding, no rebound, no masses;   BACK - no CVA tenderness, Normal  spine;   EXT - symmetric pulses, 2+ dp, capillary refill < 2 seconds, no cyanosis, no edema;   NEURO - no focal neuro deficits, no slurred speech    EKG LBBB with ST depression in V5 and V6      82 yo M with CAD, no stents, HTN, HLD, bipolar disorder on lithium and recent admission in 11/2023 for chest pain to [facility] (MRN [id number]) a/w chest pain from [facility].  Unclear if patient has capacity at this time.  Pt has new ekg changes, in comparison to old, will r/o NSTEMI and check cbc, cmp, trop.  Will likely require admission for further work up.

## 2023-12-21 NOTE — H&P ADULT - PROBLEM SELECTOR PLAN 10
DVT prophylaxis: on eliquis  Diet: dash/tlc DVT prophylaxis: on eliquis  Diet: dash/tlc  Code status: DNR/DNI

## 2023-12-21 NOTE — ED ADULT NURSE REASSESSMENT NOTE - NS ED NURSE REASSESS COMMENT FT1
patient passed dysphagia screen, patient tolerated meds well. no difficulty swallowing. Patent airway.

## 2023-12-21 NOTE — ED ADULT NURSE NOTE - OBJECTIVE STATEMENT
Receive pt. in ER 26a alert and oriented x 2, presenting to the ER with complaints of chest pain. Pt. is sent from [facility]. Pt. stated ' I had chest pain this morning but it went away". No c/o chest pain, no c/o shortness of breath at present. Place on cardiac monitor, labs sent , medicated as ordered. Receive pt. in ER 26a alert and oriented x 2, presenting to the ER with complaints of chest pain. Pt. is sent from [facility]. Pt. stated ' I had chest pain this morning but it went away". No c/o chest pain, no c/o shortness of breath at present. Place on cardiac monitor, labs sent , medicated as ordered.

## 2023-12-21 NOTE — ED ADULT TRIAGE NOTE - CHIEF COMPLAINT QUOTE
Pt presents for nursing home for chest pain, denies chest pain at this present moment, breathing even and unlabored, no shortness of breath, no headache/dizziness, afebrile. Pt was supposed to get procedure for stent placement last week but refused procedure.

## 2023-12-21 NOTE — H&P ADULT - PROBLEM SELECTOR PLAN 6
c/w eliquis. Not on beta blockers due to hx of bradycardia wbc 17, no infectious symptoms.  - continue to monitor off abx

## 2023-12-21 NOTE — H&P ADULT - HISTORY OF PRESENT ILLNESS
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 fib on Eliquis, who presents from [facility] for chest pain, Pt states he felt like he was having a "heart attack". Pt states chest pain has currently resolved. He denies any SOB, abd pain, n/v, fever, chills or cough. Per previous documentation and pt, he states he does not want any invasive procedures performed.      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 fib on Eliquis, who presents from [facility] for chest pain, Pt states he felt like he was having a "heart attack". Pt states chest pain has currently resolved. He denies any SOB, abd pain, n/v, fever, chills or cough. Per previous documentation and pt, he states he does not want any invasive procedures performed.      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 fib on Eliquis, who presents from [facility] for chest pain, Pt states he felt like he was having a "heart attack". Pt states chest pain has currently resolved. He denies any SOB, abd pain, n/v, fever, chills or cough. Per previous documentation and pt, he states he does not want any invasive procedures performed.      In ED, vitals T 98.2, HR 79, /56, RR 16, O2 sat 99% on RA  Labs significant for: wbc 17.94, Na 130, K 5.4, BUN/Cr 38/1.50, trop 54 --> 69  EKG personally reviewed: AF, LBBB  CXR: IMPRESSION:  Clear lungs.  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 fib on Eliquis, who presents from [facility] for chest pain, Pt states he felt like he was having a "heart attack". Pt states chest pain has currently resolved. He denies any SOB, abd pain, n/v, fever, chills or cough. Per previous documentation and pt, he states he does not want any invasive procedures performed.      In ED, vitals T 98.2, HR 79, /56, RR 16, O2 sat 99% on RA  Labs significant for: wbc 17.94, Na 130, K 5.4, BUN/Cr 38/1.50, trop 54 --> 69  EKG personally reviewed: AF, LBBB  CXR: IMPRESSION:  Clear lungs.  ED management: aspirin 324 mg x1, cardiology consulted

## 2023-12-21 NOTE — H&P ADULT - PROBLEM SELECTOR PLAN 3
Cr elevated to 1.5 from baseline 1.0. Mildly hyperkalemic to 5.4 as well.   - will hold entresto and aldactone for now and restart when indicated   - f/u urine studies  - bladder scan

## 2023-12-21 NOTE — CONSULT NOTE ADULT - ASSESSMENT
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 fib on Eliquis, who presents from [facility] for chest pain, noted to have mildly elevated trops in ED with stable vitals and no ischemic changes on EKG. He denies current chest pain. ED disucssed with surrogate, patient to remain DNR/DNI and they do not want any invasive procedures. Will continue with medical management.      Recs:  - Continue home Eliquis and Plavix for CAD/A. 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 fib on Eliquis, who presents from [facility] for chest pain, noted to have mildly elevated trops in ED with stable vitals and no ischemic changes on EKG. He denies current chest pain. ED disucssed with surrogate, patient to remain DNR/DNI and they do not want any invasive procedures. Will continue with medical management.      Recs:  - Continue home Eliquis and Plavix for CAD/A. 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 fib on Eliquis, who presents from [facility] for chest pain, noted to have mildly elevated trops in ED with stable vitals and no ischemic changes on EKG. He denies current chest pain. ED disucssed with surrogate, patient to remain DNR/DNI and they do not want any invasive procedures. Will continue with medical management.      Recs:  - Continue home Eliquis and Plavix for CAD/A. 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 fib on Eliquis, who presents from [facility] for chest pain, noted to have mildly elevated trops in ED with stable vitals and no ischemic changes on EKG. He denies current chest pain. ED disucssed with surrogate, patient to remain DNR/DNI and they do not want any invasive procedures. Will continue with medical management.      Recs:  - Continue home Eliquis and Plavix for CAD/A. 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 fib on Eliquis, who presents from [facility] for chest pain, noted to have mildly elevated trops in ED with stable vitals and no ischemic changes on EKG. He denies current chest pain. ED disucssed with surrogate, patient to remain DNR/DNI and they do not want any invasive procedures. Will continue with medical management.      Recs:  - Continue home Eliquis and Plavix for CAD/A. 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 fib on Eliquis, who presents from [facility] for chest pain, noted to have mildly elevated trops in ED with stable vitals and no ischemic changes on EKG. He denies current chest pain. ED disucssed with surrogate, patient to remain DNR/DNI and they do not want any invasive procedures. Will continue with medical management.      Recs:  - Continue home Eliquis and Plavix for CAD/A. fib/LV thrombus  - Continue home GDMT: entresto 24-26, aldactone 25 mg  - beta blocker held during recent admission due to bradycardia and pauses - monitor off for now  - Trend trop and EKG  - Tele monitoring    Please see attending attestation for final recommendations        Eduar Meyers MD  Cardiology Fellow     All Cardiology service information can be found 24/7 on [email or web address], password: [email or web address]

## 2023-12-21 NOTE — H&P ADULT - NSHPLABSRESULTS_GEN_ALL_CORE
.  LABS:                         13.8   17.94 )-----------( 180      ( 21 Dec 2023 15:10 )             40.9     12-21    130<L>  |  102  |  38<H>  ----------------------------<  107<H>  5.4<H>   |  19<L>  |  1.50<H>    Ca    9.2      21 Dec 2023 18:32  Phos  3.2     12-21  Mg     2.00     12-21    TPro  8.0  /  Alb  4.1  /  TBili  1.2  /  DBili  x   /  AST  33  /  ALT  16  /  AlkPhos  89  12-21    PT/INR - ( 21 Dec 2023 15:10 )   PT: 27.5 sec;   INR: 2.50 ratio         PTT - ( 21 Dec 2023 15:10 )  PTT:34.3 sec  Urinalysis Basic - ( 21 Dec 2023 18:32 )    Color: x / Appearance: x / SG: x / pH: x  Gluc: 107 mg/dL / Ketone: x  / Bili: x / Urobili: x   Blood: x / Protein: x / Nitrite: x   Leuk Esterase: x / RBC: x / WBC x   Sq Epi: x / Non Sq Epi: x / Bacteria: x                RADIOLOGY, EKG & ADDITIONAL TESTS: Reviewed.

## 2023-12-21 NOTE — ED ADULT NURSE NOTE - NSFALLHARMRISKINTERV_ED_ALL_ED
Assistance OOB with selected safe patient handling equipment if applicable/Assistance with ambulation/Communicate risk of Fall with Harm to all staff, patient, and family/Monitor gait and stability/Provide patient with walking aids/Provide visual cue: red socks, yellow wristband, yellow gown, etc/Reinforce activity limits and safety measures with patient and family/Bed in lowest position, wheels locked, appropriate side rails in place/Call bell, personal items and telephone in reach/Instruct patient to call for assistance before getting out of bed/chair/stretcher/Non-slip footwear applied when patient is off stretcher/[place] to call system/Physically safe environment - no spills, clutter or unnecessary equipment/Purposeful Proactive Rounding/Room/bathroom lighting operational, light cord in reach Assistance OOB with selected safe patient handling equipment if applicable/Assistance with ambulation/Communicate risk of Fall with Harm to all staff, patient, and family/Monitor gait and stability/Provide patient with walking aids/Provide visual cue: red socks, yellow wristband, yellow gown, etc/Reinforce activity limits and safety measures with patient and family/Bed in lowest position, wheels locked, appropriate side rails in place/Call bell, personal items and telephone in reach/Instruct patient to call for assistance before getting out of bed/chair/stretcher/Non-slip footwear applied when patient is off stretcher/[place] to call system/Physically safe environment - no spills, clutter or unnecessary equipment/Purposeful Proactive Rounding/Room/bathroom lighting operational, light cord in reach

## 2023-12-21 NOTE — ED PROVIDER NOTE - PROGRESS NOTE DETAILS
MM NP: Troponin elevated 54, repeat EKG and troponin ordered, currently denying chest pain. Repeat BMP ordered, K hemolyzed. Cardiology consulted, eval pending. Will attempt to contact patient family member. MM NP: Troponin elevated 54, repeat EKG and troponin ordered, currently denying chest pain. Repeat BMP ordered, K hemolyzed. Cardiology consulted, eval pending. Will attempt to contact patient family member- 1656: Patient family member Johny spoken to via phone and updated on patient status, states will be at [facility] in approx 2 hours. Reiterates patient does not want any invasive procedures, is ok wit medical management and admission. MM NP: Troponin elevated 54, repeat EKG and troponin ordered, currently denying chest pain. Repeat BMP ordered, K hemolyzed. Cardiology consulted, eval pending. Will attempt to contact patient family member- 1656: Patient family member Johny spoken to via phone and updated on patient status, states will be at [facility] in approx 2 hours. Reiterates patient does not want any invasive procedures, is ok wit medical management and admission. MM NP: Troponin elevated 54, repeat EKG and troponin ordered, currently denying chest pain. Repeat BMP ordered, K hemolyzed. Cardiology consulted, eval pending. Will attempt to contact patient family member- 1656: Patient family member Johny spoken to via phone and updated on patient status, states will be at [facility] in approx 2 hours. Reiterates patient does not want any invasive procedures, is ok with medical management and admission. MM NP: Troponin elevated 54, repeat EKG and troponin ordered, currently denying chest pain. Repeat BMP ordered, K hemolyzed. Cardiology consulted, eval pending. Will attempt to contact patient family member- 1656: Patient family member Johny spoken to via phone and updated on patient status, states will be at [facility] in approx 2 hours. Reiterates patient does not want any invasive procedures, is ok with medical management and admission. MM NP: Cardiology recommends continuing home Brilinta, ASA, and Eliquis with tele monitoring and trend trop/EKG. Patient and family member updated on results, in shared decision making agree to admission for medical management of NSTEMI. Patient currently denying chest pain. Hospitalist Calvo spoken to and admission accepted.

## 2023-12-21 NOTE — H&P ADULT - PROBLEM SELECTOR PLAN 8
F/u lithium level  - restart lithium pending lithium level and renally dose if necessary Holding entresto and spironolactone for now  - restart when glory improved

## 2023-12-21 NOTE — H&P ADULT - PROBLEM SELECTOR PLAN 2
S/p PCI to LAD and known multivessel disease on [facility] last month.   - c/w plavix, atorvastatin and eliquis S/p PCI to LAD and known multivessel disease on [facility] last month.   - c/w plavix, atorvastatin and eliquis

## 2023-12-21 NOTE — CONSULT NOTE ADULT - SUBJECTIVE AND OBJECTIVE BOX
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 fib on Eliquis, who presents from [facility] for chest pain. Of note, he had a recent admission to [facility] 11/2023 for NSTEMI where LHC noted multivessel disease including mid LAd ISR, D1, Ramus, Cx, LPL 1 and 2. Per [facility] documentation under different MRN ([id number]), surrogate opted for medical management as to avoid any invasive procedures. He was sent to [facility] with GDMT.    Patient unable to provide hx, reprotedly had chest pain this AM. In ED, patient unsure why he is in ED, denying CP, SOB, or palpitations.  Trop 54, VSS. EKG without changes - b/l LBBB.    PMHx:   Bipolar disorder  Hypercholesterolemia  Hypertension  CAD (coronary artery disease)        PSHx:   No significant past surgical history      Allergies:  No Known Allergies      REVIEW OF SYSTEMS:    All other review of systems is negative unless indicated above.    Physical Exam:  T(F): 98.2 (12-21), Max: 98.2 (12-21)  HR: 79 (12-21) (79 - 79)  BP: 110/56 (12-21) (110/56 - 110/56)  RR: 16 (12-21)  SpO2: 99% (12-21)  Appearance: No acute distress; chroncially ill appearing  Eyes:  EOMI  HEENT: Normal oral mucosa  Cardiovascular: RRR, S1, S2, + I/VI systolic murmurs, rubs, or gallops; no edema; no JVD  Respiratory: Clear to auscultation bilaterally  Gastrointestinal: soft, non-tender, non-distended  Extremities: no lower extremity edema   Neurologic: Non-focal  Psychiatry: AAOx1-2, mood & affect appropriate    Cardiovascular Diagnostic Testing:    ECG:   LBBB at baseline    Echo:   TTE 11/9/2023     CONCLUSIONS:      1. Left ventricular systolic function is severely decreased with an ejection fraction of 29 % by Cole's method of disks.   2. Multiple segmental abnormalities exist. See findings.   3. Normal right ventricular cavity size and systolic function.   4. Fibrocalcific aortic valve sclerosis without stenosis.   5. Symmetric mitral valve leaflet tethering.   6. Severe mitral regurgitation.   7. No pericardial effusion seen.   8. LV thrombus seen in the apex a left ventricular thrombus.      11/2023  Multivessel disease including mid LAd ISR, D1, Ramus, Cx, LPL 1 and 2        Labs: Personally reviewed                        13.8   17.94 )-----------( 180      ( 21 Dec 2023 15:10 )             40.9     12-21    130<L>  |  99  |  36<H>  ----------------------------<  98  TNP   |  19<L>  |  1.31<H>    Ca    9.8      21 Dec 2023 15:43  Phos  3.2     12-21  Mg     2.00     12-21    TPro  8.0  /  Alb  4.1  /  TBili  1.2  /  DBili  x   /  AST  33  /  ALT  16  /  AlkPhos  89  12-21    PT/INR - ( 21 Dec 2023 15:10 )   PT: 27.5 sec;   INR: 2.50 ratio         PTT - ( 21 Dec 2023 15:10 )  PTT:34.3 sec    CARDIAC MARKERS ( 21 Dec 2023 15:43 )  54 ng/L / x     / x     / x     / x     / x                       Patient seen and evaluated at bedside      HPI:  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 fib on Eliquis, who presents from [facility] for chest pain. Of note, he had a recent admission to [facility] 11/2023 for NSTEMI where LHC noted multivessel disease including mid LAd ISR, D1, Ramus, Cx, LPL 1 and 2. Per [facility] documentation under different MRN ([id number]), surrogate opted for medical management as to avoid any invasive procedures. He was sent to [facility] with GDMT.    Patient unable to provide hx, reprotedly had chest pain this AM. In ED, patient unsure why he is in ED, denying CP, SOB, or palpitations.  Trop 54, VSS. EKG without changes - b/l LBBB.    PMHx:   Bipolar disorder  Hypercholesterolemia  Hypertension  CAD (coronary artery disease)        PSHx:   No significant past surgical history      Allergies:  No Known Allergies      REVIEW OF SYSTEMS:    All other review of systems is negative unless indicated above.    Physical Exam:  T(F): 98.2 (12-21), Max: 98.2 (12-21)  HR: 79 (12-21) (79 - 79)  BP: 110/56 (12-21) (110/56 - 110/56)  RR: 16 (12-21)  SpO2: 99% (12-21)  Appearance: No acute distress; chroncially ill appearing  Eyes:  EOMI  HEENT: Normal oral mucosa  Cardiovascular: RRR, S1, S2, + I/VI systolic murmurs, rubs, or gallops; no edema; no JVD  Respiratory: Clear to auscultation bilaterally  Gastrointestinal: soft, non-tender, non-distended  Extremities: no lower extremity edema   Neurologic: Non-focal  Psychiatry: AAOx1-2, mood & affect appropriate    Cardiovascular Diagnostic Testing:    ECG:   LBBB at baseline    Echo:   TTE 11/9/2023     CONCLUSIONS:      1. Left ventricular systolic function is severely decreased with an ejection fraction of 29 % by Cole's method of disks.   2. Multiple segmental abnormalities exist. See findings.   3. Normal right ventricular cavity size and systolic function.   4. Fibrocalcific aortic valve sclerosis without stenosis.   5. Symmetric mitral valve leaflet tethering.   6. Severe mitral regurgitation.   7. No pericardial effusion seen.   8. LV thrombus seen in the apex a left ventricular thrombus.      11/2023  Multivessel disease including mid LAd ISR, D1, Ramus, Cx, LPL 1 and 2        Labs: Personally reviewed                        13.8   17.94 )-----------( 180      ( 21 Dec 2023 15:10 )             40.9     12-21    130<L>  |  99  |  36<H>  ----------------------------<  98  TNP   |  19<L>  |  1.31<H>    Ca    9.8      21 Dec 2023 15:43  Phos  3.2     12-21  Mg     2.00     12-21    TPro  8.0  /  Alb  4.1  /  TBili  1.2  /  DBili  x   /  AST  33  /  ALT  16  /  AlkPhos  89  12-21    PT/INR - ( 21 Dec 2023 15:10 )   PT: 27.5 sec;   INR: 2.50 ratio         PTT - ( 21 Dec 2023 15:10 )  PTT:34.3 sec    CARDIAC MARKERS ( 21 Dec 2023 15:43 )  54 ng/L / x     / x     / x     / x     / x                       Patient seen and evaluated at bedside      HPI:  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 fib on Eliquis, who presents from [facility] for chest pain. Of note, he had a recent admission to [facility] 11/2023 for NSTEMI where LH noted multivessel disease including mid LAd ISR, D1, Ramus, Cx, LPL 1 and 2. Per [facility] documentation under different MRN ([id number]), surrogate opted for medical management as to avoid any invasive procedures. He was sent to [facility] with GDMT.    Patient unable to provide hx, reprotedly had chest pain this AM. In ED, patient unsure why he is in ED, denying CP, SOB, or palpitations.  Trop 54, VSS. EKG without changes - b/l LBBB.    PMHx:   Bipolar disorder  Hypercholesterolemia  Hypertension  CAD (coronary artery disease)        PSHx:   No significant past surgical history      Allergies:  No Known Allergies      REVIEW OF SYSTEMS:    All other review of systems is negative unless indicated above.    Physical Exam:  T(F): 98.2 (12-21), Max: 98.2 (12-21)  HR: 79 (12-21) (79 - 79)  BP: 110/56 (12-21) (110/56 - 110/56)  RR: 16 (12-21)  SpO2: 99% (12-21)  Appearance: No acute distress; chroncially ill appearing  Eyes:  EOMI  HEENT: Normal oral mucosa  Cardiovascular: RRR, S1, S2, + I/VI systolic murmurs, rubs, or gallops; no edema; no JVD  Respiratory: Clear to auscultation bilaterally  Gastrointestinal: soft, non-tender, non-distended  Extremities: no lower extremity edema   Neurologic: Non-focal  Psychiatry: AAOx1-2, mood & affect appropriate    Cardiovascular Diagnostic Testing:    ECG:   LBBB at baseline    Echo:   TTE 11/9/2023     CONCLUSIONS:      1. Left ventricular systolic function is severely decreased with an ejection fraction of 29 % by Cloe's method of disks.   2. Multiple segmental abnormalities exist. See findings.   3. Normal right ventricular cavity size and systolic function.   4. Fibrocalcific aortic valve sclerosis without stenosis.   5. Symmetric mitral valve leaflet tethering.   6. Severe mitral regurgitation.   7. No pericardial effusion seen.   8. LV thrombus seen in the apex a left ventricular thrombus.      11/2023  Multivessel disease including mid LAd ISR, D1, Ramus, Cx, LPL 1 and 2        Labs: Personally reviewed                        13.8   17.94 )-----------( 180      ( 21 Dec 2023 15:10 )             40.9     12-21    130<L>  |  99  |  36<H>  ----------------------------<  98  TNP   |  19<L>  |  1.31<H>    Ca    9.8      21 Dec 2023 15:43  Phos  3.2     12-21  Mg     2.00     12-21    TPro  8.0  /  Alb  4.1  /  TBili  1.2  /  DBili  x   /  AST  33  /  ALT  16  /  AlkPhos  89  12-21    PT/INR - ( 21 Dec 2023 15:10 )   PT: 27.5 sec;   INR: 2.50 ratio         PTT - ( 21 Dec 2023 15:10 )  PTT:34.3 sec    CARDIAC MARKERS ( 21 Dec 2023 15:43 )  54 ng/L / x     / x     / x     / x     / x                       Patient seen and evaluated at bedside      HPI:  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 fib on Eliquis, who presents from [facility] for chest pain. Of note, he had a recent admission to [facility] 11/2023 for NSTEMI where LH noted multivessel disease including mid LAd ISR, D1, Ramus, Cx, LPL 1 and 2. Per [facility] documentation under different MRN ([id number]), surrogate opted for medical management as to avoid any invasive procedures. He was sent to [facility] with GDMT.    Patient unable to provide hx, reprotedly had chest pain this AM. In ED, patient unsure why he is in ED, denying CP, SOB, or palpitations.  Trop 54, VSS. EKG without changes - b/l LBBB.    PMHx:   Bipolar disorder  Hypercholesterolemia  Hypertension  CAD (coronary artery disease)        PSHx:   No significant past surgical history      Allergies:  No Known Allergies      REVIEW OF SYSTEMS:    All other review of systems is negative unless indicated above.    Physical Exam:  T(F): 98.2 (12-21), Max: 98.2 (12-21)  HR: 79 (12-21) (79 - 79)  BP: 110/56 (12-21) (110/56 - 110/56)  RR: 16 (12-21)  SpO2: 99% (12-21)  Appearance: No acute distress; chroncially ill appearing  Eyes:  EOMI  HEENT: Normal oral mucosa  Cardiovascular: RRR, S1, S2, + I/VI systolic murmurs, rubs, or gallops; no edema; no JVD  Respiratory: Clear to auscultation bilaterally  Gastrointestinal: soft, non-tender, non-distended  Extremities: no lower extremity edema   Neurologic: Non-focal  Psychiatry: AAOx1-2, mood & affect appropriate    Cardiovascular Diagnostic Testing:    ECG:   LBBB at baseline    Echo:   TTE 11/9/2023     CONCLUSIONS:      1. Left ventricular systolic function is severely decreased with an ejection fraction of 29 % by Cole's method of disks.   2. Multiple segmental abnormalities exist. See findings.   3. Normal right ventricular cavity size and systolic function.   4. Fibrocalcific aortic valve sclerosis without stenosis.   5. Symmetric mitral valve leaflet tethering.   6. Severe mitral regurgitation.   7. No pericardial effusion seen.   8. LV thrombus seen in the apex a left ventricular thrombus.      11/2023  Multivessel disease including mid LAd ISR, D1, Ramus, Cx, LPL 1 and 2        Labs: Personally reviewed                        13.8   17.94 )-----------( 180      ( 21 Dec 2023 15:10 )             40.9     12-21    130<L>  |  99  |  36<H>  ----------------------------<  98  TNP   |  19<L>  |  1.31<H>    Ca    9.8      21 Dec 2023 15:43  Phos  3.2     12-21  Mg     2.00     12-21    TPro  8.0  /  Alb  4.1  /  TBili  1.2  /  DBili  x   /  AST  33  /  ALT  16  /  AlkPhos  89  12-21    PT/INR - ( 21 Dec 2023 15:10 )   PT: 27.5 sec;   INR: 2.50 ratio         PTT - ( 21 Dec 2023 15:10 )  PTT:34.3 sec    CARDIAC MARKERS ( 21 Dec 2023 15:43 )  54 ng/L / x     / x     / x     / x     / x

## 2023-12-21 NOTE — H&P ADULT - NSHPREVIEWOFSYSTEMS_GEN_ALL_CORE

## 2023-12-21 NOTE — H&P ADULT - PROBLEM SELECTOR PLAN 9
DVT prophylaxis: on eliquis  Diet: dash/tlc F/u lithium level  - restart lithium pending lithium level and renally dose if necessary

## 2023-12-21 NOTE — H&P ADULT - ASSESSMENT
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 fib on Eliquis, who presents from [facility] for chest pain, Trops elevated. Pt seen and evaluated by cardiology and recommending continued medical management.    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 fib on Eliquis, who presents from [facility] for chest pain, Trops elevated. Pt seen and evaluated by cardiology and recommending continued medical management.

## 2023-12-21 NOTE — ED ADULT NURSE REASSESSMENT NOTE - NS ED NURSE REASSESS COMMENT FT1
report given from day nurse , patient laying in semi fowlers position on the stretcher. patient alert and oriented times four. patient denies shortness of breath, chest pain, nausea, vomiting, chill, fever. Patient afib on the monitor. Respirations equal and adequate. Patients IV patent, no signs of infiltration. Safety measures in place, call bell within reach. Patient stable upon leaving the room. report given from day nurse , patient laying in semi fowlers position on the stretcher. patient alert and oriented times two-three. patient denies shortness of breath, chest pain, nausea, vomiting, chill, fever. Patient afib on the monitor. Respirations equal and adequate. Patients IV patent, no signs of infiltration. Safety measures in place, call bell within reach. Patient stable upon leaving the room.

## 2023-12-21 NOTE — ED PROVIDER NOTE - OBJECTIVE STATEMENT
82 yo male A&OX2 PMH CAD on stents, 84 yo male A&OX2 PMH CAD no stents, HTN, HLD, bipolar disorder on lithium presenting to ED from [facility] for chest pain. On arrival, patient denying all complaints, states is not experiencing chest pain. Facility contacted via phone and states this morning patient was complaining of chest pain, last seen at [facility] Nov 8th for chest pain and recommended cath but patient refused. Per facility, A&OX2 is patient baseline since wife passed 2 weeks ago. Patient [facility] under different MRN [id number] in which patient dx w/ severe MR, EF 29% and triple vessel disease. CABG not performed s/o goals of care convo in which patient did not want any invasive procedures and opted for medical management. Per facility paperwork patient arrives with he takes ASA, Brilinta and Eliquis. Patient denies chest pain, SOB, LOC, abd pain, n/v/d/dizziness, palpitations, fevers/chills. 84 yo male A&OX2 PMH CAD no stents, HTN, HLD, bipolar disorder on lithium presenting to ED from [facility] for chest pain. On arrival, patient denying all complaints, states is not experiencing chest pain. Facility contacted via phone and states this morning patient was complaining of chest pain, last seen at [facility] Nov 8th for chest pain and recommended cath but patient refused. Per facility, A&OX2 is patient baseline since wife passed 2 weeks ago. Patient [facility] under different MRN [id number] in which patient dx w/ severe MR, EF 29% and triple vessel disease. CABG not performed s/o goals of care convo in which patient did not want any invasive procedures and opted for medical management. Per facility paperwork patient arrives with he takes ASA, Brilinta and Eliquis. Patient denies chest pain, SOB, LOC, abd pain, n/v/d/dizziness, palpitations, fevers/chills.

## 2023-12-21 NOTE — ED PROVIDER NOTE - CLINICAL SUMMARY MEDICAL DECISION MAKING FREE TEXT BOX
84 yo male A&OX2 PMH CAD no stents, HTN, HLD, bipolar disorder on lithium presenting to ED from [facility] for chest pain. On arrival patient denying all complaints. On exam, l/s equal clr bilat, abd soft nontender, not distended, peripheral pulses present, strong, irregular. Patient placed on cardiac monitor, EKG shows new ST depressions in V5, V6 compared to EKG from 09/2023. Will send Trop to assess NSTEMI. Will send labs/EKG to assess electrolyte abnormalities/infection, will obtain chest x-ray. Dispo likely admit tele. 84 yo male A&OX2 PMH CAD no stents, HTN, HLD, bipolar disorder on lithium presenting to ED from [facility] for chest pain. On arrival patient denying all complaints. On exam, l/s equal clr bilat, abd soft nontender, not distended, peripheral pulses present, strong, irregular. Patient placed on cardiac monitor, EKG shows new ST depressions in V5, V6 compared to EKG from 09/2023. Will send Trop to assess NSTEMI. Will send labs/EKG to assess electrolyte abnormalities/infection, will obtain chest x-ray. Dispo likely admit tele.

## 2023-12-22 LAB
ANION GAP SERPL CALC-SCNC: 14 MMOL/L — SIGNIFICANT CHANGE UP (ref 7–14)
ANION GAP SERPL CALC-SCNC: 14 MMOL/L — SIGNIFICANT CHANGE UP (ref 7–14)
ANION GAP SERPL CALC-SCNC: 15 MMOL/L — HIGH (ref 7–14)
ANION GAP SERPL CALC-SCNC: 15 MMOL/L — HIGH (ref 7–14)
ANION GAP SERPL CALC-SCNC: 17 MMOL/L — HIGH (ref 7–14)
ANION GAP SERPL CALC-SCNC: 17 MMOL/L — HIGH (ref 7–14)
APPEARANCE UR: ABNORMAL
APPEARANCE UR: ABNORMAL
BACTERIA # UR AUTO: ABNORMAL /HPF
BACTERIA # UR AUTO: ABNORMAL /HPF
BASOPHILS # BLD AUTO: 0.05 K/UL — SIGNIFICANT CHANGE UP (ref 0–0.2)
BASOPHILS # BLD AUTO: 0.05 K/UL — SIGNIFICANT CHANGE UP (ref 0–0.2)
BASOPHILS NFR BLD AUTO: 0.4 % — SIGNIFICANT CHANGE UP (ref 0–2)
BASOPHILS NFR BLD AUTO: 0.4 % — SIGNIFICANT CHANGE UP (ref 0–2)
BILIRUB UR-MCNC: NEGATIVE — SIGNIFICANT CHANGE UP
BILIRUB UR-MCNC: NEGATIVE — SIGNIFICANT CHANGE UP
BUN SERPL-MCNC: 35 MG/DL — HIGH (ref 7–23)
BUN SERPL-MCNC: 35 MG/DL — HIGH (ref 7–23)
BUN SERPL-MCNC: 39 MG/DL — HIGH (ref 7–23)
BUN SERPL-MCNC: 39 MG/DL — HIGH (ref 7–23)
BUN SERPL-MCNC: 41 MG/DL — HIGH (ref 7–23)
BUN SERPL-MCNC: 41 MG/DL — HIGH (ref 7–23)
CALCIUM SERPL-MCNC: 8.8 MG/DL — SIGNIFICANT CHANGE UP (ref 8.4–10.5)
CALCIUM SERPL-MCNC: 8.8 MG/DL — SIGNIFICANT CHANGE UP (ref 8.4–10.5)
CALCIUM SERPL-MCNC: 9 MG/DL — SIGNIFICANT CHANGE UP (ref 8.4–10.5)
CALCIUM SERPL-MCNC: 9 MG/DL — SIGNIFICANT CHANGE UP (ref 8.4–10.5)
CALCIUM SERPL-MCNC: 9.4 MG/DL — SIGNIFICANT CHANGE UP (ref 8.4–10.5)
CALCIUM SERPL-MCNC: 9.4 MG/DL — SIGNIFICANT CHANGE UP (ref 8.4–10.5)
CAST: 4 /LPF — SIGNIFICANT CHANGE UP (ref 0–4)
CAST: 4 /LPF — SIGNIFICANT CHANGE UP (ref 0–4)
CHLORIDE SERPL-SCNC: 101 MMOL/L — SIGNIFICANT CHANGE UP (ref 98–107)
CHLORIDE SERPL-SCNC: 101 MMOL/L — SIGNIFICANT CHANGE UP (ref 98–107)
CHLORIDE SERPL-SCNC: 102 MMOL/L — SIGNIFICANT CHANGE UP (ref 98–107)
CHLORIDE SERPL-SCNC: 102 MMOL/L — SIGNIFICANT CHANGE UP (ref 98–107)
CHLORIDE SERPL-SCNC: 99 MMOL/L — SIGNIFICANT CHANGE UP (ref 98–107)
CHLORIDE SERPL-SCNC: 99 MMOL/L — SIGNIFICANT CHANGE UP (ref 98–107)
CK MB BLD-MCNC: 3.7 % — HIGH (ref 0–2.5)
CK MB BLD-MCNC: 3.7 % — HIGH (ref 0–2.5)
CK MB CFR SERPL CALC: 2.5 NG/ML — SIGNIFICANT CHANGE UP
CK MB CFR SERPL CALC: 2.5 NG/ML — SIGNIFICANT CHANGE UP
CK SERPL-CCNC: 67 U/L — SIGNIFICANT CHANGE UP (ref 30–200)
CK SERPL-CCNC: 67 U/L — SIGNIFICANT CHANGE UP (ref 30–200)
CO2 SERPL-SCNC: 14 MMOL/L — LOW (ref 22–31)
CO2 SERPL-SCNC: 14 MMOL/L — LOW (ref 22–31)
CO2 SERPL-SCNC: 16 MMOL/L — LOW (ref 22–31)
CO2 SERPL-SCNC: 16 MMOL/L — LOW (ref 22–31)
CO2 SERPL-SCNC: 17 MMOL/L — LOW (ref 22–31)
CO2 SERPL-SCNC: 17 MMOL/L — LOW (ref 22–31)
COLOR SPEC: YELLOW — SIGNIFICANT CHANGE UP
COLOR SPEC: YELLOW — SIGNIFICANT CHANGE UP
CREAT ?TM UR-MCNC: 105 MG/DL — SIGNIFICANT CHANGE UP
CREAT ?TM UR-MCNC: 105 MG/DL — SIGNIFICANT CHANGE UP
CREAT SERPL-MCNC: 1.35 MG/DL — HIGH (ref 0.5–1.3)
CREAT SERPL-MCNC: 1.37 MG/DL — HIGH (ref 0.5–1.3)
CREAT SERPL-MCNC: 1.37 MG/DL — HIGH (ref 0.5–1.3)
DIFF PNL FLD: ABNORMAL
DIFF PNL FLD: ABNORMAL
EGFR: 51 ML/MIN/1.73M2 — LOW
EGFR: 51 ML/MIN/1.73M2 — LOW
EGFR: 52 ML/MIN/1.73M2 — LOW
EOSINOPHIL # BLD AUTO: 0.02 K/UL — SIGNIFICANT CHANGE UP (ref 0–0.5)
EOSINOPHIL # BLD AUTO: 0.02 K/UL — SIGNIFICANT CHANGE UP (ref 0–0.5)
EOSINOPHIL NFR BLD AUTO: 0.1 % — SIGNIFICANT CHANGE UP (ref 0–6)
EOSINOPHIL NFR BLD AUTO: 0.1 % — SIGNIFICANT CHANGE UP (ref 0–6)
GLUCOSE SERPL-MCNC: 106 MG/DL — HIGH (ref 70–99)
GLUCOSE SERPL-MCNC: 106 MG/DL — HIGH (ref 70–99)
GLUCOSE SERPL-MCNC: 111 MG/DL — HIGH (ref 70–99)
GLUCOSE SERPL-MCNC: 111 MG/DL — HIGH (ref 70–99)
GLUCOSE SERPL-MCNC: 93 MG/DL — SIGNIFICANT CHANGE UP (ref 70–99)
GLUCOSE SERPL-MCNC: 93 MG/DL — SIGNIFICANT CHANGE UP (ref 70–99)
GLUCOSE UR QL: NEGATIVE MG/DL — SIGNIFICANT CHANGE UP
GLUCOSE UR QL: NEGATIVE MG/DL — SIGNIFICANT CHANGE UP
HCT VFR BLD CALC: 36.1 % — LOW (ref 39–50)
HCT VFR BLD CALC: 36.1 % — LOW (ref 39–50)
HCT VFR BLD CALC: 38.6 % — LOW (ref 39–50)
HCT VFR BLD CALC: 38.6 % — LOW (ref 39–50)
HGB BLD-MCNC: 12 G/DL — LOW (ref 13–17)
HGB BLD-MCNC: 12 G/DL — LOW (ref 13–17)
HGB BLD-MCNC: 12.7 G/DL — LOW (ref 13–17)
HGB BLD-MCNC: 12.7 G/DL — LOW (ref 13–17)
IANC: 11.09 K/UL — HIGH (ref 1.8–7.4)
IANC: 11.09 K/UL — HIGH (ref 1.8–7.4)
IMM GRANULOCYTES NFR BLD AUTO: 0.3 % — SIGNIFICANT CHANGE UP (ref 0–0.9)
IMM GRANULOCYTES NFR BLD AUTO: 0.3 % — SIGNIFICANT CHANGE UP (ref 0–0.9)
KETONES UR-MCNC: ABNORMAL MG/DL
KETONES UR-MCNC: ABNORMAL MG/DL
LEUKOCYTE ESTERASE UR-ACNC: ABNORMAL
LEUKOCYTE ESTERASE UR-ACNC: ABNORMAL
LITHIUM SERPL-MCNC: 1.2 MMOL/L — SIGNIFICANT CHANGE UP (ref 0.6–1.2)
LITHIUM SERPL-MCNC: 1.2 MMOL/L — SIGNIFICANT CHANGE UP (ref 0.6–1.2)
LITHIUM SERPL-MCNC: 1.3 MMOL/L — HIGH (ref 0.6–1.2)
LITHIUM SERPL-MCNC: 1.3 MMOL/L — HIGH (ref 0.6–1.2)
LYMPHOCYTES # BLD AUTO: 1.68 K/UL — SIGNIFICANT CHANGE UP (ref 1–3.3)
LYMPHOCYTES # BLD AUTO: 1.68 K/UL — SIGNIFICANT CHANGE UP (ref 1–3.3)
LYMPHOCYTES # BLD AUTO: 12.3 % — LOW (ref 13–44)
LYMPHOCYTES # BLD AUTO: 12.3 % — LOW (ref 13–44)
MAGNESIUM SERPL-MCNC: 1.8 MG/DL — SIGNIFICANT CHANGE UP (ref 1.6–2.6)
MAGNESIUM SERPL-MCNC: 1.9 MG/DL — SIGNIFICANT CHANGE UP (ref 1.6–2.6)
MAGNESIUM SERPL-MCNC: 1.9 MG/DL — SIGNIFICANT CHANGE UP (ref 1.6–2.6)
MCHC RBC-ENTMCNC: 31.1 PG — SIGNIFICANT CHANGE UP (ref 27–34)
MCHC RBC-ENTMCNC: 31.1 PG — SIGNIFICANT CHANGE UP (ref 27–34)
MCHC RBC-ENTMCNC: 31.6 PG — SIGNIFICANT CHANGE UP (ref 27–34)
MCHC RBC-ENTMCNC: 31.6 PG — SIGNIFICANT CHANGE UP (ref 27–34)
MCHC RBC-ENTMCNC: 32.9 GM/DL — SIGNIFICANT CHANGE UP (ref 32–36)
MCHC RBC-ENTMCNC: 32.9 GM/DL — SIGNIFICANT CHANGE UP (ref 32–36)
MCHC RBC-ENTMCNC: 33.2 GM/DL — SIGNIFICANT CHANGE UP (ref 32–36)
MCHC RBC-ENTMCNC: 33.2 GM/DL — SIGNIFICANT CHANGE UP (ref 32–36)
MCV RBC AUTO: 94.6 FL — SIGNIFICANT CHANGE UP (ref 80–100)
MCV RBC AUTO: 94.6 FL — SIGNIFICANT CHANGE UP (ref 80–100)
MCV RBC AUTO: 95 FL — SIGNIFICANT CHANGE UP (ref 80–100)
MCV RBC AUTO: 95 FL — SIGNIFICANT CHANGE UP (ref 80–100)
MONOCYTES # BLD AUTO: 0.79 K/UL — SIGNIFICANT CHANGE UP (ref 0–0.9)
MONOCYTES # BLD AUTO: 0.79 K/UL — SIGNIFICANT CHANGE UP (ref 0–0.9)
MONOCYTES NFR BLD AUTO: 5.8 % — SIGNIFICANT CHANGE UP (ref 2–14)
MONOCYTES NFR BLD AUTO: 5.8 % — SIGNIFICANT CHANGE UP (ref 2–14)
NEUTROPHILS # BLD AUTO: 11.09 K/UL — HIGH (ref 1.8–7.4)
NEUTROPHILS # BLD AUTO: 11.09 K/UL — HIGH (ref 1.8–7.4)
NEUTROPHILS NFR BLD AUTO: 81.1 % — HIGH (ref 43–77)
NEUTROPHILS NFR BLD AUTO: 81.1 % — HIGH (ref 43–77)
NITRITE UR-MCNC: POSITIVE
NITRITE UR-MCNC: POSITIVE
NRBC # BLD: 0 /100 WBCS — SIGNIFICANT CHANGE UP (ref 0–0)
NRBC # FLD: 0 K/UL — SIGNIFICANT CHANGE UP (ref 0–0)
PH UR: 5.5 — SIGNIFICANT CHANGE UP (ref 5–8)
PH UR: 5.5 — SIGNIFICANT CHANGE UP (ref 5–8)
PHOSPHATE SERPL-MCNC: 2.8 MG/DL — SIGNIFICANT CHANGE UP (ref 2.5–4.5)
PHOSPHATE SERPL-MCNC: 2.8 MG/DL — SIGNIFICANT CHANGE UP (ref 2.5–4.5)
PHOSPHATE SERPL-MCNC: 3.2 MG/DL — SIGNIFICANT CHANGE UP (ref 2.5–4.5)
PHOSPHATE SERPL-MCNC: 3.2 MG/DL — SIGNIFICANT CHANGE UP (ref 2.5–4.5)
PHOSPHATE SERPL-MCNC: 3.5 MG/DL — SIGNIFICANT CHANGE UP (ref 2.5–4.5)
PHOSPHATE SERPL-MCNC: 3.5 MG/DL — SIGNIFICANT CHANGE UP (ref 2.5–4.5)
PLATELET # BLD AUTO: 147 K/UL — LOW (ref 150–400)
PLATELET # BLD AUTO: 147 K/UL — LOW (ref 150–400)
PLATELET # BLD AUTO: 177 K/UL — SIGNIFICANT CHANGE UP (ref 150–400)
PLATELET # BLD AUTO: 177 K/UL — SIGNIFICANT CHANGE UP (ref 150–400)
POTASSIUM SERPL-MCNC: 4.8 MMOL/L — SIGNIFICANT CHANGE UP (ref 3.5–5.3)
POTASSIUM SERPL-MCNC: 4.8 MMOL/L — SIGNIFICANT CHANGE UP (ref 3.5–5.3)
POTASSIUM SERPL-MCNC: 5.5 MMOL/L — HIGH (ref 3.5–5.3)
POTASSIUM SERPL-MCNC: 5.5 MMOL/L — HIGH (ref 3.5–5.3)
POTASSIUM SERPL-MCNC: SIGNIFICANT CHANGE UP MMOL/L (ref 3.5–5.3)
POTASSIUM SERPL-MCNC: SIGNIFICANT CHANGE UP MMOL/L (ref 3.5–5.3)
POTASSIUM SERPL-SCNC: 4.8 MMOL/L — SIGNIFICANT CHANGE UP (ref 3.5–5.3)
POTASSIUM SERPL-SCNC: 4.8 MMOL/L — SIGNIFICANT CHANGE UP (ref 3.5–5.3)
POTASSIUM SERPL-SCNC: 5.5 MMOL/L — HIGH (ref 3.5–5.3)
POTASSIUM SERPL-SCNC: 5.5 MMOL/L — HIGH (ref 3.5–5.3)
POTASSIUM SERPL-SCNC: SIGNIFICANT CHANGE UP MMOL/L (ref 3.5–5.3)
POTASSIUM SERPL-SCNC: SIGNIFICANT CHANGE UP MMOL/L (ref 3.5–5.3)
PROT UR-MCNC: 100 MG/DL
PROT UR-MCNC: 100 MG/DL
RBC # BLD: 3.8 M/UL — LOW (ref 4.2–5.8)
RBC # BLD: 3.8 M/UL — LOW (ref 4.2–5.8)
RBC # BLD: 4.08 M/UL — LOW (ref 4.2–5.8)
RBC # BLD: 4.08 M/UL — LOW (ref 4.2–5.8)
RBC # FLD: 15.5 % — HIGH (ref 10.3–14.5)
RBC # FLD: 15.5 % — HIGH (ref 10.3–14.5)
RBC # FLD: 15.6 % — HIGH (ref 10.3–14.5)
RBC # FLD: 15.6 % — HIGH (ref 10.3–14.5)
RBC CASTS # UR COMP ASSIST: 17 /HPF — HIGH (ref 0–4)
RBC CASTS # UR COMP ASSIST: 17 /HPF — HIGH (ref 0–4)
SODIUM SERPL-SCNC: 130 MMOL/L — LOW (ref 135–145)
SODIUM SERPL-SCNC: 130 MMOL/L — LOW (ref 135–145)
SODIUM SERPL-SCNC: 132 MMOL/L — LOW (ref 135–145)
SODIUM SERPL-SCNC: 132 MMOL/L — LOW (ref 135–145)
SODIUM SERPL-SCNC: 133 MMOL/L — LOW (ref 135–145)
SODIUM SERPL-SCNC: 133 MMOL/L — LOW (ref 135–145)
SODIUM UR-SCNC: 63 MMOL/L — SIGNIFICANT CHANGE UP
SODIUM UR-SCNC: 63 MMOL/L — SIGNIFICANT CHANGE UP
SP GR SPEC: 1.02 — SIGNIFICANT CHANGE UP (ref 1–1.03)
SP GR SPEC: 1.02 — SIGNIFICANT CHANGE UP (ref 1–1.03)
SQUAMOUS # UR AUTO: 1 /HPF — SIGNIFICANT CHANGE UP (ref 0–5)
SQUAMOUS # UR AUTO: 1 /HPF — SIGNIFICANT CHANGE UP (ref 0–5)
TROPONIN T, HIGH SENSITIVITY RESULT: 64 NG/L — CRITICAL HIGH
TROPONIN T, HIGH SENSITIVITY RESULT: 64 NG/L — CRITICAL HIGH
UROBILINOGEN FLD QL: 4 MG/DL (ref 0.2–1)
UROBILINOGEN FLD QL: 4 MG/DL (ref 0.2–1)
UUN UR-MCNC: 667 MG/DL — SIGNIFICANT CHANGE UP
UUN UR-MCNC: 667 MG/DL — SIGNIFICANT CHANGE UP
WBC # BLD: 13.67 K/UL — HIGH (ref 3.8–10.5)
WBC # BLD: 13.67 K/UL — HIGH (ref 3.8–10.5)
WBC # BLD: 16.5 K/UL — HIGH (ref 3.8–10.5)
WBC # BLD: 16.5 K/UL — HIGH (ref 3.8–10.5)
WBC # FLD AUTO: 13.67 K/UL — HIGH (ref 3.8–10.5)
WBC # FLD AUTO: 13.67 K/UL — HIGH (ref 3.8–10.5)
WBC # FLD AUTO: 16.5 K/UL — HIGH (ref 3.8–10.5)
WBC # FLD AUTO: 16.5 K/UL — HIGH (ref 3.8–10.5)
WBC UR QL: 374 /HPF — HIGH (ref 0–5)
WBC UR QL: 374 /HPF — HIGH (ref 0–5)

## 2023-12-22 PROCEDURE — 99233 SBSQ HOSP IP/OBS HIGH 50: CPT

## 2023-12-22 PROCEDURE — 99232 SBSQ HOSP IP/OBS MODERATE 35: CPT

## 2023-12-22 PROCEDURE — 90792 PSYCH DIAG EVAL W/MED SRVCS: CPT

## 2023-12-22 RX ORDER — SODIUM CHLORIDE 9 MG/ML
250 INJECTION INTRAMUSCULAR; INTRAVENOUS; SUBCUTANEOUS
Refills: 0 | Status: DISCONTINUED | OUTPATIENT
Start: 2023-12-22 | End: 2023-12-22

## 2023-12-22 RX ORDER — SODIUM CHLORIDE 9 MG/ML
500 INJECTION, SOLUTION INTRAVENOUS ONCE
Refills: 0 | Status: COMPLETED | OUTPATIENT
Start: 2023-12-22 | End: 2023-12-22

## 2023-12-22 RX ORDER — SODIUM CHLORIDE 9 MG/ML
250 INJECTION INTRAMUSCULAR; INTRAVENOUS; SUBCUTANEOUS ONCE
Refills: 0 | Status: COMPLETED | OUTPATIENT
Start: 2023-12-22 | End: 2023-12-22

## 2023-12-22 RX ORDER — CLOPIDOGREL BISULFATE 75 MG/1
75 TABLET, FILM COATED ORAL DAILY
Refills: 0 | Status: DISCONTINUED | OUTPATIENT
Start: 2023-12-22 | End: 2024-01-03

## 2023-12-22 RX ADMIN — Medication 650 MILLIGRAM(S): at 00:11

## 2023-12-22 RX ADMIN — CLOPIDOGREL BISULFATE 75 MILLIGRAM(S): 75 TABLET, FILM COATED ORAL at 11:58

## 2023-12-22 RX ADMIN — APIXABAN 5 MILLIGRAM(S): 2.5 TABLET, FILM COATED ORAL at 22:32

## 2023-12-22 RX ADMIN — SODIUM CHLORIDE 50 MILLILITER(S): 9 INJECTION INTRAMUSCULAR; INTRAVENOUS; SUBCUTANEOUS at 07:03

## 2023-12-22 RX ADMIN — APIXABAN 5 MILLIGRAM(S): 2.5 TABLET, FILM COATED ORAL at 06:01

## 2023-12-22 RX ADMIN — SODIUM CHLORIDE 500 MILLILITER(S): 9 INJECTION, SOLUTION INTRAVENOUS at 14:42

## 2023-12-22 RX ADMIN — SODIUM CHLORIDE 250 MILLILITER(S): 9 INJECTION INTRAMUSCULAR; INTRAVENOUS; SUBCUTANEOUS at 01:45

## 2023-12-22 RX ADMIN — ATORVASTATIN CALCIUM 80 MILLIGRAM(S): 80 TABLET, FILM COATED ORAL at 22:32

## 2023-12-22 NOTE — ED ADULT NURSE REASSESSMENT NOTE - NS ED NURSE REASSESS COMMENT FT1
Pt received in on acute distress, resting calmly and comfortably, on continuous cardiac monitor with irregular rhythm, rate wnl, A&Ox2 person and place. Pt denies any symptoms at this time. Bed linen changed, diaper changed, and pt reposition. Pt to be served breakfast on arrival and pending bed assignment. Will continue to monitor.

## 2023-12-22 NOTE — BH CONSULTATION LIAISON ASSESSMENT NOTE - OTHER PAST PSYCHIATRIC HISTORY (INCLUDE DETAILS REGARDING ONSET, COURSE OF ILLNESS, INPATIENT/OUTPATIENT TREATMENT)
see Dr Benjamin Russo from [facility] [phone] see Dr Benjamin Russo from [facility] [phone]

## 2023-12-22 NOTE — ED ADULT NURSE REASSESSMENT NOTE - NSFALLRISKINTERV_ED_ALL_ED
Assistance OOB with selected safe patient handling equipment if applicable/Assistance with ambulation/Communicate fall risk and risk factors to all staff, patient, and family/Provide visual cue: yellow wristband, yellow gown, etc/Reinforce activity limits and safety measures with patient and family/Call bell, personal items and telephone in reach/Instruct patient to call for assistance before getting out of bed/chair/stretcher/Non-slip footwear applied when patient is off stretcher/[place] to call system/Physically safe environment - no spills, clutter or unnecessary equipment/Purposeful Proactive Rounding/Room/bathroom lighting operational, light cord in reach Assistance OOB with selected safe patient handling equipment if applicable/Assistance with ambulation/Communicate fall risk and risk factors to all staff, patient, and family/Provide visual cue: yellow wristband, yellow gown, etc/Reinforce activity limits and safety measures with patient and family/Call bell, personal items and telephone in reach/Instruct patient to call for assistance before getting out of bed/chair/stretcher/Non-slip footwear applied when patient is off stretcher/[place] to call system/Physically safe environment - no spills, clutter or unnecessary equipment/Purposeful Proactive Rounding/Room/bathroom lighting operational, light cord in reach

## 2023-12-22 NOTE — BH CONSULTATION LIAISON ASSESSMENT NOTE - NSBHATTESTBILLING_PSY_A_CORE
[id number] diagnostic evaluation with medical services [id number] diagnostic evaluation with medical services

## 2023-12-22 NOTE — ED ADULT NURSE REASSESSMENT NOTE - NS ED NURSE REASSESS COMMENT FT1
pt resting in stretcher, pt noted to have automatic BP of 88/48. Manual BP was taken of 82/48. ACP ERIC Manuel made aware. labs sent on pt. Awaiting further orders. pt denies chest pain, SOB, dizziness at this time. pt offers no complaints. RR are even and unlabored. Safety maintained.

## 2023-12-22 NOTE — PROGRESS NOTE ADULT - PROBLEM SELECTOR PLAN 6
wbc 17, no infectious symptoms.  - continue to monitor off abx wbc 17 on admission. no infectious symptoms.  - Obtain UA  - Obtain RVP w/ COVID   - CXR w/ clear lungs   - continue to monitor off abx

## 2023-12-22 NOTE — PROGRESS NOTE ADULT - PROBLEM SELECTOR PLAN 9
F/u lithium level  - restart lithium pending lithium level and renally dose if necessary Hold lithium  -Repeat level in the AM

## 2023-12-22 NOTE — BH CONSULTATION LIAISON ASSESSMENT NOTE - NSBHCHARTREVIEWVS_PSY_A_CORE FT
Vital Signs Last 24 Hrs  T(C): 36.9 (22 Dec 2023 12:27), Max: 37.3 (22 Dec 2023 01:22)  T(F): 98.5 (22 Dec 2023 12:27), Max: 99.2 (22 Dec 2023 01:22)  HR: 97 (22 Dec 2023 12:27) (60 - 97)  BP: 127/88 (22 Dec 2023 12:27) (78/63 - 127/88)  BP(mean): --  RR: 18 (22 Dec 2023 12:27) (16 - 22)  SpO2: 97% (22 Dec 2023 12:27) (94% - 100%)    Parameters below as of 22 Dec 2023 12:27  Patient On (Oxygen Delivery Method): room air

## 2023-12-22 NOTE — BH CONSULTATION LIAISON ASSESSMENT NOTE - MSE UNSTRUCTURED FT
Mental Status Exam:  Joselyn: fair-to-poor hygiene; alert, tracks with eyes  Behavior: grossly calm  Motor: bilateral tremor with intention, bilateral cogwheel rigidity  Gait: did not assess, pt in bed  Speech: normal rate, rhythm, prosody and volume   Mood: "okay"  Affect: euthymic, congruent  Thought process: impoverished   Thought Content: impoverished  Perception: denies AH/VH  SI: denies  HI: denies  Insight: limited  Judgment: limited    Cognitive Exam:  Orientation: AOx1.5  Recall: impaired  Attention: impaired

## 2023-12-22 NOTE — PROGRESS NOTE ADULT - SUBJECTIVE AND OBJECTIVE BOX
Patient seen and examined at bedside.    Overnight Events:   - No acute complaints this AM, denies CP or SOB  - BP low overnight, now stable    REVIEW OF SYSTEMS:  All other review of systems is negative unless indicated above.            Current Meds:  acetaminophen     Tablet .. 650 milliGRAM(s) Oral every 6 hours PRN  apixaban 5 milliGRAM(s) Oral two times a day  atorvastatin 80 milliGRAM(s) Oral at bedtime  clopidogrel Tablet 75 milliGRAM(s) Oral daily  sodium chloride 0.9%. 250 milliLiter(s) IV Continuous <Continuous>      Vitals:  T(F): 98.2 (12-22), Max: 99.2 (12-22)  HR: 82 (12-22) (60 - 83)  BP: 119/62 (12-22) (78/63 - 119/62)  RR: 16 (12-22)  SpO2: 96% (12-22)  I&O's Summary      Physical Exam:  Appearance: No acute distress; chroncially ill appearing  Eyes:  EOMI  HEENT: Normal oral mucosa  Cardiovascular: RRR, S1, S2, + I/VI systolic murmurs, rubs, or gallops; no edema; no JVD  Respiratory: Clear to auscultation bilaterally  Gastrointestinal: soft, non-tender, non-distended  Extremities: no lower extremity edema   Neurologic: Non-focal  Psychiatry: AAOx1-2, mood & affect appropriate                          12.0   16.50 )-----------( 147      ( 22 Dec 2023 01:24 )             36.1     12-22    133<L>  |  102  |  39<H>  ----------------------------<  93  5.5<H>   |  17<L>  |  1.35<H>    Ca    8.8      22 Dec 2023 03:58  Phos  3.2     12-22  Mg     1.80     12-22    TPro  8.0  /  Alb  4.1  /  TBili  1.2  /  DBili  x   /  AST  33  /  ALT  16  /  AlkPhos  89  12-21    PT/INR - ( 21 Dec 2023 15:10 )   PT: 27.5 sec;   INR: 2.50 ratio         PTT - ( 21 Dec 2023 15:10 )  PTT:34.3 sec  CARDIAC MARKERS ( 22 Dec 2023 01:00 )  x     / x     / 67 U/L / x     / 2.5 ng/mL

## 2023-12-22 NOTE — PROGRESS NOTE ADULT - PROBLEM SELECTOR PLAN 3
Cr elevated to 1.5 from baseline 1.0.  - will hold Entresto and aldactone for now and restart as able   - f/u urine studies  - bladder scan Cr elevated to 1.5 from baseline 1.0.  - will hold Entresto and aldactone for now and restart as able   -Obtain urine studies  - Gentle hydration  - bladder scan  - Avoid nephrotoxins  -Renally dose meds

## 2023-12-22 NOTE — BH CONSULTATION LIAISON ASSESSMENT NOTE - NSSUICRSKFACTOR_PSY_ALL_CORE
SUBJECTIVE:     Mode Burger is a 14 year old male, here for a routine health maintenance visit.    Patient was roomed by: Lisa A. Magill, CMA    Well Child    Social History  Forms to complete? No  Child lives with::  Mother  Languages spoken in the home:  English  Recent family changes/ special stressors?:  Death in the family    Safety / Health Risk    TB Exposure:     No TB exposure    Child always wear seatbelt?  Yes  Helmet worn for bicycle/roller blades/skateboard?  Yes    Home Safety Survey:      Firearms in the home?: YES          Are trigger locks present?  Yes        Is ammunition stored separately? Yes     Parents monitor screen use?  Yes     Daily Activities    Diet     Child gets at least 4 servings fruit or vegetables daily: NO    Servings of juice, non-diet soda, punch or sports drinks per day: 0-4    Sleep       Sleep concerns: no concerns- sleeps well through night     Bedtime: 23:00     Wake time on school day: 07:00     Sleep duration (hours): 8     Does your child have difficulty shutting off thoughts at night?: No   Does your child take day time naps?: No    Dental    Water source:  City water    Dental provider: patient has a dental home    Dental exam in last 6 months: Yes     Risks: a parent has had a cavity in past 3 years and child has or had a cavity    Media    TV in child's room: No    Types of media used: computer and computer/ video games    Daily use of media (hours): 10    School    Name of school: [employer]    Grade level: 8th    School performance: doing well in school    Grades: Typically A s    Schooling concerns? No    Days missed current/ last year: 60 (due to his dad dying unexpectedly)    Academic problems: no problems in reading and no problems in writing     Activities    Child gets at least 60 minutes per day of active play: NO    Activities: inactive and youth group    Organized/ Team sports: basketball  Sports physical needed: No            Dental visit  recommended: Dental home established, continue care every 6 months      Cardiac risk assessment:     Family history (males <55, females <65) of angina (chest pain), heart attack, heart surgery for clogged arteries, or stroke: Family history not known    Biological parent(s) with a total cholesterol over 240:  Family history not known  Dyslipidemia risk:    None    VISION    Corrective lenses: No corrective lenses (H Plus Lens Screening required)  Tool used: Mejias  Right eye: 10/10 (20/20)  Left eye: 10/10 (20/20)  Two Line Difference: No  Visual Acuity: Pass    Vision Assessment: normal      HEARING   Right Ear:      1000 Hz RESPONSE- on Level: 40 db (Conditioning sound)   1000 Hz: RESPONSE- on Level: tone not heard   2000 Hz: RESPONSE- on Level:   20 db    4000 Hz: RESPONSE- on Level:   20 db    6000 Hz: RESPONSE- on Level:   20 db     Left Ear:      6000 Hz: RESPONSE- on Level:   20 db    4000 Hz: RESPONSE- on Level:   20 db    2000 Hz: RESPONSE- on Level:   20 db    1000 Hz: RESPONSE- on Level: tone not heard     500 Hz: RESPONSE- on Level: tone not heard    Right Ear:       500 Hz: RESPONSE- on Level: tone not heard    Hearing Acuity: RESCREEN:  .    Hearing Assessment: abnormal--rescreen    PSYCHO-SOCIAL/DEPRESSION  General screening:  PSC-17 PASS (<15 pass), no followup necessary    Recent death of father (11/2020).  Pt reports coping well.  Family therapy.        PROBLEM LIST  There is no problem list on file for this patient.    MEDICATIONS  Current Outpatient Medications   Medication Sig Dispense Refill     doxycycline hyclate (VIBRAMYCIN) 100 MG capsule Take 1 capsule (100 mg) by mouth 2 times daily (Patient not taking: Reported on 2/19/2021) 60 capsule 2     imiquimod (ALDARA) 5 % external cream Apply a small sized amount to warts or molluscum three times weekly at bedtime.   Wash off after 8 hours.   May use for up to 16 weeks. (Patient not taking: Reported on 2/19/2021) 12 packet 3      ALLERGY  No  "Known Allergies    IMMUNIZATIONS  Immunization History   Administered Date(s) Administered     DTAP-IPV, <7Y 08/28/2012     FLU 6-35 months 10/29/2009, 10/14/2010, 10/22/2011, 10/31/2012     HPV9 01/08/2019     HepA-ped 2 Dose 07/08/2008     Influenza (H1N1) 11/06/2009, 01/13/2010     Influenza (IIV3) PF 10/14/2008, 11/21/2008, 10/29/2009     Influenza Intranasal Vaccine 11/22/2014     Influenza Vaccine IM > 6 months Valent IIV4 11/27/2016, 01/08/2019, 01/20/2020     MMR 08/28/2012     Meningococcal (Menveo ) 01/08/2019     TDAP Vaccine (Boostrix) 01/08/2019     Varicella 08/28/2012       HEALTH HISTORY SINCE LAST VISIT  No surgery, major illness or injury since last physical exam    DRUGS  Smoking:  no  Passive smoke exposure:  no  Alcohol:  no  Drugs:  no    SEXUALITY  Sexual activity: No    ROS  Constitutional, eye, ENT, skin, respiratory, cardiac, GI, MSK, neuro, and allergy are normal except as otherwise noted.    OBJECTIVE:   EXAM  /60 (BP Location: Right arm, Patient Position: Sitting, Cuff Size: Adult Regular)   Pulse 70   Temp 97.9  F (36.6  C) (Oral)   Resp 20   Ht 1.784 m (5' 10.25\")   Wt 66.1 kg (145 lb 12.8 oz)   SpO2 100%   BMI 20.77 kg/m    91 %ile (Z= 1.33) based on CDC (Boys, 2-20 Years) Stature-for-age data based on Stature recorded on 2/19/2021.  84 %ile (Z= 0.98) based on CDC (Boys, 2-20 Years) weight-for-age data using vitals from 2/19/2021.  66 %ile (Z= 0.40) based on CDC (Boys, 2-20 Years) BMI-for-age based on BMI available as of 2/19/2021.  Blood pressure reading is in the normal blood pressure range based on the 2017 AAP Clinical Practice Guideline.  GENERAL: Active, alert, in no acute distress.  SKIN: Clear. No significant rash, abnormal pigmentation or lesions  HEAD: Normocephalic  EYES: Pupils equal, round, reactive, Extraocular muscles intact. Normal conjunctivae.  EARS: Normal canals. Tympanic membranes are normal; gray and translucent.  NOSE: Normal without " discharge.  MOUTH/THROAT: Clear. No oral lesions. Teeth without obvious abnormalities.  NECK: Supple, no masses.  No thyromegaly.  LYMPH NODES: No adenopathy  LUNGS: Clear. No rales, rhonchi, wheezing or retractions  HEART: Regular rhythm. Normal S1/S2. No murmurs. Normal pulses.  ABDOMEN: Soft, non-tender, not distended, no masses or hepatosplenomegaly. Bowel sounds normal.   NEUROLOGIC: No focal findings. Cranial nerves grossly intact: DTR's normal. Normal gait, strength and tone  BACK: Spine is straight, no scoliosis.  EXTREMITIES: Full range of motion, no deformities  -M: Normal male external genitalia. Pa stage 4,  both testes descended, no hernia.      ASSESSMENT/PLAN:   1. Acne vulgaris  - DERMATOLOGY PEDS REFERRAL; Future    2. Encounter for routine child health examination without abnormal findings        Anticipatory Guidance  Reviewed Anticipatory Guidance in patient instructions    Preventive Care Plan  Immunizations    Calling his school to see about records.  He should be up to date.  Referrals/Ongoing Specialty care: No   See other orders in [facility].  Cleared for sports:  Yes  BMI at 66 %ile (Z= 0.40) based on CDC (Boys, 2-20 Years) BMI-for-age based on BMI available as of 2/19/2021.  No weight concerns.    FOLLOW-UP:     in 1 year for a Preventive Care visit    Resources  HPV and Cancer Prevention:  What Parents Should Know  What Kids Should Know About HPV and Cancer  Goal Tracker: Be More Active  Goal Tracker: Less Screen Time  Goal Tracker: Drink More Water  Goal Tracker: Eat More Fruits and Veggies  [place] Child and Teen Checkups (C&TC) Schedule of Age-Related Screening Standards    JUAN CARLOS [facility]   Current and Past Psychiatric Diagnoses

## 2023-12-22 NOTE — BH CONSULTATION LIAISON ASSESSMENT NOTE - SUMMARY
83 yr old male with PPH significant for bipolar disorder (on lithium 150mg BID) and dementia and PMH significant for AD s/p PCI to LAD in the past (known multi-vessel disease noted on recent LHC, with decision to treat medically due to patient/family preference), ICM/HFrEF (EF 29%), severe MR, LV thrombus, and a. fib on Eliquis, who presents from [facility] for chest pain.   OF NOTE- PATIENT HAD RECENT ADMISSION TO [facility] UNDER DIFFERENT MRN [id number].  Psych CL consulted to assist with medication as his lithium level was supratherapeutic at 1.3.    Patient delirious on top of dementia    [] Observation- defer to team but consider enhanced vs 1:1 for delirium on top of dementia  [] hold lithium       [] daily lithium levels, CMPs  [] if severely agitated- can use ativan 0.25mg q8h prn for true severe agitation; avoiding haldol due to EPS symptoms 83 yr old male with PPH significant for bipolar disorder (on lithium 150mg BID) and dementia and PMH significant for AD s/p PCI to LAD in the past (known multi-vessel disease noted on recent LHC, with decision to treat medically due to patient/family preference), ICM/HFrEF (EF 29%), severe MR, LV thrombus, and a. fib on Eliquis, who presents from [facility] for chest pain.   OF NOTE- PATIENT HAD RECENT ADMISSION TO [facility] UNDER DIFFERENT MRN [id number].  Psych CL consulted to assist with medication as his lithium level was supratherapeutic at 1.3.    Patient delirious on top of dementia    [] Observation- defer to team but consider enhanced vs 1:1 for delirium on top of dementia  [] hold lithium       [] daily lithium levels, CMPs  [] if severely agitated- can use ativan 0.25mg q8h prn for true severe agitation; avoiding haldol due to EPS symptoms

## 2023-12-22 NOTE — PROGRESS NOTE ADULT - PROBLEM SELECTOR PLAN 2
S/p PCI to LAD and known multivessel disease on [facility] last month.   - c/w plavix, atorvastatin and Eliquis S/p PCI to LAD and known multivessel disease on [facility] last month.   - c/w plavix, atorvastatin and Eliquis

## 2023-12-22 NOTE — ED ADULT NURSE REASSESSMENT NOTE - NS ED NURSE REASSESS COMMENT FT1
report given from nurse , patient laying in semi fowlers position on the stretcher. patient alert and oriented times four. patient denies shortness of breath, chest pain, nausea, vomiting, chill, fever. Patient normal sinus on the monitor. Respirations equal and adequate. Patients IV patent, no signs of infiltration. Safety measures in place, call bell within reach. Patient stable upon assessment. Patients vital signs unremarkable, patient tolerating fluids well. report given from nurse , patient laying in semi fowlers position on the stretcher. patient alert and oriented times three. patient denies shortness of breath, chest pain, nausea, vomiting, chill, fever. Patient normal sinus on the monitor. Respirations equal and adequate. Patients IV patent, no signs of infiltration. Safety measures in place, call bell within reach. Patient stable upon assessment. Patients vital signs unremarkable, patient tolerating fluids well.

## 2023-12-22 NOTE — PROGRESS NOTE ADULT - PROBLEM SELECTOR PLAN 1
Pt presenting with chest pain, currently resolved. Previous admission at [facility] last month for NSTEM s/p LHC w/ known multivessel disease with decision to treat medically per pt/family preference. Seen by cardiology and recommend continuing medical management w/ no plans for cardiac testing.  - c/w Plavix   - EKG w/ LBBB, unchanged from prior  - monitor on tele  - Trops 54-->69-->64  - Appreciate cardio input Pt presenting with chest pain, currently resolved. Previous admission at [facility] last month for NSTEM s/p LHC w/ known multivessel disease with decision to treat medically per pt/family preference. Seen by cardiology and recommend continuing medical management w/ no plans for cardiac testing.  - c/w Plavix   - EKG w/ LBBB, unchanged from prior  - monitor on tele  - Trops 54-->69-->64  - Appreciate cardio input

## 2023-12-22 NOTE — BH CONSULTATION LIAISON ASSESSMENT NOTE - CURRENT MEDICATION
MEDICATIONS  (STANDING):  apixaban 5 milliGRAM(s) Oral two times a day  atorvastatin 80 milliGRAM(s) Oral at bedtime  clopidogrel Tablet 75 milliGRAM(s) Oral daily    MEDICATIONS  (PRN):  acetaminophen     Tablet .. 650 milliGRAM(s) Oral every 6 hours PRN Temp greater or equal to 38C (100.4F), Mild Pain (1 - 3)

## 2023-12-22 NOTE — PROGRESS NOTE ADULT - TIME BILLING
reviewing laboratory data, consultants' recommendations, documentation in [place], performing medically appropriate examinations/evaluations, discussion with patient/family/RN/ACP/Residents and interdisciplinary staff (such as , social workers, etc), counseling patient/family/care giver, ordering medically appropriate medication, tests, or procedures reviewing laboratory data, consultants' recommendations, documentation in [place], performing medically appropriate examinations/evaluations, discussion with patient/family/RN/ACP/Residents and interdisciplinary staff (such as , social workers, etc), counseling patient/family/care giver, ordering medically appropriate medication, tests, or procedures

## 2023-12-22 NOTE — BH CONSULTATION LIAISON ASSESSMENT NOTE - NSBHCHARTREVIEWLAB_PSY_A_CORE FT
12.7   13.67 )-----------( 177      ( 22 Dec 2023 13:20 )             38.6   12-22    132<L>  |  101  |  35<H>  ----------------------------<  111<H>  4.8   |  16<L>  |  1.35<H>    Ca    9.4      22 Dec 2023 13:20  Phos  2.8     12-22  Mg     1.80     12-22    TPro  8.0  /  Alb  4.1  /  TBili  1.2  /  DBili  x   /  AST  33  /  ALT  16  /  AlkPhos  89  12-21  Lithium Level, Serum (12.22.23 @ 13:20)    Lithium Level, Serum: 1.2 mmol/L

## 2023-12-22 NOTE — BH CONSULTATION LIAISON ASSESSMENT NOTE - HPI (INCLUDE ILLNESS QUALITY, SEVERITY, DURATION, TIMING, CONTEXT, MODIFYING FACTORS, ASSOCIATED SIGNS AND SYMPTOMS)
83 yr old male with PPH significant for bipolar disorder (on lithium 150mg BID) and dementia and PMH significant for AD s/p PCI to LAD in the past (known multi-vessel disease noted on recent LHC, with decision to treat medically due to patient/family preference), ICM/HFrEF (EF 29%), severe MR, LV thrombus, and a. fib on Eliquis, who presents from [facility] for chest pain.   OF NOTE- PATIENT HAD RECENT ADMISSION TO [facility] UNDER DIFFERENT MRN [id number].  Psych CL consulted to assist with medication as his lithium level was supratherapeutic at 1.3.    Patient seen in ER. He is calm and alert, he knows his name. He does not know where he is but when given choices he says hospital. Denies SI/HI. Denies AH/VH. Interview is limited. Poor attention. Does not know the date.    I spoke with nephew Johny- patient recently lost his wife. Of note he can be verbally aggressive. 83 yr old male with PPH significant for bipolar disorder (on lithium 150mg BID) and dementia and PMH significant for AD s/p PCI to LAD in the past (known multi-vessel disease noted on recent LHC, with decision to treat medically due to patient/family preference), ICM/HFrEF (EF 29%), severe MR, LV thrombus, and a. fib on Eliquis, who presents from [facility] for chest pain.   OF NOTE- PATIENT HAD RECENT ADMISSION TO [facility] UNDER DIFFERENT MRN [id number].  Psych CL consulted to assist with medication as his lithium level was supratherapeutic at 1.3.    Patient seen in ER. He is calm and alert, he knows his name. He does not know where he is but when given choices he says hospital. Denies SI/HI. Denies AH/VH. Interview is limited. Poor attention. Does not know the date.    I spoke with nephew Johny- patient recently lost his wife. Of note he can be verbally aggressive.

## 2023-12-22 NOTE — ED ADULT NURSE REASSESSMENT NOTE - NS ED NURSE REASSESS COMMENT FT1
patient tolerated sodium chloride well. Patients blood pressure improved. ACP PA walker made aware.  patient laying in semi fowlers position on the stretcher. patient alert and oriented times four. patient denies shortness of breath, chest pain, nausea, vomiting, chill, fever. Patient normal sinus on the monitor. Respirations equal and adequate. Patients IV patent, no signs of infiltration. Safety measures in place, call bell within reach. Patient stable upon assessment

## 2023-12-22 NOTE — PROGRESS NOTE ADULT - SUBJECTIVE AND OBJECTIVE BOX
Eveline Cervantes        Patient is a 83y old  Male who presents with a chief complaint of chest pain (22 Dec 2023 11:34)      SUBJECTIVE / OVERNIGHT EVENTS: No acute overnight events. This morning pt states he is "ok". States chest is 'better'. Hard of hearing w/ some dysarthria so unable to fully participate in interview         MEDICATIONS  (STANDING):  apixaban 5 milliGRAM(s) Oral two times a day  atorvastatin 80 milliGRAM(s) Oral at bedtime  clopidogrel Tablet 75 milliGRAM(s) Oral daily  sodium chloride 0.9%. 250 milliLiter(s) (50 mL/Hr) IV Continuous <Continuous>    MEDICATIONS  (PRN):  acetaminophen     Tablet .. 650 milliGRAM(s) Oral every 6 hours PRN Temp greater or equal to 38C (100.4F), Mild Pain (1 - 3)    Allergies    No Known Allergies    Intolerances        Vital Signs Last 24 Hrs  T(C): 36.9 (22 Dec 2023 12:27), Max: 37.3 (22 Dec 2023 01:22)  T(F): 98.5 (22 Dec 2023 12:27), Max: 99.2 (22 Dec 2023 01:22)  HR: 97 (22 Dec 2023 12:27) (60 - 97)  BP: 127/88 (22 Dec 2023 12:27) (78/63 - 127/88)  BP(mean): --  RR: 18 (22 Dec 2023 12:27) (16 - 22)  SpO2: 97% (22 Dec 2023 12:27) (94% - 100%)    Parameters below as of 22 Dec 2023 12:27  Patient On (Oxygen Delivery Method): room air      Daily     Daily   CAPILLARY BLOOD GLUCOSE        I&O's Summary      PHYSICAL EXAM:  GENERAL: NAD  HEAD:  Atraumatic, Normocephalic  EYES: EOMI,  conjunctiva and sclera clear  NECK: Supple  CHEST/LUNG: Clear to auscultation bilaterally; No wheeze, crackles or rhonchi   HEART: Regular rate and rhythm; Normal S1 S2, No murmurs, rubs, or gallops  ABDOMEN: Soft, Nontender, Nondistended; Bowel sounds present  EXTREMITIES:  2+ Peripheral Pulses, No edema  PSYCH: AAOx1-2  NEUROLOGY: SROM, +Mild dysarthria   SKIN: Warm and dry    LABS:                        12.0   16.50 )-----------( 147      ( 22 Dec 2023 01:24 )             36.1     Hgb Trend: 12.0<--, 13.8<--  12-22    133<L>  |  102  |  39<H>  ----------------------------<  93  5.5<H>   |  17<L>  |  1.35<H>    Ca    8.8      22 Dec 2023 03:58  Phos  3.2     12-22  Mg     1.80     12-22    TPro  8.0  /  Alb  4.1  /  TBili  1.2  /  DBili  x   /  AST  33  /  ALT  16  /  AlkPhos  89  12-21    Creatinine Trend: 1.35<--, 1.37<--, 1.50<--, 1.31<--  LIVER FUNCTIONS - ( 21 Dec 2023 15:43 )  Alb: 4.1 g/dL / Pro: 8.0 g/dL / ALK PHOS: 89 U/L / ALT: 16 U/L / AST: 33 U/L / GGT: x           PT/INR - ( 21 Dec 2023 15:10 )   PT: 27.5 sec;   INR: 2.50 ratio         PTT - ( 21 Dec 2023 15:10 )  PTT:34.3 sec  CARDIAC MARKERS ( 22 Dec 2023 01:00 )  x     / x     / 67 U/L / x     / 2.5 ng/mL      Urinalysis Basic - ( 22 Dec 2023 03:58 )    Color: x / Appearance: x / SG: x / pH: x  Gluc: 93 mg/dL / Ketone: x  / Bili: x / Urobili: x   Blood: x / Protein: x / Nitrite: x   Leuk Esterase: x / RBC: x / WBC x   Sq Epi: x / Non Sq Epi: x / Bacteria: x        RADIOLOGY & ADDITIONAL TESTS:    Imaging Personally Reviewed.    Consultant(s) Notes Reviewed.    Care Discussed with Consultants/Other Providers.

## 2023-12-22 NOTE — PROGRESS NOTE ADULT - ASSESSMENT
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 fib on Eliquis, who presents from [facility] for chest pain, Trops elevated. Pt seen and evaluated by cardiology and recommending continued medical management.    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 fib on Eliquis, who presents from [facility] for chest pain, Trops elevated. Pt seen and evaluated by cardiology and recommending continued medical management.

## 2023-12-22 NOTE — PROGRESS NOTE ADULT - ASSESSMENT
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 fib on Eliquis, who presents from [facility] for chest pain, noted to have mildly elevated trops in ED with stable vitals and no ischemic changes on EKG. He denies current chest pain. ED disucssed with surrogate, patient to remain DNR/DNI and they do not want any invasive procedures. Will continue with medical management.      Recs:  - Continue home Eliquis and Plavix for CAD/A. fib/LV thrombus  - Can hold home GDMT in s/o ENMANUEL and low BP: entresto 24-26, aldactone 25 mg - to resume as BP and BMP allow  - beta blocker held during recent admission due to bradycardia and pauses - monitor off for now  - Tele monitoring    Cardiology to sign-off, please call back with any questions.    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 fib on Eliquis, who presents from [facility] for chest pain, noted to have mildly elevated trops in ED with stable vitals and no ischemic changes on EKG. He denies current chest pain. ED disucssed with surrogate, patient to remain DNR/DNI and they do not want any invasive procedures. Will continue with medical management.      Recs:  - Continue home Eliquis and Plavix for CAD/A. fib/LV thrombus  - Can hold home GDMT in s/o ENMANUEL and low BP: entresto 24-26, aldactone 25 mg - to resume as BP and BMP allow  - beta blocker held during recent admission due to bradycardia and pauses - monitor off for now  - Tele monitoring    Cardiology to sign-off, please call back with any questions.    Please see attending attestation for final recommendations        Eduar Meyers MD  Cardiology Fellow     All Cardiology service information can be found 24/7 on [email or web address], password: [email or web address]

## 2023-12-23 DIAGNOSIS — R50.9 FEVER, UNSPECIFIED: ICD-10-CM

## 2023-12-23 LAB
ANION GAP SERPL CALC-SCNC: 11 MMOL/L — SIGNIFICANT CHANGE UP (ref 7–14)
ANION GAP SERPL CALC-SCNC: 11 MMOL/L — SIGNIFICANT CHANGE UP (ref 7–14)
B PERT DNA SPEC QL NAA+PROBE: SIGNIFICANT CHANGE UP
B PERT DNA SPEC QL NAA+PROBE: SIGNIFICANT CHANGE UP
B PERT+PARAPERT DNA PNL SPEC NAA+PROBE: SIGNIFICANT CHANGE UP
B PERT+PARAPERT DNA PNL SPEC NAA+PROBE: SIGNIFICANT CHANGE UP
BORDETELLA PARAPERTUSSIS (RAPRVP): SIGNIFICANT CHANGE UP
BORDETELLA PARAPERTUSSIS (RAPRVP): SIGNIFICANT CHANGE UP
BUN SERPL-MCNC: 36 MG/DL — HIGH (ref 7–23)
BUN SERPL-MCNC: 36 MG/DL — HIGH (ref 7–23)
C PNEUM DNA SPEC QL NAA+PROBE: SIGNIFICANT CHANGE UP
C PNEUM DNA SPEC QL NAA+PROBE: SIGNIFICANT CHANGE UP
CALCIUM SERPL-MCNC: 9.6 MG/DL — SIGNIFICANT CHANGE UP (ref 8.4–10.5)
CALCIUM SERPL-MCNC: 9.6 MG/DL — SIGNIFICANT CHANGE UP (ref 8.4–10.5)
CHLORIDE SERPL-SCNC: 99 MMOL/L — SIGNIFICANT CHANGE UP (ref 98–107)
CHLORIDE SERPL-SCNC: 99 MMOL/L — SIGNIFICANT CHANGE UP (ref 98–107)
CO2 SERPL-SCNC: 21 MMOL/L — LOW (ref 22–31)
CO2 SERPL-SCNC: 21 MMOL/L — LOW (ref 22–31)
CREAT SERPL-MCNC: 1.33 MG/DL — HIGH (ref 0.5–1.3)
CREAT SERPL-MCNC: 1.33 MG/DL — HIGH (ref 0.5–1.3)
EGFR: 53 ML/MIN/1.73M2 — LOW
EGFR: 53 ML/MIN/1.73M2 — LOW
FLUAV SUBTYP SPEC NAA+PROBE: SIGNIFICANT CHANGE UP
FLUAV SUBTYP SPEC NAA+PROBE: SIGNIFICANT CHANGE UP
FLUBV RNA SPEC QL NAA+PROBE: SIGNIFICANT CHANGE UP
FLUBV RNA SPEC QL NAA+PROBE: SIGNIFICANT CHANGE UP
GI PCR PANEL: SIGNIFICANT CHANGE UP
GI PCR PANEL: SIGNIFICANT CHANGE UP
GLUCOSE SERPL-MCNC: 98 MG/DL — SIGNIFICANT CHANGE UP (ref 70–99)
GLUCOSE SERPL-MCNC: 98 MG/DL — SIGNIFICANT CHANGE UP (ref 70–99)
HADV DNA SPEC QL NAA+PROBE: SIGNIFICANT CHANGE UP
HADV DNA SPEC QL NAA+PROBE: SIGNIFICANT CHANGE UP
HCOV 229E RNA SPEC QL NAA+PROBE: SIGNIFICANT CHANGE UP
HCOV 229E RNA SPEC QL NAA+PROBE: SIGNIFICANT CHANGE UP
HCOV HKU1 RNA SPEC QL NAA+PROBE: SIGNIFICANT CHANGE UP
HCOV HKU1 RNA SPEC QL NAA+PROBE: SIGNIFICANT CHANGE UP
HCOV NL63 RNA SPEC QL NAA+PROBE: SIGNIFICANT CHANGE UP
HCOV NL63 RNA SPEC QL NAA+PROBE: SIGNIFICANT CHANGE UP
HCOV OC43 RNA SPEC QL NAA+PROBE: SIGNIFICANT CHANGE UP
HCOV OC43 RNA SPEC QL NAA+PROBE: SIGNIFICANT CHANGE UP
HCT VFR BLD CALC: 39.4 % — SIGNIFICANT CHANGE UP (ref 39–50)
HCT VFR BLD CALC: 39.4 % — SIGNIFICANT CHANGE UP (ref 39–50)
HGB BLD-MCNC: 12.8 G/DL — LOW (ref 13–17)
HGB BLD-MCNC: 12.8 G/DL — LOW (ref 13–17)
HMPV RNA SPEC QL NAA+PROBE: SIGNIFICANT CHANGE UP
HMPV RNA SPEC QL NAA+PROBE: SIGNIFICANT CHANGE UP
HPIV1 RNA SPEC QL NAA+PROBE: SIGNIFICANT CHANGE UP
HPIV1 RNA SPEC QL NAA+PROBE: SIGNIFICANT CHANGE UP
HPIV2 RNA SPEC QL NAA+PROBE: SIGNIFICANT CHANGE UP
HPIV2 RNA SPEC QL NAA+PROBE: SIGNIFICANT CHANGE UP
HPIV3 RNA SPEC QL NAA+PROBE: SIGNIFICANT CHANGE UP
HPIV3 RNA SPEC QL NAA+PROBE: SIGNIFICANT CHANGE UP
HPIV4 RNA SPEC QL NAA+PROBE: SIGNIFICANT CHANGE UP
HPIV4 RNA SPEC QL NAA+PROBE: SIGNIFICANT CHANGE UP
LACTATE SERPL-SCNC: 0.9 MMOL/L — SIGNIFICANT CHANGE UP (ref 0.5–2)
LACTATE SERPL-SCNC: 0.9 MMOL/L — SIGNIFICANT CHANGE UP (ref 0.5–2)
LITHIUM SERPL-MCNC: 1.1 MMOL/L — SIGNIFICANT CHANGE UP (ref 0.6–1.2)
LITHIUM SERPL-MCNC: 1.1 MMOL/L — SIGNIFICANT CHANGE UP (ref 0.6–1.2)
M PNEUMO DNA SPEC QL NAA+PROBE: SIGNIFICANT CHANGE UP
M PNEUMO DNA SPEC QL NAA+PROBE: SIGNIFICANT CHANGE UP
MAGNESIUM SERPL-MCNC: 1.9 MG/DL — SIGNIFICANT CHANGE UP (ref 1.6–2.6)
MAGNESIUM SERPL-MCNC: 1.9 MG/DL — SIGNIFICANT CHANGE UP (ref 1.6–2.6)
MCHC RBC-ENTMCNC: 30.8 PG — SIGNIFICANT CHANGE UP (ref 27–34)
MCHC RBC-ENTMCNC: 30.8 PG — SIGNIFICANT CHANGE UP (ref 27–34)
MCHC RBC-ENTMCNC: 32.5 GM/DL — SIGNIFICANT CHANGE UP (ref 32–36)
MCHC RBC-ENTMCNC: 32.5 GM/DL — SIGNIFICANT CHANGE UP (ref 32–36)
MCV RBC AUTO: 94.9 FL — SIGNIFICANT CHANGE UP (ref 80–100)
MCV RBC AUTO: 94.9 FL — SIGNIFICANT CHANGE UP (ref 80–100)
NRBC # BLD: 0 /100 WBCS — SIGNIFICANT CHANGE UP (ref 0–0)
NRBC # BLD: 0 /100 WBCS — SIGNIFICANT CHANGE UP (ref 0–0)
NRBC # FLD: 0 K/UL — SIGNIFICANT CHANGE UP (ref 0–0)
NRBC # FLD: 0 K/UL — SIGNIFICANT CHANGE UP (ref 0–0)
OSMOLALITY UR: 523 MOSM/KG — SIGNIFICANT CHANGE UP (ref 50–1200)
OSMOLALITY UR: 523 MOSM/KG — SIGNIFICANT CHANGE UP (ref 50–1200)
PHOSPHATE SERPL-MCNC: 3 MG/DL — SIGNIFICANT CHANGE UP (ref 2.5–4.5)
PHOSPHATE SERPL-MCNC: 3 MG/DL — SIGNIFICANT CHANGE UP (ref 2.5–4.5)
PLATELET # BLD AUTO: 151 K/UL — SIGNIFICANT CHANGE UP (ref 150–400)
PLATELET # BLD AUTO: 151 K/UL — SIGNIFICANT CHANGE UP (ref 150–400)
POTASSIUM SERPL-MCNC: 4.7 MMOL/L — SIGNIFICANT CHANGE UP (ref 3.5–5.3)
POTASSIUM SERPL-MCNC: 4.7 MMOL/L — SIGNIFICANT CHANGE UP (ref 3.5–5.3)
POTASSIUM SERPL-SCNC: 4.7 MMOL/L — SIGNIFICANT CHANGE UP (ref 3.5–5.3)
POTASSIUM SERPL-SCNC: 4.7 MMOL/L — SIGNIFICANT CHANGE UP (ref 3.5–5.3)
PROCALCITONIN SERPL-MCNC: 0.76 NG/ML — HIGH (ref 0.02–0.1)
PROCALCITONIN SERPL-MCNC: 0.76 NG/ML — HIGH (ref 0.02–0.1)
RAPID RVP RESULT: SIGNIFICANT CHANGE UP
RAPID RVP RESULT: SIGNIFICANT CHANGE UP
RBC # BLD: 4.15 M/UL — LOW (ref 4.2–5.8)
RBC # BLD: 4.15 M/UL — LOW (ref 4.2–5.8)
RBC # FLD: 15.5 % — HIGH (ref 10.3–14.5)
RBC # FLD: 15.5 % — HIGH (ref 10.3–14.5)
RSV RNA SPEC QL NAA+PROBE: SIGNIFICANT CHANGE UP
RSV RNA SPEC QL NAA+PROBE: SIGNIFICANT CHANGE UP
RV+EV RNA SPEC QL NAA+PROBE: SIGNIFICANT CHANGE UP
RV+EV RNA SPEC QL NAA+PROBE: SIGNIFICANT CHANGE UP
SARS-COV-2 RNA SPEC QL NAA+PROBE: SIGNIFICANT CHANGE UP
SARS-COV-2 RNA SPEC QL NAA+PROBE: SIGNIFICANT CHANGE UP
SODIUM SERPL-SCNC: 131 MMOL/L — LOW (ref 135–145)
SODIUM SERPL-SCNC: 131 MMOL/L — LOW (ref 135–145)
SODIUM UR-SCNC: 77 MMOL/L — SIGNIFICANT CHANGE UP
SODIUM UR-SCNC: 77 MMOL/L — SIGNIFICANT CHANGE UP
WBC # BLD: 9.51 K/UL — SIGNIFICANT CHANGE UP (ref 3.8–10.5)
WBC # BLD: 9.51 K/UL — SIGNIFICANT CHANGE UP (ref 3.8–10.5)
WBC # FLD AUTO: 9.51 K/UL — SIGNIFICANT CHANGE UP (ref 3.8–10.5)
WBC # FLD AUTO: 9.51 K/UL — SIGNIFICANT CHANGE UP (ref 3.8–10.5)

## 2023-12-23 PROCEDURE — 71045 X-RAY EXAM CHEST 1 VIEW: CPT | Mod: 26

## 2023-12-23 PROCEDURE — 99233 SBSQ HOSP IP/OBS HIGH 50: CPT

## 2023-12-23 RX ORDER — ACETAMINOPHEN 500 MG
1000 TABLET ORAL ONCE
Refills: 0 | Status: COMPLETED | OUTPATIENT
Start: 2023-12-23 | End: 2023-12-23

## 2023-12-23 RX ORDER — CEFTRIAXONE 500 MG/1
1000 INJECTION, POWDER, FOR SOLUTION INTRAMUSCULAR; INTRAVENOUS EVERY 24 HOURS
Refills: 0 | Status: COMPLETED | OUTPATIENT
Start: 2023-12-24 | End: 2023-12-26

## 2023-12-23 RX ORDER — CEFTRIAXONE 500 MG/1
1000 INJECTION, POWDER, FOR SOLUTION INTRAMUSCULAR; INTRAVENOUS ONCE
Refills: 0 | Status: COMPLETED | OUTPATIENT
Start: 2023-12-23 | End: 2023-12-23

## 2023-12-23 RX ORDER — CEFTRIAXONE 500 MG/1
INJECTION, POWDER, FOR SOLUTION INTRAMUSCULAR; INTRAVENOUS
Refills: 0 | Status: COMPLETED | OUTPATIENT
Start: 2023-12-23 | End: 2023-12-27

## 2023-12-23 RX ORDER — SODIUM CHLORIDE 9 MG/ML
1000 INJECTION INTRAMUSCULAR; INTRAVENOUS; SUBCUTANEOUS
Refills: 0 | Status: DISCONTINUED | OUTPATIENT
Start: 2023-12-23 | End: 2023-12-24

## 2023-12-23 RX ADMIN — CEFTRIAXONE 100 MILLIGRAM(S): 500 INJECTION, POWDER, FOR SOLUTION INTRAMUSCULAR; INTRAVENOUS at 09:30

## 2023-12-23 RX ADMIN — APIXABAN 5 MILLIGRAM(S): 2.5 TABLET, FILM COATED ORAL at 06:00

## 2023-12-23 RX ADMIN — APIXABAN 5 MILLIGRAM(S): 2.5 TABLET, FILM COATED ORAL at 17:35

## 2023-12-23 RX ADMIN — ATORVASTATIN CALCIUM 80 MILLIGRAM(S): 80 TABLET, FILM COATED ORAL at 22:34

## 2023-12-23 RX ADMIN — SODIUM CHLORIDE 65 MILLILITER(S): 9 INJECTION INTRAMUSCULAR; INTRAVENOUS; SUBCUTANEOUS at 09:32

## 2023-12-23 RX ADMIN — Medication 650 MILLIGRAM(S): at 17:35

## 2023-12-23 RX ADMIN — Medication 1000 MILLIGRAM(S): at 17:00

## 2023-12-23 RX ADMIN — Medication 400 MILLIGRAM(S): at 12:32

## 2023-12-23 RX ADMIN — CLOPIDOGREL BISULFATE 75 MILLIGRAM(S): 75 TABLET, FILM COATED ORAL at 12:32

## 2023-12-23 NOTE — PHYSICAL THERAPY INITIAL EVALUATION ADULT - PATIENT PROFILE REVIEW, REHAB EVAL
Activity - Ambulate as tolerated. Pt cleared for PT evaluation prior to session by ERNESTO Rothman./yes

## 2023-12-23 NOTE — PROGRESS NOTE ADULT - PROBLEM SELECTOR PLAN 3
S/p PCI to LAD and known multivessel disease on [facility] last month.   - c/w plavix, atorvastatin and Eliquis S/p PCI to LAD and known multivessel disease on [facility] last month.   - c/w plavix, atorvastatin and Eliquis

## 2023-12-23 NOTE — PROGRESS NOTE ADULT - SUBJECTIVE AND OBJECTIVE BOX
Eveline Cervantes        Patient is a 83y old  Male who presents with a chief complaint of chest pain (22 Dec 2023 13:48)      SUBJECTIVE / OVERNIGHT EVENTS: No acute overnight events. This morning pt doing "okay." When asked about fevers, pt states "that's what they tell me." Has not noticed any fevers or diarrhea. Per RN pt w/ loose stools. Pt w/o complaints this morning        MEDICATIONS  (STANDING):  apixaban 5 milliGRAM(s) Oral two times a day  atorvastatin 80 milliGRAM(s) Oral at bedtime  cefTRIAXone   IVPB      clopidogrel Tablet 75 milliGRAM(s) Oral daily  sodium chloride 0.9%. 1000 milliLiter(s) (65 mL/Hr) IV Continuous <Continuous>    MEDICATIONS  (PRN):  acetaminophen     Tablet .. 650 milliGRAM(s) Oral every 6 hours PRN Temp greater or equal to 38C (100.4F), Mild Pain (1 - 3)  LORazepam   Injectable 0.25 milliGRAM(s) IV Push every 8 hours PRN Agitation    Allergies    No Known Allergies    Intolerances        Vital Signs Last 24 Hrs  T(C): 38.4 (23 Dec 2023 12:02), Max: 38.4 (23 Dec 2023 12:02)  T(F): 101.1 (23 Dec 2023 12:02), Max: 101.1 (23 Dec 2023 12:02)  HR: 89 (23 Dec 2023 12:02) (68 - 89)  BP: 97/47 (23 Dec 2023 12:02) (97/47 - 116/50)  BP(mean): --  RR: 17 (23 Dec 2023 12:02) (16 - 17)  SpO2: 96% (23 Dec 2023 12:02) (95% - 96%)    Parameters below as of 23 Dec 2023 12:02  Patient On (Oxygen Delivery Method): room air      Daily     Daily   CAPILLARY BLOOD GLUCOSE        I&O's Summary      PHYSICAL EXAM:  GENERAL: NAD  HEAD:  Atraumatic, Normocephalic  EYES: EOMI,  conjunctiva and sclera clear  NECK: Supple  CHEST/LUNG: Clear to auscultation bilaterally; No wheeze, crackles or rhonchi   HEART: Regular rate and rhythm; Normal S1 S2, No murmurs, rubs, or gallops  ABDOMEN: Soft, Nontender, Nondistended; Bowel sounds present  EXTREMITIES:  2+ Peripheral Pulses, No edema  PSYCH: AAOx1  NEUROLOGY: SROM, +Mild dysarthria , + LUE resting tremor   SKIN: Warm and dry      LABS:                        12.8   9.51  )-----------( 151      ( 23 Dec 2023 07:10 )             39.4     Hgb Trend: 12.8<--, 12.7<--, 12.0<--, 13.8<--  12-23    131<L>  |  99  |  36<H>  ----------------------------<  98  4.7   |  21<L>  |  1.33<H>    Ca    9.6      23 Dec 2023 07:10  Phos  3.0     12-23  Mg     1.90     12-23      Creatinine Trend: 1.33<--, 1.35<--, 1.35<--, 1.37<--, 1.50<--, 1.31<--      CARDIAC MARKERS ( 22 Dec 2023 01:00 )  x     / x     / 67 U/L / x     / 2.5 ng/mL      Urinalysis Basic - ( 23 Dec 2023 07:10 )    Color: x / Appearance: x / SG: x / pH: x  Gluc: 98 mg/dL / Ketone: x  / Bili: x / Urobili: x   Blood: x / Protein: x / Nitrite: x   Leuk Esterase: x / RBC: x / WBC x   Sq Epi: x / Non Sq Epi: x / Bacteria: x        RADIOLOGY & ADDITIONAL TESTS:    Imaging Personally Reviewed.    Consultant(s) Notes Reviewed.    Care Discussed with Consultants/Other Providers.

## 2023-12-23 NOTE — PHYSICAL THERAPY INITIAL EVALUATION ADULT - GENERAL OBSERVATIONS, REHAB EVAL
Pt encountered semireclined in bed in no distress, +IV, +telemetry, +[place]. Blood pressure = 95/47.

## 2023-12-23 NOTE — PROGRESS NOTE ADULT - TIME BILLING
reviewing laboratory data, consultants' recommendations, documentation in [place], performing medically appropriate examinations/evaluations, discussion with patient/family/RN/ACP/Residents and interdisciplinary staff (such as , social workers, etc), counseling patient/family/care giver, ordering medically appropriate medication, tests, or procedures reviewing laboratory data, consultants' recommendations, documentation in [place], performing medically appropriate examinations/evaluations, discussion with patient/family/RN/ACP/Residents and interdisciplinary staff (such as , social workers, etc), counseling patient/family/care giver, ordering medically appropriate medication, tests, or procedures

## 2023-12-23 NOTE — PROGRESS NOTE ADULT - PROBLEM SELECTOR PLAN 4
Cr elevated to 1.5 from baseline 1.0.  - will hold Entresto and aldactone for now and restart as able   - FeNA consistent w/ pre-renal etiology   - Gentle hydration  - Avoid nephrotoxins  -Renally dose meds

## 2023-12-23 NOTE — PHYSICAL THERAPY INITIAL EVALUATION ADULT - ADDITIONAL COMMENTS
Pt lives in an assisted living facility, ambulated with a rolling walker and was independent in ADLs.    Following evaluation, pt was left semireclined in bed in no distress, all lines in tact, call bell in reach.

## 2023-12-23 NOTE — PROGRESS NOTE ADULT - PROBLEM SELECTOR PLAN 2
Pt presenting with chest pain, currently resolved. Previous admission at [facility] last month for NSTEM s/p LHC w/ known multivessel disease with decision to treat medically per pt/family preference. Seen by cardiology and recommend continuing medical management w/ no plans for cardiac testing.  - c/w Plavix   - EKG w/ LBBB, unchanged from prior  - monitor on tele  - Trops 54-->69-->64  - Appreciate cardio input Pt presenting with chest pain, currently resolved. Previous admission at [facility] last month for NSTEM s/p LHC w/ known multivessel disease with decision to treat medically per pt/family preference. Seen by cardiology and recommend continuing medical management w/ no plans for cardiac testing.  - c/w Plavix   - EKG w/ LBBB, unchanged from prior  - monitor on tele  - Trops 54-->69-->64  - Appreciate cardio input

## 2023-12-23 NOTE — PROGRESS NOTE ADULT - ASSESSMENT
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 fib on Eliquis, who presents from [facility] for chest pain, Trops elevated. Pt seen and evaluated by cardiology and recommending continued medical management. w/ course c/b fever

## 2023-12-23 NOTE — PROGRESS NOTE ADULT - PROBLEM SELECTOR PLAN 1
Unclear etiology. Suspect UTI given positive UA  --CXR w/o focal consolidations   --C/w CTX  --F/u urine cx  --Blood cx x 2  --Stool studies given loose stools  --Check procalcitonin  --RVP w/ covid  --Monitor fever curve.  --Previously w/ leukocytosis, now resolved

## 2023-12-24 ENCOUNTER — TRANSCRIPTION ENCOUNTER (OUTPATIENT)
Age: 83
End: 2023-12-24

## 2023-12-24 LAB
ALBUMIN SERPL ELPH-MCNC: 3.7 G/DL — SIGNIFICANT CHANGE UP (ref 3.3–5)
ALBUMIN SERPL ELPH-MCNC: 3.7 G/DL — SIGNIFICANT CHANGE UP (ref 3.3–5)
ALP SERPL-CCNC: 80 U/L — SIGNIFICANT CHANGE UP (ref 40–120)
ALP SERPL-CCNC: 80 U/L — SIGNIFICANT CHANGE UP (ref 40–120)
ALT FLD-CCNC: 14 U/L — SIGNIFICANT CHANGE UP (ref 4–41)
ALT FLD-CCNC: 14 U/L — SIGNIFICANT CHANGE UP (ref 4–41)
ANION GAP SERPL CALC-SCNC: 11 MMOL/L — SIGNIFICANT CHANGE UP (ref 7–14)
ANION GAP SERPL CALC-SCNC: 11 MMOL/L — SIGNIFICANT CHANGE UP (ref 7–14)
AST SERPL-CCNC: 19 U/L — SIGNIFICANT CHANGE UP (ref 4–40)
AST SERPL-CCNC: 19 U/L — SIGNIFICANT CHANGE UP (ref 4–40)
BILIRUB SERPL-MCNC: 0.9 MG/DL — SIGNIFICANT CHANGE UP (ref 0.2–1.2)
BILIRUB SERPL-MCNC: 0.9 MG/DL — SIGNIFICANT CHANGE UP (ref 0.2–1.2)
BUN SERPL-MCNC: 30 MG/DL — HIGH (ref 7–23)
BUN SERPL-MCNC: 30 MG/DL — HIGH (ref 7–23)
CALCIUM SERPL-MCNC: 9.1 MG/DL — SIGNIFICANT CHANGE UP (ref 8.4–10.5)
CALCIUM SERPL-MCNC: 9.1 MG/DL — SIGNIFICANT CHANGE UP (ref 8.4–10.5)
CHLORIDE SERPL-SCNC: 101 MMOL/L — SIGNIFICANT CHANGE UP (ref 98–107)
CHLORIDE SERPL-SCNC: 101 MMOL/L — SIGNIFICANT CHANGE UP (ref 98–107)
CO2 SERPL-SCNC: 22 MMOL/L — SIGNIFICANT CHANGE UP (ref 22–31)
CO2 SERPL-SCNC: 22 MMOL/L — SIGNIFICANT CHANGE UP (ref 22–31)
CREAT SERPL-MCNC: 1.24 MG/DL — SIGNIFICANT CHANGE UP (ref 0.5–1.3)
CREAT SERPL-MCNC: 1.24 MG/DL — SIGNIFICANT CHANGE UP (ref 0.5–1.3)
EGFR: 58 ML/MIN/1.73M2 — LOW
EGFR: 58 ML/MIN/1.73M2 — LOW
GLUCOSE SERPL-MCNC: 81 MG/DL — SIGNIFICANT CHANGE UP (ref 70–99)
GLUCOSE SERPL-MCNC: 81 MG/DL — SIGNIFICANT CHANGE UP (ref 70–99)
HCT VFR BLD CALC: 37.2 % — LOW (ref 39–50)
HCT VFR BLD CALC: 37.2 % — LOW (ref 39–50)
HGB BLD-MCNC: 12.4 G/DL — LOW (ref 13–17)
HGB BLD-MCNC: 12.4 G/DL — LOW (ref 13–17)
LITHIUM SERPL-MCNC: 0.9 MMOL/L — SIGNIFICANT CHANGE UP (ref 0.6–1.2)
LITHIUM SERPL-MCNC: 0.9 MMOL/L — SIGNIFICANT CHANGE UP (ref 0.6–1.2)
MAGNESIUM SERPL-MCNC: 2 MG/DL — SIGNIFICANT CHANGE UP (ref 1.6–2.6)
MAGNESIUM SERPL-MCNC: 2 MG/DL — SIGNIFICANT CHANGE UP (ref 1.6–2.6)
MCHC RBC-ENTMCNC: 31.5 PG — SIGNIFICANT CHANGE UP (ref 27–34)
MCHC RBC-ENTMCNC: 31.5 PG — SIGNIFICANT CHANGE UP (ref 27–34)
MCHC RBC-ENTMCNC: 33.3 GM/DL — SIGNIFICANT CHANGE UP (ref 32–36)
MCHC RBC-ENTMCNC: 33.3 GM/DL — SIGNIFICANT CHANGE UP (ref 32–36)
MCV RBC AUTO: 94.4 FL — SIGNIFICANT CHANGE UP (ref 80–100)
MCV RBC AUTO: 94.4 FL — SIGNIFICANT CHANGE UP (ref 80–100)
NRBC # BLD: 0 /100 WBCS — SIGNIFICANT CHANGE UP (ref 0–0)
NRBC # BLD: 0 /100 WBCS — SIGNIFICANT CHANGE UP (ref 0–0)
NRBC # FLD: 0 K/UL — SIGNIFICANT CHANGE UP (ref 0–0)
NRBC # FLD: 0 K/UL — SIGNIFICANT CHANGE UP (ref 0–0)
PHOSPHATE SERPL-MCNC: 2.9 MG/DL — SIGNIFICANT CHANGE UP (ref 2.5–4.5)
PHOSPHATE SERPL-MCNC: 2.9 MG/DL — SIGNIFICANT CHANGE UP (ref 2.5–4.5)
PLATELET # BLD AUTO: 152 K/UL — SIGNIFICANT CHANGE UP (ref 150–400)
PLATELET # BLD AUTO: 152 K/UL — SIGNIFICANT CHANGE UP (ref 150–400)
POTASSIUM SERPL-MCNC: 4.9 MMOL/L — SIGNIFICANT CHANGE UP (ref 3.5–5.3)
POTASSIUM SERPL-MCNC: 4.9 MMOL/L — SIGNIFICANT CHANGE UP (ref 3.5–5.3)
POTASSIUM SERPL-SCNC: 4.9 MMOL/L — SIGNIFICANT CHANGE UP (ref 3.5–5.3)
POTASSIUM SERPL-SCNC: 4.9 MMOL/L — SIGNIFICANT CHANGE UP (ref 3.5–5.3)
PROT SERPL-MCNC: 6.7 G/DL — SIGNIFICANT CHANGE UP (ref 6–8.3)
PROT SERPL-MCNC: 6.7 G/DL — SIGNIFICANT CHANGE UP (ref 6–8.3)
RBC # BLD: 3.94 M/UL — LOW (ref 4.2–5.8)
RBC # BLD: 3.94 M/UL — LOW (ref 4.2–5.8)
RBC # FLD: 15.5 % — HIGH (ref 10.3–14.5)
RBC # FLD: 15.5 % — HIGH (ref 10.3–14.5)
SODIUM SERPL-SCNC: 134 MMOL/L — LOW (ref 135–145)
SODIUM SERPL-SCNC: 134 MMOL/L — LOW (ref 135–145)
WBC # BLD: 7.07 K/UL — SIGNIFICANT CHANGE UP (ref 3.8–10.5)
WBC # BLD: 7.07 K/UL — SIGNIFICANT CHANGE UP (ref 3.8–10.5)
WBC # FLD AUTO: 7.07 K/UL — SIGNIFICANT CHANGE UP (ref 3.8–10.5)
WBC # FLD AUTO: 7.07 K/UL — SIGNIFICANT CHANGE UP (ref 3.8–10.5)

## 2023-12-24 PROCEDURE — 99232 SBSQ HOSP IP/OBS MODERATE 35: CPT

## 2023-12-24 RX ADMIN — CLOPIDOGREL BISULFATE 75 MILLIGRAM(S): 75 TABLET, FILM COATED ORAL at 12:10

## 2023-12-24 RX ADMIN — CEFTRIAXONE 100 MILLIGRAM(S): 500 INJECTION, POWDER, FOR SOLUTION INTRAMUSCULAR; INTRAVENOUS at 12:33

## 2023-12-24 RX ADMIN — APIXABAN 5 MILLIGRAM(S): 2.5 TABLET, FILM COATED ORAL at 18:03

## 2023-12-24 RX ADMIN — APIXABAN 5 MILLIGRAM(S): 2.5 TABLET, FILM COATED ORAL at 05:50

## 2023-12-24 RX ADMIN — ATORVASTATIN CALCIUM 80 MILLIGRAM(S): 80 TABLET, FILM COATED ORAL at 21:15

## 2023-12-24 NOTE — PROGRESS NOTE ADULT - PROBLEM SELECTOR PLAN 2
Pt presenting with chest pain, currently resolved. Previous admission at [facility] last month for NSTEM s/p LHC w/ known multivessel disease with decision to treat medically per pt/family preference. Seen by cardiology and recommend continuing medical management w/ no plans for cardiac testing.  - c/w Plavix   - EKG w/ LBBB, unchanged from prior  - monitor on tele  - Trops 54-->69-->64  - Appreciate cardio input Pt presenting with chest pain, currently resolved. Previous admission at [facility] last month for NSTEM s/p LHC w/ known multivessel disease with decision to treat medically per pt/family preference. Seen by cardiology and recommend continuing medical management w/ no plans for cardiac testing.  - c/w Plavix   - EKG w/ LBBB, unchanged from prior  - monitor on tele  - Trops 54-->69-->64  - Appreciate cardio input

## 2023-12-24 NOTE — PROGRESS NOTE ADULT - PROBLEM SELECTOR PLAN 3
S/p PCI to LAD and known multivessel disease on [facility] last month.   - c/w plavix, atorvastatin and Eliquis S/p PCI to LAD and known multivessel disease on [facility] last month.   - c/w plavix, atorvastatin and Eliquis

## 2023-12-24 NOTE — DISCHARGE NOTE PROVIDER - ATTENDING DISCHARGE PHYSICAL EXAMINATION:
VITAL SIGNS:  T(C): 36.7 (01-02-24 @ 10:55), Max: 36.7 (01-01-24 @ 19:52)  T(F): 98 (01-02-24 @ 10:55), Max: 98.1 (01-01-24 @ 19:52)  HR: 51 (01-02-24 @ 10:55) (51 - 66)  BP: 128/48 (01-02-24 @ 10:55) (91/74 - 134/54)  BP(mean): --  RR: 18 (01-02-24 @ 10:55) (16 - 18)  SpO2: 100% (01-02-24 @ 10:55) (97% - 100%)  Wt(kg): --    PHYSICAL EXAM:  Constitutional: resting comfortably in bed; NAD  Head: NC/AT  Eyes: PERRL, EOMI, anicteric sclera  ENT: no nasal discharge; MMM  Neck: supple; no JVD  Respiratory: CTA B/L; no W/R/R  Cardiac: +S1/S2; RRR; no M/R/G  Gastrointestinal: soft, NT/ND; no rebound or guarding; +BSx4  Extremities: WWP, no clubbing or cyanosis; no peripheral edema  Musculoskeletal: moves all extremities spontaneously  Dermatologic: skin warm, dry and intact; no rashes, wounds, or scars  Neurologic: AAOx1-2 (self, place); CNII-XII grossly intact; no focal deficits  Psychiatric: calm VITAL SIGNS:  T(C): 36.7 (01-02-24 @ 10:55), Max: 36.7 (01-01-24 @ 19:52)  T(F): 98 (01-02-24 @ 10:55), Max: 98.1 (01-01-24 @ 19:52)  HR: 51 (01-02-24 @ 10:55) (51 - 66)  BP: 128/48 (01-02-24 @ 10:55) (91/74 - 134/54)  BP(mean): --  RR: 18 (01-02-24 @ 10:55) (16 - 18)  SpO2: 100% (01-02-24 @ 10:55) (97% - 100%)  Wt(kg): --    PHYSICAL EXAM:  GEN: NAD  Eyes: PERRL, EOMI, anicteric sclera  Respiratory: CTA B/L; no W/R/R  Cardiac: +S1/S2; RRR; no M/R/G  Gastrointestinal: soft  Neurologic: AAOx1-2 (self, place); CNII-XII grossly intact; no focal deficits  Psychiatric: calm

## 2023-12-24 NOTE — DISCHARGE NOTE PROVIDER - CARE PROVIDERS DIRECT ADDRESSES
,[email or web address]

## 2023-12-24 NOTE — PROGRESS NOTE ADULT - PROBLEM SELECTOR PLAN 1
Unclear etiology. Suspect UTI given positive UA  --CXR w/o focal consolidations   --C/w CTX  --F/u urine cx  --Blood cx x 2  --GI PCR Negative. F/u stool cx  --Procalcitonin 0.76  --RVP w/ covid NEG  --Monitor fever curve.  --Previously w/ leukocytosis, now resolved

## 2023-12-24 NOTE — PROGRESS NOTE ADULT - PROBLEM SELECTOR PLAN 9
Hold lithium  -Obtain daily lithium levels  -Resume when OK from psych standpoint  -Psych consulted, appreciate recs

## 2023-12-24 NOTE — DISCHARGE NOTE PROVIDER - HOSPITAL COURSE
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 fib on Eliquis, who presents from [facility] for chest pain, Trops elevated. Pt seen and evaluated by cardiology and recommending continued medical management. w/ course c/b fever. Started on IV abx. Suspect UTI given positive UA. 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 fib on Eliquis, who presents from [facility] for chest pain, Trops elevated. Pt seen and evaluated by cardiology and recommending continued medical management. w/ course c/b fever. Started on IV abx. Suspect UTI given positive UA. 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 fib on Eliquis, who presents from [facility] for chest pain, Trops elevated. Pt seen and evaluated by cardiology and recommending continued medical management. w/ course c/b fever. Started on IV abx. Suspect UTI given positive UA. Ucx grew E. 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 fib on Eliquis, who presents from [facility] for chest pain, Trops elevated. Pt seen and evaluated by cardiology and recommending continued medical management. w/ course c/b fever. Started on IV abx. Suspect UTI given positive UA. Ucx grew E. coli      Pt is an 84 yo M with PMH BP d/o (lithium), dementia (AOx2), CAD (s/p PCI), HFrEF (EF 29%), severe MR, LV thrombus, and a-fib (eliquis) p/w CP and elevated troponin. Pt declining intervention, evaluated by cards and recommended for medical management, on plavix and entresto. Course c/b F/C found to have +UA s/p 5d CTX. Psych following, recommending holding Li d/t EPS and can use ativan PRN. PT recs [facility], stable for DC to [facility]. Plan for return to NH with hospice after completing [facility] treatment. Pt is an 82 yo M with PMH BP d/o (lithium), dementia (AOx2), CAD (s/p PCI), HFrEF (EF 29%), severe MR, LV thrombus, and a-fib (eliquis) p/w CP and elevated troponin. Pt declining intervention, evaluated by cards and recommended for medical management, on plavix and entresto. Course c/b F/C found to have +UA s/p 5d CTX. Psych following, recommending holding Lithium d/t EPS and can use ativan PRN. PT recs [facility], stable for DC to [facility]. Plan for return to NH with hospice after completing [facility] treatment.

## 2023-12-24 NOTE — DISCHARGE NOTE PROVIDER - NSDCCPCAREPLAN_GEN_ALL_CORE_FT
PRINCIPAL DISCHARGE DIAGNOSIS  Diagnosis: Fever  Assessment and Plan of Treatment: You were found to have a urinary tract infection and completed 3 days of antibitiocs     PRINCIPAL DISCHARGE DIAGNOSIS  Diagnosis: Angina pectoris  Assessment and Plan of Treatment: You came to the hospital with chest pain concerning for a heart attack. Your EKG was unchanged from prior, but your heart labs were elevated. You were evaluated by cardiology and were not interested in intervention. You were medically managed and are now on plavix and entresto. Your spironolactone was restarted. Please continue to take these medications as prescribed.      SECONDARY DISCHARGE DIAGNOSES  Diagnosis: Acute UTI  Assessment and Plan of Treatment: You were found to have a urine infection while in the hospital and received 5 days of antibiotics with resolution of your fevers.    Diagnosis: Bipolar disorder  Assessment and Plan of Treatment: While in the hospital, you were evaluated by psychiatry who recommended discontinuing your lithium due to adverse side effects. You were started on ativan 0.25mg twice a day as needed for anxiety and agitation.

## 2023-12-24 NOTE — DISCHARGE NOTE PROVIDER - NSDCMRMEDTOKEN_GEN_ALL_CORE_FT
acetaminophen 325 mg oral tablet: 2 tab(s) orally 3 times a day as need for pain  aspirin 81 mg oral tablet: orally once a day  atorvastatin 80 mg oral tablet: 1 tab(s) orally  Eliquis 5 mg oral tablet: 1 tab(s) orally 2 times a day  Entresto 24 mg-26 mg oral tablet: 1 tab(s) orally 2 times a day  lithium 150 mg oral capsule: 1 cap(s) orally 2 times a day  spironolactone 25 mg oral tablet: 1 tab(s) orally once a day   acetaminophen 325 mg oral tablet: 2 tab(s) orally every 6 hours As needed Temp greater or equal to 38C (100.4F), Mild Pain (1 - 3)  Aldactone 25 mg oral tablet: 1 tab(s) orally once a day  apixaban 5 mg oral tablet: 1 tab(s) orally every 12 hours  atorvastatin 80 mg oral tablet: 1 tab(s) orally  clopidogrel 75 mg oral tablet: 1 tab(s) orally once a day  LORazepam 1 mg/mL-NaCl 0.9% intravenous solution: 0.25 milliliter(s) intravenous every 8 hours As needed Agitation  sacubitril-valsartan 24 mg-26 mg oral tablet: 1 tab(s) orally 2 times a day   acetaminophen 325 mg oral tablet: 2 tab(s) orally every 6 hours As needed Temp greater or equal to 38C (100.4F), Mild Pain (1 - 3)  Aldactone 25 mg oral tablet: 1 tab(s) orally once a day  apixaban 5 mg oral tablet: 1 tab(s) orally every 12 hours  atorvastatin 80 mg oral tablet: 1 tab(s) orally  clopidogrel 75 mg oral tablet: 1 tab(s) orally once a day  sacubitril-valsartan 24 mg-26 mg oral tablet: 1 tab(s) orally 2 times a day

## 2023-12-24 NOTE — PROGRESS NOTE ADULT - TIME BILLING
reviewing laboratory data, consultants' recommendations, documentation in [place], performing medically appropriate examinations/evaluations, discussion with patient/family/RN/ACP/Residents and interdisciplinary staff (such as , social workers, etc), counseling patient/family/care giver, ordering medically appropriate medication, tests, or procedures reviewing laboratory data, consultants' recommendations, documentation in [place], performing medically appropriate examinations/evaluations, discussion with patient/family/RN/ACP/Residents and interdisciplinary staff (such as , social workers, etc), counseling patient/family/care giver, ordering medically appropriate medication, tests, or procedures

## 2023-12-24 NOTE — PROGRESS NOTE ADULT - SUBJECTIVE AND OBJECTIVE BOX
Eveline Cervantes        Patient is a 83y old  Male who presents with a chief complaint of chest pain (23 Dec 2023 16:08)      SUBJECTIVE / OVERNIGHT EVENTS: No acute overnight events. This morning pt doing "okay". States he did not sleep very much overnight but is unsure why. Denies chest pain or SOB. No complaints      MEDICATIONS  (STANDING):  apixaban 5 milliGRAM(s) Oral two times a day  atorvastatin 80 milliGRAM(s) Oral at bedtime  cefTRIAXone   IVPB      cefTRIAXone   IVPB 1000 milliGRAM(s) IV Intermittent every 24 hours  clopidogrel Tablet 75 milliGRAM(s) Oral daily    MEDICATIONS  (PRN):  acetaminophen     Tablet .. 650 milliGRAM(s) Oral every 6 hours PRN Temp greater or equal to 38C (100.4F), Mild Pain (1 - 3)  LORazepam   Injectable 0.25 milliGRAM(s) IV Push every 8 hours PRN Agitation    Allergies    No Known Allergies    Intolerances        Vital Signs Last 24 Hrs  T(C): 37.1 (24 Dec 2023 11:49), Max: 37.8 (23 Dec 2023 17:32)  T(F): 98.8 (24 Dec 2023 11:49), Max: 100.1 (23 Dec 2023 17:32)  HR: 88 (24 Dec 2023 11:49) (78 - 89)  BP: 95/60 (24 Dec 2023 11:49) (95/60 - 104/50)  BP(mean): --  RR: 17 (24 Dec 2023 11:49) (17 - 18)  SpO2: 98% (24 Dec 2023 11:49) (98% - 100%)    Parameters below as of 24 Dec 2023 11:49  Patient On (Oxygen Delivery Method): room air      Daily     Daily   CAPILLARY BLOOD GLUCOSE        I&O's Summary      PHYSICAL EXAM:  GENERAL: NAD  HEAD:  Atraumatic, Normocephalic  EYES: EOMI,  conjunctiva and sclera clear  NECK: Supple  CHEST/LUNG: Clear to auscultation bilaterally; No wheeze, crackles or rhonchi   HEART: Regular rate and rhythm; Normal S1 S2, No murmurs, rubs, or gallops  ABDOMEN: Soft, Nontender, Nondistended; Bowel sounds present  EXTREMITIES:  2+ Peripheral Pulses, No edema  PSYCH: AAOx1  NEUROLOGY: SROM, +Mild dysarthria , + LUE resting tremor   SKIN: Warm and dry      LABS:                        12.4   7.07  )-----------( 152      ( 24 Dec 2023 07:30 )             37.2     Hgb Trend: 12.4<--, 12.8<--, 12.7<--, 12.0<--, 13.8<--  12-24    134<L>  |  101  |  30<H>  ----------------------------<  81  4.9   |  22  |  1.24    Ca    9.1      24 Dec 2023 07:30  Phos  2.9     12-24  Mg     2.00     12-24    TPro  6.7  /  Alb  3.7  /  TBili  0.9  /  DBili  x   /  AST  19  /  ALT  14  /  AlkPhos  80  12-24    Creatinine Trend: 1.24<--, 1.33<--, 1.35<--, 1.35<--, 1.37<--, 1.50<--  LIVER FUNCTIONS - ( 24 Dec 2023 07:30 )  Alb: 3.7 g/dL / Pro: 6.7 g/dL / ALK PHOS: 80 U/L / ALT: 14 U/L / AST: 19 U/L / GGT: x                 Urinalysis Basic - ( 24 Dec 2023 07:30 )    Color: x / Appearance: x / SG: x / pH: x  Gluc: 81 mg/dL / Ketone: x  / Bili: x / Urobili: x   Blood: x / Protein: x / Nitrite: x   Leuk Esterase: x / RBC: x / WBC x   Sq Epi: x / Non Sq Epi: x / Bacteria: x        RADIOLOGY & ADDITIONAL TESTS:    Imaging Personally Reviewed.    Consultant(s) Notes Reviewed.    Care Discussed with Consultants/Other Providers.

## 2023-12-24 NOTE — PROGRESS NOTE ADULT - PROBLEM SELECTOR PLAN 8
Holding entresto and spironolactone for now  - resume if sCR remains stable and pressures stable -pressures remain soft at this time

## 2023-12-24 NOTE — PROGRESS NOTE ADULT - PROBLEM SELECTOR PLAN 4
Cr elevated to 1.5 from baseline 1.0.  - will hold Entresto and aldactone for now and restart as able   - FeNA consistent w/ pre-renal etiology   - Now resolved s/p Gentle hydration  - Avoid nephrotoxins  -Renally dose meds

## 2023-12-24 NOTE — DISCHARGE NOTE PROVIDER - CARE PROVIDER_API CALL
Brent Dior  Geriatric Medicine  [address] [place] [address], [address]  [place] [address]  Phone: [phone]  Fax: [phone]  Follow Up Time: 2 weeks   Brent Dior  Geriatric Medicine  [address] [place] [address], [address]  [place] [address]  Phone: [phone]  Fax: [phone]  Follow Up Time: 2 weeks

## 2023-12-25 LAB
ALBUMIN SERPL ELPH-MCNC: 3.6 G/DL — SIGNIFICANT CHANGE UP (ref 3.3–5)
ALBUMIN SERPL ELPH-MCNC: 3.6 G/DL — SIGNIFICANT CHANGE UP (ref 3.3–5)
ALP SERPL-CCNC: 79 U/L — SIGNIFICANT CHANGE UP (ref 40–120)
ALP SERPL-CCNC: 79 U/L — SIGNIFICANT CHANGE UP (ref 40–120)
ALT FLD-CCNC: 12 U/L — SIGNIFICANT CHANGE UP (ref 4–41)
ALT FLD-CCNC: 12 U/L — SIGNIFICANT CHANGE UP (ref 4–41)
ANION GAP SERPL CALC-SCNC: 11 MMOL/L — SIGNIFICANT CHANGE UP (ref 7–14)
ANION GAP SERPL CALC-SCNC: 11 MMOL/L — SIGNIFICANT CHANGE UP (ref 7–14)
AST SERPL-CCNC: 19 U/L — SIGNIFICANT CHANGE UP (ref 4–40)
AST SERPL-CCNC: 19 U/L — SIGNIFICANT CHANGE UP (ref 4–40)
BILIRUB SERPL-MCNC: 0.6 MG/DL — SIGNIFICANT CHANGE UP (ref 0.2–1.2)
BILIRUB SERPL-MCNC: 0.6 MG/DL — SIGNIFICANT CHANGE UP (ref 0.2–1.2)
BUN SERPL-MCNC: 24 MG/DL — HIGH (ref 7–23)
BUN SERPL-MCNC: 24 MG/DL — HIGH (ref 7–23)
CALCIUM SERPL-MCNC: 8.9 MG/DL — SIGNIFICANT CHANGE UP (ref 8.4–10.5)
CALCIUM SERPL-MCNC: 8.9 MG/DL — SIGNIFICANT CHANGE UP (ref 8.4–10.5)
CHLORIDE SERPL-SCNC: 102 MMOL/L — SIGNIFICANT CHANGE UP (ref 98–107)
CHLORIDE SERPL-SCNC: 102 MMOL/L — SIGNIFICANT CHANGE UP (ref 98–107)
CO2 SERPL-SCNC: 19 MMOL/L — LOW (ref 22–31)
CO2 SERPL-SCNC: 19 MMOL/L — LOW (ref 22–31)
CREAT SERPL-MCNC: 1.06 MG/DL — SIGNIFICANT CHANGE UP (ref 0.5–1.3)
CREAT SERPL-MCNC: 1.06 MG/DL — SIGNIFICANT CHANGE UP (ref 0.5–1.3)
CULTURE RESULTS: SIGNIFICANT CHANGE UP
CULTURE RESULTS: SIGNIFICANT CHANGE UP
EGFR: 70 ML/MIN/1.73M2 — SIGNIFICANT CHANGE UP
EGFR: 70 ML/MIN/1.73M2 — SIGNIFICANT CHANGE UP
GLUCOSE SERPL-MCNC: 95 MG/DL — SIGNIFICANT CHANGE UP (ref 70–99)
GLUCOSE SERPL-MCNC: 95 MG/DL — SIGNIFICANT CHANGE UP (ref 70–99)
HCT VFR BLD CALC: 34.5 % — LOW (ref 39–50)
HCT VFR BLD CALC: 34.5 % — LOW (ref 39–50)
HGB BLD-MCNC: 11.7 G/DL — LOW (ref 13–17)
HGB BLD-MCNC: 11.7 G/DL — LOW (ref 13–17)
LITHIUM SERPL-MCNC: 0.7 MMOL/L — SIGNIFICANT CHANGE UP (ref 0.6–1.2)
LITHIUM SERPL-MCNC: 0.7 MMOL/L — SIGNIFICANT CHANGE UP (ref 0.6–1.2)
MAGNESIUM SERPL-MCNC: 1.9 MG/DL — SIGNIFICANT CHANGE UP (ref 1.6–2.6)
MAGNESIUM SERPL-MCNC: 1.9 MG/DL — SIGNIFICANT CHANGE UP (ref 1.6–2.6)
MCHC RBC-ENTMCNC: 31.5 PG — SIGNIFICANT CHANGE UP (ref 27–34)
MCHC RBC-ENTMCNC: 31.5 PG — SIGNIFICANT CHANGE UP (ref 27–34)
MCHC RBC-ENTMCNC: 33.9 GM/DL — SIGNIFICANT CHANGE UP (ref 32–36)
MCHC RBC-ENTMCNC: 33.9 GM/DL — SIGNIFICANT CHANGE UP (ref 32–36)
MCV RBC AUTO: 93 FL — SIGNIFICANT CHANGE UP (ref 80–100)
MCV RBC AUTO: 93 FL — SIGNIFICANT CHANGE UP (ref 80–100)
NRBC # BLD: 0 /100 WBCS — SIGNIFICANT CHANGE UP (ref 0–0)
NRBC # BLD: 0 /100 WBCS — SIGNIFICANT CHANGE UP (ref 0–0)
NRBC # FLD: 0 K/UL — SIGNIFICANT CHANGE UP (ref 0–0)
NRBC # FLD: 0 K/UL — SIGNIFICANT CHANGE UP (ref 0–0)
PHOSPHATE SERPL-MCNC: 2.4 MG/DL — LOW (ref 2.5–4.5)
PHOSPHATE SERPL-MCNC: 2.4 MG/DL — LOW (ref 2.5–4.5)
PLATELET # BLD AUTO: 179 K/UL — SIGNIFICANT CHANGE UP (ref 150–400)
PLATELET # BLD AUTO: 179 K/UL — SIGNIFICANT CHANGE UP (ref 150–400)
POTASSIUM SERPL-MCNC: 4.2 MMOL/L — SIGNIFICANT CHANGE UP (ref 3.5–5.3)
POTASSIUM SERPL-MCNC: 4.2 MMOL/L — SIGNIFICANT CHANGE UP (ref 3.5–5.3)
POTASSIUM SERPL-SCNC: 4.2 MMOL/L — SIGNIFICANT CHANGE UP (ref 3.5–5.3)
POTASSIUM SERPL-SCNC: 4.2 MMOL/L — SIGNIFICANT CHANGE UP (ref 3.5–5.3)
PROT SERPL-MCNC: 6.4 G/DL — SIGNIFICANT CHANGE UP (ref 6–8.3)
PROT SERPL-MCNC: 6.4 G/DL — SIGNIFICANT CHANGE UP (ref 6–8.3)
RBC # BLD: 3.71 M/UL — LOW (ref 4.2–5.8)
RBC # BLD: 3.71 M/UL — LOW (ref 4.2–5.8)
RBC # FLD: 15.3 % — HIGH (ref 10.3–14.5)
RBC # FLD: 15.3 % — HIGH (ref 10.3–14.5)
SODIUM SERPL-SCNC: 132 MMOL/L — LOW (ref 135–145)
SODIUM SERPL-SCNC: 132 MMOL/L — LOW (ref 135–145)
SPECIMEN SOURCE: SIGNIFICANT CHANGE UP
SPECIMEN SOURCE: SIGNIFICANT CHANGE UP
WBC # BLD: 7.75 K/UL — SIGNIFICANT CHANGE UP (ref 3.8–10.5)
WBC # BLD: 7.75 K/UL — SIGNIFICANT CHANGE UP (ref 3.8–10.5)
WBC # FLD AUTO: 7.75 K/UL — SIGNIFICANT CHANGE UP (ref 3.8–10.5)
WBC # FLD AUTO: 7.75 K/UL — SIGNIFICANT CHANGE UP (ref 3.8–10.5)

## 2023-12-25 RX ORDER — SODIUM,POTASSIUM PHOSPHATES 278-250MG
1 POWDER IN PACKET (EA) ORAL
Refills: 0 | Status: COMPLETED | OUTPATIENT
Start: 2023-12-25 | End: 2023-12-26

## 2023-12-25 RX ORDER — MAGNESIUM SULFATE 500 MG/ML
1 VIAL (ML) INJECTION ONCE
Refills: 0 | Status: COMPLETED | OUTPATIENT
Start: 2023-12-25 | End: 2023-12-25

## 2023-12-25 RX ORDER — SODIUM CHLORIDE 0.65 %
1 AEROSOL, SPRAY (ML) NASAL
Refills: 0 | Status: DISCONTINUED | OUTPATIENT
Start: 2023-12-25 | End: 2023-12-27

## 2023-12-25 RX ADMIN — CEFTRIAXONE 100 MILLIGRAM(S): 500 INJECTION, POWDER, FOR SOLUTION INTRAMUSCULAR; INTRAVENOUS at 08:36

## 2023-12-25 RX ADMIN — Medication 100 GRAM(S): at 23:24

## 2023-12-25 RX ADMIN — Medication 1 TABLET(S): at 21:33

## 2023-12-25 RX ADMIN — Medication 0.25 MILLIGRAM(S): at 18:30

## 2023-12-25 RX ADMIN — APIXABAN 5 MILLIGRAM(S): 2.5 TABLET, FILM COATED ORAL at 17:33

## 2023-12-25 RX ADMIN — CLOPIDOGREL BISULFATE 75 MILLIGRAM(S): 75 TABLET, FILM COATED ORAL at 11:52

## 2023-12-25 RX ADMIN — ATORVASTATIN CALCIUM 80 MILLIGRAM(S): 80 TABLET, FILM COATED ORAL at 21:34

## 2023-12-25 RX ADMIN — APIXABAN 5 MILLIGRAM(S): 2.5 TABLET, FILM COATED ORAL at 05:20

## 2023-12-25 NOTE — PROGRESS NOTE ADULT - SUBJECTIVE AND OBJECTIVE BOX
PROGRESS NOTE:   Authoted by Dr. Ivan Molina MD, JESUS  Pager [phone] [facility], Available via Microsoft Teams     Patient is a 83y old  Male who presents with a chief complaint of chest pain (24 Dec 2023 16:02)      SUBJECTIVE / OVERNIGHT EVENTS:  No acute events overnight   No subjective complaints    MEDICATIONS  (STANDING):  apixaban 5 milliGRAM(s) Oral two times a day  atorvastatin 80 milliGRAM(s) Oral at bedtime  cefTRIAXone   IVPB      cefTRIAXone   IVPB 1000 milliGRAM(s) IV Intermittent every 24 hours  clopidogrel Tablet 75 milliGRAM(s) Oral daily  potassium phosphate / sodium phosphate Tablet (K-PHOS No. 2) 1 Tablet(s) Oral two times a day with meals    MEDICATIONS  (PRN):  acetaminophen     Tablet .. 650 milliGRAM(s) Oral every 6 hours PRN Temp greater or equal to 38C (100.4F), Mild Pain (1 - 3)  LORazepam   Injectable 0.25 milliGRAM(s) IV Push every 8 hours PRN Agitation      OBJECTIVE:  Vital Signs Last 24 Hrs  T(C): 36.8 (25 Dec 2023 11:54), Max: 37.2 (25 Dec 2023 05:36)  T(F): 98.2 (25 Dec 2023 11:54), Max: 99 (25 Dec 2023 05:36)  HR: 80 (25 Dec 2023 11:54) (64 - 80)  BP: 98/59 (25 Dec 2023 11:54) (98/59 - 106/55)  BP(mean): --  RR: 18 (25 Dec 2023 11:54) (17 - 18)  SpO2: 98% (25 Dec 2023 11:54) (95% - 98%)    Parameters below as of 25 Dec 2023 11:54  Patient On (Oxygen Delivery Method): room air      I&O's Summary      CONSTITUTIONAL: NAD  HEAD:  Atraumatic, Normocephalic  EYES: EOMI, conjunctiva and sclera clear  ENMT: No tonsillar erythema, exudates, or enlargement; Moist mucous membranes  NECK: Supple, No JVD  NERVOUS SYSTEM: AOX3, motor and sensation grossly intact in b/l UE and b/l LE  PSYCHIATRIC: Appropriate affect and mood  CHEST/LUNG: Clear to auscultation b/l    HEART: Regular rate and rhythm; No murmurs. No LE edema  ABDOMEN: Soft, Nontender, Nondistended; Bowel sounds present  EXTREMITIES:  2+ Peripheral Pulses, non cyanotic   SKIN: No rashes or lesions    LABS:                        11.7   7.75  )-----------( 179      ( 25 Dec 2023 06:15 )             34.5     12-25    132<L>  |  102  |  24<H>  ----------------------------<  95  4.2   |  19<L>  |  1.06    Ca    8.9      25 Dec 2023 06:15  Phos  2.4     12-25  Mg     1.90     12-25    TPro  6.4  /  Alb  3.6  /  TBili  0.6  /  DBili  x   /  AST  19  /  ALT  12  /  AlkPhos  79  12-25    CAPILLARY BLOOD GLUCOSE                Culture - Blood (collected 23 Dec 2023 15:05)  Source: .Blood Blood-Venous  Preliminary Report (24 Dec 2023 20:01):    No growth at 24 hours    Culture - Blood (collected 23 Dec 2023 15:05)  Source: .Blood Blood-Peripheral  Preliminary Report (24 Dec 2023 20:01):    No growth at 24 hours    Culture - Urine (collected 23 Dec 2023 01:23)  Source: Clean Catch Clean Catch (Midstream)  Preliminary Report (25 Dec 2023 12:23):    >100,000 CFU/ml Escherichia coli       PROGRESS NOTE:   Authoted by Dr. Ivan Molina MD, JESUS  Pager [phone] [facility], Available via Microsoft Teams     Patient is a 83y old  Male who presents with a chief complaint of chest pain (24 Dec 2023 16:02)      SUBJECTIVE / OVERNIGHT EVENTS:  No acute events overnight   No subjective complaints    MEDICATIONS  (STANDING):  apixaban 5 milliGRAM(s) Oral two times a day  atorvastatin 80 milliGRAM(s) Oral at bedtime  cefTRIAXone   IVPB      cefTRIAXone   IVPB 1000 milliGRAM(s) IV Intermittent every 24 hours  clopidogrel Tablet 75 milliGRAM(s) Oral daily  potassium phosphate / sodium phosphate Tablet (K-PHOS No. 2) 1 Tablet(s) Oral two times a day with meals    MEDICATIONS  (PRN):  acetaminophen     Tablet .. 650 milliGRAM(s) Oral every 6 hours PRN Temp greater or equal to 38C (100.4F), Mild Pain (1 - 3)  LORazepam   Injectable 0.25 milliGRAM(s) IV Push every 8 hours PRN Agitation      OBJECTIVE:  Vital Signs Last 24 Hrs  T(C): 36.8 (25 Dec 2023 11:54), Max: 37.2 (25 Dec 2023 05:36)  T(F): 98.2 (25 Dec 2023 11:54), Max: 99 (25 Dec 2023 05:36)  HR: 80 (25 Dec 2023 11:54) (64 - 80)  BP: 98/59 (25 Dec 2023 11:54) (98/59 - 106/55)  BP(mean): --  RR: 18 (25 Dec 2023 11:54) (17 - 18)  SpO2: 98% (25 Dec 2023 11:54) (95% - 98%)    Parameters below as of 25 Dec 2023 11:54  Patient On (Oxygen Delivery Method): room air      I&O's Summary      CONSTITUTIONAL: NAD  HEAD:  Atraumatic, Normocephalic  EYES: EOMI, conjunctiva and sclera clear  ENMT: No tonsillar erythema, exudates, or enlargement; Moist mucous membranes  NECK: Supple, No JVD  NERVOUS SYSTEM: AOX3, motor and sensation grossly intact in b/l UE and b/l LE  PSYCHIATRIC: Appropriate affect and mood  CHEST/LUNG: Clear to auscultation b/l    HEART: Regular rate and rhythm; No murmurs. No LE edema  ABDOMEN: Soft, Nontender, Nondistended; Bowel sounds present  EXTREMITIES:  2+ Peripheral Pulses, non cyanotic   SKIN: No rashes or lesions    LABS:                        11.7   7.75  )-----------( 179      ( 25 Dec 2023 06:15 )             34.5     12-25    132<L>  |  102  |  24<H>  ----------------------------<  95  4.2   |  19<L>  |  1.06    Ca    8.9      25 Dec 2023 06:15  Phos  2.4     12-25  Mg     1.90     12-25    TPro  6.4  /  Alb  3.6  /  TBili  0.6  /  DBili  x   /  AST  19  /  ALT  12  /  AlkPhos  79  12-25    CAPILLARY BLOOD GLUCOSE                Culture - Blood (collected 23 Dec 2023 15:05)  Source: .Blood Blood-Venous  Preliminary Report (24 Dec 2023 20:01):    No growth at 24 hours    Culture - Blood (collected 23 Dec 2023 15:05)  Source: .Blood Blood-Peripheral  Preliminary Report (24 Dec 2023 20:01):    No growth at 24 hours    Culture - Urine (collected 23 Dec 2023 01:23)  Source: Clean Catch Clean Catch (Midstream)  Preliminary Report (25 Dec 2023 12:23):    >100,000 CFU/ml Escherichia coli       PROGRESS NOTE:   Authoted by Dr. Ivan Molina MD, JESUS  Pager [phone] [facility], Available via [employer] Teams     Patient is a 83y old  Male who presents with a chief complaint of chest pain (24 Dec 2023 16:02)    SUBJECTIVE / OVERNIGHT EVENTS:  No acute events overnight   NSVT earlier +agitation     MEDICATIONS  (STANDING):  apixaban 5 milliGRAM(s) Oral two times a day  atorvastatin 80 milliGRAM(s) Oral at bedtime  cefTRIAXone   IVPB      cefTRIAXone   IVPB 1000 milliGRAM(s) IV Intermittent every 24 hours  clopidogrel Tablet 75 milliGRAM(s) Oral daily  potassium phosphate / sodium phosphate Tablet (K-PHOS No. 2) 1 Tablet(s) Oral two times a day with meals    MEDICATIONS  (PRN):  acetaminophen     Tablet .. 650 milliGRAM(s) Oral every 6 hours PRN Temp greater or equal to 38C (100.4F), Mild Pain (1 - 3)  LORazepam   Injectable 0.25 milliGRAM(s) IV Push every 8 hours PRN Agitation      OBJECTIVE:  Vital Signs Last 24 Hrs  T(C): 36.8 (25 Dec 2023 11:54), Max: 37.2 (25 Dec 2023 05:36)  T(F): 98.2 (25 Dec 2023 11:54), Max: 99 (25 Dec 2023 05:36)  HR: 80 (25 Dec 2023 11:54) (64 - 80)  BP: 98/59 (25 Dec 2023 11:54) (98/59 - 106/55)  BP(mean): --  RR: 18 (25 Dec 2023 11:54) (17 - 18)  SpO2: 98% (25 Dec 2023 11:54) (95% - 98%)    Parameters below as of 25 Dec 2023 11:54  Patient On (Oxygen Delivery Method): room air      I&O's Summary      CONSTITUTIONAL: NAD  HEAD:  Atraumatic, Normocephalic  EYES: EOMI, conjunctiva and sclera clear  ENMT: No tonsillar erythema, exudates, or enlargement; Moist mucous membranes  NECK: Supple, No JVD  NERVOUS SYSTEM: AOX3, motor and sensation grossly intact in b/l UE and b/l LE  PSYCHIATRIC: Appropriate affect and mood  CHEST/LUNG: Clear to auscultation b/l    HEART: Regular rate and rhythm; No murmurs. No LE edema  ABDOMEN: Soft, Nontender, Nondistended; Bowel sounds present  EXTREMITIES:  2+ Peripheral Pulses, non cyanotic   SKIN: No rashes or lesions    LABS:                        11.7   7.75  )-----------( 179      ( 25 Dec 2023 06:15 )             34.5     12-25    132<L>  |  102  |  24<H>  ----------------------------<  95  4.2   |  19<L>  |  1.06    Ca    8.9      25 Dec 2023 06:15  Phos  2.4     12-25  Mg     1.90     12-25    TPro  6.4  /  Alb  3.6  /  TBili  0.6  /  DBili  x   /  AST  19  /  ALT  12  /  AlkPhos  79  12-25    CAPILLARY BLOOD GLUCOSE                Culture - Blood (collected 23 Dec 2023 15:05)  Source: .Blood Blood-Venous  Preliminary Report (24 Dec 2023 20:01):    No growth at 24 hours    Culture - Blood (collected 23 Dec 2023 15:05)  Source: .Blood Blood-Peripheral  Preliminary Report (24 Dec 2023 20:01):    No growth at 24 hours    Culture - Urine (collected 23 Dec 2023 01:23)  Source: Clean Catch Clean Catch (Midstream)  Preliminary Report (25 Dec 2023 12:23):    >100,000 CFU/ml Escherichia coli       PROGRESS NOTE:   Authoted by Dr. Ivan Molina MD, JESUS  Pager [phone] [facility], Available via [employer] Teams     Patient is a 83y old  Male who presents with a chief complaint of chest pain (24 Dec 2023 16:02)    SUBJECTIVE / OVERNIGHT EVENTS:  No acute events overnight   NSVT earlier +agitation     MEDICATIONS  (STANDING):  apixaban 5 milliGRAM(s) Oral two times a day  atorvastatin 80 milliGRAM(s) Oral at bedtime  cefTRIAXone   IVPB      cefTRIAXone   IVPB 1000 milliGRAM(s) IV Intermittent every 24 hours  clopidogrel Tablet 75 milliGRAM(s) Oral daily  potassium phosphate / sodium phosphate Tablet (K-PHOS No. 2) 1 Tablet(s) Oral two times a day with meals    MEDICATIONS  (PRN):  acetaminophen     Tablet .. 650 milliGRAM(s) Oral every 6 hours PRN Temp greater or equal to 38C (100.4F), Mild Pain (1 - 3)  LORazepam   Injectable 0.25 milliGRAM(s) IV Push every 8 hours PRN Agitation      OBJECTIVE:  Vital Signs Last 24 Hrs  T(C): 36.8 (25 Dec 2023 11:54), Max: 37.2 (25 Dec 2023 05:36)  T(F): 98.2 (25 Dec 2023 11:54), Max: 99 (25 Dec 2023 05:36)  HR: 80 (25 Dec 2023 11:54) (64 - 80)  BP: 98/59 (25 Dec 2023 11:54) (98/59 - 106/55)  BP(mean): --  RR: 18 (25 Dec 2023 11:54) (17 - 18)  SpO2: 98% (25 Dec 2023 11:54) (95% - 98%)    Parameters below as of 25 Dec 2023 11:54  Patient On (Oxygen Delivery Method): room air      I&O's Summary      CONSTITUTIONAL: NAD  HEAD:  Atraumatic, Normocephalic  EYES: EOMI, conjunctiva and sclera clear  ENMT: No tonsillar erythema, exudates, or enlargement; Moist mucous membranes  NECK: Supple, No JVD  NERVOUS SYSTEM: AOX3, motor and sensation grossly intact in b/l UE and b/l LE  PSYCHIATRIC: Appropriate affect and mood  CHEST/LUNG: Clear to auscultation b/l    HEART: Regular rate and rhythm; No murmurs. No LE edema  ABDOMEN: Soft, Nontender, Nondistended; Bowel sounds present  EXTREMITIES:  2+ Peripheral Pulses, non cyanotic   SKIN: No rashes or lesions    LABS:                        11.7   7.75  )-----------( 179      ( 25 Dec 2023 06:15 )             34.5     12-25    132<L>  |  102  |  24<H>  ----------------------------<  95  4.2   |  19<L>  |  1.06    Ca    8.9      25 Dec 2023 06:15  Phos  2.4     12-25  Mg     1.90     12-25    TPro  6.4  /  Alb  3.6  /  TBili  0.6  /  DBili  x   /  AST  19  /  ALT  12  /  AlkPhos  79  12-25    CAPILLARY BLOOD GLUCOSE                Culture - Blood (collected 23 Dec 2023 15:05)  Source: .Blood Blood-Venous  Preliminary Report (24 Dec 2023 20:01):    No growth at 24 hours    Culture - Blood (collected 23 Dec 2023 15:05)  Source: .Blood Blood-Peripheral  Preliminary Report (24 Dec 2023 20:01):    No growth at 24 hours    Culture - Urine (collected 23 Dec 2023 01:23)  Source: Clean Catch Clean Catch (Midstream)  Preliminary Report (25 Dec 2023 12:23):    >100,000 CFU/ml Escherichia coli

## 2023-12-25 NOTE — PROGRESS NOTE ADULT - ASSESSMENT
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 fib on Eliquis, who presents from [facility] for chest pain, Trops elevated. Pt seen and evaluated by cardiology and recommending continued medical management. 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 fib on Eliquis, who presents from [facility] for chest pain, Trops elevated. Pt seen and evaluated by cardiology and recommending continued medical management. w/ course c/b fever

## 2023-12-25 NOTE — PROGRESS NOTE ADULT - PROBLEM SELECTOR PLAN 3
S/p PCI to LAD and known multivessel disease on [facility] last month.   - c/w plavix, atorvastatin and Eliquis S/p PCI to LAD and known multivessel disease on [facility] last month.   - c/w plavix, atorvastatin and Eliquis

## 2023-12-25 NOTE — PROGRESS NOTE ADULT - PROBLEM SELECTOR PLAN 2
Pt presenting with chest pain, currently resolved. Previous admission at [facility] last month for NSTEM s/p LHC w/ known multivessel disease with decision to treat medically per pt/family preference. Seen by cardiology and recommend continuing medical management w/ no plans for cardiac testing.  - c/w Plavix   - EKG w/ LBBB, unchanged from prior  - monitor on tele  - Trops 54-->69-->64  - Appreciate cardio input Pt presenting with chest pain, currently resolved. Previous admission at [facility] last month for NSTEM s/p LHC w/ known multivessel disease with decision to treat medically per pt/family preference. Seen by cardiology and recommend continuing medical management w/ no plans for cardiac testing.  - c/w Plavix   - EKG w/ LBBB, unchanged from prior  - monitor on tele  - Trops 54-->69-->64  - Appreciate cardio input Pt presenting with chest pain, currently resolved. Previous admission at [facility] last month for NSTEM s/p LHC w/ known multivessel disease with decision to treat medically per pt/family preference. Seen by cardiology and recommend continuing medical management w/ no plans for cardiac testing.  - c/w Plavix   - EKG w/ LBBB, unchanged from prior  - monitor on tele  - Trops 54-->69-->64  - NSVT, no beta blocker given hx of bradycardia, CTM   - Appreciate cardio input Pt presenting with chest pain, currently resolved. Previous admission at [facility] last month for NSTEM s/p LHC w/ known multivessel disease with decision to treat medically per pt/family preference. Seen by cardiology and recommend continuing medical management w/ no plans for cardiac testing.  - c/w Plavix   - EKG w/ LBBB, unchanged from prior  - monitor on tele  - Trops 54-->69-->64  - NSVT, no beta blocker given hx of bradycardia, CTM   - Appreciate cardio input

## 2023-12-25 NOTE — CHART NOTE - NSCHARTNOTEFT_GEN_A_CORE
Notified by RN at 5:53 PM - patient noted to have 10 beats of vtach with multiple PVCs. Patient is noted to be a bit anxious after family visited him at the hospital. HR was ranging at 108-122 bpm. Currently SpO2 88%. Told RN to replete magnesium 1g IVPB for mag 1.90 at AM labs. Patient with extensive cardiac history with no interventions - cards recently signed off 12/22. After monitoring telemetry after 30 minutes, patient HR gradually lower at  bpm. Spo2 90% on room air, will place on 2L NC temporarily to bump up oxygenation.     In addition, patient is expression he "wants to kill his nephew," he is planning to "shoot him with a gun."      /66, SpO2 90%, HR 80 bpm, Temp 98.3.      Attending Dr. Molina notified/aware. Notified by RN at 5:53 PM - patient noted to have 10 beats of vtach with multiple PVCs. Patient is noted to be a bit anxious after family visited him at the hospital. HR was ranging at 108-122 bpm. Currently SpO2 88%. Told RN to replete magnesium 1g IVPB for mag 1.90 at AM labs. Patient with extensive cardiac history with no interventions - cards recently signed off 12/22. After monitoring telemetry after 30 minutes, patient HR gradually lower at  bpm. Spo2 90% on room air, will place on 2L NC temporarily to bump up oxygenation.     In addition, patient is expression he "wants to kill his nephew," he is planning to "shoot him with a gun."  made aware, will follow up on Tuesday.     /66, SpO2 90%, HR 80 bpm, Temp 98.3.      Attending Dr. Molina notified/aware.

## 2023-12-26 LAB
-  AMOXICILLIN/CLAVULANIC ACID: SIGNIFICANT CHANGE UP
-  AMOXICILLIN/CLAVULANIC ACID: SIGNIFICANT CHANGE UP
-  AMPICILLIN/SULBACTAM: SIGNIFICANT CHANGE UP
-  AMPICILLIN/SULBACTAM: SIGNIFICANT CHANGE UP
-  AMPICILLIN: SIGNIFICANT CHANGE UP
-  AMPICILLIN: SIGNIFICANT CHANGE UP
-  AZTREONAM: SIGNIFICANT CHANGE UP
-  AZTREONAM: SIGNIFICANT CHANGE UP
-  CEFAZOLIN: SIGNIFICANT CHANGE UP
-  CEFAZOLIN: SIGNIFICANT CHANGE UP
-  CEFEPIME: SIGNIFICANT CHANGE UP
-  CEFEPIME: SIGNIFICANT CHANGE UP
-  CEFOXITIN: SIGNIFICANT CHANGE UP
-  CEFOXITIN: SIGNIFICANT CHANGE UP
-  CEFTRIAXONE: SIGNIFICANT CHANGE UP
-  CEFTRIAXONE: SIGNIFICANT CHANGE UP
-  CEFUROXIME: SIGNIFICANT CHANGE UP
-  CEFUROXIME: SIGNIFICANT CHANGE UP
-  CIPROFLOXACIN: SIGNIFICANT CHANGE UP
-  CIPROFLOXACIN: SIGNIFICANT CHANGE UP
-  ERTAPENEM: SIGNIFICANT CHANGE UP
-  ERTAPENEM: SIGNIFICANT CHANGE UP
-  GENTAMICIN: SIGNIFICANT CHANGE UP
-  GENTAMICIN: SIGNIFICANT CHANGE UP
-  IMIPENEM: SIGNIFICANT CHANGE UP
-  IMIPENEM: SIGNIFICANT CHANGE UP
-  LEVOFLOXACIN: SIGNIFICANT CHANGE UP
-  LEVOFLOXACIN: SIGNIFICANT CHANGE UP
-  MEROPENEM: SIGNIFICANT CHANGE UP
-  MEROPENEM: SIGNIFICANT CHANGE UP
-  NITROFURANTOIN: SIGNIFICANT CHANGE UP
-  NITROFURANTOIN: SIGNIFICANT CHANGE UP
-  PIPERACILLIN/TAZOBACTAM: SIGNIFICANT CHANGE UP
-  PIPERACILLIN/TAZOBACTAM: SIGNIFICANT CHANGE UP
-  TOBRAMYCIN: SIGNIFICANT CHANGE UP
-  TOBRAMYCIN: SIGNIFICANT CHANGE UP
-  TRIMETHOPRIM/SULFAMETHOXAZOLE: SIGNIFICANT CHANGE UP
-  TRIMETHOPRIM/SULFAMETHOXAZOLE: SIGNIFICANT CHANGE UP
ALBUMIN SERPL ELPH-MCNC: 3.7 G/DL — SIGNIFICANT CHANGE UP (ref 3.3–5)
ALBUMIN SERPL ELPH-MCNC: 3.7 G/DL — SIGNIFICANT CHANGE UP (ref 3.3–5)
ALP SERPL-CCNC: 84 U/L — SIGNIFICANT CHANGE UP (ref 40–120)
ALP SERPL-CCNC: 84 U/L — SIGNIFICANT CHANGE UP (ref 40–120)
ALT FLD-CCNC: 14 U/L — SIGNIFICANT CHANGE UP (ref 4–41)
ALT FLD-CCNC: 14 U/L — SIGNIFICANT CHANGE UP (ref 4–41)
ANION GAP SERPL CALC-SCNC: 13 MMOL/L — SIGNIFICANT CHANGE UP (ref 7–14)
ANION GAP SERPL CALC-SCNC: 13 MMOL/L — SIGNIFICANT CHANGE UP (ref 7–14)
AST SERPL-CCNC: 22 U/L — SIGNIFICANT CHANGE UP (ref 4–40)
AST SERPL-CCNC: 22 U/L — SIGNIFICANT CHANGE UP (ref 4–40)
BILIRUB SERPL-MCNC: 0.5 MG/DL — SIGNIFICANT CHANGE UP (ref 0.2–1.2)
BILIRUB SERPL-MCNC: 0.5 MG/DL — SIGNIFICANT CHANGE UP (ref 0.2–1.2)
BUN SERPL-MCNC: 23 MG/DL — SIGNIFICANT CHANGE UP (ref 7–23)
BUN SERPL-MCNC: 23 MG/DL — SIGNIFICANT CHANGE UP (ref 7–23)
CALCIUM SERPL-MCNC: 9 MG/DL — SIGNIFICANT CHANGE UP (ref 8.4–10.5)
CALCIUM SERPL-MCNC: 9 MG/DL — SIGNIFICANT CHANGE UP (ref 8.4–10.5)
CHLORIDE SERPL-SCNC: 103 MMOL/L — SIGNIFICANT CHANGE UP (ref 98–107)
CHLORIDE SERPL-SCNC: 103 MMOL/L — SIGNIFICANT CHANGE UP (ref 98–107)
CO2 SERPL-SCNC: 19 MMOL/L — LOW (ref 22–31)
CO2 SERPL-SCNC: 19 MMOL/L — LOW (ref 22–31)
CREAT SERPL-MCNC: 1.1 MG/DL — SIGNIFICANT CHANGE UP (ref 0.5–1.3)
CREAT SERPL-MCNC: 1.1 MG/DL — SIGNIFICANT CHANGE UP (ref 0.5–1.3)
EGFR: 67 ML/MIN/1.73M2 — SIGNIFICANT CHANGE UP
EGFR: 67 ML/MIN/1.73M2 — SIGNIFICANT CHANGE UP
GLUCOSE SERPL-MCNC: 106 MG/DL — HIGH (ref 70–99)
GLUCOSE SERPL-MCNC: 106 MG/DL — HIGH (ref 70–99)
HCT VFR BLD CALC: 35.4 % — LOW (ref 39–50)
HCT VFR BLD CALC: 35.4 % — LOW (ref 39–50)
HCT VFR BLD CALC: 35.8 % — LOW (ref 39–50)
HCT VFR BLD CALC: 35.8 % — LOW (ref 39–50)
HGB BLD-MCNC: 11.9 G/DL — LOW (ref 13–17)
HGB BLD-MCNC: 11.9 G/DL — LOW (ref 13–17)
HGB BLD-MCNC: 12 G/DL — LOW (ref 13–17)
HGB BLD-MCNC: 12 G/DL — LOW (ref 13–17)
LITHIUM SERPL-MCNC: 0.5 MMOL/L — LOW (ref 0.6–1.2)
LITHIUM SERPL-MCNC: 0.5 MMOL/L — LOW (ref 0.6–1.2)
MAGNESIUM SERPL-MCNC: 2.3 MG/DL — SIGNIFICANT CHANGE UP (ref 1.6–2.6)
MAGNESIUM SERPL-MCNC: 2.3 MG/DL — SIGNIFICANT CHANGE UP (ref 1.6–2.6)
MCHC RBC-ENTMCNC: 31.2 PG — SIGNIFICANT CHANGE UP (ref 27–34)
MCHC RBC-ENTMCNC: 31.2 PG — SIGNIFICANT CHANGE UP (ref 27–34)
MCHC RBC-ENTMCNC: 31.3 PG — SIGNIFICANT CHANGE UP (ref 27–34)
MCHC RBC-ENTMCNC: 31.3 PG — SIGNIFICANT CHANGE UP (ref 27–34)
MCHC RBC-ENTMCNC: 33.2 GM/DL — SIGNIFICANT CHANGE UP (ref 32–36)
MCHC RBC-ENTMCNC: 33.2 GM/DL — SIGNIFICANT CHANGE UP (ref 32–36)
MCHC RBC-ENTMCNC: 33.9 GM/DL — SIGNIFICANT CHANGE UP (ref 32–36)
MCHC RBC-ENTMCNC: 33.9 GM/DL — SIGNIFICANT CHANGE UP (ref 32–36)
MCV RBC AUTO: 92.2 FL — SIGNIFICANT CHANGE UP (ref 80–100)
MCV RBC AUTO: 92.2 FL — SIGNIFICANT CHANGE UP (ref 80–100)
MCV RBC AUTO: 93.7 FL — SIGNIFICANT CHANGE UP (ref 80–100)
MCV RBC AUTO: 93.7 FL — SIGNIFICANT CHANGE UP (ref 80–100)
METHOD TYPE: SIGNIFICANT CHANGE UP
METHOD TYPE: SIGNIFICANT CHANGE UP
NRBC # BLD: 0 /100 WBCS — SIGNIFICANT CHANGE UP (ref 0–0)
NRBC # FLD: 0 K/UL — SIGNIFICANT CHANGE UP (ref 0–0)
PHOSPHATE SERPL-MCNC: 2.9 MG/DL — SIGNIFICANT CHANGE UP (ref 2.5–4.5)
PHOSPHATE SERPL-MCNC: 2.9 MG/DL — SIGNIFICANT CHANGE UP (ref 2.5–4.5)
PLATELET # BLD AUTO: 185 K/UL — SIGNIFICANT CHANGE UP (ref 150–400)
PLATELET # BLD AUTO: 185 K/UL — SIGNIFICANT CHANGE UP (ref 150–400)
PLATELET # BLD AUTO: 190 K/UL — SIGNIFICANT CHANGE UP (ref 150–400)
PLATELET # BLD AUTO: 190 K/UL — SIGNIFICANT CHANGE UP (ref 150–400)
POTASSIUM SERPL-MCNC: 4 MMOL/L — SIGNIFICANT CHANGE UP (ref 3.5–5.3)
POTASSIUM SERPL-MCNC: 4 MMOL/L — SIGNIFICANT CHANGE UP (ref 3.5–5.3)
POTASSIUM SERPL-SCNC: 4 MMOL/L — SIGNIFICANT CHANGE UP (ref 3.5–5.3)
POTASSIUM SERPL-SCNC: 4 MMOL/L — SIGNIFICANT CHANGE UP (ref 3.5–5.3)
PROT SERPL-MCNC: 6.9 G/DL — SIGNIFICANT CHANGE UP (ref 6–8.3)
PROT SERPL-MCNC: 6.9 G/DL — SIGNIFICANT CHANGE UP (ref 6–8.3)
RBC # BLD: 3.82 M/UL — LOW (ref 4.2–5.8)
RBC # BLD: 3.82 M/UL — LOW (ref 4.2–5.8)
RBC # BLD: 3.84 M/UL — LOW (ref 4.2–5.8)
RBC # BLD: 3.84 M/UL — LOW (ref 4.2–5.8)
RBC # FLD: 15.3 % — HIGH (ref 10.3–14.5)
RBC # FLD: 15.3 % — HIGH (ref 10.3–14.5)
RBC # FLD: 15.4 % — HIGH (ref 10.3–14.5)
RBC # FLD: 15.4 % — HIGH (ref 10.3–14.5)
SODIUM SERPL-SCNC: 135 MMOL/L — SIGNIFICANT CHANGE UP (ref 135–145)
SODIUM SERPL-SCNC: 135 MMOL/L — SIGNIFICANT CHANGE UP (ref 135–145)
WBC # BLD: 11.27 K/UL — HIGH (ref 3.8–10.5)
WBC # BLD: 11.27 K/UL — HIGH (ref 3.8–10.5)
WBC # BLD: 8.53 K/UL — SIGNIFICANT CHANGE UP (ref 3.8–10.5)
WBC # BLD: 8.53 K/UL — SIGNIFICANT CHANGE UP (ref 3.8–10.5)
WBC # FLD AUTO: 11.27 K/UL — HIGH (ref 3.8–10.5)
WBC # FLD AUTO: 11.27 K/UL — HIGH (ref 3.8–10.5)
WBC # FLD AUTO: 8.53 K/UL — SIGNIFICANT CHANGE UP (ref 3.8–10.5)
WBC # FLD AUTO: 8.53 K/UL — SIGNIFICANT CHANGE UP (ref 3.8–10.5)

## 2023-12-26 PROCEDURE — 99232 SBSQ HOSP IP/OBS MODERATE 35: CPT

## 2023-12-26 RX ORDER — OXYMETAZOLINE HYDROCHLORIDE 0.5 MG/ML
2 SPRAY NASAL ONCE
Refills: 0 | Status: COMPLETED | OUTPATIENT
Start: 2023-12-26 | End: 2023-12-26

## 2023-12-26 RX ADMIN — ATORVASTATIN CALCIUM 80 MILLIGRAM(S): 80 TABLET, FILM COATED ORAL at 21:56

## 2023-12-26 RX ADMIN — APIXABAN 5 MILLIGRAM(S): 2.5 TABLET, FILM COATED ORAL at 17:44

## 2023-12-26 RX ADMIN — CLOPIDOGREL BISULFATE 75 MILLIGRAM(S): 75 TABLET, FILM COATED ORAL at 12:51

## 2023-12-26 RX ADMIN — CEFTRIAXONE 100 MILLIGRAM(S): 500 INJECTION, POWDER, FOR SOLUTION INTRAMUSCULAR; INTRAVENOUS at 08:21

## 2023-12-26 RX ADMIN — Medication 1 TABLET(S): at 08:28

## 2023-12-26 NOTE — BH CONSULTATION LIAISON PROGRESS NOTE - NSBHATTESTBILLING_PSY_A_CORE
[id number] - moderate complexity OR 30-39 mins [id number] - moderate complexity OR 30-39 mins

## 2023-12-26 NOTE — CHART NOTE - NSCHARTNOTEFT_GEN_A_CORE
Called by RN to evaluate patient with nose. Patient seen and assessed at bedside. patient is not  in any acute distress.  Active bleeding noted from right nostril. Applied pressure. Rn reported passing small blood clot form the right nostril. Recommended  RN to apply ice pack. of note, patient had epistaxis at the start of the shift of shift which resolved at the time.   Epistaxis : Patient noted with bleeding from right nostril   Afrin nasal spray   Patient is on Eliquis BID, recommenced RN to hold am dose of Eliquis if there is active bleeding. Called by RN to evaluate patient with nose. Patient seen and assessed at bedside. Patient is not  in any acute distress.  Active bleeding noted from right nostril. Applied pressure.  Recommended  RN to apply ice pack. of note, patient had epistaxis at the start of the shift of shift which resolved at the time.   Epistaxis : Patient noted with bleeding from right nostril   Afrin nasal spray   Patient is on Eliquis BID, recommenced RN to hold am dose of Eliquis if there is active bleeding. Called by RN to evaluate patient with nose. Patient seen and assessed at bedside. Patient is not  in any acute distress.  Active bleeding noted from right nostril. Applied pressure. Recommended  RN to apply ice pack. As per patient passed a small blood clot. of note, patient had epistaxis at the start of the shift which resolved at the time after the application of pressure.   Epistaxis : Patient noted with bleeding from right nostril   Afrin nasal spray   Patient is on Eliquis BID, recommenced RN to hold am dose of Eliquis if there is active bleeding.  Consider ENT consult if patient has further episodes of epistaxis.

## 2023-12-26 NOTE — BH CONSULTATION LIAISON PROGRESS NOTE - NSBHASSESSMENTFT_PSY_ALL_CORE
83 yr old male with PPH significant for bipolar disorder (on lithium 150mg BID) and dementia and PMH significant for AD s/p PCI to LAD in the past (known multi-vessel disease noted on recent LHC, with decision to treat medically due to patient/family preference), ICM/HFrEF (EF 29%), severe MR, LV thrombus, and a. fib on Eliquis, who presents from [facility] for chest pain.   OF NOTE- PATIENT HAD RECENT ADMISSION TO [facility] UNDER DIFFERENT MRN [id number].    Patient appears delirious, Lithium level was high and patient somnolent, confused, would continue to hold lithium until more awake, psych will follow.     [] Observation- defer to team but consider enhanced vs 1:1 for delirium on top of dementia  [] hold lithium       [] daily lithium levels, CMPs  [] if severely agitated- can use ativan 0.25mg q8h prn for true severe agitation; avoiding haldol due to EPS symptoms   83 yr old male with PPH significant for bipolar disorder (on lithium 150mg BID) and dementia and PMH significant for AD s/p PCI to LAD in the past (known multi-vessel disease noted on recent LHC, with decision to treat medically due to patient/family preference), ICM/HFrEF (EF 29%), severe MR, LV thrombus, and a. fib on Eliquis, who presents from [facility] for chest pain.   OF NOTE- PATIENT HAD RECENT ADMISSION TO [facility] UNDER DIFFERENT MRN [id number].    Patient appears delirious, Lithium level was high and patient somnolent, confused, would continue to hold lithium until more awake, psych will follow.     [] Observation- defer to team but consider enhanced vs 1:1 for delirium on top of dementia  [] hold lithium       [] daily lithium levels, CMPs  [] if severely agitated- can use ativan 0.25mg q8h prn for true severe agitation; avoiding haldol due to EPS symptoms

## 2023-12-26 NOTE — PROGRESS NOTE ADULT - PROBLEM SELECTOR PLAN 3
S/p PCI to LAD and known multivessel disease on [facility] last month.   - c/w plavix, atorvastatin and Eliquis S/p PCI to LAD and known multivessel disease on [facility] last month.   - c/w plavix, atorvastatin and Eliquis

## 2023-12-26 NOTE — PROGRESS NOTE ADULT - TIME BILLING
Review of laboratory data, radiology results, consultants' recommendations, documentation in [place], discussion with patient/house staff/ACP and interdisciplinary staff (such as , social workers, etc). Interventions were performed as documented above. Review of laboratory data, radiology results, consultants' recommendations, documentation in [place], discussion with patient/house staff/ACP and interdisciplinary staff (such as , social workers, etc). Interventions were performed as documented above.

## 2023-12-26 NOTE — PROGRESS NOTE ADULT - PROBLEM SELECTOR PLAN 2
Pt presenting with chest pain, currently resolved. Previous admission at [facility] last month for NSTEM s/p LHC w/ known multivessel disease with decision to treat medically per pt/family preference. Seen by cardiology and recommend continuing medical management w/ no plans for cardiac testing.  - c/w Plavix   - EKG w/ LBBB, unchanged from prior  - monitor on tele  - Trops 54-->69-->64  - NSVT, no beta blocker given hx of bradycardia, CTM   - Appreciate cardio input Pt presenting with chest pain, currently resolved. Previous admission at [facility] last month for NSTEM s/p LHC w/ known multivessel disease with decision to treat medically per pt/family preference. Seen by cardiology and recommend continuing medical management w/ no plans for cardiac testing.  - c/w Plavix   - EKG w/ LBBB, unchanged from prior  - monitor on tele  - Trops 54-->69-->64  - NSVT, no beta blocker given hx of bradycardia, CTM   - Appreciate cardio input

## 2023-12-26 NOTE — PROGRESS NOTE ADULT - PROBLEM SELECTOR PLAN 1
Unclear etiology. Suspect UTI given positive UA  --CXR w/o focal consolidations   --C/w CTX  --F/u urine cx  --Blood cx x 2: NGTD   --GI PCR Negative. F/u stool cx  --Procalcitonin 0.76  --RVP w/ covid NEG  --Monitor fever curve.  --Previously w/ leukocytosis, now resolved Unclear etiology. Suspect UTI given positive UA  --CXR w/o focal consolidations   --s/p 3 day course of  CTX   --F/u urine cx - e coli   --Blood cx x 2: NGTD   --GI PCR Negative. F/u stool cx  --Procalcitonin 0.76  --RVP w/ covid NEG  --Monitor fever curve.  --Previously w/ leukocytosis, now resolved

## 2023-12-26 NOTE — PROGRESS NOTE ADULT - SUBJECTIVE AND OBJECTIVE BOX
[facility]  Division of Hospital Medicine  Angela Riggs MD  Pager: [phone]      Patient is a 83y old  Male who presents with a chief complaint of chest pain (25 Dec 2023 15:20)      SUBJECTIVE / OVERNIGHT EVENTS: No acute overnight events. This morning pt's exam is limited. Hard of hearing w/ some dysarthria so unable to fully participate in interview. Appears comfortable. No medical concerns   ADDITIONAL REVIEW OF SYSTEMS:    MEDICATIONS  (STANDING):  apixaban 5 milliGRAM(s) Oral two times a day  atorvastatin 80 milliGRAM(s) Oral at bedtime  clopidogrel Tablet 75 milliGRAM(s) Oral daily    MEDICATIONS  (PRN):  acetaminophen     Tablet .. 650 milliGRAM(s) Oral every 6 hours PRN Temp greater or equal to 38C (100.4F), Mild Pain (1 - 3)  LORazepam   Injectable 0.25 milliGRAM(s) IV Push every 8 hours PRN Agitation  sodium chloride 0.65% Nasal 1 Spray(s) Both Nostrils four times a day PRN Nasal Congestion      CAPILLARY BLOOD GLUCOSE        I&O's Summary      PHYSICAL EXAM:  Vital Signs Last 24 Hrs  T(C): 36.4 (26 Dec 2023 05:20), Max: 36.8 (25 Dec 2023 18:00)  T(F): 97.6 (26 Dec 2023 05:20), Max: 98.3 (25 Dec 2023 18:00)  HR: 125 (26 Dec 2023 05:20) (80 - 125)  BP: 102/59 (26 Dec 2023 05:20) (102/59 - 120/66)  BP(mean): --  RR: 18 (26 Dec 2023 05:20) (18 - 18)  SpO2: 98% (26 Dec 2023 05:20) (88% - 98%)    Parameters below as of 26 Dec 2023 05:20  Patient On (Oxygen Delivery Method): room air      GENERAL: Elderly man in NAD  HEAD:  Atraumatic, Normocephalic  EYES: EOMI,  conjunctiva and sclera clear  NECK: Supple  CHEST/LUNG: Clear to auscultation bilaterally; No wheeze, crackles or rhonchi   HEART: Regular rate and rhythm; Normal S1 S2, No murmurs, rubs, or gallops  ABDOMEN: Soft, Nontender, Nondistended; Bowel sounds present  EXTREMITIES:  2+ Peripheral Pulses, No edema  PSYCH: AAOx1-2  NEUROLOGY: SROM, +Mild dysarthria   SKIN: Warm and dry  LABS:                        12.0   11.27 )-----------( 190      ( 26 Dec 2023 10:12 )             35.4     12-26    135  |  103  |  23  ----------------------------<  106<H>  4.0   |  19<L>  |  1.10    Ca    9.0      26 Dec 2023 04:33  Phos  2.9     12-26  Mg     2.30     12-26    TPro  6.9  /  Alb  3.7  /  TBili  0.5  /  DBili  x   /  AST  22  /  ALT  14  /  AlkPhos  84  12-26          Urinalysis Basic - ( 26 Dec 2023 04:33 )    Color: x / Appearance: x / SG: x / pH: x  Gluc: 106 mg/dL / Ketone: x  / Bili: x / Urobili: x   Blood: x / Protein: x / Nitrite: x   Leuk Esterase: x / RBC: x / WBC x   Sq Epi: x / Non Sq Epi: x / Bacteria: x        Culture - Blood (collected 23 Dec 2023 15:05)  Source: .Blood Blood-Venous  Preliminary Report (25 Dec 2023 20:02):    No growth at 48 Hours    Culture - Blood (collected 23 Dec 2023 15:05)  Source: .Blood Blood-Peripheral  Preliminary Report (25 Dec 2023 20:02):    No growth at 48 Hours    Culture - Stool (collected 23 Dec 2023 15:05)  Source: .Stool Feces  Final Report (25 Dec 2023 16:12):    No enteric pathogens isolated.    (Stool culture examined for Salmonella,    Shigella, Campylobacter, Aeromonas, Plesiomonas,    Vibrio, E.coli O157 and Yersinia)        RADIOLOGY & ADDITIONAL TESTS:  Results Reviewed:   Imaging Personally Reviewed:  Electrocardiogram Personally Reviewed:    COORDINATION OF CARE:  Care Discussed with Consultants/Other Providers [Y/N]:  Prior or Outpatient Records Reviewed [Y/N]:   [facility]  Division of Hospital Medicine  Angela Riggs MD  Pager: [phone]      Patient is a 83y old  Male who presents with a chief complaint of chest pain (25 Dec 2023 15:20)      SUBJECTIVE / OVERNIGHT EVENTS: No acute overnight events. This morning pt's exam is limited. Hard of hearing w/ some dysarthria so unable to fully participate in interview. Appears comfortable. No medical concerns   ADDITIONAL REVIEW OF SYSTEMS:    MEDICATIONS  (STANDING):  apixaban 5 milliGRAM(s) Oral two times a day  atorvastatin 80 milliGRAM(s) Oral at bedtime  clopidogrel Tablet 75 milliGRAM(s) Oral daily    MEDICATIONS  (PRN):  acetaminophen     Tablet .. 650 milliGRAM(s) Oral every 6 hours PRN Temp greater or equal to 38C (100.4F), Mild Pain (1 - 3)  LORazepam   Injectable 0.25 milliGRAM(s) IV Push every 8 hours PRN Agitation  sodium chloride 0.65% Nasal 1 Spray(s) Both Nostrils four times a day PRN Nasal Congestion      CAPILLARY BLOOD GLUCOSE        I&O's Summary      PHYSICAL EXAM:  Vital Signs Last 24 Hrs  T(C): 36.4 (26 Dec 2023 05:20), Max: 36.8 (25 Dec 2023 18:00)  T(F): 97.6 (26 Dec 2023 05:20), Max: 98.3 (25 Dec 2023 18:00)  HR: 125 (26 Dec 2023 05:20) (80 - 125)  BP: 102/59 (26 Dec 2023 05:20) (102/59 - 120/66)  BP(mean): --  RR: 18 (26 Dec 2023 05:20) (18 - 18)  SpO2: 98% (26 Dec 2023 05:20) (88% - 98%)    Parameters below as of 26 Dec 2023 05:20  Patient On (Oxygen Delivery Method): room air      GENERAL: Elderly man in NAD  HEAD:  Atraumatic, Normocephalic  EYES: EOMI,  conjunctiva and sclera clear  NECK: Supple  CHEST/LUNG: Clear to auscultation bilaterally; No wheeze, crackles or rhonchi   HEART: Regular rate and rhythm; Normal S1 S2, No murmurs, rubs, or gallops  ABDOMEN: Soft, Nontender, Nondistended; Bowel sounds present  EXTREMITIES:  2+ Peripheral Pulses, No edema  PSYCH: AAOx1-2  NEUROLOGY: SROM, +Mild dysarthria   SKIN: Warm and dry  LABS:                        12.0   11.27 )-----------( 190      ( 26 Dec 2023 10:12 )             35.4     12-26    135  |  103  |  23  ----------------------------<  106<H>  4.0   |  19<L>  |  1.10    Ca    9.0      26 Dec 2023 04:33  Phos  2.9     12-26  Mg     2.30     12-26    TPro  6.9  /  Alb  3.7  /  TBili  0.5  /  DBili  x   /  AST  22  /  ALT  14  /  AlkPhos  84  12-26          Urinalysis Basic - ( 26 Dec 2023 04:33 )    Color: x / Appearance: x / SG: x / pH: x  Gluc: 106 mg/dL / Ketone: x  / Bili: x / Urobili: x   Blood: x / Protein: x / Nitrite: x   Leuk Esterase: x / RBC: x / WBC x   Sq Epi: x / Non Sq Epi: x / Bacteria: x        Culture - Blood (collected 23 Dec 2023 15:05)  Source: .Blood Blood-Venous  Preliminary Report (25 Dec 2023 20:02):    No growth at 48 Hours    Culture - Blood (collected 23 Dec 2023 15:05)  Source: .Blood Blood-Peripheral  Preliminary Report (25 Dec 2023 20:02):    No growth at 48 Hours    Culture - Stool (collected 23 Dec 2023 15:05)  Source: .Stool Feces  Final Report (25 Dec 2023 16:12):    No enteric pathogens isolated.    (Stool culture examined for Salmonella,    Shigella, Campylobacter, Aeromonas, Plesiomonas,    Vibrio, E.coli O157 and Yersinia)        RADIOLOGY & ADDITIONAL TESTS:  Results Reviewed:   Imaging Personally Reviewed:  Electrocardiogram Personally Reviewed:    COORDINATION OF CARE:  Care Discussed with Consultants/Other Providers [Y/N]:  Prior or Outpatient Records Reviewed [Y/N]:   [facility]  Division of Hospital Medicine  Angela Riggs MD  Pager: [phone]      Patient is a 83y old  Male who presents with a chief complaint of chest pain (25 Dec 2023 15:20)      SUBJECTIVE / OVERNIGHT EVENTS: No acute overnight events. This morning pt's exam is limited. Hard of hearing w/ some dysarthria so unable to fully participate in interview. Appears comfortable. No medical concerns   ADDITIONAL REVIEW OF SYSTEMS:    MEDICATIONS  (STANDING):  apixaban 5 milliGRAM(s) Oral two times a day  atorvastatin 80 milliGRAM(s) Oral at bedtime  clopidogrel Tablet 75 milliGRAM(s) Oral daily    MEDICATIONS  (PRN):  acetaminophen     Tablet .. 650 milliGRAM(s) Oral every 6 hours PRN Temp greater or equal to 38C (100.4F), Mild Pain (1 - 3)  LORazepam   Injectable 0.25 milliGRAM(s) IV Push every 8 hours PRN Agitation  sodium chloride 0.65% Nasal 1 Spray(s) Both Nostrils four times a day PRN Nasal Congestion      CAPILLARY BLOOD GLUCOSE        I&O's Summary      PHYSICAL EXAM:  Vital Signs Last 24 Hrs  T(C): 36.4 (26 Dec 2023 05:20), Max: 36.8 (25 Dec 2023 18:00)  T(F): 97.6 (26 Dec 2023 05:20), Max: 98.3 (25 Dec 2023 18:00)  HR: 125 (26 Dec 2023 05:20) (80 - 125)  BP: 102/59 (26 Dec 2023 05:20) (102/59 - 120/66)  BP(mean): --  RR: 18 (26 Dec 2023 05:20) (18 - 18)  SpO2: 98% (26 Dec 2023 05:20) (88% - 98%)    Parameters below as of 26 Dec 2023 05:20  Patient On (Oxygen Delivery Method): room air      GENERAL: Elderly man in NAD  HEAD:  Atraumatic, Normocephalic  EYES: EOMI,  conjunctiva and sclera clear  NECK: Supple  CHEST/LUNG: Clear to auscultation bilaterally; No wheeze, crackles or rhonchi   HEART: Regular rate and rhythm; Normal S1 S2, No murmurs, rubs, or gallops  ABDOMEN: Soft, Nontender, Nondistended; Bowel sounds present  EXTREMITIES:  2+ Peripheral Pulses, No edema  PSYCH: unable to assess orientation   NEUROLOGY: SROM, +Mild dysarthria   SKIN: Warm and dry  LABS:                        12.0   11.27 )-----------( 190      ( 26 Dec 2023 10:12 )             35.4     12-26    135  |  103  |  23  ----------------------------<  106<H>  4.0   |  19<L>  |  1.10    Ca    9.0      26 Dec 2023 04:33  Phos  2.9     12-26  Mg     2.30     12-26    TPro  6.9  /  Alb  3.7  /  TBili  0.5  /  DBili  x   /  AST  22  /  ALT  14  /  AlkPhos  84  12-26          Urinalysis Basic - ( 26 Dec 2023 04:33 )    Color: x / Appearance: x / SG: x / pH: x  Gluc: 106 mg/dL / Ketone: x  / Bili: x / Urobili: x   Blood: x / Protein: x / Nitrite: x   Leuk Esterase: x / RBC: x / WBC x   Sq Epi: x / Non Sq Epi: x / Bacteria: x        Culture - Blood (collected 23 Dec 2023 15:05)  Source: .Blood Blood-Venous  Preliminary Report (25 Dec 2023 20:02):    No growth at 48 Hours    Culture - Blood (collected 23 Dec 2023 15:05)  Source: .Blood Blood-Peripheral  Preliminary Report (25 Dec 2023 20:02):    No growth at 48 Hours    Culture - Stool (collected 23 Dec 2023 15:05)  Source: .Stool Feces  Final Report (25 Dec 2023 16:12):    No enteric pathogens isolated.    (Stool culture examined for Salmonella,    Shigella, Campylobacter, Aeromonas, Plesiomonas,    Vibrio, E.coli O157 and Yersinia)        RADIOLOGY & ADDITIONAL TESTS:  Results Reviewed:   Imaging Personally Reviewed:  Electrocardiogram Personally Reviewed:    COORDINATION OF CARE:  Care Discussed with Consultants/Other Providers [Y/N]:  Prior or Outpatient Records Reviewed [Y/N]:   [facility]  Division of Hospital Medicine  Angela Riggs MD  Pager: [phone]      Patient is a 83y old  Male who presents with a chief complaint of chest pain (25 Dec 2023 15:20)      SUBJECTIVE / OVERNIGHT EVENTS: No acute overnight events. This morning pt's exam is limited. Hard of hearing w/ some dysarthria so unable to fully participate in interview. Appears comfortable. No medical concerns   ADDITIONAL REVIEW OF SYSTEMS:    MEDICATIONS  (STANDING):  apixaban 5 milliGRAM(s) Oral two times a day  atorvastatin 80 milliGRAM(s) Oral at bedtime  clopidogrel Tablet 75 milliGRAM(s) Oral daily    MEDICATIONS  (PRN):  acetaminophen     Tablet .. 650 milliGRAM(s) Oral every 6 hours PRN Temp greater or equal to 38C (100.4F), Mild Pain (1 - 3)  LORazepam   Injectable 0.25 milliGRAM(s) IV Push every 8 hours PRN Agitation  sodium chloride 0.65% Nasal 1 Spray(s) Both Nostrils four times a day PRN Nasal Congestion      CAPILLARY BLOOD GLUCOSE        I&O's Summary      PHYSICAL EXAM:  Vital Signs Last 24 Hrs  T(C): 36.4 (26 Dec 2023 05:20), Max: 36.8 (25 Dec 2023 18:00)  T(F): 97.6 (26 Dec 2023 05:20), Max: 98.3 (25 Dec 2023 18:00)  HR: 125 (26 Dec 2023 05:20) (80 - 125)  BP: 102/59 (26 Dec 2023 05:20) (102/59 - 120/66)  BP(mean): --  RR: 18 (26 Dec 2023 05:20) (18 - 18)  SpO2: 98% (26 Dec 2023 05:20) (88% - 98%)    Parameters below as of 26 Dec 2023 05:20  Patient On (Oxygen Delivery Method): room air      GENERAL: Elderly man in NAD  HEAD:  Atraumatic, Normocephalic  EYES: EOMI,  conjunctiva and sclera clear  NECK: Supple  CHEST/LUNG: Clear to auscultation bilaterally; No wheeze, crackles or rhonchi   HEART: Regular rate and rhythm; Normal S1 S2, No murmurs, rubs, or gallops  ABDOMEN: Soft, Nontender, Nondistended; Bowel sounds present  EXTREMITIES:  2+ Peripheral Pulses, No edema  PSYCH: unable to assess orientation   NEUROLOGY: SROM, +Mild dysarthria   SKIN: Warm and dry  LABS:                        12.0   11.27 )-----------( 190      ( 26 Dec 2023 10:12 )             35.4     12-26    135  |  103  |  23  ----------------------------<  106<H>  4.0   |  19<L>  |  1.10    Ca    9.0      26 Dec 2023 04:33  Phos  2.9     12-26  Mg     2.30     12-26    TPro  6.9  /  Alb  3.7  /  TBili  0.5  /  DBili  x   /  AST  22  /  ALT  14  /  AlkPhos  84  12-26          Urinalysis Basic - ( 26 Dec 2023 04:33 )    Color: x / Appearance: x / SG: x / pH: x  Gluc: 106 mg/dL / Ketone: x  / Bili: x / Urobili: x   Blood: x / Protein: x / Nitrite: x   Leuk Esterase: x / RBC: x / WBC x   Sq Epi: x / Non Sq Epi: x / Bacteria: x        Culture - Blood (collected 23 Dec 2023 15:05)  Source: .Blood Blood-Venous  Preliminary Report (25 Dec 2023 20:02):    No growth at 48 Hours    Culture - Blood (collected 23 Dec 2023 15:05)  Source: .Blood Blood-Peripheral  Preliminary Report (25 Dec 2023 20:02):    No growth at 48 Hours    Culture - Stool (collected 23 Dec 2023 15:05)  Source: .Stool Feces  Final Report (25 Dec 2023 16:12):    No enteric pathogens isolated.    (Stool culture examined for Salmonella,    Shigella, Campylobacter, Aeromonas, Plesiomonas,    Vibrio, E.coli O157 and Yersinia)        RADIOLOGY & ADDITIONAL TESTS:  Results Reviewed:   Imaging Personally Reviewed:  Electrocardiogram Personally Reviewed:    COORDINATION OF CARE:  Care Discussed with Consultants/Other Providers [Y/N]:  Prior or Outpatient Records Reviewed [Y/N]:

## 2023-12-27 LAB
ANION GAP SERPL CALC-SCNC: 13 MMOL/L — SIGNIFICANT CHANGE UP (ref 7–14)
ANION GAP SERPL CALC-SCNC: 13 MMOL/L — SIGNIFICANT CHANGE UP (ref 7–14)
BASOPHILS # BLD AUTO: 0.05 K/UL — SIGNIFICANT CHANGE UP (ref 0–0.2)
BASOPHILS # BLD AUTO: 0.05 K/UL — SIGNIFICANT CHANGE UP (ref 0–0.2)
BASOPHILS NFR BLD AUTO: 0.5 % — SIGNIFICANT CHANGE UP (ref 0–2)
BASOPHILS NFR BLD AUTO: 0.5 % — SIGNIFICANT CHANGE UP (ref 0–2)
BUN SERPL-MCNC: 21 MG/DL — SIGNIFICANT CHANGE UP (ref 7–23)
BUN SERPL-MCNC: 21 MG/DL — SIGNIFICANT CHANGE UP (ref 7–23)
CALCIUM SERPL-MCNC: 9.2 MG/DL — SIGNIFICANT CHANGE UP (ref 8.4–10.5)
CALCIUM SERPL-MCNC: 9.2 MG/DL — SIGNIFICANT CHANGE UP (ref 8.4–10.5)
CHLORIDE SERPL-SCNC: 102 MMOL/L — SIGNIFICANT CHANGE UP (ref 98–107)
CHLORIDE SERPL-SCNC: 102 MMOL/L — SIGNIFICANT CHANGE UP (ref 98–107)
CO2 SERPL-SCNC: 21 MMOL/L — LOW (ref 22–31)
CO2 SERPL-SCNC: 21 MMOL/L — LOW (ref 22–31)
CREAT SERPL-MCNC: 1.07 MG/DL — SIGNIFICANT CHANGE UP (ref 0.5–1.3)
CREAT SERPL-MCNC: 1.07 MG/DL — SIGNIFICANT CHANGE UP (ref 0.5–1.3)
EGFR: 69 ML/MIN/1.73M2 — SIGNIFICANT CHANGE UP
EGFR: 69 ML/MIN/1.73M2 — SIGNIFICANT CHANGE UP
EOSINOPHIL # BLD AUTO: 0.27 K/UL — SIGNIFICANT CHANGE UP (ref 0–0.5)
EOSINOPHIL # BLD AUTO: 0.27 K/UL — SIGNIFICANT CHANGE UP (ref 0–0.5)
EOSINOPHIL NFR BLD AUTO: 2.6 % — SIGNIFICANT CHANGE UP (ref 0–6)
EOSINOPHIL NFR BLD AUTO: 2.6 % — SIGNIFICANT CHANGE UP (ref 0–6)
GLUCOSE SERPL-MCNC: 95 MG/DL — SIGNIFICANT CHANGE UP (ref 70–99)
GLUCOSE SERPL-MCNC: 95 MG/DL — SIGNIFICANT CHANGE UP (ref 70–99)
HCT VFR BLD CALC: 35.8 % — LOW (ref 39–50)
HCT VFR BLD CALC: 35.8 % — LOW (ref 39–50)
HGB BLD-MCNC: 12 G/DL — LOW (ref 13–17)
HGB BLD-MCNC: 12 G/DL — LOW (ref 13–17)
IANC: 7.59 K/UL — HIGH (ref 1.8–7.4)
IANC: 7.59 K/UL — HIGH (ref 1.8–7.4)
IMM GRANULOCYTES NFR BLD AUTO: 0.2 % — SIGNIFICANT CHANGE UP (ref 0–0.9)
IMM GRANULOCYTES NFR BLD AUTO: 0.2 % — SIGNIFICANT CHANGE UP (ref 0–0.9)
LITHIUM SERPL-MCNC: 0.5 MMOL/L — LOW (ref 0.6–1.2)
LITHIUM SERPL-MCNC: 0.5 MMOL/L — LOW (ref 0.6–1.2)
LYMPHOCYTES # BLD AUTO: 1.76 K/UL — SIGNIFICANT CHANGE UP (ref 1–3.3)
LYMPHOCYTES # BLD AUTO: 1.76 K/UL — SIGNIFICANT CHANGE UP (ref 1–3.3)
LYMPHOCYTES # BLD AUTO: 16.9 % — SIGNIFICANT CHANGE UP (ref 13–44)
LYMPHOCYTES # BLD AUTO: 16.9 % — SIGNIFICANT CHANGE UP (ref 13–44)
MAGNESIUM SERPL-MCNC: 2.3 MG/DL — SIGNIFICANT CHANGE UP (ref 1.6–2.6)
MAGNESIUM SERPL-MCNC: 2.3 MG/DL — SIGNIFICANT CHANGE UP (ref 1.6–2.6)
MCHC RBC-ENTMCNC: 31.3 PG — SIGNIFICANT CHANGE UP (ref 27–34)
MCHC RBC-ENTMCNC: 31.3 PG — SIGNIFICANT CHANGE UP (ref 27–34)
MCHC RBC-ENTMCNC: 33.5 GM/DL — SIGNIFICANT CHANGE UP (ref 32–36)
MCHC RBC-ENTMCNC: 33.5 GM/DL — SIGNIFICANT CHANGE UP (ref 32–36)
MCV RBC AUTO: 93.5 FL — SIGNIFICANT CHANGE UP (ref 80–100)
MCV RBC AUTO: 93.5 FL — SIGNIFICANT CHANGE UP (ref 80–100)
MONOCYTES # BLD AUTO: 0.73 K/UL — SIGNIFICANT CHANGE UP (ref 0–0.9)
MONOCYTES # BLD AUTO: 0.73 K/UL — SIGNIFICANT CHANGE UP (ref 0–0.9)
MONOCYTES NFR BLD AUTO: 7 % — SIGNIFICANT CHANGE UP (ref 2–14)
MONOCYTES NFR BLD AUTO: 7 % — SIGNIFICANT CHANGE UP (ref 2–14)
NEUTROPHILS # BLD AUTO: 7.59 K/UL — HIGH (ref 1.8–7.4)
NEUTROPHILS # BLD AUTO: 7.59 K/UL — HIGH (ref 1.8–7.4)
NEUTROPHILS NFR BLD AUTO: 72.8 % — SIGNIFICANT CHANGE UP (ref 43–77)
NEUTROPHILS NFR BLD AUTO: 72.8 % — SIGNIFICANT CHANGE UP (ref 43–77)
NRBC # BLD: 0 /100 WBCS — SIGNIFICANT CHANGE UP (ref 0–0)
NRBC # BLD: 0 /100 WBCS — SIGNIFICANT CHANGE UP (ref 0–0)
NRBC # FLD: 0 K/UL — SIGNIFICANT CHANGE UP (ref 0–0)
NRBC # FLD: 0 K/UL — SIGNIFICANT CHANGE UP (ref 0–0)
PHOSPHATE SERPL-MCNC: 3.3 MG/DL — SIGNIFICANT CHANGE UP (ref 2.5–4.5)
PHOSPHATE SERPL-MCNC: 3.3 MG/DL — SIGNIFICANT CHANGE UP (ref 2.5–4.5)
PLATELET # BLD AUTO: 210 K/UL — SIGNIFICANT CHANGE UP (ref 150–400)
PLATELET # BLD AUTO: 210 K/UL — SIGNIFICANT CHANGE UP (ref 150–400)
POTASSIUM SERPL-MCNC: 4.1 MMOL/L — SIGNIFICANT CHANGE UP (ref 3.5–5.3)
POTASSIUM SERPL-MCNC: 4.1 MMOL/L — SIGNIFICANT CHANGE UP (ref 3.5–5.3)
POTASSIUM SERPL-SCNC: 4.1 MMOL/L — SIGNIFICANT CHANGE UP (ref 3.5–5.3)
POTASSIUM SERPL-SCNC: 4.1 MMOL/L — SIGNIFICANT CHANGE UP (ref 3.5–5.3)
RBC # BLD: 3.83 M/UL — LOW (ref 4.2–5.8)
RBC # BLD: 3.83 M/UL — LOW (ref 4.2–5.8)
RBC # FLD: 15.5 % — HIGH (ref 10.3–14.5)
RBC # FLD: 15.5 % — HIGH (ref 10.3–14.5)
SODIUM SERPL-SCNC: 136 MMOL/L — SIGNIFICANT CHANGE UP (ref 135–145)
SODIUM SERPL-SCNC: 136 MMOL/L — SIGNIFICANT CHANGE UP (ref 135–145)
WBC # BLD: 10.42 K/UL — SIGNIFICANT CHANGE UP (ref 3.8–10.5)
WBC # BLD: 10.42 K/UL — SIGNIFICANT CHANGE UP (ref 3.8–10.5)
WBC # FLD AUTO: 10.42 K/UL — SIGNIFICANT CHANGE UP (ref 3.8–10.5)
WBC # FLD AUTO: 10.42 K/UL — SIGNIFICANT CHANGE UP (ref 3.8–10.5)

## 2023-12-27 PROCEDURE — 99232 SBSQ HOSP IP/OBS MODERATE 35: CPT

## 2023-12-27 RX ORDER — SACUBITRIL AND VALSARTAN 24; 26 MG/1; MG/1
1 TABLET, FILM COATED ORAL
Refills: 0 | Status: DISCONTINUED | OUTPATIENT
Start: 2023-12-27 | End: 2023-12-27

## 2023-12-27 RX ORDER — LOSARTAN POTASSIUM 100 MG/1
25 TABLET, FILM COATED ORAL DAILY
Refills: 0 | Status: DISCONTINUED | OUTPATIENT
Start: 2023-12-27 | End: 2023-12-27

## 2023-12-27 RX ORDER — LOSARTAN POTASSIUM 100 MG/1
25 TABLET, FILM COATED ORAL DAILY
Refills: 0 | Status: DISCONTINUED | OUTPATIENT
Start: 2023-12-27 | End: 2023-12-28

## 2023-12-27 RX ORDER — OXYMETAZOLINE HYDROCHLORIDE 0.5 MG/ML
2 SPRAY NASAL EVERY 12 HOURS
Refills: 0 | Status: DISCONTINUED | OUTPATIENT
Start: 2023-12-27 | End: 2024-01-02

## 2023-12-27 RX ORDER — APIXABAN 2.5 MG/1
5 TABLET, FILM COATED ORAL EVERY 12 HOURS
Refills: 0 | Status: DISCONTINUED | OUTPATIENT
Start: 2023-12-28 | End: 2024-01-03

## 2023-12-27 RX ORDER — SODIUM CHLORIDE 0.65 %
1 AEROSOL, SPRAY (ML) NASAL THREE TIMES A DAY
Refills: 0 | Status: DISCONTINUED | OUTPATIENT
Start: 2023-12-27 | End: 2024-01-03

## 2023-12-27 RX ORDER — PANTOPRAZOLE SODIUM 20 MG/1
40 TABLET, DELAYED RELEASE ORAL
Refills: 0 | Status: DISCONTINUED | OUTPATIENT
Start: 2023-12-27 | End: 2024-01-02

## 2023-12-27 RX ADMIN — APIXABAN 5 MILLIGRAM(S): 2.5 TABLET, FILM COATED ORAL at 05:35

## 2023-12-27 RX ADMIN — Medication 1 SPRAY(S): at 13:10

## 2023-12-27 RX ADMIN — Medication 1 SPRAY(S): at 21:26

## 2023-12-27 RX ADMIN — CLOPIDOGREL BISULFATE 75 MILLIGRAM(S): 75 TABLET, FILM COATED ORAL at 12:14

## 2023-12-27 RX ADMIN — ATORVASTATIN CALCIUM 80 MILLIGRAM(S): 80 TABLET, FILM COATED ORAL at 21:26

## 2023-12-27 NOTE — PROGRESS NOTE ADULT - PROBLEM SELECTOR PLAN 8
Holding entresto and spironolactone for now  - resume if sCR remains stable and pressures stable -pressures remain soft at this time Holding entresto and spironolactone for now  - resume if sCR remains stable and pressures stable   - Trialing losartan as above

## 2023-12-27 NOTE — PROGRESS NOTE ADULT - TIME BILLING
Review of laboratory data, radiology results, consultants' recommendations, documentation in [place], discussion with patient/house staff/ACP and interdisciplinary staff (such as , social workers, etc). Interventions were performed as documented above. Review of laboratory data, radiology results, consultants' recommendations, documentation in [place], discussion with patient/house staff/ACP and interdisciplinary staff (such as , social workers, etc). Interventions were performed as documented above.

## 2023-12-27 NOTE — DIETITIAN INITIAL EVALUATION ADULT - PERTINENT LABORATORY DATA
12-27    136  |  102  |  21  ----------------------------<  95  4.1   |  21<L>  |  1.07    Ca    9.2      27 Dec 2023 04:24  Phos  3.3     12-27  Mg     2.30     12-27    TPro  6.9  /  Alb  3.7  /  TBili  0.5  /  DBili  x   /  AST  22  /  ALT  14  /  AlkPhos  84  12-26

## 2023-12-27 NOTE — PROGRESS NOTE ADULT - PROBLEM SELECTOR PLAN 3
S/p PCI to LAD and known multivessel disease on [facility] last month.   - c/w plavix, atorvastatin and Eliquis S/p PCI to LAD and known multivessel disease on [facility] last month.   - c/w plavix, atorvastatin and Eliquis

## 2023-12-27 NOTE — PROGRESS NOTE ADULT - PROBLEM SELECTOR PLAN 1
Unclear etiology. Suspect UTI given positive UA  --CXR w/o focal consolidations   --s/p 3 day course of  CTX   --F/u urine cx - e coli   --Blood cx x 2: NGTD   --GI PCR Negative. F/u stool cx  --Procalcitonin 0.76  --RVP w/ covid NEG  --Monitor fever curve.  --Previously w/ leukocytosis, now resolved

## 2023-12-27 NOTE — PROGRESS NOTE ADULT - PROBLEM SELECTOR PLAN 2
Pt presenting with chest pain, currently resolved. Previous admission at [facility] last month for NSTEM s/p LHC w/ known multivessel disease with decision to treat medically per pt/family preference. Seen by cardiology and recommend continuing medical management w/ no plans for cardiac testing.  - c/w Plavix   - EKG w/ LBBB, unchanged from prior  - monitor on tele  - Trops 54-->69-->64  - NSVT, no beta blocker given hx of bradycardia, CTM   - Appreciate cardio input Pt presenting with chest pain, currently resolved. Previous admission at [facility] last month for NSTEM s/p LHC w/ known multivessel disease with decision to treat medically per pt/family preference. Seen by cardiology and recommend continuing medical management w/ no plans for cardiac testing.  - c/w Plavix   - EKG w/ LBBB, unchanged from prior  - monitor on tele  - Trops 54-->69-->64  - NSVT, no beta blocker given hx of bradycardia, CTM   - Appreciate cardio input

## 2023-12-27 NOTE — DIETITIAN INITIAL EVALUATION ADULT - ADD RECOMMEND
1. Kitchen to provide Magic Cup (290kcal, 9gm Protein) - 2x daily;   2. Encourage & assist Pt with meals; Monitor PO diet tolerance;   3. Honor food preferences;   4. Add Multivitamins 1 tab daily for micronutrient coverage;   5. Monitor labs, hydration status;

## 2023-12-27 NOTE — PROVIDER CONTACT NOTE (OTHER) - RECOMMENDATIONS
ACP order to give ativan due agitation, and to tell pt to bear down, and to monitor pt vitals
Will continue to monitor and follow proper precautions
Will continue to monitor and obtain stool sample as ordered
Ordered to monitor pt, nasal OCEAN flush is ordered prn,
no new recommendations at this time.

## 2023-12-27 NOTE — CHART NOTE - NSCHARTNOTEFT_GEN_A_CORE
MEDICINE PA NOTE     Patient with HFrEF 29 with known multivessel disease and prior PCI. ENMANUEL on admission and GDMT help. Renal function now normal and per discussion with cardiology recc resuming GDMT as tolerated. Patient on entresto 24-26 BID and aldactone 25 QD at baseline. Per cards recc will resume on cozaar 25 QD for now and monitor BP. IF BP remains stable them resume Entresto this weekend. Will continue to monitor patient.     FAYE JacksonC  Department of Medicine/ RCU  In house RCU [place] [id number]  In house Medicine Bee[place] [phone]  Reachable via teams MEDICINE PA NOTE     Patient with HFrEF 29 with known multivessel disease and prior PCI. ENMANUEL on admission and GDMT help. Renal function now normal and per discussion with cardiology recc resuming GDMT as tolerated. Patient on entresto 24-26 BID and aldactone 25 QD at baseline. Per cards recc will resume on cozaar 25 QD for now and monitor BP. IF BP remains stable them resume Entresto this weekend. Will continue to monitor patient.     FAYE JacksonC  Department of Medicine/ RCU  In house RCU [place] [id number]  In house Medicine Beeper [phone]  Reachable via teams

## 2023-12-27 NOTE — DIETITIAN INITIAL EVALUATION ADULT - OTHER INFO
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 Of note, Pt DNR/DNI.      At time of visit, Pt awake, alert. Per Pt, his appetite not that well since admission; Pt does not like the hospital food/food options. Food preferences discussed; RD offered variety of PO supplements (Ensure Plus, Ensure Clear, Orgain, etc) but Pt declined them all. No report of chewing or swallowing difficulty; no report of vomiting or diarrhea @ this time. +Last BM (12/26) per flow sheet.     Per Pt, his height: ~72" & his body weight: ~180# -> not consistent with 148.3# (12/26/23) ->> question accuracy?? Rec to obtain Pt's accurate body weights. Of note, Pt's weights: 65.8 kg (12/27/23), 78.8 kg (HIE - 9/5/23) --> weight loss/wt change: ~12.9 kg/16.3% x 3 months. Kitchen to provide Magic cup - 2x daily, as discussed with Pt. RD answered food related concerns; RD remains available, Pt made aware.  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 Of note, Pt DNR/DNI.      At time of visit, Pt awake, alert. Per Pt, his appetite not that well since admission; Pt does not like the hospital food/food options. Food preferences discussed; RD offered variety of PO supplements (Ensure Plus, Ensure Clear, Orgain, etc) but Pt declined them all. No report of chewing or swallowing difficulty; no report of vomiting or diarrhea @ this time. +Last BM (12/26) per flow sheet.     Per Pt, his height: ~72" & his body weight: ~180# -> not consistent with 148.3# (12/26/23) ->> question accuracy?? Rec to obtain Pt's accurate body weights. Of note, Pt's weights: 65.8 kg (12/27/23), 78.8 kg (HIE - 9/5/23) --> weight loss/wt change: ~12.9 kg/16.3% x 3 months. Kitchen to provide Magic cup - 2x daily, as discussed with Pt. RD answered food related concerns; RD remains available, Pt made aware.

## 2023-12-27 NOTE — PROGRESS NOTE ADULT - SUBJECTIVE AND OBJECTIVE BOX
[facility]  Division of Hospital Medicine  Angela Riggs MD  Pager: [phone]      Patient is a 83y old  Male who presents with a chief complaint of Non-ST elevation myocardial infarction (NSTEMI)     (27 Dec 2023 09:55)      SUBJECTIVE / OVERNIGHT EVENTS: Patient seen and examined at bedside. Patient more awake and engaged in conversation. Patient knows he is in the hospital - does not know name. Not oriented to time. Denies chest pain or SOB. Calm   ADDITIONAL REVIEW OF SYSTEMS:    MEDICATIONS  (STANDING):  apixaban 5 milliGRAM(s) Oral two times a day  atorvastatin 80 milliGRAM(s) Oral at bedtime  clopidogrel Tablet 75 milliGRAM(s) Oral daily  losartan 25 milliGRAM(s) Oral daily  sodium chloride 0.65% Nasal 1 Spray(s) Both Nostrils three times a day    MEDICATIONS  (PRN):  acetaminophen     Tablet .. 650 milliGRAM(s) Oral every 6 hours PRN Temp greater or equal to 38C (100.4F), Mild Pain (1 - 3)  LORazepam   Injectable 0.25 milliGRAM(s) IV Push every 8 hours PRN Agitation  oxymetazoline 0.05% Nasal Spray 2 Spray(s) Both Nostrils every 12 hours PRN Epistaxis      CAPILLARY BLOOD GLUCOSE        I&O's Summary      PHYSICAL EXAM:  Vital Signs Last 24 Hrs  T(C): 37.2 (27 Dec 2023 11:30), Max: 37.2 (27 Dec 2023 11:30)  T(F): 99 (27 Dec 2023 11:30), Max: 99 (27 Dec 2023 11:30)  HR: 77 (27 Dec 2023 11:30) (60 - 77)  BP: 110/61 (27 Dec 2023 11:30) (104/84 - 110/61)  BP(mean): --  RR: 18 (27 Dec 2023 11:30) (18 - 18)  SpO2: 97% (27 Dec 2023 11:30) (97% - 98%)    Parameters below as of 27 Dec 2023 11:30  Patient On (Oxygen Delivery Method): room air      GENERAL: Elderly man in NAD  HEAD:  Atraumatic, Normocephalic  EYES: EOMI,  conjunctiva and sclera clear  NECK: Supple  CHEST/LUNG: Clear to auscultation bilaterally; No wheeze, crackles or rhonchi   HEART: Regular rate and rhythm; Normal S1 S2, No murmurs, rubs, or gallops  ABDOMEN: Soft, Nontender, Nondistended; Bowel sounds present  EXTREMITIES:  2+ Peripheral Pulses, No edema  PSYCH: AAOx2 - self and hospital   NEUROLOGY: SROM, +Mild dysarthria , + LUE resting tremor   SKIN: Warm and dry  LABS:                        12.0   10.42 )-----------( 210      ( 27 Dec 2023 04:24 )             35.8     12-27    136  |  102  |  21  ----------------------------<  95  4.1   |  21<L>  |  1.07    Ca    9.2      27 Dec 2023 04:24  Phos  3.3     12-27  Mg     2.30     12-27    TPro  6.9  /  Alb  3.7  /  TBili  0.5  /  DBili  x   /  AST  22  /  ALT  14  /  AlkPhos  84  12-26          Urinalysis Basic - ( 27 Dec 2023 04:24 )    Color: x / Appearance: x / SG: x / pH: x  Gluc: 95 mg/dL / Ketone: x  / Bili: x / Urobili: x   Blood: x / Protein: x / Nitrite: x   Leuk Esterase: x / RBC: x / WBC x   Sq Epi: x / Non Sq Epi: x / Bacteria: x          RADIOLOGY & ADDITIONAL TESTS:  Results Reviewed:   Imaging Personally Reviewed:  Electrocardiogram Personally Reviewed:    COORDINATION OF CARE:  Care Discussed with Consultants/Other Providers [Y/N]:  Prior or Outpatient Records Reviewed [Y/N]:   [facility]  Division of Hospital Medicine  Angela Riggs MD  Pager: [phone]      Patient is a 83y old  Male who presents with a chief complaint of Non-ST elevation myocardial infarction (NSTEMI)     (27 Dec 2023 09:55)      SUBJECTIVE / OVERNIGHT EVENTS: Patient seen and examined at bedside. Patient more awake and engaged in conversation. Patient knows he is in the hospital - does not know name. Not oriented to time. Denies chest pain or SOB. Calm   ADDITIONAL REVIEW OF SYSTEMS:    MEDICATIONS  (STANDING):  apixaban 5 milliGRAM(s) Oral two times a day  atorvastatin 80 milliGRAM(s) Oral at bedtime  clopidogrel Tablet 75 milliGRAM(s) Oral daily  losartan 25 milliGRAM(s) Oral daily  sodium chloride 0.65% Nasal 1 Spray(s) Both Nostrils three times a day    MEDICATIONS  (PRN):  acetaminophen     Tablet .. 650 milliGRAM(s) Oral every 6 hours PRN Temp greater or equal to 38C (100.4F), Mild Pain (1 - 3)  LORazepam   Injectable 0.25 milliGRAM(s) IV Push every 8 hours PRN Agitation  oxymetazoline 0.05% Nasal Spray 2 Spray(s) Both Nostrils every 12 hours PRN Epistaxis      CAPILLARY BLOOD GLUCOSE        I&O's Summary      PHYSICAL EXAM:  Vital Signs Last 24 Hrs  T(C): 37.2 (27 Dec 2023 11:30), Max: 37.2 (27 Dec 2023 11:30)  T(F): 99 (27 Dec 2023 11:30), Max: 99 (27 Dec 2023 11:30)  HR: 77 (27 Dec 2023 11:30) (60 - 77)  BP: 110/61 (27 Dec 2023 11:30) (104/84 - 110/61)  BP(mean): --  RR: 18 (27 Dec 2023 11:30) (18 - 18)  SpO2: 97% (27 Dec 2023 11:30) (97% - 98%)    Parameters below as of 27 Dec 2023 11:30  Patient On (Oxygen Delivery Method): room air      GENERAL: Elderly man in NAD  HEAD:  Atraumatic, Normocephalic  EYES: EOMI,  conjunctiva and sclera clear  NECK: Supple  CHEST/LUNG: Clear to auscultation bilaterally; No wheeze, crackles or rhonchi   HEART: Regular rate and rhythm; Normal S1 S2, No murmurs, rubs, or gallops  ABDOMEN: Soft, Nontender, Nondistended; Bowel sounds present  EXTREMITIES:  2+ Peripheral Pulses, No edema  PSYCH: AAOx2 - self and hospital   NEUROLOGY: SROM, +Mild dysarthria , + LUE resting tremor   SKIN: Warm and dry  LABS:                        12.0   10.42 )-----------( 210      ( 27 Dec 2023 04:24 )             35.8     12-27    136  |  102  |  21  ----------------------------<  95  4.1   |  21<L>  |  1.07    Ca    9.2      27 Dec 2023 04:24  Phos  3.3     12-27  Mg     2.30     12-27    TPro  6.9  /  Alb  3.7  /  TBili  0.5  /  DBili  x   /  AST  22  /  ALT  14  /  AlkPhos  84  12-26          Urinalysis Basic - ( 27 Dec 2023 04:24 )    Color: x / Appearance: x / SG: x / pH: x  Gluc: 95 mg/dL / Ketone: x  / Bili: x / Urobili: x   Blood: x / Protein: x / Nitrite: x   Leuk Esterase: x / RBC: x / WBC x   Sq Epi: x / Non Sq Epi: x / Bacteria: x          RADIOLOGY & ADDITIONAL TESTS:  Results Reviewed:   Imaging Personally Reviewed:  Electrocardiogram Personally Reviewed:    COORDINATION OF CARE:  Care Discussed with Consultants/Other Providers [Y/N]:  Prior or Outpatient Records Reviewed [Y/N]:

## 2023-12-27 NOTE — BH CONSULTATION LIAISON PROGRESS NOTE - NSBHASSESSMENTFT_PSY_ALL_CORE
83 yr old male with PPH significant for bipolar disorder (on lithium 150mg BID) and dementia and PMH significant for AD s/p PCI to LAD in the past (known multi-vessel disease noted on recent LHC, with decision to treat medically due to patient/family preference), ICM/HFrEF (EF 29%), severe MR, LV thrombus, and a. fib on Eliquis, who presents from intermediate for chest pain.   OF NOTE- PATIENT HAD RECENT ADMISSION TO [facility] UNDER DIFFERENT MRN [id number].    f/u 12/27- less somnolent but still appears delirious, keep holding lithium     [] Observation doing well on [facility]   [] hold lithium       [] daily lithium levels, CMPs  [] if severely agitated- can use ativan 0.25mg q8h prn for true severe agitation; avoiding haldol due to EPS symptoms   83 yr old male with PPH significant for bipolar disorder (on lithium 150mg BID) and dementia and PMH significant for AD s/p PCI to LAD in the past (known multi-vessel disease noted on recent LHC, with decision to treat medically due to patient/family preference), ICM/HFrEF (EF 29%), severe MR, LV thrombus, and a. fib on Eliquis, who presents from [facility] for chest pain.   OF NOTE- PATIENT HAD RECENT ADMISSION TO [facility] UNDER DIFFERENT MRN [id number].    f/u 12/27- less somnolent but still appears delirious, keep holding lithium     [] Observation doing well on [facility]   [] hold lithium       [] daily lithium levels, CMPs  [] if severely agitated- can use ativan 0.25mg q8h prn for true severe agitation; avoiding haldol due to EPS symptoms

## 2023-12-27 NOTE — BH CONSULTATION LIAISON PROGRESS NOTE - NSBHATTESTBILLING_PSY_A_CORE
[id number] - moderate complexity OR 30-39 mins [id number] - moderate complexity OR 30-39 mins

## 2023-12-27 NOTE — DIETITIAN INITIAL EVALUATION ADULT - PROBLEM SELECTOR PROBLEM 7
Negative for chills and fever. HENT: Negative for congestion, facial swelling, and voice change. Eyes: Negative for photophobia and visual disturbance. Respiratory: Negative for apnea, cough, chest tightness and shortness of breath. Cardiovascular: Negative for chest pain and palpitations. Gastrointestinal: Negative for dysphagia and early satiety. Genitourinary: Negative for difficulty urinating, dysuria, flank pain, frequency and hematuria. Musculoskeletal: Negative for new gait problem, joint swelling and myalgias. Skin: Negative for color change, pallor and rash. Endocrine: negative for tremors, temperature intolerance or polydipsia. Allergic/Immunologic: Negative for new environmental or food allergies. Neurological: Negative for dizziness, seizures, speech difficulty, numbness. Hematological: Negative for adenopathy. Psychiatric/Behavioral: Negative for agitation and confusion. EXAM    /87 (Site: Left Upper Arm, Position: Sitting, Cuff Size: Medium Adult)   Pulse 105   Temp 97.5 °F (36.4 °C) (Oral)   Wt 215 lb (97.5 kg)   BMI 34.70 kg/m²     GEN: NAD, pleasant, obese  CVS: RRR  PULM: No respiratory distress  HEENT: PERRLA/EOMI; dentition appropriate, hearing appears within normal limits  NECK: Supple with trachea in midline, no masses  EXT: No lymphedema noted  ABD: soft/NT/obese  NEURO: No focal deficits, no obvious CN deficits  BACK: Bilateral latiss muscle intact  BREAST:   Physical Exam    Bra size: 40D  Desired bra size: Smaller  Ptosis grade:   R: 3   L: 3  The left breast size is larger than the right breast.  There were no palpable masses with no palpable lymphadenopathy in the ipsilateral left axilla.    Nipple retraction: No    Left breast sternal notch to nipple: 33.1 cm  Right breast sternal notch to nipple: 30.8 cm    Left breast nipple to inframammary fold: 14.8 cm  Right breast nipple to inframammary fold: 13.2 cm    Left breast width 12.8 cm  Right breast Paroxysmal atrial fibrillation

## 2023-12-27 NOTE — CHART NOTE - NSCHARTNOTEFT_GEN_A_CORE
MEDICINE PA NOTE     Called by RN for patient with new dark stools noticed. HH and hemodynamics stable. Will send for FOB. Continue on AC for now and add PPI. Monitor for now.     Betty Rivas PA-C  Department of Medicine/ RCU  In house RCU [place] [id number]  In house Medicine Beeper [id number]  Reachable via teams MEDICINE PA NOTE     Called by RN for patient with new dark stools noticed. HH and hemodynamics stable. Will send for FOB. Continue on AC for now and add PPI. Monitor for now.     Betty Rivas PA-C  Department of Medicine/ RCU  In house RCU [place] [id number]  In house Medicine Beeper [id number]  Reachable via teams

## 2023-12-27 NOTE — PROGRESS NOTE ADULT - PROBLEM SELECTOR PLAN 4
Cr elevated to 1.5 from baseline 1.0.  - will hold Entresto and aldactone for now and restart as able   - Will re-start entresto on 12/17   - FeNA consistent w/ pre-renal etiology   - Now resolved s/p Gentle hydration  - Avoid nephrotoxins  -Renally dose meds Cr elevated to 1.5 from baseline 1.0.  - will hold Entresto and aldactone for now and restart as able   - Per cardiology recommends, given soft BP wants to try losartan 25mg to see if patient blood pressure can tolerate   - FeNA consistent w/ pre-renal etiology   - Now resolved s/p Gentle hydration  - Avoid nephrotoxins  -Renally dose meds

## 2023-12-27 NOTE — PROVIDER CONTACT NOTE (OTHER) - ACTION/TREATMENT ORDERED:
Betty REYES, Haley Ricci RN made aware. Provider adjusted AM dose of Eliquis to be held.
Vitals retaken 98.3 80 HR 90% room air 120/66 18   ativan given via IV push  Pt repositioned for comfort.
Betty REYES made aware
monitoring pt ongoing and awaken pt if HR gets low
Order initiated, monitoring ongoing. Pt resting in bed, safety maintained.

## 2023-12-28 LAB
ANION GAP SERPL CALC-SCNC: 18 MMOL/L — HIGH (ref 7–14)
ANION GAP SERPL CALC-SCNC: 18 MMOL/L — HIGH (ref 7–14)
APTT BLD: 44.5 SEC — HIGH (ref 24.5–35.6)
APTT BLD: 44.5 SEC — HIGH (ref 24.5–35.6)
BASOPHILS # BLD AUTO: 0.09 K/UL — SIGNIFICANT CHANGE UP (ref 0–0.2)
BASOPHILS # BLD AUTO: 0.09 K/UL — SIGNIFICANT CHANGE UP (ref 0–0.2)
BASOPHILS NFR BLD AUTO: 0.5 % — SIGNIFICANT CHANGE UP (ref 0–2)
BASOPHILS NFR BLD AUTO: 0.5 % — SIGNIFICANT CHANGE UP (ref 0–2)
BUN SERPL-MCNC: 17 MG/DL — SIGNIFICANT CHANGE UP (ref 7–23)
BUN SERPL-MCNC: 17 MG/DL — SIGNIFICANT CHANGE UP (ref 7–23)
CALCIUM SERPL-MCNC: 9.6 MG/DL — SIGNIFICANT CHANGE UP (ref 8.4–10.5)
CALCIUM SERPL-MCNC: 9.6 MG/DL — SIGNIFICANT CHANGE UP (ref 8.4–10.5)
CHLORIDE SERPL-SCNC: 98 MMOL/L — SIGNIFICANT CHANGE UP (ref 98–107)
CHLORIDE SERPL-SCNC: 98 MMOL/L — SIGNIFICANT CHANGE UP (ref 98–107)
CO2 SERPL-SCNC: 20 MMOL/L — LOW (ref 22–31)
CO2 SERPL-SCNC: 20 MMOL/L — LOW (ref 22–31)
CREAT SERPL-MCNC: 0.99 MG/DL — SIGNIFICANT CHANGE UP (ref 0.5–1.3)
CREAT SERPL-MCNC: 0.99 MG/DL — SIGNIFICANT CHANGE UP (ref 0.5–1.3)
CULTURE RESULTS: SIGNIFICANT CHANGE UP
EGFR: 76 ML/MIN/1.73M2 — SIGNIFICANT CHANGE UP
EGFR: 76 ML/MIN/1.73M2 — SIGNIFICANT CHANGE UP
EOSINOPHIL # BLD AUTO: 0.31 K/UL — SIGNIFICANT CHANGE UP (ref 0–0.5)
EOSINOPHIL # BLD AUTO: 0.31 K/UL — SIGNIFICANT CHANGE UP (ref 0–0.5)
EOSINOPHIL NFR BLD AUTO: 1.7 % — SIGNIFICANT CHANGE UP (ref 0–6)
EOSINOPHIL NFR BLD AUTO: 1.7 % — SIGNIFICANT CHANGE UP (ref 0–6)
GLUCOSE BLDC GLUCOMTR-MCNC: 115 MG/DL — HIGH (ref 70–99)
GLUCOSE BLDC GLUCOMTR-MCNC: 115 MG/DL — HIGH (ref 70–99)
GLUCOSE SERPL-MCNC: 126 MG/DL — HIGH (ref 70–99)
GLUCOSE SERPL-MCNC: 126 MG/DL — HIGH (ref 70–99)
HCT VFR BLD CALC: 42.4 % — SIGNIFICANT CHANGE UP (ref 39–50)
HCT VFR BLD CALC: 42.4 % — SIGNIFICANT CHANGE UP (ref 39–50)
HGB BLD-MCNC: 14 G/DL — SIGNIFICANT CHANGE UP (ref 13–17)
HGB BLD-MCNC: 14 G/DL — SIGNIFICANT CHANGE UP (ref 13–17)
IMM GRANULOCYTES NFR BLD AUTO: 0.4 % — SIGNIFICANT CHANGE UP (ref 0–0.9)
IMM GRANULOCYTES NFR BLD AUTO: 0.4 % — SIGNIFICANT CHANGE UP (ref 0–0.9)
INR BLD: 2.14 RATIO — HIGH (ref 0.85–1.18)
INR BLD: 2.14 RATIO — HIGH (ref 0.85–1.18)
LITHIUM SERPL-MCNC: 0.3 MMOL/L — LOW (ref 0.6–1.2)
LITHIUM SERPL-MCNC: 0.3 MMOL/L — LOW (ref 0.6–1.2)
LYMPHOCYTES # BLD AUTO: 24.4 % — SIGNIFICANT CHANGE UP (ref 13–44)
LYMPHOCYTES # BLD AUTO: 24.4 % — SIGNIFICANT CHANGE UP (ref 13–44)
LYMPHOCYTES # BLD AUTO: 4.53 K/UL — HIGH (ref 1–3.3)
LYMPHOCYTES # BLD AUTO: 4.53 K/UL — HIGH (ref 1–3.3)
MAGNESIUM SERPL-MCNC: 2.2 MG/DL — SIGNIFICANT CHANGE UP (ref 1.6–2.6)
MAGNESIUM SERPL-MCNC: 2.2 MG/DL — SIGNIFICANT CHANGE UP (ref 1.6–2.6)
MCHC RBC-ENTMCNC: 31.2 PG — SIGNIFICANT CHANGE UP (ref 27–34)
MCHC RBC-ENTMCNC: 31.2 PG — SIGNIFICANT CHANGE UP (ref 27–34)
MCHC RBC-ENTMCNC: 33 GM/DL — SIGNIFICANT CHANGE UP (ref 32–36)
MCHC RBC-ENTMCNC: 33 GM/DL — SIGNIFICANT CHANGE UP (ref 32–36)
MCV RBC AUTO: 94.4 FL — SIGNIFICANT CHANGE UP (ref 80–100)
MCV RBC AUTO: 94.4 FL — SIGNIFICANT CHANGE UP (ref 80–100)
MONOCYTES # BLD AUTO: 1.14 K/UL — HIGH (ref 0–0.9)
MONOCYTES # BLD AUTO: 1.14 K/UL — HIGH (ref 0–0.9)
MONOCYTES NFR BLD AUTO: 6.1 % — SIGNIFICANT CHANGE UP (ref 2–14)
MONOCYTES NFR BLD AUTO: 6.1 % — SIGNIFICANT CHANGE UP (ref 2–14)
NEUTROPHILS # BLD AUTO: 12.43 K/UL — HIGH (ref 1.8–7.4)
NEUTROPHILS # BLD AUTO: 12.43 K/UL — HIGH (ref 1.8–7.4)
NEUTROPHILS NFR BLD AUTO: 66.9 % — SIGNIFICANT CHANGE UP (ref 43–77)
NEUTROPHILS NFR BLD AUTO: 66.9 % — SIGNIFICANT CHANGE UP (ref 43–77)
NRBC # BLD: 0 /100 WBCS — SIGNIFICANT CHANGE UP (ref 0–0)
NRBC # BLD: 0 /100 WBCS — SIGNIFICANT CHANGE UP (ref 0–0)
NRBC # FLD: 0 K/UL — SIGNIFICANT CHANGE UP (ref 0–0)
NRBC # FLD: 0 K/UL — SIGNIFICANT CHANGE UP (ref 0–0)
OB PNL STL: NEGATIVE — SIGNIFICANT CHANGE UP
OB PNL STL: NEGATIVE — SIGNIFICANT CHANGE UP
PHOSPHATE SERPL-MCNC: 2.7 MG/DL — SIGNIFICANT CHANGE UP (ref 2.5–4.5)
PHOSPHATE SERPL-MCNC: 2.7 MG/DL — SIGNIFICANT CHANGE UP (ref 2.5–4.5)
PLATELET # BLD AUTO: 340 K/UL — SIGNIFICANT CHANGE UP (ref 150–400)
PLATELET # BLD AUTO: 340 K/UL — SIGNIFICANT CHANGE UP (ref 150–400)
POTASSIUM SERPL-MCNC: 4.4 MMOL/L — SIGNIFICANT CHANGE UP (ref 3.5–5.3)
POTASSIUM SERPL-MCNC: 4.4 MMOL/L — SIGNIFICANT CHANGE UP (ref 3.5–5.3)
POTASSIUM SERPL-SCNC: 4.4 MMOL/L — SIGNIFICANT CHANGE UP (ref 3.5–5.3)
POTASSIUM SERPL-SCNC: 4.4 MMOL/L — SIGNIFICANT CHANGE UP (ref 3.5–5.3)
PROTHROM AB SERPL-ACNC: 23.4 SEC — HIGH (ref 9.5–13)
PROTHROM AB SERPL-ACNC: 23.4 SEC — HIGH (ref 9.5–13)
RBC # BLD: 4.49 M/UL — SIGNIFICANT CHANGE UP (ref 4.2–5.8)
RBC # BLD: 4.49 M/UL — SIGNIFICANT CHANGE UP (ref 4.2–5.8)
RBC # FLD: 15.6 % — HIGH (ref 10.3–14.5)
RBC # FLD: 15.6 % — HIGH (ref 10.3–14.5)
SODIUM SERPL-SCNC: 136 MMOL/L — SIGNIFICANT CHANGE UP (ref 135–145)
SODIUM SERPL-SCNC: 136 MMOL/L — SIGNIFICANT CHANGE UP (ref 135–145)
SPECIMEN SOURCE: SIGNIFICANT CHANGE UP
WBC # BLD: 18.57 K/UL — HIGH (ref 3.8–10.5)
WBC # BLD: 18.57 K/UL — HIGH (ref 3.8–10.5)
WBC # FLD AUTO: 18.57 K/UL — HIGH (ref 3.8–10.5)
WBC # FLD AUTO: 18.57 K/UL — HIGH (ref 3.8–10.5)

## 2023-12-28 PROCEDURE — 99232 SBSQ HOSP IP/OBS MODERATE 35: CPT

## 2023-12-28 RX ORDER — SACUBITRIL AND VALSARTAN 24; 26 MG/1; MG/1
1 TABLET, FILM COATED ORAL
Refills: 0 | Status: DISCONTINUED | OUTPATIENT
Start: 2023-12-29 | End: 2024-01-03

## 2023-12-28 RX ADMIN — PANTOPRAZOLE SODIUM 40 MILLIGRAM(S): 20 TABLET, DELAYED RELEASE ORAL at 05:48

## 2023-12-28 RX ADMIN — Medication 1 SPRAY(S): at 05:47

## 2023-12-28 RX ADMIN — LOSARTAN POTASSIUM 25 MILLIGRAM(S): 100 TABLET, FILM COATED ORAL at 05:47

## 2023-12-28 RX ADMIN — CLOPIDOGREL BISULFATE 75 MILLIGRAM(S): 75 TABLET, FILM COATED ORAL at 12:53

## 2023-12-28 RX ADMIN — APIXABAN 5 MILLIGRAM(S): 2.5 TABLET, FILM COATED ORAL at 18:15

## 2023-12-28 RX ADMIN — Medication 1 SPRAY(S): at 18:17

## 2023-12-28 NOTE — PROGRESS NOTE ADULT - SUBJECTIVE AND OBJECTIVE BOX
[facility]  Division of Hospital Medicine  Angela Riggs MD  Pager: [phone]      Patient is a 83y old  Male who presents with a chief complaint of chest pain (27 Dec 2023 16:27)      SUBJECTIVE / OVERNIGHT EVENTS: Patient afebrile. Examined at bedside. Seems more oriented knows hospital and states 2022. Denies any infectious symptoms - no dysuria, abdominal pain, cough, d/c. No chest pain.   ADDITIONAL REVIEW OF SYSTEMS:    MEDICATIONS  (STANDING):  apixaban 5 milliGRAM(s) Oral every 12 hours  atorvastatin 80 milliGRAM(s) Oral at bedtime  clopidogrel Tablet 75 milliGRAM(s) Oral daily  pantoprazole    Tablet 40 milliGRAM(s) Oral before breakfast  sodium chloride 0.65% Nasal 1 Spray(s) Both Nostrils three times a day    MEDICATIONS  (PRN):  acetaminophen     Tablet .. 650 milliGRAM(s) Oral every 6 hours PRN Temp greater or equal to 38C (100.4F), Mild Pain (1 - 3)  LORazepam   Injectable 0.25 milliGRAM(s) IV Push every 8 hours PRN Agitation  oxymetazoline 0.05% Nasal Spray 2 Spray(s) Both Nostrils every 12 hours PRN Epistaxis      CAPILLARY BLOOD GLUCOSE        I&O's Summary      PHYSICAL EXAM:  Vital Signs Last 24 Hrs  T(C): 36.3 (28 Dec 2023 11:15), Max: 36.8 (27 Dec 2023 20:56)  T(F): 97.3 (28 Dec 2023 11:15), Max: 98.3 (28 Dec 2023 05:54)  HR: 72 (28 Dec 2023 11:15) (63 - 73)  BP: 97/77 (28 Dec 2023 11:15) (96/67 - 103/54)  BP(mean): --  RR: 18 (28 Dec 2023 11:15) (18 - 18)  SpO2: 99% (28 Dec 2023 11:15) (98% - 99%)    Parameters below as of 28 Dec 2023 11:15  Patient On (Oxygen Delivery Method): room air      GENERAL: Elderly man in NAD  HEAD:  Atraumatic, Normocephalic  EYES: EOMI,  conjunctiva and sclera clear  NECK: Supple  CHEST/LUNG: Clear to auscultation bilaterally; No wheeze, crackles or rhonchi   HEART: Regular rate and rhythm; Normal S1 S2, No murmurs, rubs, or gallops  ABDOMEN: Soft, Nontender, Nondistended; Bowel sounds present  EXTREMITIES:  2+ Peripheral Pulses, No edema  PSYCH: AAOx2 - self and hospital   NEUROLOGY: SROM, +Mild dysarthria , + LUE resting tremor   SKIN: Warm and dry    LABS:                        14.0   18.57 )-----------( 340      ( 28 Dec 2023 06:57 )             42.4     12-28    136  |  98  |  17  ----------------------------<  126<H>  4.4   |  20<L>  |  0.99    Ca    9.6      28 Dec 2023 07:00  Phos  2.7     12-28  Mg     2.20     12-28      PT/INR - ( 28 Dec 2023 06:57 )   PT: 23.4 sec;   INR: 2.14 ratio         PTT - ( 28 Dec 2023 06:57 )  PTT:44.5 sec      Urinalysis Basic - ( 28 Dec 2023 07:00 )    Color: x / Appearance: x / SG: x / pH: x  Gluc: 126 mg/dL / Ketone: x  / Bili: x / Urobili: x   Blood: x / Protein: x / Nitrite: x   Leuk Esterase: x / RBC: x / WBC x   Sq Epi: x / Non Sq Epi: x / Bacteria: x          RADIOLOGY & ADDITIONAL TESTS:  Results Reviewed:   Imaging Personally Reviewed:  Electrocardiogram Personally Reviewed:    COORDINATION OF CARE:  Care Discussed with Consultants/Other Providers [Y/N]:  Prior or Outpatient Records Reviewed [Y/N]:   [facility]  Division of Hospital Medicine  Angela Riggs MD  Pager: [phone]      Patient is a 83y old  Male who presents with a chief complaint of chest pain (27 Dec 2023 16:27)      SUBJECTIVE / OVERNIGHT EVENTS: Patient afebrile. Examined at bedside. Seems more oriented knows hospital and states 2022. Denies any infectious symptoms - no dysuria, abdominal pain, cough, d/c. No chest pain.   ADDITIONAL REVIEW OF SYSTEMS:    MEDICATIONS  (STANDING):  apixaban 5 milliGRAM(s) Oral every 12 hours  atorvastatin 80 milliGRAM(s) Oral at bedtime  clopidogrel Tablet 75 milliGRAM(s) Oral daily  pantoprazole    Tablet 40 milliGRAM(s) Oral before breakfast  sodium chloride 0.65% Nasal 1 Spray(s) Both Nostrils three times a day    MEDICATIONS  (PRN):  acetaminophen     Tablet .. 650 milliGRAM(s) Oral every 6 hours PRN Temp greater or equal to 38C (100.4F), Mild Pain (1 - 3)  LORazepam   Injectable 0.25 milliGRAM(s) IV Push every 8 hours PRN Agitation  oxymetazoline 0.05% Nasal Spray 2 Spray(s) Both Nostrils every 12 hours PRN Epistaxis      CAPILLARY BLOOD GLUCOSE        I&O's Summary      PHYSICAL EXAM:  Vital Signs Last 24 Hrs  T(C): 36.3 (28 Dec 2023 11:15), Max: 36.8 (27 Dec 2023 20:56)  T(F): 97.3 (28 Dec 2023 11:15), Max: 98.3 (28 Dec 2023 05:54)  HR: 72 (28 Dec 2023 11:15) (63 - 73)  BP: 97/77 (28 Dec 2023 11:15) (96/67 - 103/54)  BP(mean): --  RR: 18 (28 Dec 2023 11:15) (18 - 18)  SpO2: 99% (28 Dec 2023 11:15) (98% - 99%)    Parameters below as of 28 Dec 2023 11:15  Patient On (Oxygen Delivery Method): room air      GENERAL: Elderly man in NAD  HEAD:  Atraumatic, Normocephalic  EYES: EOMI,  conjunctiva and sclera clear  NECK: Supple  CHEST/LUNG: Clear to auscultation bilaterally; No wheeze, crackles or rhonchi   HEART: Regular rate and rhythm; Normal S1 S2, No murmurs, rubs, or gallops  ABDOMEN: Soft, Nontender, Nondistended; Bowel sounds present  EXTREMITIES:  2+ Peripheral Pulses, No edema  PSYCH: AAOx2 - self and hospital   NEUROLOGY: SROM, +Mild dysarthria , + LUE resting tremor   SKIN: Warm and dry    LABS:                        14.0   18.57 )-----------( 340      ( 28 Dec 2023 06:57 )             42.4     12-28    136  |  98  |  17  ----------------------------<  126<H>  4.4   |  20<L>  |  0.99    Ca    9.6      28 Dec 2023 07:00  Phos  2.7     12-28  Mg     2.20     12-28      PT/INR - ( 28 Dec 2023 06:57 )   PT: 23.4 sec;   INR: 2.14 ratio         PTT - ( 28 Dec 2023 06:57 )  PTT:44.5 sec      Urinalysis Basic - ( 28 Dec 2023 07:00 )    Color: x / Appearance: x / SG: x / pH: x  Gluc: 126 mg/dL / Ketone: x  / Bili: x / Urobili: x   Blood: x / Protein: x / Nitrite: x   Leuk Esterase: x / RBC: x / WBC x   Sq Epi: x / Non Sq Epi: x / Bacteria: x          RADIOLOGY & ADDITIONAL TESTS:  Results Reviewed:   Imaging Personally Reviewed:  Electrocardiogram Personally Reviewed:    COORDINATION OF CARE:  Care Discussed with Consultants/Other Providers [Y/N]:  Prior or Outpatient Records Reviewed [Y/N]:

## 2023-12-28 NOTE — PROGRESS NOTE ADULT - PROBLEM SELECTOR PLAN 3
S/p PCI to LAD and known multivessel disease on [facility] last month.   - c/w plavix, atorvastatin and Eliquis S/p PCI to LAD and known multivessel disease on [facility] last month.   - c/w plavix, atorvastatin and Eliquis

## 2023-12-28 NOTE — PROGRESS NOTE ADULT - PROBLEM SELECTOR PLAN 8
Holding entresto and spironolactone for now  - resume if sCR remains stable and pressures stable ; Able to tolerate losartan 25mg, will restart home entresto on 12/29    - Trialing losartan as above

## 2023-12-28 NOTE — PROGRESS NOTE ADULT - PROBLEM SELECTOR PLAN 4
Cr elevated to 1.5 from baseline 1.0.  - will hold Entresto and aldactone for now and restart as able   - Per cardiology recommends, given soft BP wants to try losartan 25mg to see if patient blood pressure can tolerate. Able to tolerate losartan 25mg, will restart home entresto on 12/29    - FeNA consistent w/ pre-renal etiology   - Now resolved s/p Gentle hydration  - Avoid nephrotoxins  -Renally dose meds

## 2023-12-28 NOTE — PROGRESS NOTE ADULT - TIME BILLING
Review of laboratory data, radiology results, consultants' recommendations, documentation in [place], discussion with patient/house staff/ACP and interdisciplinary staff (such as , social workers, etc). Interventions were performed as documented above. Review of laboratory data, radiology results, consultants' recommendations, documentation in [place], discussion with patient/house staff/ACP and interdisciplinary staff (such as , social workers, etc). Interventions were performed as documented above.

## 2023-12-28 NOTE — PROGRESS NOTE ADULT - PROBLEM SELECTOR PLAN 2
Pt presenting with chest pain, currently resolved. Previous admission at [facility] last month for NSTEM s/p LHC w/ known multivessel disease with decision to treat medically per pt/family preference. Seen by cardiology and recommend continuing medical management w/ no plans for cardiac testing.  - c/w Plavix   - EKG w/ LBBB, unchanged from prior  - monitor on tele  - Trops 54-->69-->64  - NSVT, no beta blocker given hx of bradycardia, CTM   - Appreciate cardio input Pt presenting with chest pain, currently resolved. Previous admission at [facility] last month for NSTEM s/p LHC w/ known multivessel disease with decision to treat medically per pt/family preference. Seen by cardiology and recommend continuing medical management w/ no plans for cardiac testing.  - c/w Plavix   - EKG w/ LBBB, unchanged from prior  - monitor on tele  - Trops 54-->69-->64  - NSVT, no beta blocker given hx of bradycardia, CTM   - Appreciate cardio input

## 2023-12-29 LAB
ANION GAP SERPL CALC-SCNC: 12 MMOL/L — SIGNIFICANT CHANGE UP (ref 7–14)
ANION GAP SERPL CALC-SCNC: 12 MMOL/L — SIGNIFICANT CHANGE UP (ref 7–14)
BASOPHILS # BLD AUTO: 0.05 K/UL — SIGNIFICANT CHANGE UP (ref 0–0.2)
BASOPHILS # BLD AUTO: 0.05 K/UL — SIGNIFICANT CHANGE UP (ref 0–0.2)
BASOPHILS NFR BLD AUTO: 0.5 % — SIGNIFICANT CHANGE UP (ref 0–2)
BASOPHILS NFR BLD AUTO: 0.5 % — SIGNIFICANT CHANGE UP (ref 0–2)
BUN SERPL-MCNC: 16 MG/DL — SIGNIFICANT CHANGE UP (ref 7–23)
BUN SERPL-MCNC: 16 MG/DL — SIGNIFICANT CHANGE UP (ref 7–23)
CALCIUM SERPL-MCNC: 9 MG/DL — SIGNIFICANT CHANGE UP (ref 8.4–10.5)
CALCIUM SERPL-MCNC: 9 MG/DL — SIGNIFICANT CHANGE UP (ref 8.4–10.5)
CHLORIDE SERPL-SCNC: 103 MMOL/L — SIGNIFICANT CHANGE UP (ref 98–107)
CHLORIDE SERPL-SCNC: 103 MMOL/L — SIGNIFICANT CHANGE UP (ref 98–107)
CK MB BLD-MCNC: 8.3 % — HIGH (ref 0–2.5)
CK MB BLD-MCNC: 8.3 % — HIGH (ref 0–2.5)
CK MB CFR SERPL CALC: 3 NG/ML — SIGNIFICANT CHANGE UP
CK MB CFR SERPL CALC: 3 NG/ML — SIGNIFICANT CHANGE UP
CK SERPL-CCNC: 36 U/L — SIGNIFICANT CHANGE UP (ref 30–200)
CK SERPL-CCNC: 36 U/L — SIGNIFICANT CHANGE UP (ref 30–200)
CO2 SERPL-SCNC: 23 MMOL/L — SIGNIFICANT CHANGE UP (ref 22–31)
CO2 SERPL-SCNC: 23 MMOL/L — SIGNIFICANT CHANGE UP (ref 22–31)
CREAT SERPL-MCNC: 1.05 MG/DL — SIGNIFICANT CHANGE UP (ref 0.5–1.3)
CREAT SERPL-MCNC: 1.05 MG/DL — SIGNIFICANT CHANGE UP (ref 0.5–1.3)
CULTURE RESULTS: ABNORMAL
CULTURE RESULTS: ABNORMAL
EGFR: 70 ML/MIN/1.73M2 — SIGNIFICANT CHANGE UP
EGFR: 70 ML/MIN/1.73M2 — SIGNIFICANT CHANGE UP
EOSINOPHIL # BLD AUTO: 0.29 K/UL — SIGNIFICANT CHANGE UP (ref 0–0.5)
EOSINOPHIL # BLD AUTO: 0.29 K/UL — SIGNIFICANT CHANGE UP (ref 0–0.5)
EOSINOPHIL NFR BLD AUTO: 3.1 % — SIGNIFICANT CHANGE UP (ref 0–6)
EOSINOPHIL NFR BLD AUTO: 3.1 % — SIGNIFICANT CHANGE UP (ref 0–6)
GLUCOSE SERPL-MCNC: 87 MG/DL — SIGNIFICANT CHANGE UP (ref 70–99)
GLUCOSE SERPL-MCNC: 87 MG/DL — SIGNIFICANT CHANGE UP (ref 70–99)
HCT VFR BLD CALC: 34.1 % — LOW (ref 39–50)
HCT VFR BLD CALC: 34.1 % — LOW (ref 39–50)
HGB BLD-MCNC: 11.1 G/DL — LOW (ref 13–17)
HGB BLD-MCNC: 11.1 G/DL — LOW (ref 13–17)
IANC: 5.92 K/UL — SIGNIFICANT CHANGE UP (ref 1.8–7.4)
IANC: 5.92 K/UL — SIGNIFICANT CHANGE UP (ref 1.8–7.4)
IMM GRANULOCYTES NFR BLD AUTO: 0.5 % — SIGNIFICANT CHANGE UP (ref 0–0.9)
IMM GRANULOCYTES NFR BLD AUTO: 0.5 % — SIGNIFICANT CHANGE UP (ref 0–0.9)
LYMPHOCYTES # BLD AUTO: 2.36 K/UL — SIGNIFICANT CHANGE UP (ref 1–3.3)
LYMPHOCYTES # BLD AUTO: 2.36 K/UL — SIGNIFICANT CHANGE UP (ref 1–3.3)
LYMPHOCYTES # BLD AUTO: 25.5 % — SIGNIFICANT CHANGE UP (ref 13–44)
LYMPHOCYTES # BLD AUTO: 25.5 % — SIGNIFICANT CHANGE UP (ref 13–44)
MAGNESIUM SERPL-MCNC: 2.1 MG/DL — SIGNIFICANT CHANGE UP (ref 1.6–2.6)
MAGNESIUM SERPL-MCNC: 2.1 MG/DL — SIGNIFICANT CHANGE UP (ref 1.6–2.6)
MCHC RBC-ENTMCNC: 30.9 PG — SIGNIFICANT CHANGE UP (ref 27–34)
MCHC RBC-ENTMCNC: 30.9 PG — SIGNIFICANT CHANGE UP (ref 27–34)
MCHC RBC-ENTMCNC: 32.6 GM/DL — SIGNIFICANT CHANGE UP (ref 32–36)
MCHC RBC-ENTMCNC: 32.6 GM/DL — SIGNIFICANT CHANGE UP (ref 32–36)
MCV RBC AUTO: 95 FL — SIGNIFICANT CHANGE UP (ref 80–100)
MCV RBC AUTO: 95 FL — SIGNIFICANT CHANGE UP (ref 80–100)
MONOCYTES # BLD AUTO: 0.58 K/UL — SIGNIFICANT CHANGE UP (ref 0–0.9)
MONOCYTES # BLD AUTO: 0.58 K/UL — SIGNIFICANT CHANGE UP (ref 0–0.9)
MONOCYTES NFR BLD AUTO: 6.3 % — SIGNIFICANT CHANGE UP (ref 2–14)
MONOCYTES NFR BLD AUTO: 6.3 % — SIGNIFICANT CHANGE UP (ref 2–14)
NEUTROPHILS # BLD AUTO: 5.92 K/UL — SIGNIFICANT CHANGE UP (ref 1.8–7.4)
NEUTROPHILS # BLD AUTO: 5.92 K/UL — SIGNIFICANT CHANGE UP (ref 1.8–7.4)
NEUTROPHILS NFR BLD AUTO: 64.1 % — SIGNIFICANT CHANGE UP (ref 43–77)
NEUTROPHILS NFR BLD AUTO: 64.1 % — SIGNIFICANT CHANGE UP (ref 43–77)
NRBC # BLD: 0 /100 WBCS — SIGNIFICANT CHANGE UP (ref 0–0)
NRBC # BLD: 0 /100 WBCS — SIGNIFICANT CHANGE UP (ref 0–0)
NRBC # FLD: 0 K/UL — SIGNIFICANT CHANGE UP (ref 0–0)
NRBC # FLD: 0 K/UL — SIGNIFICANT CHANGE UP (ref 0–0)
ORGANISM # SPEC MICROSCOPIC CNT: ABNORMAL
PHOSPHATE SERPL-MCNC: 3.2 MG/DL — SIGNIFICANT CHANGE UP (ref 2.5–4.5)
PHOSPHATE SERPL-MCNC: 3.2 MG/DL — SIGNIFICANT CHANGE UP (ref 2.5–4.5)
PLATELET # BLD AUTO: 230 K/UL — SIGNIFICANT CHANGE UP (ref 150–400)
PLATELET # BLD AUTO: 230 K/UL — SIGNIFICANT CHANGE UP (ref 150–400)
POTASSIUM SERPL-MCNC: 4.6 MMOL/L — SIGNIFICANT CHANGE UP (ref 3.5–5.3)
POTASSIUM SERPL-MCNC: 4.6 MMOL/L — SIGNIFICANT CHANGE UP (ref 3.5–5.3)
POTASSIUM SERPL-SCNC: 4.6 MMOL/L — SIGNIFICANT CHANGE UP (ref 3.5–5.3)
POTASSIUM SERPL-SCNC: 4.6 MMOL/L — SIGNIFICANT CHANGE UP (ref 3.5–5.3)
RBC # BLD: 3.59 M/UL — LOW (ref 4.2–5.8)
RBC # BLD: 3.59 M/UL — LOW (ref 4.2–5.8)
RBC # FLD: 15.7 % — HIGH (ref 10.3–14.5)
RBC # FLD: 15.7 % — HIGH (ref 10.3–14.5)
SODIUM SERPL-SCNC: 138 MMOL/L — SIGNIFICANT CHANGE UP (ref 135–145)
SODIUM SERPL-SCNC: 138 MMOL/L — SIGNIFICANT CHANGE UP (ref 135–145)
SPECIMEN SOURCE: SIGNIFICANT CHANGE UP
SPECIMEN SOURCE: SIGNIFICANT CHANGE UP
TROPONIN T, HIGH SENSITIVITY RESULT: 255 NG/L — CRITICAL HIGH
TROPONIN T, HIGH SENSITIVITY RESULT: 255 NG/L — CRITICAL HIGH
WBC # BLD: 9.25 K/UL — SIGNIFICANT CHANGE UP (ref 3.8–10.5)
WBC # BLD: 9.25 K/UL — SIGNIFICANT CHANGE UP (ref 3.8–10.5)
WBC # FLD AUTO: 9.25 K/UL — SIGNIFICANT CHANGE UP (ref 3.8–10.5)
WBC # FLD AUTO: 9.25 K/UL — SIGNIFICANT CHANGE UP (ref 3.8–10.5)

## 2023-12-29 PROCEDURE — 99232 SBSQ HOSP IP/OBS MODERATE 35: CPT

## 2023-12-29 PROCEDURE — 93010 ELECTROCARDIOGRAM REPORT: CPT

## 2023-12-29 RX ADMIN — Medication 1 SPRAY(S): at 21:34

## 2023-12-29 RX ADMIN — SACUBITRIL AND VALSARTAN 1 TABLET(S): 24; 26 TABLET, FILM COATED ORAL at 17:41

## 2023-12-29 RX ADMIN — Medication 1 SPRAY(S): at 05:39

## 2023-12-29 RX ADMIN — CLOPIDOGREL BISULFATE 75 MILLIGRAM(S): 75 TABLET, FILM COATED ORAL at 12:28

## 2023-12-29 RX ADMIN — PANTOPRAZOLE SODIUM 40 MILLIGRAM(S): 20 TABLET, DELAYED RELEASE ORAL at 05:38

## 2023-12-29 RX ADMIN — APIXABAN 5 MILLIGRAM(S): 2.5 TABLET, FILM COATED ORAL at 05:38

## 2023-12-29 RX ADMIN — Medication 1 SPRAY(S): at 13:40

## 2023-12-29 RX ADMIN — SACUBITRIL AND VALSARTAN 1 TABLET(S): 24; 26 TABLET, FILM COATED ORAL at 05:38

## 2023-12-29 RX ADMIN — APIXABAN 5 MILLIGRAM(S): 2.5 TABLET, FILM COATED ORAL at 17:40

## 2023-12-29 RX ADMIN — ATORVASTATIN CALCIUM 80 MILLIGRAM(S): 80 TABLET, FILM COATED ORAL at 21:34

## 2023-12-29 NOTE — BH CONSULTATION LIAISON PROGRESS NOTE - NSBHCHARTREVIEWVS_PSY_A_CORE FT
Vital Signs Last 24 Hrs  T(C): 36.4 (26 Dec 2023 05:20), Max: 36.8 (25 Dec 2023 18:00)  T(F): 97.6 (26 Dec 2023 05:20), Max: 98.3 (25 Dec 2023 18:00)  HR: 125 (26 Dec 2023 05:20) (80 - 125)  BP: 102/59 (26 Dec 2023 05:20) (102/59 - 120/66)  BP(mean): --  RR: 18 (26 Dec 2023 05:20) (18 - 18)  SpO2: 98% (26 Dec 2023 05:20) (88% - 98%)    Parameters below as of 26 Dec 2023 05:20  Patient On (Oxygen Delivery Method): room air    
Vital Signs Last 24 Hrs  T(C): 36.7 (29 Dec 2023 11:05), Max: 36.9 (28 Dec 2023 20:15)  T(F): 98 (29 Dec 2023 11:05), Max: 98.4 (28 Dec 2023 20:15)  HR: 78 (29 Dec 2023 11:05) (44 - 78)  BP: 98/60 (29 Dec 2023 11:05) (97/60 - 110/46)  BP(mean): --  RR: 18 (29 Dec 2023 11:05) (18 - 18)  SpO2: 97% (29 Dec 2023 11:05) (96% - 98%)    Parameters below as of 29 Dec 2023 11:05  Patient On (Oxygen Delivery Method): room air    
Vital Signs Last 24 Hrs  T(C): 37.2 (27 Dec 2023 11:30), Max: 37.2 (27 Dec 2023 11:30)  T(F): 99 (27 Dec 2023 11:30), Max: 99 (27 Dec 2023 11:30)  HR: 77 (27 Dec 2023 11:30) (60 - 77)  BP: 110/61 (27 Dec 2023 11:30) (104/84 - 110/61)  BP(mean): --  RR: 18 (27 Dec 2023 11:30) (18 - 18)  SpO2: 97% (27 Dec 2023 11:30) (97% - 98%)    Parameters below as of 27 Dec 2023 11:30  Patient On (Oxygen Delivery Method): room air

## 2023-12-29 NOTE — CHART NOTE - NSCHARTNOTEFT_GEN_A_CORE
MEDICINE PA NOTE     Called by RN for patient with HR 35 on telemetry. Telemetry reviewed with noted slow AFIB with HR ranging 35-55. Overnight also noted with intermittent PVCs and pauses. ECG performed with slow AFIB and new extreme RAD. Patient seen by bedside and denies dizziness, vision changes, headaches, lightheadedness, chest pain, or SOB. SBP 95 with MAP 61 per baseline. Case discussed with Nephew/ POA and patient DNR/ DNI with no wishes for invasive medical procedures. Family amendable for no escalation of care and home hospice if able. Will continue to monitor patient     Betty Rivas PA-C  Department of Medicine/ RCU  In house RCU [place] [id number]  In house Medicine Beeper [id number]  Reachable via teams MEDICINE PA NOTE     Called by RN for patient with HR 35 on telemetry. Telemetry reviewed with noted slow AFIB with HR ranging 35-55. Overnight also noted with intermittent PVCs and pauses. ECG performed with slow AFIB and new extreme RAD. Patient seen by bedside and denies dizziness, vision changes, headaches, lightheadedness, chest pain, or SOB. SBP 95 with MAP 61 per baseline. Case discussed with Nephew/ POA and patient DNR/ DNI with no wishes for invasive medical procedures. Family amendable for no escalation of care and home hospice if able. Will continue to monitor patient     Betty Rivas PA-C  Department of Medicine/ RCU  In house RCU [place] [id number]  In house Medicine Beeper [phone]  Reachable via teams

## 2023-12-29 NOTE — PROVIDER CONTACT NOTE (CRITICAL VALUE NOTIFICATION) - BACKGROUND
83 PMH bipolar disorder on lithium, CAD s/p PCI to LAD in the past, ICM/HFrEF 29, severe MR, and LV thrombus and AFIB on Eliquis

## 2023-12-29 NOTE — BH CONSULTATION LIAISON PROGRESS NOTE - NSBHFUPINTERVALHXFT_PSY_A_CORE
patient more awake but still confused, not able to meaningfully engage, allows nursing to change him 
Patient very somnolent, AOx0-1 at most, unable to speak to writer much further appears mostly confused, no longer making SI/HI statements per staff
Patient more awake, feels "okay", AOx1 at most, does despond to Dr. Ng, when asked what kind of physician he laughs "a good one", but then falls asleep, still intermittently somnolent no agitation, no SI/HI

## 2023-12-29 NOTE — BH CONSULTATION LIAISON PROGRESS NOTE - NSBHCHARTREVIEWLAB_PSY_A_CORE FT
12.7   13.67 )-----------( 177      ( 22 Dec 2023 13:20 )             38.6   12-22    132<L>  |  101  |  35<H>  ----------------------------<  111<H>  4.8   |  16<L>  |  1.35<H>    Ca    9.4      22 Dec 2023 13:20  Phos  2.8     12-22  Mg     1.80     12-22    TPro  8.0  /  Alb  4.1  /  TBili  1.2  /  DBili  x   /  AST  33  /  ALT  16  /  AlkPhos  89  12-21  Lithium Level, Serum (12.22.23 @ 13:20)    Lithium Level, Serum: 1.2 mmol/L  
                      12.7   13.67 )-----------( 177      ( 22 Dec 2023 13:20 )             38.6   12-22    132<L>  |  101  |  35<H>  ----------------------------<  111<H>  4.8   |  16<L>  |  1.35<H>    Ca    9.4      22 Dec 2023 13:20  Phos  2.8     12-22  Mg     1.80     12-22    TPro  8.0  /  Alb  4.1  /  TBili  1.2  /  DBili  x   /  AST  33  /  ALT  16  /  AlkPhos  89  12-21  Lithium Level, Serum (12.22.23 @ 13:20)    Lithium Level, Serum: 1.2 mmol/L  
ob/gyn
                      12.7   13.67 )-----------( 177      ( 22 Dec 2023 13:20 )             38.6   12-22    132<L>  |  101  |  35<H>  ----------------------------<  111<H>  4.8   |  16<L>  |  1.35<H>    Ca    9.4      22 Dec 2023 13:20  Phos  2.8     12-22  Mg     1.80     12-22    TPro  8.0  /  Alb  4.1  /  TBili  1.2  /  DBili  x   /  AST  33  /  ALT  16  /  AlkPhos  89  12-21  Lithium Level, Serum (12.22.23 @ 13:20)    Lithium Level, Serum: 1.2 mmol/L

## 2023-12-29 NOTE — BH CONSULTATION LIAISON PROGRESS NOTE - NSBHTIMEACTIVITIESPERFORMED_PSY_A_CORE
d/w patient, primary team, reviewed chart, IDR 
d/w patient, reviewed chart, IDR 
d/w patient, primary team, reviewed chart, IDR

## 2023-12-29 NOTE — BH CONSULTATION LIAISON PROGRESS NOTE - NSBHATTESTBILLING_PSY_A_CORE
[id number] OBS or IP - moderate complexity OR 35-49 mins [id number] OBS or IP - moderate complexity OR 35-49 mins

## 2023-12-29 NOTE — BH CONSULTATION LIAISON PROGRESS NOTE - NSBHASSESSMENTFT_PSY_ALL_CORE
83 yr old male with PPH significant for bipolar disorder (on lithium 150mg BID) and dementia and PMH significant for AD s/p PCI to LAD in the past (known multi-vessel disease noted on recent LHC, with decision to treat medically due to patient/family preference), ICM/HFrEF (EF 29%), severe MR, LV thrombus, and a. fib on Eliquis, who presents from [facility] for chest pain.   OF NOTE- PATIENT HAD RECENT ADMISSION TO [facility] UNDER DIFFERENT MRN [id number].    f/u 12/27- less somnolent but still appears delirious, keep holding lithium   1229 - not as somnolent but still quite confused, mood stable, low utility in continuation of lithium given low HR, medical fraily, and poor cognitive baseline    [] Observation doing well on [facility]   [] hold lithium       [] daily lithium levels, CMPs  [] if severely agitated- can use ativan 0.25mg q8h prn for true severe agitation; avoiding haldol due to EPS symptoms   83 yr old male with PPH significant for bipolar disorder (on lithium 150mg BID) and dementia and PMH significant for AD s/p PCI to LAD in the past (known multi-vessel disease noted on recent LHC, with decision to treat medically due to patient/family preference), ICM/HFrEF (EF 29%), severe MR, LV thrombus, and a. fib on Eliquis, who presents from [facility] for chest pain.   OF NOTE- PATIENT HAD RECENT ADMISSION TO [facility] UNDER DIFFERENT MRN [id number].    f/u 12/27- less somnolent but still appears delirious, keep holding lithium   1229 - not as somnolent but still quite confused, mood stable, low utility in continuation of lithium given low HR, medical fraily, and poor cognitive baseline    [] Observation doing well on [facility]   [] hold lithium       [] daily lithium levels, CMPs  [] if severely agitated- can use ativan 0.25mg q8h prn for true severe agitation; avoiding haldol due to EPS symptoms

## 2023-12-29 NOTE — PROGRESS NOTE ADULT - PROBLEM SELECTOR PLAN 3
S/p PCI to LAD and known multivessel disease on [facility] last month.   - c/w plavix, atorvastatin and Eliquis S/p PCI to LAD and known multivessel disease on [facility] last month.   - c/w plavix, atorvastatin and Eliquis

## 2023-12-29 NOTE — PROGRESS NOTE ADULT - PROBLEM SELECTOR PLAN 9
Hold lithium  - Appreciate psych rec -  low utility in continuation of lithium given low HR,  -Psych consulted, appreciate recs

## 2023-12-29 NOTE — PROGRESS NOTE ADULT - SUBJECTIVE AND OBJECTIVE BOX
[facility]  Division of Hospital Medicine  Angela Riggs MD  Pager: [phone]      Patient is a 83y old  Male who presents with a chief complaint of chest pain (28 Dec 2023 14:45)      SUBJECTIVE / OVERNIGHT EVENTS: Patient examined at bedside No medical concerns. No pain or SOB. Noted to be bradycardic this am. Clarified GOC - no procedures or intervention  ADDITIONAL REVIEW OF SYSTEMS:    MEDICATIONS  (STANDING):  apixaban 5 milliGRAM(s) Oral every 12 hours  atorvastatin 80 milliGRAM(s) Oral at bedtime  clopidogrel Tablet 75 milliGRAM(s) Oral daily  pantoprazole    Tablet 40 milliGRAM(s) Oral before breakfast  sacubitril 24 mG/valsartan 26 mG 1 Tablet(s) Oral two times a day  sodium chloride 0.65% Nasal 1 Spray(s) Both Nostrils three times a day    MEDICATIONS  (PRN):  acetaminophen     Tablet .. 650 milliGRAM(s) Oral every 6 hours PRN Temp greater or equal to 38C (100.4F), Mild Pain (1 - 3)  LORazepam   Injectable 0.25 milliGRAM(s) IV Push every 8 hours PRN Agitation  oxymetazoline 0.05% Nasal Spray 2 Spray(s) Both Nostrils every 12 hours PRN Epistaxis      CAPILLARY BLOOD GLUCOSE      POCT Blood Glucose.: 115 mg/dL (28 Dec 2023 22:06)    I&O's Summary      PHYSICAL EXAM:  Vital Signs Last 24 Hrs  T(C): 36.7 (29 Dec 2023 11:05), Max: 36.9 (28 Dec 2023 20:15)  T(F): 98 (29 Dec 2023 11:05), Max: 98.4 (28 Dec 2023 20:15)  HR: 78 (29 Dec 2023 11:05) (44 - 78)  BP: 98/60 (29 Dec 2023 11:05) (97/60 - 110/46)  BP(mean): --  RR: 18 (29 Dec 2023 11:05) (18 - 18)  SpO2: 97% (29 Dec 2023 11:05) (96% - 98%)    Parameters below as of 29 Dec 2023 11:05  Patient On (Oxygen Delivery Method): room air      GENERAL: Elderly man in NAD  HEAD:  Atraumatic, Normocephalic  EYES: EOMI,  conjunctiva and sclera clear  NECK: Supple  CHEST/LUNG: Clear to auscultation bilaterally; No wheeze, crackles or rhonchi   HEART: Regular rate and rhythm; Normal S1 S2, No murmurs, rubs, or gallops  ABDOMEN: Soft, Nontender, Nondistended; Bowel sounds present  EXTREMITIES:  2+ Peripheral Pulses, No edema  PSYCH: AAOx2 - self and hospital   NEUROLOGY: SROM, +Mild dysarthria , + LUE resting tremor   SKIN: Warm and dry  LABS:                        11.1   9.25  )-----------( 230      ( 29 Dec 2023 04:58 )             34.1     12-29    138  |  103  |  16  ----------------------------<  87  4.6   |  23  |  1.05    Ca    9.0      29 Dec 2023 04:58  Phos  3.2     12-29  Mg     2.10     12-29      PT/INR - ( 28 Dec 2023 06:57 )   PT: 23.4 sec;   INR: 2.14 ratio         PTT - ( 28 Dec 2023 06:57 )  PTT:44.5 sec  CARDIAC MARKERS ( 29 Dec 2023 04:58 )  x     / x     / 36 U/L / x     / 3.0 ng/mL      Urinalysis Basic - ( 29 Dec 2023 04:58 )    Color: x / Appearance: x / SG: x / pH: x  Gluc: 87 mg/dL / Ketone: x  / Bili: x / Urobili: x   Blood: x / Protein: x / Nitrite: x   Leuk Esterase: x / RBC: x / WBC x   Sq Epi: x / Non Sq Epi: x / Bacteria: x          RADIOLOGY & ADDITIONAL TESTS:  Results Reviewed:   Imaging Personally Reviewed:  Electrocardiogram Personally Reviewed:    COORDINATION OF CARE:  Care Discussed with Consultants/Other Providers [Y/N]:  Prior or Outpatient Records Reviewed [Y/N]:   [facility]  Division of Hospital Medicine  Angela Riggs MD  Pager: [phone]      Patient is a 83y old  Male who presents with a chief complaint of chest pain (28 Dec 2023 14:45)      SUBJECTIVE / OVERNIGHT EVENTS: Patient examined at bedside No medical concerns. No pain or SOB. Noted to be bradycardic this am. Clarified GOC - no procedures or intervention  ADDITIONAL REVIEW OF SYSTEMS:    MEDICATIONS  (STANDING):  apixaban 5 milliGRAM(s) Oral every 12 hours  atorvastatin 80 milliGRAM(s) Oral at bedtime  clopidogrel Tablet 75 milliGRAM(s) Oral daily  pantoprazole    Tablet 40 milliGRAM(s) Oral before breakfast  sacubitril 24 mG/valsartan 26 mG 1 Tablet(s) Oral two times a day  sodium chloride 0.65% Nasal 1 Spray(s) Both Nostrils three times a day    MEDICATIONS  (PRN):  acetaminophen     Tablet .. 650 milliGRAM(s) Oral every 6 hours PRN Temp greater or equal to 38C (100.4F), Mild Pain (1 - 3)  LORazepam   Injectable 0.25 milliGRAM(s) IV Push every 8 hours PRN Agitation  oxymetazoline 0.05% Nasal Spray 2 Spray(s) Both Nostrils every 12 hours PRN Epistaxis      CAPILLARY BLOOD GLUCOSE      POCT Blood Glucose.: 115 mg/dL (28 Dec 2023 22:06)    I&O's Summary      PHYSICAL EXAM:  Vital Signs Last 24 Hrs  T(C): 36.7 (29 Dec 2023 11:05), Max: 36.9 (28 Dec 2023 20:15)  T(F): 98 (29 Dec 2023 11:05), Max: 98.4 (28 Dec 2023 20:15)  HR: 78 (29 Dec 2023 11:05) (44 - 78)  BP: 98/60 (29 Dec 2023 11:05) (97/60 - 110/46)  BP(mean): --  RR: 18 (29 Dec 2023 11:05) (18 - 18)  SpO2: 97% (29 Dec 2023 11:05) (96% - 98%)    Parameters below as of 29 Dec 2023 11:05  Patient On (Oxygen Delivery Method): room air      GENERAL: Elderly man in NAD  HEAD:  Atraumatic, Normocephalic  EYES: EOMI,  conjunctiva and sclera clear  NECK: Supple  CHEST/LUNG: Clear to auscultation bilaterally; No wheeze, crackles or rhonchi   HEART: Regular rate and rhythm; Normal S1 S2, No murmurs, rubs, or gallops  ABDOMEN: Soft, Nontender, Nondistended; Bowel sounds present  EXTREMITIES:  2+ Peripheral Pulses, No edema  PSYCH: AAOx2 - self and hospital   NEUROLOGY: SROM, +Mild dysarthria , + LUE resting tremor   SKIN: Warm and dry  LABS:                        11.1   9.25  )-----------( 230      ( 29 Dec 2023 04:58 )             34.1     12-29    138  |  103  |  16  ----------------------------<  87  4.6   |  23  |  1.05    Ca    9.0      29 Dec 2023 04:58  Phos  3.2     12-29  Mg     2.10     12-29      PT/INR - ( 28 Dec 2023 06:57 )   PT: 23.4 sec;   INR: 2.14 ratio         PTT - ( 28 Dec 2023 06:57 )  PTT:44.5 sec  CARDIAC MARKERS ( 29 Dec 2023 04:58 )  x     / x     / 36 U/L / x     / 3.0 ng/mL      Urinalysis Basic - ( 29 Dec 2023 04:58 )    Color: x / Appearance: x / SG: x / pH: x  Gluc: 87 mg/dL / Ketone: x  / Bili: x / Urobili: x   Blood: x / Protein: x / Nitrite: x   Leuk Esterase: x / RBC: x / WBC x   Sq Epi: x / Non Sq Epi: x / Bacteria: x          RADIOLOGY & ADDITIONAL TESTS:  Results Reviewed:   Imaging Personally Reviewed:  Electrocardiogram Personally Reviewed:    COORDINATION OF CARE:  Care Discussed with Consultants/Other Providers [Y/N]:  Prior or Outpatient Records Reviewed [Y/N]:

## 2023-12-29 NOTE — PROGRESS NOTE ADULT - PROBLEM SELECTOR PLAN 2
Pt presenting with chest pain, currently resolved. Previous admission at [facility] last month for NSTEM s/p LHC w/ known multivessel disease with decision to treat medically per pt/family preference. Seen by cardiology and recommend continuing medical management w/ no plans for cardiac testing.  - c/w Plavix   - EKG w/ LBBB, unchanged from prior  - monitor on tele  - Trops 54-->69-->64  - NSVT, no beta blocker given hx of bradycardia, CTM   - Appreciate cardio input Pt presenting with chest pain, currently resolved. Previous admission at [facility] last month for NSTEM s/p LHC w/ known multivessel disease with decision to treat medically per pt/family preference. Seen by cardiology and recommend continuing medical management w/ no plans for cardiac testing.  - c/w Plavix   - EKG w/ LBBB, unchanged from prior  - monitor on tele  - Trops 54-->69-->64  - NSVT, no beta blocker given hx of bradycardia, CTM   - Appreciate cardio input

## 2023-12-29 NOTE — BH CONSULTATION LIAISON PROGRESS NOTE - CURRENT MEDICATION
MEDICATIONS  (STANDING):  apixaban 5 milliGRAM(s) Oral two times a day  atorvastatin 80 milliGRAM(s) Oral at bedtime  clopidogrel Tablet 75 milliGRAM(s) Oral daily    MEDICATIONS  (PRN):  acetaminophen     Tablet .. 650 milliGRAM(s) Oral every 6 hours PRN Temp greater or equal to 38C (100.4F), Mild Pain (1 - 3)  LORazepam   Injectable 0.25 milliGRAM(s) IV Push every 8 hours PRN Agitation  sodium chloride 0.65% Nasal 1 Spray(s) Both Nostrils four times a day PRN Nasal Congestion  
MEDICATIONS  (STANDING):  apixaban 5 milliGRAM(s) Oral every 12 hours  atorvastatin 80 milliGRAM(s) Oral at bedtime  clopidogrel Tablet 75 milliGRAM(s) Oral daily  pantoprazole    Tablet 40 milliGRAM(s) Oral before breakfast  sacubitril 24 mG/valsartan 26 mG 1 Tablet(s) Oral two times a day  sodium chloride 0.65% Nasal 1 Spray(s) Both Nostrils three times a day    MEDICATIONS  (PRN):  acetaminophen     Tablet .. 650 milliGRAM(s) Oral every 6 hours PRN Temp greater or equal to 38C (100.4F), Mild Pain (1 - 3)  LORazepam   Injectable 0.25 milliGRAM(s) IV Push every 8 hours PRN Agitation  oxymetazoline 0.05% Nasal Spray 2 Spray(s) Both Nostrils every 12 hours PRN Epistaxis  
MEDICATIONS  (STANDING):  apixaban 5 milliGRAM(s) Oral two times a day  atorvastatin 80 milliGRAM(s) Oral at bedtime  clopidogrel Tablet 75 milliGRAM(s) Oral daily  losartan 25 milliGRAM(s) Oral daily  sodium chloride 0.65% Nasal 1 Spray(s) Both Nostrils three times a day    MEDICATIONS  (PRN):  acetaminophen     Tablet .. 650 milliGRAM(s) Oral every 6 hours PRN Temp greater or equal to 38C (100.4F), Mild Pain (1 - 3)  LORazepam   Injectable 0.25 milliGRAM(s) IV Push every 8 hours PRN Agitation  oxymetazoline 0.05% Nasal Spray 2 Spray(s) Both Nostrils every 12 hours PRN Epistaxis

## 2023-12-29 NOTE — PROGRESS NOTE ADULT - TIME BILLING
Review of laboratory data, radiology results, consultants' recommendations, documentation in [place], discussion with patient/house staff/ACP and interdisciplinary staff (such as , social workers, etc). Interventions were performed as documented above. Review of laboratory data, radiology results, consultants' recommendations, documentation in [place], discussion with patient/house staff/ACP and interdisciplinary staff (such as , social workers, etc). Interventions were performed as documented above.

## 2023-12-29 NOTE — PROGRESS NOTE ADULT - PROBLEM SELECTOR PLAN 8
Holding entresto and spironolactone for now  - resume if sCR remains stable and pressures stable ; Able to tolerate losartan 25mg, will restart home entresto on 12/29

## 2023-12-30 LAB
ALBUMIN SERPL ELPH-MCNC: 3.5 G/DL — SIGNIFICANT CHANGE UP (ref 3.3–5)
ALBUMIN SERPL ELPH-MCNC: 3.5 G/DL — SIGNIFICANT CHANGE UP (ref 3.3–5)
ALP SERPL-CCNC: 76 U/L — SIGNIFICANT CHANGE UP (ref 40–120)
ALP SERPL-CCNC: 76 U/L — SIGNIFICANT CHANGE UP (ref 40–120)
ALT FLD-CCNC: 12 U/L — SIGNIFICANT CHANGE UP (ref 4–41)
ALT FLD-CCNC: 12 U/L — SIGNIFICANT CHANGE UP (ref 4–41)
ANION GAP SERPL CALC-SCNC: 10 MMOL/L — SIGNIFICANT CHANGE UP (ref 7–14)
ANION GAP SERPL CALC-SCNC: 10 MMOL/L — SIGNIFICANT CHANGE UP (ref 7–14)
AST SERPL-CCNC: 12 U/L — SIGNIFICANT CHANGE UP (ref 4–40)
AST SERPL-CCNC: 12 U/L — SIGNIFICANT CHANGE UP (ref 4–40)
BILIRUB DIRECT SERPL-MCNC: 0.2 MG/DL — SIGNIFICANT CHANGE UP (ref 0–0.3)
BILIRUB DIRECT SERPL-MCNC: 0.2 MG/DL — SIGNIFICANT CHANGE UP (ref 0–0.3)
BILIRUB INDIRECT FLD-MCNC: 0.4 MG/DL — SIGNIFICANT CHANGE UP (ref 0–1)
BILIRUB INDIRECT FLD-MCNC: 0.4 MG/DL — SIGNIFICANT CHANGE UP (ref 0–1)
BILIRUB SERPL-MCNC: 0.6 MG/DL — SIGNIFICANT CHANGE UP (ref 0.2–1.2)
BILIRUB SERPL-MCNC: 0.6 MG/DL — SIGNIFICANT CHANGE UP (ref 0.2–1.2)
BUN SERPL-MCNC: 18 MG/DL — SIGNIFICANT CHANGE UP (ref 7–23)
BUN SERPL-MCNC: 18 MG/DL — SIGNIFICANT CHANGE UP (ref 7–23)
CALCIUM SERPL-MCNC: 8.7 MG/DL — SIGNIFICANT CHANGE UP (ref 8.4–10.5)
CALCIUM SERPL-MCNC: 8.7 MG/DL — SIGNIFICANT CHANGE UP (ref 8.4–10.5)
CHLORIDE SERPL-SCNC: 105 MMOL/L — SIGNIFICANT CHANGE UP (ref 98–107)
CHLORIDE SERPL-SCNC: 105 MMOL/L — SIGNIFICANT CHANGE UP (ref 98–107)
CO2 SERPL-SCNC: 22 MMOL/L — SIGNIFICANT CHANGE UP (ref 22–31)
CO2 SERPL-SCNC: 22 MMOL/L — SIGNIFICANT CHANGE UP (ref 22–31)
CREAT SERPL-MCNC: 1.03 MG/DL — SIGNIFICANT CHANGE UP (ref 0.5–1.3)
CREAT SERPL-MCNC: 1.03 MG/DL — SIGNIFICANT CHANGE UP (ref 0.5–1.3)
EGFR: 72 ML/MIN/1.73M2 — SIGNIFICANT CHANGE UP
EGFR: 72 ML/MIN/1.73M2 — SIGNIFICANT CHANGE UP
GLUCOSE SERPL-MCNC: 87 MG/DL — SIGNIFICANT CHANGE UP (ref 70–99)
GLUCOSE SERPL-MCNC: 87 MG/DL — SIGNIFICANT CHANGE UP (ref 70–99)
HCT VFR BLD CALC: 32.3 % — LOW (ref 39–50)
HCT VFR BLD CALC: 32.3 % — LOW (ref 39–50)
HGB BLD-MCNC: 10.9 G/DL — LOW (ref 13–17)
HGB BLD-MCNC: 10.9 G/DL — LOW (ref 13–17)
LITHIUM SERPL-MCNC: 0.3 MMOL/L — LOW (ref 0.6–1.2)
LITHIUM SERPL-MCNC: 0.3 MMOL/L — LOW (ref 0.6–1.2)
MAGNESIUM SERPL-MCNC: 2.2 MG/DL — SIGNIFICANT CHANGE UP (ref 1.6–2.6)
MAGNESIUM SERPL-MCNC: 2.2 MG/DL — SIGNIFICANT CHANGE UP (ref 1.6–2.6)
MCHC RBC-ENTMCNC: 31.8 PG — SIGNIFICANT CHANGE UP (ref 27–34)
MCHC RBC-ENTMCNC: 31.8 PG — SIGNIFICANT CHANGE UP (ref 27–34)
MCHC RBC-ENTMCNC: 33.7 GM/DL — SIGNIFICANT CHANGE UP (ref 32–36)
MCHC RBC-ENTMCNC: 33.7 GM/DL — SIGNIFICANT CHANGE UP (ref 32–36)
MCV RBC AUTO: 94.2 FL — SIGNIFICANT CHANGE UP (ref 80–100)
MCV RBC AUTO: 94.2 FL — SIGNIFICANT CHANGE UP (ref 80–100)
NRBC # BLD: 0 /100 WBCS — SIGNIFICANT CHANGE UP (ref 0–0)
NRBC # BLD: 0 /100 WBCS — SIGNIFICANT CHANGE UP (ref 0–0)
NRBC # FLD: 0 K/UL — SIGNIFICANT CHANGE UP (ref 0–0)
NRBC # FLD: 0 K/UL — SIGNIFICANT CHANGE UP (ref 0–0)
PHOSPHATE SERPL-MCNC: 3.2 MG/DL — SIGNIFICANT CHANGE UP (ref 2.5–4.5)
PHOSPHATE SERPL-MCNC: 3.2 MG/DL — SIGNIFICANT CHANGE UP (ref 2.5–4.5)
PLATELET # BLD AUTO: 255 K/UL — SIGNIFICANT CHANGE UP (ref 150–400)
PLATELET # BLD AUTO: 255 K/UL — SIGNIFICANT CHANGE UP (ref 150–400)
POTASSIUM SERPL-MCNC: 4.7 MMOL/L — SIGNIFICANT CHANGE UP (ref 3.5–5.3)
POTASSIUM SERPL-MCNC: 4.7 MMOL/L — SIGNIFICANT CHANGE UP (ref 3.5–5.3)
POTASSIUM SERPL-SCNC: 4.7 MMOL/L — SIGNIFICANT CHANGE UP (ref 3.5–5.3)
POTASSIUM SERPL-SCNC: 4.7 MMOL/L — SIGNIFICANT CHANGE UP (ref 3.5–5.3)
PROT SERPL-MCNC: 6.4 G/DL — SIGNIFICANT CHANGE UP (ref 6–8.3)
PROT SERPL-MCNC: 6.4 G/DL — SIGNIFICANT CHANGE UP (ref 6–8.3)
RBC # BLD: 3.43 M/UL — LOW (ref 4.2–5.8)
RBC # BLD: 3.43 M/UL — LOW (ref 4.2–5.8)
RBC # FLD: 15.5 % — HIGH (ref 10.3–14.5)
RBC # FLD: 15.5 % — HIGH (ref 10.3–14.5)
SODIUM SERPL-SCNC: 137 MMOL/L — SIGNIFICANT CHANGE UP (ref 135–145)
SODIUM SERPL-SCNC: 137 MMOL/L — SIGNIFICANT CHANGE UP (ref 135–145)
WBC # BLD: 8.42 K/UL — SIGNIFICANT CHANGE UP (ref 3.8–10.5)
WBC # BLD: 8.42 K/UL — SIGNIFICANT CHANGE UP (ref 3.8–10.5)
WBC # FLD AUTO: 8.42 K/UL — SIGNIFICANT CHANGE UP (ref 3.8–10.5)
WBC # FLD AUTO: 8.42 K/UL — SIGNIFICANT CHANGE UP (ref 3.8–10.5)

## 2023-12-30 PROCEDURE — 99232 SBSQ HOSP IP/OBS MODERATE 35: CPT

## 2023-12-30 RX ADMIN — APIXABAN 5 MILLIGRAM(S): 2.5 TABLET, FILM COATED ORAL at 17:38

## 2023-12-30 RX ADMIN — PANTOPRAZOLE SODIUM 40 MILLIGRAM(S): 20 TABLET, DELAYED RELEASE ORAL at 05:22

## 2023-12-30 RX ADMIN — Medication 1 SPRAY(S): at 22:46

## 2023-12-30 RX ADMIN — ATORVASTATIN CALCIUM 80 MILLIGRAM(S): 80 TABLET, FILM COATED ORAL at 22:47

## 2023-12-30 RX ADMIN — APIXABAN 5 MILLIGRAM(S): 2.5 TABLET, FILM COATED ORAL at 05:22

## 2023-12-30 RX ADMIN — Medication 1 SPRAY(S): at 13:19

## 2023-12-30 RX ADMIN — CLOPIDOGREL BISULFATE 75 MILLIGRAM(S): 75 TABLET, FILM COATED ORAL at 13:15

## 2023-12-30 RX ADMIN — SACUBITRIL AND VALSARTAN 1 TABLET(S): 24; 26 TABLET, FILM COATED ORAL at 17:38

## 2023-12-30 RX ADMIN — Medication 1 SPRAY(S): at 05:23

## 2023-12-30 NOTE — PROGRESS NOTE ADULT - PROBLEM SELECTOR PLAN 2
Pt presenting with chest pain, currently resolved. Previous admission at [facility] last month for NSTEM s/p LHC w/ known multivessel disease with decision to treat medically per pt/family preference. Seen by cardiology and recommend continuing medical management w/ no plans for cardiac testing.  - c/w Plavix   - EKG w/ LBBB, unchanged from prior  - monitor on tele  - Trops 54-->69-->64  - NSVT, no beta blocker given hx of bradycardia, CTM   - Appreciate cardio input Pt presenting with chest pain, currently resolved. Previous admission at [facility] last month for NSTEM s/p LHC w/ known multivessel disease with decision to treat medically per pt/family preference. Seen by cardiology and recommend continuing medical management w/ no plans for cardiac testing.  - c/w Plavix   - EKG w/ LBBB, unchanged from prior  - monitor on tele  - Trops 54-->69-->64  - NSVT, no beta blocker given hx of bradycardia, CTM   - Appreciate cardio input

## 2023-12-30 NOTE — PROGRESS NOTE ADULT - TIME BILLING
Review of laboratory data, radiology results, consultants' recommendations, documentation in [place], discussion with patient/house staff/ACP and interdisciplinary staff (such as , social workers, etc). Interventions were performed as documented above. Review of laboratory data, radiology results, consultants' recommendations, documentation in [place], discussion with patient/house staff/ACP and interdisciplinary staff (such as , social workers, etc). Interventions were performed as documented above.

## 2023-12-30 NOTE — PROGRESS NOTE ADULT - ASSESSMENT
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 fib on Eliquis, who presents from [facility] for chest pain, Trops elevated. Pt seen and evaluated by cardiology and recommending continued medical management. 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 fib on Eliquis, who presents from [facility] for chest pain, Trops elevated. Pt seen and evaluated by cardiology and recommending continued medical management. w/ course c/b fever

## 2023-12-30 NOTE — PROGRESS NOTE ADULT - PROBLEM SELECTOR PLAN 3
S/p PCI to LAD and known multivessel disease on [facility] last month.   - c/w plavix, atorvastatin and Eliquis S/p PCI to LAD and known multivessel disease on [facility] last month.   - c/w plavix, atorvastatin and Eliquis

## 2023-12-30 NOTE — PROGRESS NOTE ADULT - SUBJECTIVE AND OBJECTIVE BOX
[facility]  Division of Hospital Medicine  Angela Riggs MD  Pager: [phone]      Patient is a 83y old  Male who presents with a chief complaint of chest pain (29 Dec 2023 15:48)      SUBJECTIVE / OVERNIGHT EVENTS: Patient examined at bedside. No medical concerns. No chest pain or SOB   ADDITIONAL REVIEW OF SYSTEMS:    MEDICATIONS  (STANDING):  apixaban 5 milliGRAM(s) Oral every 12 hours  atorvastatin 80 milliGRAM(s) Oral at bedtime  clopidogrel Tablet 75 milliGRAM(s) Oral daily  pantoprazole    Tablet 40 milliGRAM(s) Oral before breakfast  sacubitril 24 mG/valsartan 26 mG 1 Tablet(s) Oral two times a day  sodium chloride 0.65% Nasal 1 Spray(s) Both Nostrils three times a day    MEDICATIONS  (PRN):  acetaminophen     Tablet .. 650 milliGRAM(s) Oral every 6 hours PRN Temp greater or equal to 38C (100.4F), Mild Pain (1 - 3)  LORazepam   Injectable 0.25 milliGRAM(s) IV Push every 8 hours PRN Agitation  oxymetazoline 0.05% Nasal Spray 2 Spray(s) Both Nostrils every 12 hours PRN Epistaxis      CAPILLARY BLOOD GLUCOSE        I&O's Summary      PHYSICAL EXAM:  Vital Signs Last 24 Hrs  T(C): 36.9 (30 Dec 2023 05:20), Max: 36.9 (30 Dec 2023 05:20)  T(F): 98.4 (30 Dec 2023 05:20), Max: 98.4 (30 Dec 2023 05:20)  HR: 50 (30 Dec 2023 05:20) (50 - 55)  BP: 98/50 (30 Dec 2023 05:20) (98/50 - 100/50)  BP(mean): --  RR: 18 (30 Dec 2023 05:20) (17 - 18)  SpO2: 97% (30 Dec 2023 05:20) (97% - 98%)    Parameters below as of 30 Dec 2023 05:20  Patient On (Oxygen Delivery Method): room air      GENERAL: Elderly man in NAD  HEAD:  Atraumatic, Normocephalic  EYES: EOMI,  conjunctiva and sclera clear  NECK: Supple  CHEST/LUNG: Clear to auscultation bilaterally; No wheeze, crackles or rhonchi   HEART: Regular rate and rhythm; Normal S1 S2, No murmurs, rubs, or gallops  ABDOMEN: Soft, Nontender, Nondistended; Bowel sounds present  EXTREMITIES:  2+ Peripheral Pulses, No edema  PSYCH: AAOx2 - self and hospital   NEUROLOGY: SROM, +Mild dysarthria , + LUE resting tremor   SKIN: Warm and dry    LABS:                        10.9   8.42  )-----------( 255      ( 30 Dec 2023 05:12 )             32.3     12-30    137  |  105  |  18  ----------------------------<  87  4.7   |  22  |  1.03    Ca    8.7      30 Dec 2023 05:12  Phos  3.2     12-30  Mg     2.20     12-30    TPro  6.4  /  Alb  3.5  /  TBili  0.6  /  DBili  0.2  /  AST  12  /  ALT  12  /  AlkPhos  76  12-30      CARDIAC MARKERS ( 29 Dec 2023 04:58 )  x     / x     / 36 U/L / x     / 3.0 ng/mL      Urinalysis Basic - ( 30 Dec 2023 05:12 )    Color: x / Appearance: x / SG: x / pH: x  Gluc: 87 mg/dL / Ketone: x  / Bili: x / Urobili: x   Blood: x / Protein: x / Nitrite: x   Leuk Esterase: x / RBC: x / WBC x   Sq Epi: x / Non Sq Epi: x / Bacteria: x          RADIOLOGY & ADDITIONAL TESTS:  Results Reviewed:   Imaging Personally Reviewed:  Electrocardiogram Personally Reviewed:    COORDINATION OF CARE:  Care Discussed with Consultants/Other Providers [Y/N]:  Prior or Outpatient Records Reviewed [Y/N]:   [facility]  Division of Hospital Medicine  Angela Riggs MD  Pager: [phone]      Patient is a 83y old  Male who presents with a chief complaint of chest pain (29 Dec 2023 15:48)      SUBJECTIVE / OVERNIGHT EVENTS: Patient examined at bedside. No medical concerns. No chest pain or SOB   ADDITIONAL REVIEW OF SYSTEMS:    MEDICATIONS  (STANDING):  apixaban 5 milliGRAM(s) Oral every 12 hours  atorvastatin 80 milliGRAM(s) Oral at bedtime  clopidogrel Tablet 75 milliGRAM(s) Oral daily  pantoprazole    Tablet 40 milliGRAM(s) Oral before breakfast  sacubitril 24 mG/valsartan 26 mG 1 Tablet(s) Oral two times a day  sodium chloride 0.65% Nasal 1 Spray(s) Both Nostrils three times a day    MEDICATIONS  (PRN):  acetaminophen     Tablet .. 650 milliGRAM(s) Oral every 6 hours PRN Temp greater or equal to 38C (100.4F), Mild Pain (1 - 3)  LORazepam   Injectable 0.25 milliGRAM(s) IV Push every 8 hours PRN Agitation  oxymetazoline 0.05% Nasal Spray 2 Spray(s) Both Nostrils every 12 hours PRN Epistaxis      CAPILLARY BLOOD GLUCOSE        I&O's Summary      PHYSICAL EXAM:  Vital Signs Last 24 Hrs  T(C): 36.9 (30 Dec 2023 05:20), Max: 36.9 (30 Dec 2023 05:20)  T(F): 98.4 (30 Dec 2023 05:20), Max: 98.4 (30 Dec 2023 05:20)  HR: 50 (30 Dec 2023 05:20) (50 - 55)  BP: 98/50 (30 Dec 2023 05:20) (98/50 - 100/50)  BP(mean): --  RR: 18 (30 Dec 2023 05:20) (17 - 18)  SpO2: 97% (30 Dec 2023 05:20) (97% - 98%)    Parameters below as of 30 Dec 2023 05:20  Patient On (Oxygen Delivery Method): room air      GENERAL: Elderly man in NAD  HEAD:  Atraumatic, Normocephalic  EYES: EOMI,  conjunctiva and sclera clear  NECK: Supple  CHEST/LUNG: Clear to auscultation bilaterally; No wheeze, crackles or rhonchi   HEART: Regular rate and rhythm; Normal S1 S2, No murmurs, rubs, or gallops  ABDOMEN: Soft, Nontender, Nondistended; Bowel sounds present  EXTREMITIES:  2+ Peripheral Pulses, No edema  PSYCH: AAOx2 - self and hospital   NEUROLOGY: SROM, +Mild dysarthria , + LUE resting tremor   SKIN: Warm and dry    LABS:                        10.9   8.42  )-----------( 255      ( 30 Dec 2023 05:12 )             32.3     12-30    137  |  105  |  18  ----------------------------<  87  4.7   |  22  |  1.03    Ca    8.7      30 Dec 2023 05:12  Phos  3.2     12-30  Mg     2.20     12-30    TPro  6.4  /  Alb  3.5  /  TBili  0.6  /  DBili  0.2  /  AST  12  /  ALT  12  /  AlkPhos  76  12-30      CARDIAC MARKERS ( 29 Dec 2023 04:58 )  x     / x     / 36 U/L / x     / 3.0 ng/mL      Urinalysis Basic - ( 30 Dec 2023 05:12 )    Color: x / Appearance: x / SG: x / pH: x  Gluc: 87 mg/dL / Ketone: x  / Bili: x / Urobili: x   Blood: x / Protein: x / Nitrite: x   Leuk Esterase: x / RBC: x / WBC x   Sq Epi: x / Non Sq Epi: x / Bacteria: x          RADIOLOGY & ADDITIONAL TESTS:  Results Reviewed:   Imaging Personally Reviewed:  Electrocardiogram Personally Reviewed:    COORDINATION OF CARE:  Care Discussed with Consultants/Other Providers [Y/N]:  Prior or Outpatient Records Reviewed [Y/N]:

## 2023-12-31 PROCEDURE — 99232 SBSQ HOSP IP/OBS MODERATE 35: CPT

## 2023-12-31 RX ADMIN — Medication 1 SPRAY(S): at 13:09

## 2023-12-31 RX ADMIN — SACUBITRIL AND VALSARTAN 1 TABLET(S): 24; 26 TABLET, FILM COATED ORAL at 05:57

## 2023-12-31 RX ADMIN — CLOPIDOGREL BISULFATE 75 MILLIGRAM(S): 75 TABLET, FILM COATED ORAL at 13:06

## 2023-12-31 RX ADMIN — APIXABAN 5 MILLIGRAM(S): 2.5 TABLET, FILM COATED ORAL at 05:57

## 2023-12-31 RX ADMIN — PANTOPRAZOLE SODIUM 40 MILLIGRAM(S): 20 TABLET, DELAYED RELEASE ORAL at 05:57

## 2023-12-31 RX ADMIN — SACUBITRIL AND VALSARTAN 1 TABLET(S): 24; 26 TABLET, FILM COATED ORAL at 17:33

## 2023-12-31 RX ADMIN — Medication 1 SPRAY(S): at 05:57

## 2023-12-31 RX ADMIN — APIXABAN 5 MILLIGRAM(S): 2.5 TABLET, FILM COATED ORAL at 17:33

## 2023-12-31 NOTE — PROGRESS NOTE ADULT - PROBLEM SELECTOR PLAN 3
S/p PCI to LAD and known multivessel disease on [facility] last month.   - c/w plavix, atorvastatin and Eliquis S/p PCI to LAD and known multivessel disease on [facility] last month.   - c/w plavix, atorvastatin and Eliquis

## 2023-12-31 NOTE — PROVIDER CONTACT NOTE (OTHER) - BACKGROUND
Pt admitted for NSTEMI, multi vessel disease, Afib , severe MR
Pt admitted for Non-ST elevation myocardial infarction
Pt admitted for chest pain, PMH of Afib on Eliquis, NSTEMI, multi vessel disease noted
Pt admitted for NSTEMI
history of NSTEMI, CAD<HTN< Bipolar and hypercholesterolemia. Admitting diagnosis NSTEMI
pt admitted with a PMH of bipolar disorder, Afib on eliquis, multi vessel disease noted, severe MR. PT presented to ED to chest pain, NSTEMI

## 2023-12-31 NOTE — PROGRESS NOTE ADULT - SUBJECTIVE AND OBJECTIVE BOX
[facility]  Division of Hospital Medicine  Angela Riggs MD  Pager: [phone]      Patient is a 83y old  Male who presents with a chief complaint of chest pain (30 Dec 2023 15:07)      SUBJECTIVE / OVERNIGHT EVENTS: Patient more alert and engaged in exam. Is not oriented to time but oriented to self and place. Asking to sit in chair   ADDITIONAL REVIEW OF SYSTEMS:    MEDICATIONS  (STANDING):  apixaban 5 milliGRAM(s) Oral every 12 hours  atorvastatin 80 milliGRAM(s) Oral at bedtime  clopidogrel Tablet 75 milliGRAM(s) Oral daily  pantoprazole    Tablet 40 milliGRAM(s) Oral before breakfast  sacubitril 24 mG/valsartan 26 mG 1 Tablet(s) Oral two times a day  sodium chloride 0.65% Nasal 1 Spray(s) Both Nostrils three times a day    MEDICATIONS  (PRN):  acetaminophen     Tablet .. 650 milliGRAM(s) Oral every 6 hours PRN Temp greater or equal to 38C (100.4F), Mild Pain (1 - 3)  LORazepam   Injectable 0.25 milliGRAM(s) IV Push every 8 hours PRN Agitation  oxymetazoline 0.05% Nasal Spray 2 Spray(s) Both Nostrils every 12 hours PRN Epistaxis      CAPILLARY BLOOD GLUCOSE        I&O's Summary      PHYSICAL EXAM:  Vital Signs Last 24 Hrs  T(C): 36.7 (31 Dec 2023 05:43), Max: 36.7 (30 Dec 2023 14:10)  T(F): 98 (31 Dec 2023 05:43), Max: 98.1 (30 Dec 2023 14:10)  HR: 52 (31 Dec 2023 05:43) (52 - 94)  BP: 102/53 (31 Dec 2023 05:43) (102/53 - 123/57)  BP(mean): 64 (31 Dec 2023 05:43) (64 - 84)  RR: 18 (31 Dec 2023 05:43) (18 - 18)  SpO2: 99% (31 Dec 2023 05:43) (95% - 99%)    Parameters below as of 31 Dec 2023 05:43  Patient On (Oxygen Delivery Method): room air      GENERAL: Elderly man in NAD  HEAD:  Atraumatic, Normocephalic  EYES: EOMI,  conjunctiva and sclera clear  NECK: Supple  CHEST/LUNG: Clear to auscultation bilaterally; No wheeze, crackles or rhonchi   HEART: Regular rate and rhythm; Normal S1 S2, No murmurs, rubs, or gallops  ABDOMEN: Soft, Nontender, Nondistended; Bowel sounds present  EXTREMITIES:  2+ Peripheral Pulses, No edema  PSYCH: AAOx2 - self and hospital   NEUROLOGY: SROM, +Mild dysarthria , + LUE resting tremor   SKIN: Warm and dry    LABS:                        10.9   8.42  )-----------( 255      ( 30 Dec 2023 05:12 )             32.3     12-30    137  |  105  |  18  ----------------------------<  87  4.7   |  22  |  1.03    Ca    8.7      30 Dec 2023 05:12  Phos  3.2     12-30  Mg     2.20     12-30    TPro  6.4  /  Alb  3.5  /  TBili  0.6  /  DBili  0.2  /  AST  12  /  ALT  12  /  AlkPhos  76  12-30          Urinalysis Basic - ( 30 Dec 2023 05:12 )    Color: x / Appearance: x / SG: x / pH: x  Gluc: 87 mg/dL / Ketone: x  / Bili: x / Urobili: x   Blood: x / Protein: x / Nitrite: x   Leuk Esterase: x / RBC: x / WBC x   Sq Epi: x / Non Sq Epi: x / Bacteria: x          RADIOLOGY & ADDITIONAL TESTS:  Results Reviewed:   Imaging Personally Reviewed:  Electrocardiogram Personally Reviewed:    COORDINATION OF CARE:  Care Discussed with Consultants/Other Providers [Y/N]:  Prior or Outpatient Records Reviewed [Y/N]:   [facility]  Division of Hospital Medicine  Angela Riggs MD  Pager: [phone]      Patient is a 83y old  Male who presents with a chief complaint of chest pain (30 Dec 2023 15:07)      SUBJECTIVE / OVERNIGHT EVENTS: Patient more alert and engaged in exam. Is not oriented to time but oriented to self and place. Asking to sit in chair   ADDITIONAL REVIEW OF SYSTEMS:    MEDICATIONS  (STANDING):  apixaban 5 milliGRAM(s) Oral every 12 hours  atorvastatin 80 milliGRAM(s) Oral at bedtime  clopidogrel Tablet 75 milliGRAM(s) Oral daily  pantoprazole    Tablet 40 milliGRAM(s) Oral before breakfast  sacubitril 24 mG/valsartan 26 mG 1 Tablet(s) Oral two times a day  sodium chloride 0.65% Nasal 1 Spray(s) Both Nostrils three times a day    MEDICATIONS  (PRN):  acetaminophen     Tablet .. 650 milliGRAM(s) Oral every 6 hours PRN Temp greater or equal to 38C (100.4F), Mild Pain (1 - 3)  LORazepam   Injectable 0.25 milliGRAM(s) IV Push every 8 hours PRN Agitation  oxymetazoline 0.05% Nasal Spray 2 Spray(s) Both Nostrils every 12 hours PRN Epistaxis      CAPILLARY BLOOD GLUCOSE        I&O's Summary      PHYSICAL EXAM:  Vital Signs Last 24 Hrs  T(C): 36.7 (31 Dec 2023 05:43), Max: 36.7 (30 Dec 2023 14:10)  T(F): 98 (31 Dec 2023 05:43), Max: 98.1 (30 Dec 2023 14:10)  HR: 52 (31 Dec 2023 05:43) (52 - 94)  BP: 102/53 (31 Dec 2023 05:43) (102/53 - 123/57)  BP(mean): 64 (31 Dec 2023 05:43) (64 - 84)  RR: 18 (31 Dec 2023 05:43) (18 - 18)  SpO2: 99% (31 Dec 2023 05:43) (95% - 99%)    Parameters below as of 31 Dec 2023 05:43  Patient On (Oxygen Delivery Method): room air      GENERAL: Elderly man in NAD  HEAD:  Atraumatic, Normocephalic  EYES: EOMI,  conjunctiva and sclera clear  NECK: Supple  CHEST/LUNG: Clear to auscultation bilaterally; No wheeze, crackles or rhonchi   HEART: Regular rate and rhythm; Normal S1 S2, No murmurs, rubs, or gallops  ABDOMEN: Soft, Nontender, Nondistended; Bowel sounds present  EXTREMITIES:  2+ Peripheral Pulses, No edema  PSYCH: AAOx2 - self and hospital   NEUROLOGY: SROM, +Mild dysarthria , + LUE resting tremor   SKIN: Warm and dry    LABS:                        10.9   8.42  )-----------( 255      ( 30 Dec 2023 05:12 )             32.3     12-30    137  |  105  |  18  ----------------------------<  87  4.7   |  22  |  1.03    Ca    8.7      30 Dec 2023 05:12  Phos  3.2     12-30  Mg     2.20     12-30    TPro  6.4  /  Alb  3.5  /  TBili  0.6  /  DBili  0.2  /  AST  12  /  ALT  12  /  AlkPhos  76  12-30          Urinalysis Basic - ( 30 Dec 2023 05:12 )    Color: x / Appearance: x / SG: x / pH: x  Gluc: 87 mg/dL / Ketone: x  / Bili: x / Urobili: x   Blood: x / Protein: x / Nitrite: x   Leuk Esterase: x / RBC: x / WBC x   Sq Epi: x / Non Sq Epi: x / Bacteria: x          RADIOLOGY & ADDITIONAL TESTS:  Results Reviewed:   Imaging Personally Reviewed:  Electrocardiogram Personally Reviewed:    COORDINATION OF CARE:  Care Discussed with Consultants/Other Providers [Y/N]:  Prior or Outpatient Records Reviewed [Y/N]:

## 2023-12-31 NOTE — PROGRESS NOTE ADULT - PROBLEM SELECTOR PLAN 2
Pt presenting with chest pain, currently resolved. Previous admission at [facility] last month for NSTEM s/p LHC w/ known multivessel disease with decision to treat medically per pt/family preference. Seen by cardiology and recommend continuing medical management w/ no plans for cardiac testing.  - c/w Plavix   - EKG w/ LBBB, unchanged from prior  - monitor on tele  - Trops 54-->69-->64  - NSVT, no beta blocker given hx of bradycardia, CTM   - Appreciate cardio input Pt presenting with chest pain, currently resolved. Previous admission at [facility] last month for NSTEM s/p LHC w/ known multivessel disease with decision to treat medically per pt/family preference. Seen by cardiology and recommend continuing medical management w/ no plans for cardiac testing.  - c/w Plavix   - EKG w/ LBBB, unchanged from prior  - monitor on tele  - Trops 54-->69-->64  - NSVT, no beta blocker given hx of bradycardia, CTM   - Appreciate cardio input

## 2023-12-31 NOTE — PROGRESS NOTE ADULT - TIME BILLING
Review of laboratory data, radiology results, consultants' recommendations, documentation in [place], discussion with patient/house staff/ACP and interdisciplinary staff (such as , social workers, etc). Interventions were performed as documented above. Review of laboratory data, radiology results, consultants' recommendations, documentation in [place], discussion with patient/house staff/ACP and interdisciplinary staff (such as , social workers, etc). Interventions were performed as documented above.

## 2023-12-31 NOTE — PROVIDER CONTACT NOTE (OTHER) - DATE AND TIME:
26-Dec-2023 09:30
27-Dec-2023 19:48
25-Dec-2023 18:20
31-Dec-2023 22:11
25-Dec-2023 19:00
27-Dec-2023 16:52

## 2023-12-31 NOTE — PROVIDER CONTACT NOTE (OTHER) - SITUATION
Pt HR running low, reading states HR as low as 38 and as high as 105.
Pt with dark appearing stools
Patient refuses medication
Pt has oxygen reading of 88% on room air,  ori [place] ordered to give 2 L nasal cannula. As order is being initiated pt starts having a continuos nose bleed and a blood clot is formed.
Pt received dosage of 10 mg Eliquis (two 5mg tablets) in error instead of 5 mg (two 2.5 mg tablets) that was ordered.
Tele tech called stating pt had 10 beats of vtach and has a HR of 122.

## 2023-12-31 NOTE — PROVIDER CONTACT NOTE (OTHER) - REASON
Patient refuses meds
pt has 10 beats of vtach and a HR of 122
Error in medication dosing
Pt with dark appearing stools
nose bleed, blood clot in nose
Pt HR running as low as 38

## 2023-12-31 NOTE — PROVIDER CONTACT NOTE (OTHER) - ASSESSMENT
Patient refuses medication atorvastatin and sodium chloride nasal spray
Pt is stable, no chest pain present, no sob. After removing blood clot in nose oxygen is now 96% on room air. bleeding has stopped.
While assessing pt, pt states that he wants to kill his nephew Johny with a gun. Pt HR was around 115, no SOB noted. Pt had no complaints of chest pain. Writer told pt to bear down. Vitals taken
Pt in no distress
Pt HR runs low while sleeping, when pt is awakened HR returns to regular rhythm between 59-67
Pt with dark appearing stools

## 2024-01-01 PROCEDURE — 99232 SBSQ HOSP IP/OBS MODERATE 35: CPT

## 2024-01-01 RX ADMIN — SACUBITRIL AND VALSARTAN 1 TABLET(S): 24; 26 TABLET, FILM COATED ORAL at 17:37

## 2024-01-01 RX ADMIN — APIXABAN 5 MILLIGRAM(S): 2.5 TABLET, FILM COATED ORAL at 17:37

## 2024-01-01 RX ADMIN — Medication 1 SPRAY(S): at 21:12

## 2024-01-01 RX ADMIN — SACUBITRIL AND VALSARTAN 1 TABLET(S): 24; 26 TABLET, FILM COATED ORAL at 05:17

## 2024-01-01 RX ADMIN — Medication 1 SPRAY(S): at 05:16

## 2024-01-01 RX ADMIN — Medication 1 SPRAY(S): at 14:49

## 2024-01-01 RX ADMIN — PANTOPRAZOLE SODIUM 40 MILLIGRAM(S): 20 TABLET, DELAYED RELEASE ORAL at 05:16

## 2024-01-01 RX ADMIN — ATORVASTATIN CALCIUM 80 MILLIGRAM(S): 80 TABLET, FILM COATED ORAL at 21:12

## 2024-01-01 RX ADMIN — APIXABAN 5 MILLIGRAM(S): 2.5 TABLET, FILM COATED ORAL at 05:17

## 2024-01-01 RX ADMIN — CLOPIDOGREL BISULFATE 75 MILLIGRAM(S): 75 TABLET, FILM COATED ORAL at 12:01

## 2024-01-01 NOTE — PROGRESS NOTE ADULT - PROBLEM SELECTOR PLAN 10
DVT prophylaxis: on Eliquis  Diet: dash/tlc  Code status: DNR/DNI
DVT prophylaxis: on Eliquis  Diet: dash/tlc  Code status: DNR/DNI  Dispo: NH with hospice
DVT prophylaxis: on Eliquis  Diet: dash/tlc  Code status: DNR/DNI  Dispo: NH with hospice
DVT prophylaxis: on Eliquis  Diet: dash/tlc  Code status: DNR/DNI
DVT prophylaxis: on Eliquis  Diet: dash/tlc  Code status: DNR/DNI  Dispo: NH with hospice
DVT prophylaxis: on Eliquis  Diet: dash/tlc  Code status: DNR/DNI
DVT prophylaxis: on Eliquis  Diet: dash/tlc  Code status: DNR/DNI  Dispo: NH with hospice

## 2024-01-01 NOTE — PROVIDER CONTACT NOTE (CHANGE IN STATUS NOTIFICATION) - ASSESSMENT
Patient has nosebleed and passed clot 1.5 x 1 cm approx
Patients  pulse -46bpm and bloodpressure is 98/45.
Patient has bruising present to superficial region of penis.

## 2024-01-01 NOTE — PROGRESS NOTE ADULT - TIME BILLING
Review of laboratory data, radiology results, consultants' recommendations, documentation in [place], discussion with patient/house staff/ACP and interdisciplinary staff (such as , social workers, etc). Interventions were performed as documented above. Review of laboratory data, radiology results, consultants' recommendations, documentation in [place], discussion with patient/house staff/ACP and interdisciplinary staff (such as , social workers, etc). Interventions were performed as documented above.

## 2024-01-01 NOTE — PROGRESS NOTE ADULT - PROBLEM SELECTOR PROBLEM 3
CAD (coronary artery disease)
ENMANUEL (acute kidney injury)
CAD (coronary artery disease)

## 2024-01-01 NOTE — PROGRESS NOTE ADULT - PROBLEM SELECTOR PLAN 7
c/w eliquis. Not on beta blockers due to hx of bradycardia

## 2024-01-01 NOTE — PROGRESS NOTE ADULT - SUBJECTIVE AND OBJECTIVE BOX
[facility]  Division of Hospital Medicine  Angela Riggs MD  Pager: [phone]      Patient is a 83y old  Male who presents with a chief complaint of chest pain (31 Dec 2023 12:57)      SUBJECTIVE / OVERNIGHT EVENTS:  ADDITIONAL REVIEW OF SYSTEMS:    MEDICATIONS  (STANDING):  apixaban 5 milliGRAM(s) Oral every 12 hours  atorvastatin 80 milliGRAM(s) Oral at bedtime  clopidogrel Tablet 75 milliGRAM(s) Oral daily  pantoprazole    Tablet 40 milliGRAM(s) Oral before breakfast  sacubitril 24 mG/valsartan 26 mG 1 Tablet(s) Oral two times a day  sodium chloride 0.65% Nasal 1 Spray(s) Both Nostrils three times a day    MEDICATIONS  (PRN):  acetaminophen     Tablet .. 650 milliGRAM(s) Oral every 6 hours PRN Temp greater or equal to 38C (100.4F), Mild Pain (1 - 3)  LORazepam   Injectable 0.25 milliGRAM(s) IV Push every 8 hours PRN Agitation  oxymetazoline 0.05% Nasal Spray 2 Spray(s) Both Nostrils every 12 hours PRN Epistaxis      CAPILLARY BLOOD GLUCOSE        I&O's Summary      PHYSICAL EXAM:  Vital Signs Last 24 Hrs  T(C): 36.7 (01 Jan 2024 05:55), Max: 36.7 (01 Jan 2024 05:55)  T(F): 98 (01 Jan 2024 05:55), Max: 98 (01 Jan 2024 05:55)  HR: 46 (01 Jan 2024 05:55) (46 - 58)  BP: 98/45 (01 Jan 2024 05:55) (98/45 - 101/54)  BP(mean): --  RR: 18 (01 Jan 2024 05:55) (18 - 18)  SpO2: 98% (01 Jan 2024 05:55) (97% - 98%)    Parameters below as of 01 Jan 2024 05:55  Patient On (Oxygen Delivery Method): room air      GENERAL: Elderly man in NAD  HEAD:  Atraumatic, Normocephalic  EYES: EOMI,  conjunctiva and sclera clear  NECK: Supple  CHEST/LUNG: Clear to auscultation bilaterally; No wheeze, crackles or rhonchi   HEART: Regular rate and rhythm; Normal S1 S2, No murmurs, rubs, or gallops  ABDOMEN: Soft, Nontender, Nondistended; Bowel sounds present  EXTREMITIES:  2+ Peripheral Pulses, No edema  PSYCH: AAOx2 - self and hospital   NEUROLOGY: SROM, +Mild dysarthria , + LUE resting tremor   SKIN: Warm and dry  LABS:                      RADIOLOGY & ADDITIONAL TESTS:  Results Reviewed:   Imaging Personally Reviewed:  Electrocardiogram Personally Reviewed:    COORDINATION OF CARE:  Care Discussed with Consultants/Other Providers [Y/N]:  Prior or Outpatient Records Reviewed [Y/N]:   [facility]  Division of Hospital Medicine  Angela Riggs MD  Pager: [phone]      Patient is a 83y old  Male who presents with a chief complaint of chest pain (31 Dec 2023 12:57)      SUBJECTIVE / OVERNIGHT EVENTS:  ADDITIONAL REVIEW OF SYSTEMS:    MEDICATIONS  (STANDING):  apixaban 5 milliGRAM(s) Oral every 12 hours  atorvastatin 80 milliGRAM(s) Oral at bedtime  clopidogrel Tablet 75 milliGRAM(s) Oral daily  pantoprazole    Tablet 40 milliGRAM(s) Oral before breakfast  sacubitril 24 mG/valsartan 26 mG 1 Tablet(s) Oral two times a day  sodium chloride 0.65% Nasal 1 Spray(s) Both Nostrils three times a day    MEDICATIONS  (PRN):  acetaminophen     Tablet .. 650 milliGRAM(s) Oral every 6 hours PRN Temp greater or equal to 38C (100.4F), Mild Pain (1 - 3)  LORazepam   Injectable 0.25 milliGRAM(s) IV Push every 8 hours PRN Agitation  oxymetazoline 0.05% Nasal Spray 2 Spray(s) Both Nostrils every 12 hours PRN Epistaxis      CAPILLARY BLOOD GLUCOSE        I&O's Summary      PHYSICAL EXAM:  Vital Signs Last 24 Hrs  T(C): 36.7 (01 Jan 2024 05:55), Max: 36.7 (01 Jan 2024 05:55)  T(F): 98 (01 Jan 2024 05:55), Max: 98 (01 Jan 2024 05:55)  HR: 46 (01 Jan 2024 05:55) (46 - 58)  BP: 98/45 (01 Jan 2024 05:55) (98/45 - 101/54)  BP(mean): --  RR: 18 (01 Jan 2024 05:55) (18 - 18)  SpO2: 98% (01 Jan 2024 05:55) (97% - 98%)    Parameters below as of 01 Jan 2024 05:55  Patient On (Oxygen Delivery Method): room air      GENERAL: Elderly man in NAD  HEAD:  Atraumatic, Normocephalic  EYES: EOMI,  conjunctiva and sclera clear  NECK: Supple  CHEST/LUNG: Clear to auscultation bilaterally; No wheeze, crackles or rhonchi   HEART: Regular rate and rhythm; Normal S1 S2, No murmurs, rubs, or gallops  ABDOMEN: Soft, Nontender, Nondistended; Bowel sounds present  EXTREMITIES:  2+ Peripheral Pulses, No edema  PSYCH: AAOx2 - self and hospital   NEUROLOGY: SROM, +Mild dysarthria , + LUE resting tremor   SKIN: Warm and dry  LABS:                      RADIOLOGY & ADDITIONAL TESTS:  Results Reviewed:   Imaging Personally Reviewed:  Electrocardiogram Personally Reviewed:    COORDINATION OF CARE:  Care Discussed with Consultants/Other Providers [Y/N]:  Prior or Outpatient Records Reviewed [Y/N]:   [facility]  Division of Hospital Medicine  Angela Riggs MD  Pager: [phone]      Patient is a 83y old  Male who presents with a chief complaint of chest pain (31 Dec 2023 12:57)      SUBJECTIVE / OVERNIGHT EVENTS: Patient seen and examined at bedside. No chest pain or SOB. Awaiting placement   ADDITIONAL REVIEW OF SYSTEMS:    MEDICATIONS  (STANDING):  apixaban 5 milliGRAM(s) Oral every 12 hours  atorvastatin 80 milliGRAM(s) Oral at bedtime  clopidogrel Tablet 75 milliGRAM(s) Oral daily  pantoprazole    Tablet 40 milliGRAM(s) Oral before breakfast  sacubitril 24 mG/valsartan 26 mG 1 Tablet(s) Oral two times a day  sodium chloride 0.65% Nasal 1 Spray(s) Both Nostrils three times a day    MEDICATIONS  (PRN):  acetaminophen     Tablet .. 650 milliGRAM(s) Oral every 6 hours PRN Temp greater or equal to 38C (100.4F), Mild Pain (1 - 3)  LORazepam   Injectable 0.25 milliGRAM(s) IV Push every 8 hours PRN Agitation  oxymetazoline 0.05% Nasal Spray 2 Spray(s) Both Nostrils every 12 hours PRN Epistaxis      CAPILLARY BLOOD GLUCOSE        I&O's Summary      PHYSICAL EXAM:  Vital Signs Last 24 Hrs  T(C): 36.7 (01 Jan 2024 05:55), Max: 36.7 (01 Jan 2024 05:55)  T(F): 98 (01 Jan 2024 05:55), Max: 98 (01 Jan 2024 05:55)  HR: 46 (01 Jan 2024 05:55) (46 - 58)  BP: 98/45 (01 Jan 2024 05:55) (98/45 - 101/54)  BP(mean): --  RR: 18 (01 Jan 2024 05:55) (18 - 18)  SpO2: 98% (01 Jan 2024 05:55) (97% - 98%)    Parameters below as of 01 Jan 2024 05:55  Patient On (Oxygen Delivery Method): room air      GENERAL: Elderly man in NAD  HEAD:  Atraumatic, Normocephalic  EYES: EOMI,  conjunctiva and sclera clear  NECK: Supple  CHEST/LUNG: Clear to auscultation bilaterally; No wheeze, crackles or rhonchi   HEART: Regular rate and rhythm; Normal S1 S2, No murmurs, rubs, or gallops  ABDOMEN: Soft, Nontender, Nondistended; Bowel sounds present  EXTREMITIES:  2+ Peripheral Pulses, No edema  PSYCH: AAOx2 - self and hospital   NEUROLOGY: SROM, +Mild dysarthria , + LUE resting tremor   SKIN: Warm and dry  LABS:                      RADIOLOGY & ADDITIONAL TESTS:  Results Reviewed:   Imaging Personally Reviewed:  Electrocardiogram Personally Reviewed:    COORDINATION OF CARE:  Care Discussed with Consultants/Other Providers [Y/N]:  Prior or Outpatient Records Reviewed [Y/N]:   [facility]  Division of Hospital Medicine  Angela Riggs MD  Pager: [phone]      Patient is a 83y old  Male who presents with a chief complaint of chest pain (31 Dec 2023 12:57)      SUBJECTIVE / OVERNIGHT EVENTS: Patient seen and examined at bedside. No chest pain or SOB. Awaiting placement   ADDITIONAL REVIEW OF SYSTEMS:    MEDICATIONS  (STANDING):  apixaban 5 milliGRAM(s) Oral every 12 hours  atorvastatin 80 milliGRAM(s) Oral at bedtime  clopidogrel Tablet 75 milliGRAM(s) Oral daily  pantoprazole    Tablet 40 milliGRAM(s) Oral before breakfast  sacubitril 24 mG/valsartan 26 mG 1 Tablet(s) Oral two times a day  sodium chloride 0.65% Nasal 1 Spray(s) Both Nostrils three times a day    MEDICATIONS  (PRN):  acetaminophen     Tablet .. 650 milliGRAM(s) Oral every 6 hours PRN Temp greater or equal to 38C (100.4F), Mild Pain (1 - 3)  LORazepam   Injectable 0.25 milliGRAM(s) IV Push every 8 hours PRN Agitation  oxymetazoline 0.05% Nasal Spray 2 Spray(s) Both Nostrils every 12 hours PRN Epistaxis      CAPILLARY BLOOD GLUCOSE        I&O's Summary      PHYSICAL EXAM:  Vital Signs Last 24 Hrs  T(C): 36.7 (01 Jan 2024 05:55), Max: 36.7 (01 Jan 2024 05:55)  T(F): 98 (01 Jan 2024 05:55), Max: 98 (01 Jan 2024 05:55)  HR: 46 (01 Jan 2024 05:55) (46 - 58)  BP: 98/45 (01 Jan 2024 05:55) (98/45 - 101/54)  BP(mean): --  RR: 18 (01 Jan 2024 05:55) (18 - 18)  SpO2: 98% (01 Jan 2024 05:55) (97% - 98%)    Parameters below as of 01 Jan 2024 05:55  Patient On (Oxygen Delivery Method): room air      GENERAL: Elderly man in NAD  HEAD:  Atraumatic, Normocephalic  EYES: EOMI,  conjunctiva and sclera clear  NECK: Supple  CHEST/LUNG: Clear to auscultation bilaterally; No wheeze, crackles or rhonchi   HEART: Regular rate and rhythm; Normal S1 S2, No murmurs, rubs, or gallops  ABDOMEN: Soft, Nontender, Nondistended; Bowel sounds present  EXTREMITIES:  2+ Peripheral Pulses, No edema  PSYCH: AAOx2 - self and hospital   NEUROLOGY: SROM, +Mild dysarthria , + LUE resting tremor   SKIN: Warm and dry  LABS:                      RADIOLOGY & ADDITIONAL TESTS:  Results Reviewed:   Imaging Personally Reviewed:  Electrocardiogram Personally Reviewed:    COORDINATION OF CARE:  Care Discussed with Consultants/Other Providers [Y/N]:  Prior or Outpatient Records Reviewed [Y/N]:

## 2024-01-01 NOTE — PROGRESS NOTE ADULT - PROBLEM SELECTOR PLAN 2
Pt presenting with chest pain, currently resolved. Previous admission at [facility] last month for NSTEM s/p LHC w/ known multivessel disease with decision to treat medically per pt/family preference. Seen by cardiology and recommend continuing medical management w/ no plans for cardiac testing.  - c/w Plavix   - EKG w/ LBBB, unchanged from prior  - monitor on tele  - Trops 54-->69-->64  - NSVT, no beta blocker given hx of bradycardia, CTM   - Appreciate cardio input Pt presenting with chest pain, currently resolved. Previous admission at [facility] last month for NSTEM s/p LHC w/ known multivessel disease with decision to treat medically per pt/family preference. Seen by cardiology and recommend continuing medical management w/ no plans for cardiac testing.  - c/w Plavix   - EKG w/ LBBB, unchanged from prior  - monitor on tele  - Trops 54-->69-->64  - NSVT, no beta blocker given hx of bradycardia, CTM   - Appreciate cardio input

## 2024-01-01 NOTE — PROGRESS NOTE ADULT - PROVIDER SPECIALTY LIST ADULT
Cardiology
Hospitalist
Hospitalist
Internal Medicine
Hospitalist
Internal Medicine
Internal Medicine
Hospitalist
Internal Medicine
Hospitalist

## 2024-01-01 NOTE — PROGRESS NOTE ADULT - PROBLEM SELECTOR PROBLEM 6
Chronic systolic congestive heart failure
Leukocytosis
Chronic systolic congestive heart failure

## 2024-01-01 NOTE — PROGRESS NOTE ADULT - PROBLEM SELECTOR PROBLEM 5
Chronic systolic congestive heart failure
LV (left ventricular) mural thrombus

## 2024-01-01 NOTE — PROVIDER CONTACT NOTE (CHANGE IN STATUS NOTIFICATION) - SITUATION
Patient has bruising present to superficial region of penis.
Patients  pulse -46bpm and bloodpressure is 98/45.
Patient has nosebleed and passed clot 1.5 x 1 cm approx

## 2024-01-01 NOTE — PROGRESS NOTE ADULT - PROBLEM SELECTOR PROBLEM 9
Bipolar disorder

## 2024-01-01 NOTE — PROVIDER CONTACT NOTE (CHANGE IN STATUS NOTIFICATION) - RECOMMENDATIONS
Please advise on continuity of care.
Please assess patient and order bacitracin for exterior penis.
Come access patient

## 2024-01-01 NOTE — PROGRESS NOTE ADULT - PROBLEM SELECTOR PLAN 5
c/w [place]
Pt does not appear to fluid overloaded.  - continue to monitor volume status.
c/w Oseas

## 2024-01-01 NOTE — PROGRESS NOTE ADULT - PROBLEM SELECTOR PROBLEM 2
Unstable angina
CAD (coronary artery disease)
Unstable angina

## 2024-01-01 NOTE — PROGRESS NOTE ADULT - PROBLEM SELECTOR PLAN 3
S/p PCI to LAD and known multivessel disease on [facility] last month.   - c/w plavix, atorvastatin and Eliquis S/p PCI to LAD and known multivessel disease on [facility] last month.   - c/w plavix, atorvastatin and Eliquis

## 2024-01-01 NOTE — PROVIDER CONTACT NOTE (CHANGE IN STATUS NOTIFICATION) - BACKGROUND
Patient has history CAD,HTN, high cholesterol, Bipolar and is admitted for NSTEMI
Patient has history of CAD, HTN, LV,ENMANUEL. admitted for NSTEMI
Patient has history of CAD,HTN,Bipolar, hypercholesterolemia. Admitting diagnosis NSTEMI

## 2024-01-02 PROCEDURE — 99239 HOSP IP/OBS DSCHRG MGMT >30: CPT

## 2024-01-02 RX ORDER — APIXABAN 2.5 MG/1
1 TABLET, FILM COATED ORAL
Refills: 0 | DISCHARGE

## 2024-01-02 RX ORDER — SACUBITRIL AND VALSARTAN 24; 26 MG/1; MG/1
1 TABLET, FILM COATED ORAL
Qty: 0 | Refills: 0 | DISCHARGE
Start: 2024-01-02

## 2024-01-02 RX ORDER — SPIRONOLACTONE 25 MG/1
1 TABLET, FILM COATED ORAL
Refills: 0 | DISCHARGE

## 2024-01-02 RX ORDER — SACUBITRIL AND VALSARTAN 24; 26 MG/1; MG/1
1 TABLET, FILM COATED ORAL
Refills: 0 | DISCHARGE

## 2024-01-02 RX ORDER — ACETAMINOPHEN 500 MG
2 TABLET ORAL
Refills: 0 | DISCHARGE

## 2024-01-02 RX ORDER — ACETAMINOPHEN 500 MG
2 TABLET ORAL
Qty: 0 | Refills: 0 | DISCHARGE
Start: 2024-01-02

## 2024-01-02 RX ORDER — SPIRONOLACTONE 25 MG/1
1 TABLET, FILM COATED ORAL
Qty: 0 | Refills: 0 | DISCHARGE
Start: 2024-01-02

## 2024-01-02 RX ORDER — LITHIUM CARBONATE 300 MG/1
1 TABLET, EXTENDED RELEASE ORAL
Refills: 0 | DISCHARGE

## 2024-01-02 RX ORDER — ASPIRIN/CALCIUM CARB/MAGNESIUM 324 MG
0 TABLET ORAL
Refills: 0 | DISCHARGE

## 2024-01-02 RX ORDER — CLOPIDOGREL BISULFATE 75 MG/1
1 TABLET, FILM COATED ORAL
Qty: 0 | Refills: 0 | DISCHARGE
Start: 2024-01-02

## 2024-01-02 RX ORDER — APIXABAN 2.5 MG/1
1 TABLET, FILM COATED ORAL
Qty: 0 | Refills: 0 | DISCHARGE
Start: 2024-01-02

## 2024-01-02 RX ADMIN — SACUBITRIL AND VALSARTAN 1 TABLET(S): 24; 26 TABLET, FILM COATED ORAL at 05:14

## 2024-01-02 RX ADMIN — Medication 1 SPRAY(S): at 05:14

## 2024-01-02 RX ADMIN — CLOPIDOGREL BISULFATE 75 MILLIGRAM(S): 75 TABLET, FILM COATED ORAL at 12:18

## 2024-01-02 RX ADMIN — PANTOPRAZOLE SODIUM 40 MILLIGRAM(S): 20 TABLET, DELAYED RELEASE ORAL at 05:14

## 2024-01-02 RX ADMIN — Medication 1 SPRAY(S): at 22:36

## 2024-01-02 RX ADMIN — APIXABAN 5 MILLIGRAM(S): 2.5 TABLET, FILM COATED ORAL at 18:08

## 2024-01-02 RX ADMIN — Medication 1 SPRAY(S): at 13:24

## 2024-01-02 RX ADMIN — SACUBITRIL AND VALSARTAN 1 TABLET(S): 24; 26 TABLET, FILM COATED ORAL at 18:09

## 2024-01-02 RX ADMIN — APIXABAN 5 MILLIGRAM(S): 2.5 TABLET, FILM COATED ORAL at 05:14

## 2024-01-02 NOTE — CHART NOTE - NSCHARTNOTEFT_GEN_A_CORE
Pt seen and examined at bedside in no acute distress, resting comfortably. Denies acute complaints. Calm. Able to answer simple questions. Amenable to going to [facility] today, nephew in agreement. 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 Plan for DC to [facility] and then return to [facility] with hospice afterwards. Please see DC summary for additional information.     VITAL SIGNS:  T(C): 36.7 (01-02-24 @ 10:55), Max: 36.7 (01-01-24 @ 19:52)  T(F): 98 (01-02-24 @ 10:55), Max: 98.1 (01-01-24 @ 19:52)  HR: 51 (01-02-24 @ 10:55) (51 - 66)  BP: 128/48 (01-02-24 @ 10:55) (91/74 - 134/54)  BP(mean): --  RR: 18 (01-02-24 @ 10:55) (16 - 18)  SpO2: 100% (01-02-24 @ 10:55) (97% - 100%)  Wt(kg): --    PHYSICAL EXAM:  Constitutional: resting comfortably in bed; NAD  Head: NC/AT  Eyes: PERRL, EOMI, anicteric sclera  ENT: no nasal discharge; MMM  Neck: supple; no JVD  Respiratory: CTA B/L; no W/R/R  Cardiac: +S1/S2; RRR; no M/R/G  Gastrointestinal: soft, NT/ND; no rebound or guarding; +BSx4  Extremities: WWP, no clubbing or cyanosis; no peripheral edema  Musculoskeletal: moves all extremities spontaneously  Dermatologic: skin warm, dry and intact; no rashes, wounds, or scars  Neurologic: AAOx1-2 (self, place); CNII-XII grossly intact; no focal deficits  Psychiatric: calm Pt seen and examined at bedside in no acute distress, resting comfortably. Denies acute complaints. Calm. Able to answer simple questions. Amenable to going to [facility] today, nephew in agreement. 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 Plan for DC to [facility] and then return to [facility] with hospice afterwards. Please see DC summary for additional information.     VITAL SIGNS:  T(C): 36.7 (01-02-24 @ 10:55), Max: 36.7 (01-01-24 @ 19:52)  T(F): 98 (01-02-24 @ 10:55), Max: 98.1 (01-01-24 @ 19:52)  HR: 51 (01-02-24 @ 10:55) (51 - 66)  BP: 128/48 (01-02-24 @ 10:55) (91/74 - 134/54)  BP(mean): --  RR: 18 (01-02-24 @ 10:55) (16 - 18)  SpO2: 100% (01-02-24 @ 10:55) (97% - 100%)  Wt(kg): --    PHYSICAL EXAM:  Constitutional: resting comfortably in bed; NAD  Head: NC/AT  Eyes: PERRL, EOMI, anicteric sclera  ENT: no nasal discharge; MMM  Neck: supple; no JVD  Respiratory: CTA B/L; no W/R/R  Cardiac: +S1/S2; RRR; no M/R/G  Gastrointestinal: soft, NT/ND; no rebound or guarding; +BSx4  Extremities: WWP, no clubbing or cyanosis; no peripheral edema  Musculoskeletal: moves all extremities spontaneously  Dermatologic: skin warm, dry and intact; no rashes, wounds, or scars  Neurologic: AAOx1-2 (self, place); CNII-XII grossly intact; no focal deficits  Psychiatric: calm

## 2024-01-03 ENCOUNTER — TRANSCRIPTION ENCOUNTER (OUTPATIENT)
Age: 84
End: 2024-01-03

## 2024-01-03 VITALS
TEMPERATURE: 98 F | RESPIRATION RATE: 18 BRPM | HEART RATE: 49 BPM | OXYGEN SATURATION: 98 % | SYSTOLIC BLOOD PRESSURE: 102 MMHG | DIASTOLIC BLOOD PRESSURE: 56 MMHG

## 2024-01-03 PROCEDURE — 99232 SBSQ HOSP IP/OBS MODERATE 35: CPT

## 2024-01-03 RX ADMIN — Medication 1 SPRAY(S): at 06:33

## 2024-01-03 RX ADMIN — CLOPIDOGREL BISULFATE 75 MILLIGRAM(S): 75 TABLET, FILM COATED ORAL at 11:46

## 2024-01-03 RX ADMIN — SACUBITRIL AND VALSARTAN 1 TABLET(S): 24; 26 TABLET, FILM COATED ORAL at 06:05

## 2024-01-03 RX ADMIN — APIXABAN 5 MILLIGRAM(S): 2.5 TABLET, FILM COATED ORAL at 06:06

## 2024-01-03 RX ADMIN — Medication 1 SPRAY(S): at 14:21

## 2024-01-03 NOTE — DISCHARGE NOTE NURSING/CASE MANAGEMENT/SOCIAL WORK - SOCIAL WORKER'S NAME
Xiomara Titus, [place] [facility] [phone] Xiomara Titus, [place] [facility] [phone]

## 2024-01-03 NOTE — DISCHARGE NOTE NURSING/CASE MANAGEMENT/SOCIAL WORK - NSDCVIVACCINE_GEN_ALL_CORE_FT
influenza, high-dose, quadrivalent; 07-Sep-2023 14:53; Akilah Garcia (ERNESTO); [employer]; [id number] (Exp. Date: 07-Jun-2024); IntraMuscular; Deltoid Right.; 0.7 milliLiter(s); VIS (VIS Published: 06-Aug-2021, VIS Presented: 07-Sep-2023);    influenza, high-dose, quadrivalent; 07-Sep-2023 14:53; Akilah Garcia (ERNESTO); [employer]; [id number] (Exp. Date: 07-Jun-2024); IntraMuscular; Deltoid Right.; 0.7 milliLiter(s); VIS (VIS Published: 06-Aug-2021, VIS Presented: 07-Sep-2023);

## 2024-01-03 NOTE — DISCHARGE NOTE NURSING/CASE MANAGEMENT/SOCIAL WORK - NSDPFAC_GEN_ALL_CORE
[facility] [address], [place] [address] Phone: [phone] [facility] [address], [place] [address] Phone: [phone]

## 2024-01-03 NOTE — DISCHARGE NOTE NURSING/CASE MANAGEMENT/SOCIAL WORK - NSDCPEFALRISK_GEN_ALL_CORE
For information on Fall & Injury Prevention, visit: [email or web address] OR  [email or web address] OR  https://www.cdc.gov/steadi/patient.html For information on Fall & Injury Prevention, visit: [email or web address] OR  [email or web address] OR  https://www.cdc.gov/steadi/patient.html

## 2024-01-03 NOTE — DISCHARGE NOTE NURSING/CASE MANAGEMENT/SOCIAL WORK - PATIENT PORTAL LINK FT
You can access the FollowMyHealth Patient Portal offered by [facility] by registering at the following website: [email or web address]. By joining [employer]’s FollowMyHealth portal, you will also be able to view your health information using other applications (apps) compatible with our system. You can access the FollowMyHealth Patient Portal offered by [facility] by registering at the following website: [email or web address]. By joining [employer]’s FollowMyHealth portal, you will also be able to view your health information using other applications (apps) compatible with our system.

## 2024-01-05 ENCOUNTER — TRANSCRIPTION ENCOUNTER (OUTPATIENT)
Age: 84
End: 2024-01-05

## 2024-01-12 ENCOUNTER — TRANSCRIPTION ENCOUNTER (OUTPATIENT)
Age: 84
End: 2024-01-12

## 2024-01-12 RX ORDER — LITHIUM CARBONATE 300 MG/1
0.5 TABLET, EXTENDED RELEASE ORAL
Refills: 0 | DISCHARGE

## 2024-01-12 RX ORDER — LITHIUM CARBONATE 300 MG/1
1 TABLET, EXTENDED RELEASE ORAL
Refills: 0 | DISCHARGE

## 2024-01-26 ENCOUNTER — TRANSCRIPTION ENCOUNTER (OUTPATIENT)
Age: 84
End: 2024-01-26

## 2024-02-02 ENCOUNTER — TRANSCRIPTION ENCOUNTER (OUTPATIENT)
Age: 84
End: 2024-02-02

## 2024-02-09 ENCOUNTER — TRANSCRIPTION ENCOUNTER (OUTPATIENT)
Age: 84
End: 2024-02-09

## 2024-02-15 ENCOUNTER — APPOINTMENT (OUTPATIENT)
Dept: GERIATRICS | Facility: ASSISTED LIVING FACILITY | Age: 84
End: 2024-02-15
Payer: MEDICARE

## 2024-02-15 VITALS — SYSTOLIC BLOOD PRESSURE: 128 MMHG | DIASTOLIC BLOOD PRESSURE: 65 MMHG | HEART RATE: 72 BPM

## 2024-02-15 PROBLEM — F31.9 BIPOLAR DISORDER, UNSPECIFIED: Chronic | Status: ACTIVE | Noted: 2023-11-09

## 2024-02-15 PROCEDURE — G2211 COMPLEX E/M VISIT ADD ON: CPT | Mod: NC

## 2024-02-15 PROCEDURE — 99348 HOME/RES VST EST LOW MDM 30: CPT

## 2024-02-15 RX ORDER — LITHIUM CARBONATE 150 MG/1
150 CAPSULE ORAL TWICE DAILY
Qty: 100 | Refills: 2 | Status: DISCONTINUED | COMMUNITY
Start: 2023-11-21 | End: 2024-02-15

## 2024-02-15 NOTE — REVIEW OF SYSTEMS
[SOB on Exertion] : shortness of breath during exertion [Negative] : Heme/Lymph [Fever] : no fever [Chills] : no chills [Feeling Poorly] : not feeling poorly [Heart Rate Is Slow] : the heart rate was not slow [Heart Rate Is Fast] : the heart rate was not fast [Chest Pain] : no chest pain [Palpitations] : no palpitations [Shortness Of Breath] : no shortness of breath [Confused] : no confusion [Suicidal] : not suicidal [Anxiety] : no anxiety

## 2024-02-15 NOTE — PHYSICAL EXAM
[Alert] : alert [EOMI] : extraocular movements were intact [PERRL] : pupils were equal in size, round, and reactive to light [Normal Outer Ear/Nose] : the ears and nose were normal in appearance [Normal Appearance] : the appearance of the neck was normal [Supple] : the neck was supple [No Respiratory Distress] : no respiratory distress [Respiration, Rhythm And Depth] : normal respiratory rhythm and effort [Auscultation Breath Sounds / Voice Sounds] : lungs were clear to auscultation bilaterally [Normal S1, S2] : normal S1 and S2 [Edema] : edema was not present [Normal] : normal skin color and pigmentation [No Focal Deficits] : no focal deficits [Normal Affect] : the affect was normal [Normal Mood] : the mood was normal

## 2024-02-15 NOTE — HISTORY OF PRESENT ILLNESS
[FreeTextEntry1] : The patient is an 83-year-old man with a past medical history of bipolar disorder, dementia, severe coronary artery disease, heart failure with reduced ejection fraction, severe mitral regurgitation, LV thrombus, atrial fibrillation who was hospitalized at [facility] from December 21 to January 3.  The patient was admitted with angina, UTI, congestive heart failure.  The patient has refused interventional therapy.  Following hospitalization the patient was sent to a nursing home.  The patient was discharged from [facility] with the recommendation for hospice care.  He was recently readmitted to the [facility].  While in the hospital his lithium was discontinued.  Since discharge back to the [facility], he is much improved.  While at rehab he received physical therapy.  He is walking without difficulty with a walker.  There is no chest pain or shortness of breath.  His mood appears stable.

## 2024-02-15 NOTE — ASSESSMENT
[FreeTextEntry1] : The patient was recently readmitted to the [facility] in the memory disorder unit.  Lithium was discontinued in the hospital.  He is status post admission for unstable angina, UTI, congestive heart failure.  He is currently euvolemic.  His mood is much improved.  He is ambulatory.  He is status post recent physical therapy and is walking well with walker.
3+

## 2024-02-24 NOTE — ED ADULT TRIAGE NOTE - RESPIRATORY RATE (BREATHS/MIN)
Patient discharged to home today. She verbalized an understanding of the AVS instructions after review with the nurse. She packed her belongings. Her IV was removed with tip intact. She received meds to bed from [facility] pharmacy. Her boyfriend came to pick her up. I took her to the [facility] in a wheelchair and helped her in the car.      Problem: Pain  Goal: My pain/discomfort is manageable  Outcome: Adequate for Discharge     Problem: Safety  Goal: Patient will be injury free during hospitalization  Outcome: Adequate for Discharge  Goal: I will remain free of falls  Outcome: Adequate for Discharge     Problem: Daily Care  Goal: Daily care needs are met  Outcome: Adequate for Discharge     Problem: Psychosocial Needs  Goal: Demonstrates ability to cope with hospitalization/illness  Outcome: Adequate for Discharge  Goal: Collaborate with me, my family, and caregiver to identify my specific goals  Outcome: Adequate for Discharge  Flowsheets (Taken 2/24/2024 3276)  Cultural Requests During Hospitalization: none  Spiritual Requests During Hospitalization: none     Problem: Discharge Barriers  Goal: My discharge needs are met  Outcome: Adequate for Discharge     Problem: Pain  Goal: Takes deep breaths with improved pain control throughout the shift  Outcome: Adequate for Discharge  Goal: Turns in bed with improved pain control throughout the shift  Outcome: Adequate for Discharge  Goal: Walks with improved pain control throughout the shift  Outcome: Adequate for Discharge  Goal: Performs ADL's with improved pain control throughout shift  Outcome: Adequate for Discharge  Goal: Participates in PT with improved pain control throughout the shift  Outcome: Adequate for Discharge  Goal: Free from opioid side effects throughout the shift  Outcome: Adequate for Discharge  Goal: Free from acute confusion related to pain meds throughout the shift  Outcome: Adequate for Discharge        17

## 2024-03-04 ENCOUNTER — RX RENEWAL (OUTPATIENT)
Age: 84
End: 2024-03-04

## 2024-03-14 NOTE — PHYSICAL THERAPY INITIAL EVALUATION ADULT - PERTINENT HX OF CURRENT PROBLEM, REHAB EVAL
12.8   5.90  )-----------( 143      ( 14 Mar 2024 12:26 )             40.4       03-14    136  |  104  |  40<H>  ----------------------------<  168<H>  4.3   |  16<L>  |  2.5<H>    Ca    10.0      14 Mar 2024 12:26  Mg     2.0     03-14    TPro  6.4  /  Alb  4.1  /  TBili  0.4  /  DBili  x   /  AST  39  /  ALT  10  /  AlkPhos  63  03-14              Urinalysis Basic - ( 14 Mar 2024 12:26 )    Color: x / Appearance: x / SG: x / pH: x  Gluc: 168 mg/dL / Ketone: x  / Bili: x / Urobili: x   Blood: x / Protein: x / Nitrite: x   Leuk Esterase: x / RBC: x / WBC x   Sq Epi: x / Non Sq Epi: x / Bacteria: x            Lactate Trend            CAPILLARY BLOOD GLUCOSE 83 year old male presents with chest pain, found to have elevated troponin.

## 2024-03-26 ENCOUNTER — APPOINTMENT (OUTPATIENT)
Dept: GERIATRICS | Facility: ASSISTED LIVING FACILITY | Age: 84
End: 2024-03-26
Payer: MEDICARE

## 2024-03-26 VITALS — HEART RATE: 72 BPM | SYSTOLIC BLOOD PRESSURE: 124 MMHG | DIASTOLIC BLOOD PRESSURE: 80 MMHG

## 2024-03-26 DIAGNOSIS — I50.9 HEART FAILURE, UNSPECIFIED: ICD-10-CM

## 2024-03-26 DIAGNOSIS — I25.10 ATHEROSCLEROTIC HEART DISEASE OF NATIVE CORONARY ARTERY W/OUT ANGINA PECTORIS: ICD-10-CM

## 2024-03-26 PROCEDURE — G2211 COMPLEX E/M VISIT ADD ON: CPT | Mod: NC

## 2024-03-26 PROCEDURE — 99348 HOME/RES VST EST LOW MDM 30: CPT

## 2024-03-26 NOTE — ASSESSMENT
[FreeTextEntry1] : The patient appears to have adjusted to the memory disorder unit.  He is ambulatory with a walker.  He continues to demonstrate severe fatigue.  This is likely secondary to his significant heart failure.  Recent angiogram shows triple-vessel disease.  His medical therapy appears to be helping him.  He again underlines the fact that he would not want palliative and not invasive therapy.

## 2024-03-26 NOTE — HISTORY OF PRESENT ILLNESS
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  The patient has consistently refused interventional therapy.  Upon discharge from [facility] hospice care was recommended.  He was readmitted to the [facility] approximately 6 weeks ago.  The patient has been undergoing physical therapy.  The staff notes frequent depression.  He also demonstrates weakness and poor appetite.  He has received physical therapy and is now ambulating safely with a walker.  When asked about depression the patient states "my wife  how do think I should feel?"  He continues to request a palliative approach.

## 2024-03-26 NOTE — REVIEW OF SYSTEMS
[Feeling Poorly] : feeling poorly [Feeling Tired] : feeling tired [Chest Pain] : no chest pain [Palpitations] : no palpitations [Cough] : no cough [SOB on Exertion] : no shortness of breath during exertion [Depression] : depression [Negative] : Heme/Lymph

## 2024-05-16 ENCOUNTER — RX RENEWAL (OUTPATIENT)
Age: 84
End: 2024-05-16

## 2024-06-04 ENCOUNTER — APPOINTMENT (OUTPATIENT)
Dept: GERIATRICS | Facility: ASSISTED LIVING FACILITY | Age: 84
End: 2024-06-04
Payer: MEDICARE

## 2024-06-04 VITALS — SYSTOLIC BLOOD PRESSURE: 118 MMHG | DIASTOLIC BLOOD PRESSURE: 80 MMHG

## 2024-06-04 DIAGNOSIS — I48.91 UNSPECIFIED ATRIAL FIBRILLATION: ICD-10-CM

## 2024-06-04 DIAGNOSIS — G30.1 ALZHEIMER'S DISEASE WITH LATE ONSET: ICD-10-CM

## 2024-06-04 DIAGNOSIS — F02.80 ALZHEIMER'S DISEASE WITH LATE ONSET: ICD-10-CM

## 2024-06-04 DIAGNOSIS — F31.9 BIPOLAR DISORDER, UNSPECIFIED: ICD-10-CM

## 2024-06-04 DIAGNOSIS — I42.9 CARDIOMYOPATHY, UNSPECIFIED: ICD-10-CM

## 2024-06-04 DIAGNOSIS — I23.6 THROMBOSIS OF ATRIUM, AURICULAR APPENDAGE, AND VENTRICLE AS CURRENT COMPLICATIONS FOLLOWING ACUTE MYOCARDIAL INFARCTION: ICD-10-CM

## 2024-06-04 DIAGNOSIS — Z71.89 OTHER SPECIFIED COUNSELING: ICD-10-CM

## 2024-06-04 PROCEDURE — 99348 HOME/RES VST EST LOW MDM 30: CPT

## 2024-06-04 PROCEDURE — G2211 COMPLEX E/M VISIT ADD ON: CPT | Mod: NC

## 2024-06-04 NOTE — HISTORY OF PRESENT ILLNESS
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  The patient has consistently refused interventional therapy.  Upon discharge from [facility] hospice care was recommended.  He was readmitted to the [facility] approximately 3 months ago.  The patient has been undergoing physical therapy.  Over the last several months the patient is experiencing progressive frailty.  This is almost certainly secondary to his advanced congestive heart failure and triple-vessel coronary artery disease.  He is also limited by longstanding depression.  He continues to be somewhat reclusive and spends most of his day in bed.  There has been no shortness of breath or chest pain.  Goals remain palliative

## 2024-06-04 NOTE — REVIEW OF SYSTEMS
[Feeling Tired] : feeling tired [Chest Pain] : no chest pain [Palpitations] : no palpitations [Shortness Of Breath] : no shortness of breath [Wheezing] : no wheezing [Cough] : no cough [Depression] : depression [Negative] : Heme/Lymph

## 2024-06-17 ENCOUNTER — EMERGENCY (EMERGENCY)
Facility: HOSPITAL | Age: 84
LOS: 1 days | Discharge: HOME | End: 2024-06-17
Attending: STUDENT IN AN ORGANIZED HEALTH CARE EDUCATION/TRAINING PROGRAM | Admitting: STUDENT IN AN ORGANIZED HEALTH CARE EDUCATION/TRAINING PROGRAM
Payer: MEDICARE

## 2024-06-17 VITALS
WEIGHT: 149.03 LBS | HEART RATE: 50 BPM | SYSTOLIC BLOOD PRESSURE: 101 MMHG | RESPIRATION RATE: 16 BRPM | OXYGEN SATURATION: 97 % | DIASTOLIC BLOOD PRESSURE: 53 MMHG | TEMPERATURE: 98 F

## 2024-06-17 VITALS
OXYGEN SATURATION: 100 % | SYSTOLIC BLOOD PRESSURE: 99 MMHG | DIASTOLIC BLOOD PRESSURE: 54 MMHG | HEART RATE: 45 BPM | RESPIRATION RATE: 17 BRPM | TEMPERATURE: 97 F

## 2024-06-17 LAB
ALBUMIN SERPL ELPH-MCNC: 4.8 G/DL — SIGNIFICANT CHANGE UP (ref 3.3–5)
ALP SERPL-CCNC: 264 U/L — HIGH (ref 40–120)
ALT FLD-CCNC: 33 U/L — SIGNIFICANT CHANGE UP (ref 4–41)
ANION GAP SERPL CALC-SCNC: 14 MMOL/L — SIGNIFICANT CHANGE UP (ref 7–14)
APPEARANCE UR: CLEAR — SIGNIFICANT CHANGE UP
AST SERPL-CCNC: 23 U/L — SIGNIFICANT CHANGE UP (ref 4–40)
BACTERIA # UR AUTO: NEGATIVE /HPF — SIGNIFICANT CHANGE UP
BASE EXCESS BLDV CALC-SCNC: -3 MMOL/L — LOW (ref -2–3)
BASOPHILS # BLD AUTO: 0.04 K/UL — SIGNIFICANT CHANGE UP (ref 0–0.2)
BASOPHILS NFR BLD AUTO: 0.6 % — SIGNIFICANT CHANGE UP (ref 0–2)
BILIRUB SERPL-MCNC: 0.4 MG/DL — SIGNIFICANT CHANGE UP (ref 0.2–1.2)
BILIRUB UR-MCNC: NEGATIVE — SIGNIFICANT CHANGE UP
BLOOD GAS VENOUS COMPREHENSIVE RESULT: SIGNIFICANT CHANGE UP
BUN SERPL-MCNC: 24 MG/DL — HIGH (ref 7–23)
CALCIUM SERPL-MCNC: 9.9 MG/DL — SIGNIFICANT CHANGE UP (ref 8.4–10.5)
CAST: 0 /LPF — SIGNIFICANT CHANGE UP (ref 0–4)
CHLORIDE BLDV-SCNC: 106 MMOL/L — SIGNIFICANT CHANGE UP (ref 96–108)
CHLORIDE SERPL-SCNC: 104 MMOL/L — SIGNIFICANT CHANGE UP (ref 98–107)
CO2 BLDV-SCNC: 27.2 MMOL/L — HIGH (ref 22–26)
CO2 SERPL-SCNC: 22 MMOL/L — SIGNIFICANT CHANGE UP (ref 22–31)
COLOR SPEC: YELLOW — SIGNIFICANT CHANGE UP
CREAT SERPL-MCNC: 0.99 MG/DL — SIGNIFICANT CHANGE UP (ref 0.5–1.3)
DIFF PNL FLD: NEGATIVE — SIGNIFICANT CHANGE UP
EGFR: 76 ML/MIN/1.73M2 — SIGNIFICANT CHANGE UP
EOSINOPHIL # BLD AUTO: 0.16 K/UL — SIGNIFICANT CHANGE UP (ref 0–0.5)
EOSINOPHIL NFR BLD AUTO: 2.2 % — SIGNIFICANT CHANGE UP (ref 0–6)
GAS PNL BLDV: 135 MMOL/L — LOW (ref 136–145)
GLUCOSE BLDV-MCNC: 96 MG/DL — SIGNIFICANT CHANGE UP (ref 70–99)
GLUCOSE SERPL-MCNC: 96 MG/DL — SIGNIFICANT CHANGE UP (ref 70–99)
GLUCOSE UR QL: NEGATIVE MG/DL — SIGNIFICANT CHANGE UP
HCO3 BLDV-SCNC: 25 MMOL/L — SIGNIFICANT CHANGE UP (ref 22–29)
HCT VFR BLD CALC: 43.8 % — SIGNIFICANT CHANGE UP (ref 39–50)
HCT VFR BLDA CALC: 45 % — SIGNIFICANT CHANGE UP (ref 39–51)
HGB BLD CALC-MCNC: 14.9 G/DL — SIGNIFICANT CHANGE UP (ref 12.6–17.4)
HGB BLD-MCNC: 14.2 G/DL — SIGNIFICANT CHANGE UP (ref 13–17)
IANC: 4.46 K/UL — SIGNIFICANT CHANGE UP (ref 1.8–7.4)
IMM GRANULOCYTES NFR BLD AUTO: 0.3 % — SIGNIFICANT CHANGE UP (ref 0–0.9)
KETONES UR-MCNC: NEGATIVE MG/DL — SIGNIFICANT CHANGE UP
LACTATE BLDV-MCNC: 1.8 MMOL/L — SIGNIFICANT CHANGE UP (ref 0.5–2)
LEUKOCYTE ESTERASE UR-ACNC: NEGATIVE — SIGNIFICANT CHANGE UP
LYMPHOCYTES # BLD AUTO: 2.18 K/UL — SIGNIFICANT CHANGE UP (ref 1–3.3)
LYMPHOCYTES # BLD AUTO: 30.2 % — SIGNIFICANT CHANGE UP (ref 13–44)
MCHC RBC-ENTMCNC: 30 PG — SIGNIFICANT CHANGE UP (ref 27–34)
MCHC RBC-ENTMCNC: 32.4 GM/DL — SIGNIFICANT CHANGE UP (ref 32–36)
MCV RBC AUTO: 92.6 FL — SIGNIFICANT CHANGE UP (ref 80–100)
MONOCYTES # BLD AUTO: 0.35 K/UL — SIGNIFICANT CHANGE UP (ref 0–0.9)
MONOCYTES NFR BLD AUTO: 4.9 % — SIGNIFICANT CHANGE UP (ref 2–14)
NEUTROPHILS # BLD AUTO: 4.46 K/UL — SIGNIFICANT CHANGE UP (ref 1.8–7.4)
NEUTROPHILS NFR BLD AUTO: 61.8 % — SIGNIFICANT CHANGE UP (ref 43–77)
NITRITE UR-MCNC: NEGATIVE — SIGNIFICANT CHANGE UP
NRBC # BLD: 0 /100 WBCS — SIGNIFICANT CHANGE UP (ref 0–0)
NRBC # FLD: 0 K/UL — SIGNIFICANT CHANGE UP (ref 0–0)
NT-PROBNP SERPL-SCNC: 4128 PG/ML — HIGH
PCO2 BLDV: 58 MMHG — HIGH (ref 42–55)
PH BLDV: 7.25 — LOW (ref 7.32–7.43)
PH UR: 6 — SIGNIFICANT CHANGE UP (ref 5–8)
PLATELET # BLD AUTO: 179 K/UL — SIGNIFICANT CHANGE UP (ref 150–400)
PO2 BLDV: <20 MMHG — LOW (ref 25–45)
POTASSIUM BLDV-SCNC: 4.2 MMOL/L — SIGNIFICANT CHANGE UP (ref 3.5–5.1)
POTASSIUM SERPL-MCNC: 4.1 MMOL/L — SIGNIFICANT CHANGE UP (ref 3.5–5.3)
POTASSIUM SERPL-SCNC: 4.1 MMOL/L — SIGNIFICANT CHANGE UP (ref 3.5–5.3)
PROT SERPL-MCNC: 8.5 G/DL — HIGH (ref 6–8.3)
PROT UR-MCNC: NEGATIVE MG/DL — SIGNIFICANT CHANGE UP
RBC # BLD: 4.73 M/UL — SIGNIFICANT CHANGE UP (ref 4.2–5.8)
RBC # FLD: 17.5 % — HIGH (ref 10.3–14.5)
RBC CASTS # UR COMP ASSIST: 1 /HPF — SIGNIFICANT CHANGE UP (ref 0–4)
SAO2 % BLDV: 17 % — LOW (ref 67–88)
SODIUM SERPL-SCNC: 140 MMOL/L — SIGNIFICANT CHANGE UP (ref 135–145)
SP GR SPEC: 1.01 — SIGNIFICANT CHANGE UP (ref 1–1.03)
SQUAMOUS # UR AUTO: 0 /HPF — SIGNIFICANT CHANGE UP (ref 0–5)
TROPONIN T, HIGH SENSITIVITY RESULT: 41 NG/L — SIGNIFICANT CHANGE UP
TROPONIN T, HIGH SENSITIVITY RESULT: 51 NG/L — HIGH
UROBILINOGEN FLD QL: 0.2 MG/DL — SIGNIFICANT CHANGE UP (ref 0.2–1)
WBC # BLD: 7.21 K/UL — SIGNIFICANT CHANGE UP (ref 3.8–10.5)
WBC # FLD AUTO: 7.21 K/UL — SIGNIFICANT CHANGE UP (ref 3.8–10.5)
WBC UR QL: 1 /HPF — SIGNIFICANT CHANGE UP (ref 0–5)

## 2024-06-17 PROCEDURE — 76937 US GUIDE VASCULAR ACCESS: CPT | Mod: 26,59

## 2024-06-17 PROCEDURE — 70450 CT HEAD/BRAIN W/O DYE: CPT | Mod: 26,MC

## 2024-06-17 PROCEDURE — 72170 X-RAY EXAM OF PELVIS: CPT | Mod: 26

## 2024-06-17 PROCEDURE — 93010 ELECTROCARDIOGRAM REPORT: CPT

## 2024-06-17 PROCEDURE — 72125 CT NECK SPINE W/O DYE: CPT | Mod: 26,MC

## 2024-06-17 PROCEDURE — 36000 PLACE NEEDLE IN VEIN: CPT

## 2024-06-17 PROCEDURE — 71045 X-RAY EXAM CHEST 1 VIEW: CPT | Mod: 26

## 2024-06-17 PROCEDURE — 99285 EMERGENCY DEPT VISIT HI MDM: CPT | Mod: 25,GC

## 2024-06-17 RX ORDER — POLYETHYLENE GLYCOL 3350 17 G/17G
17 POWDER, FOR SOLUTION ORAL
Qty: 30 | Refills: 3 | Status: ACTIVE | COMMUNITY
Start: 2022-11-08 | End: 1900-01-01

## 2024-06-17 RX ORDER — SODIUM CHLORIDE 9 MG/ML
500 INJECTION INTRAMUSCULAR; INTRAVENOUS; SUBCUTANEOUS ONCE
Refills: 0 | Status: COMPLETED | OUTPATIENT
Start: 2024-06-17 | End: 2024-06-17

## 2024-06-17 RX ORDER — SODIUM CHLORIDE 9 MG/ML
500 INJECTION, SOLUTION INTRAVENOUS
Refills: 0 | Status: COMPLETED | OUTPATIENT
Start: 2024-06-17 | End: 2024-06-17

## 2024-06-17 RX ORDER — SACUBITRIL AND VALSARTAN 24; 26 MG/1; MG/1
24-26 TABLET, FILM COATED ORAL TWICE DAILY
Qty: 120 | Refills: 2 | Status: ACTIVE | COMMUNITY
Start: 2023-11-21 | End: 1900-01-01

## 2024-06-17 RX ORDER — ACETAMINOPHEN 325 MG/1
325 TABLET ORAL
Qty: 150 | Refills: 0 | Status: ACTIVE | COMMUNITY
Start: 2022-11-14 | End: 1900-01-01

## 2024-06-17 RX ORDER — ATORVASTATIN CALCIUM 80 MG/1
80 TABLET, FILM COATED ORAL
Qty: 90 | Refills: 2 | Status: ACTIVE | COMMUNITY
Start: 2023-11-21 | End: 1900-01-01

## 2024-06-17 RX ORDER — APIXABAN 5 MG/1
5 TABLET, FILM COATED ORAL
Qty: 120 | Refills: 4 | Status: ACTIVE | COMMUNITY
Start: 2023-11-21 | End: 1900-01-01

## 2024-06-17 RX ORDER — CLOPIDOGREL BISULFATE 75 MG/1
75 TABLET, FILM COATED ORAL
Qty: 90 | Refills: 0 | Status: ACTIVE | COMMUNITY
Start: 2024-02-15 | End: 1900-01-01

## 2024-06-17 RX ORDER — SPIRONOLACTONE 25 MG/1
25 TABLET ORAL
Qty: 90 | Refills: 2 | Status: ACTIVE | COMMUNITY
Start: 2023-11-21 | End: 1900-01-01

## 2024-06-17 RX ADMIN — SODIUM CHLORIDE 50 MILLILITER(S): 9 INJECTION, SOLUTION INTRAVENOUS at 16:27

## 2024-06-17 RX ADMIN — SODIUM CHLORIDE 500 MILLILITER(S): 9 INJECTION INTRAMUSCULAR; INTRAVENOUS; SUBCUTANEOUS at 12:51

## 2024-06-17 NOTE — ED PROVIDER NOTE - OBJECTIVE STATEMENT
83-year-old male status post fall history of NSTEMI hypertension heart failure chronic A-fib patient reported on Eliquis hit head on nightstand no reported LOC on Eliquis Plavix patient unaware of his current situation but does not endorse any pain.  Patient arriving from [facility] living at the [facility]

## 2024-06-17 NOTE — ED PROVIDER NOTE - PHYSICAL EXAMINATION
GENERAL: Awake, alert, NAD  HEENT: no c spine tenderness no facial ecchymosis no facial tenderness all along face  scalp free of trauma   LUNGS: non labored breathing   ABDOMEN: Soft, , non tender, non distended, no rebound, no guarding  BACK: No midline spinal tenderness,   EXT: No edema, no calf tenderness, no deformities.  NEURO: A&Ox1. Moving all extremities.  SKIN: Warm and dry. No rash.  PSYCH: Normal affect.

## 2024-06-17 NOTE — ED ADULT TRIAGE NOTE - CHIEF COMPLAINT QUOTE
from assisted living s/p fall, fell hitting head onto night stand this morning. + hitting head. denies LOC. pt is on Eliquis. hx of CAD, Afib, HTN, bipolar.

## 2024-06-17 NOTE — ED ADULT NURSE REASSESSMENT NOTE - NS ED NURSE REASSESS COMMENT FT1
ED focused US guided PIV placement by Registered Nurse.      Indication - poor access.      Findings: compressible ( right] )  AC vein.  Using direct US guidance, under clean conditions, a long [20]Ga peripheral catheter introduced into vessel.  US performed after procedure, functional catheter in vein, no complications.     Impression:  successful US guided (   right]   ) AC PIV placement.
Pt going to CT scan at this time. Ok to go off zoll as per MD Alexandre.
Pt in no acute distress. breathing is even and unlabored. VS as noted - MD Kimbrough aware. Pt stable for discharge as per MD. safety maintained.
Pt resting in stretcher, no distress noted. breathing is even and unlabored. VS as noted - MD Alexandre aware. cardiac monitoring maintained, zoll at bedside. IV fluids infusing as ordered. Plan of care ongoing. Stretcher set in lowest position, safety maintained.
Report received from break coverage RN Radhames. Pt appears comfortable in stretcher. Pt offers no complaints or requests at this time. breathing is even and unlabored. Pt remains bradycardic, MD Walton aware. cardiac monitoring maintained, zoll at bedside. straight cath using sterile technique. IV intact. repeat troponin collected. Plan of care ongoing. Stretcher set in lowest position, call bell within reach, safety maintained.

## 2024-06-17 NOTE — ED ADULT NURSE NOTE - OBJECTIVE STATEMENT
Pt arrives to room 3. Pt is confused, not answering questions upon assessment. Pt only states "I'm fine". abdomen is soft, nondistended. no edema noted. hyperpigmentation noted to bilateral lower extremities. airway patent, breathing is even and unlabored. VS as noted in flowsheet. placed on zoll and cardiac monitor - AFib with pauses, HR down to 34 - MD Walton made aware. Stretcher set in lowest position, safety maintained. Pt arrives to room 3. Pt is A&O1-2. Pt states "I'm fine" and "I did not fall". abdomen is soft, nondistended. no edema noted. hyperpigmentation noted to bilateral lower extremities. skin tears noted to left forearm and right hand. abrasion noted to left shoulder. blanchable redness noted to sacrum. airway patent, breathing is even and unlabored. VS as noted in flowsheet. placed on zoll and cardiac monitor - A.Fib with pauses, HR down to 34 - MD Alexandre and Isabelle made aware. Stretcher set in lowest position, safety maintained.
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
## 2024-06-17 NOTE — ED ADULT NURSE NOTE - NSFALLHARMRISKINTERV_ED_ALL_ED

## 2024-06-17 NOTE — ED PROVIDER NOTE - CLINICAL SUMMARY MEDICAL DECISION MAKING FREE TEXT BOX
given hx and pe cc for intracranial bleed given fx and bt, weill screen for infectious process such as pneumonia vs ujti as possible precipitating factor

## 2024-06-17 NOTE — ED PROVIDER NOTE - PATIENT PORTAL LINK FT
You can access the FollowMyHealth Patient Portal offered by [facility] by registering at the following website: [email or web address]. By joining [employer]’s FollowMyHealth portal, you will also be able to view your health information using other applications (apps) compatible with our system.

## 2024-06-17 NOTE — ED PROVIDER NOTE - PROGRESS NOTE DETAILS
Isabelle PGY 3 attempted to reach family, [facility]er living facility and is usually unreachable at this hour,  unsure if goals have changed based off of previous notes card noted bradycardiac and signed off on pt Isabelle PGY 3 spoke w/ power of  at bedside, nephew, notes he wants no changes to his current GOC as discussed previously, no pacemaker, no interventions Paul Tenorio MD (PGY-3) work up done in the ED was negative for any acute process. I spoke w/ the pt's healthcare decision maker, Mr. Johny Pruitt. Per them, the patient has only had one fall and the Johny feels confident that the patient is safe in the nursing home setting. pt bradycardic here, but this is at baseline and s/p evaluation by cardiology, that had no interventions to offer the patient. will d/c given that he is at mental baseline and hemodynamically unchanged. Discussed with Mr. Pruitt who understands the rationale and is happy with the plan of care.

## 2024-06-17 NOTE — ED PROVIDER NOTE - ATTENDING CONTRIBUTION TO CARE
prior inpatient records reviewed.     82 yo M past medical history htn, cad, chf. afib on doac, bradycardia, ?hx of falls presents after fall. Pt from SNF, unwitnessed fall, reported to hit head on night stand. no reported loc. patient denies pain. patient aaox1 which is baseline for him.  exam as above.    plan: labs, xr, ct, reassess  patient signed out at the end of my  shift.

## 2024-06-17 NOTE — ED PROVIDER NOTE - PRINCIPAL DIAGNOSIS
The patient is Watcher - Medium risk of patient condition declining or worsening    Shift Goals  Clinical Goals: Infant will remain stable on HFJV  Patient Goals: N/A  Family Goals: POB will be updated on POC when in contact    Progress made toward(s) clinical / shift goals:        Problem: Knowledge Deficit - NICU  Goal: Family/caregivers will demonstrate understanding of plan of care, disease process/condition, diagnostic tests, medications and unit policies and procedures  Outcome: Progressing     Mother updated via phone of infant condition and plan of care with . Mother verbalized understanding.    Problem: Oxygenation / Respiratory Function  Goal: Patient will achieve/maintain optimum respiratory ventilation/gas exchange  Outcome: Progressing  Patient remains on HFJV rate 360. FiO2 40-48% this shift. Patient has frequent desaturations and moderately thick secretions suctioned out of ETT.        Problem: Pain / Discomfort  Goal: Patient displays alleviation or reduction in pain  Outcome: Progressing   Patient medicated per MAR based on NPASS scores. Patient tolerating well.        Fall

## 2024-06-18 LAB
CULTURE RESULTS: SIGNIFICANT CHANGE UP
SPECIMEN SOURCE: SIGNIFICANT CHANGE UP

## 2024-06-28 NOTE — ED PROVIDER NOTE - NS ED MD DISPO DISCHARGE
Patient notified of Dr. Boateng's recommendations. Med list amended.  She still has Hydralazine 25 mg tablets at home.    Home

## 2024-08-13 ENCOUNTER — APPOINTMENT (OUTPATIENT)
Dept: GERIATRICS | Facility: ASSISTED LIVING FACILITY | Age: 84
End: 2024-08-13
Payer: MEDICARE

## 2024-08-13 DIAGNOSIS — I42.9 CARDIOMYOPATHY, UNSPECIFIED: ICD-10-CM

## 2024-08-13 DIAGNOSIS — G30.1 ALZHEIMER'S DISEASE WITH LATE ONSET: ICD-10-CM

## 2024-08-13 DIAGNOSIS — F02.80 ALZHEIMER'S DISEASE WITH LATE ONSET: ICD-10-CM

## 2024-08-13 DIAGNOSIS — F31.9 BIPOLAR DISORDER, UNSPECIFIED: ICD-10-CM

## 2024-08-13 DIAGNOSIS — Z71.89 OTHER SPECIFIED COUNSELING: ICD-10-CM

## 2024-08-13 DIAGNOSIS — I48.91 UNSPECIFIED ATRIAL FIBRILLATION: ICD-10-CM

## 2024-08-13 DIAGNOSIS — I23.6 THROMBOSIS OF ATRIUM, AURICULAR APPENDAGE, AND VENTRICLE AS CURRENT COMPLICATIONS FOLLOWING ACUTE MYOCARDIAL INFARCTION: ICD-10-CM

## 2024-08-13 PROCEDURE — G2211 COMPLEX E/M VISIT ADD ON: CPT | Mod: NC

## 2024-08-13 PROCEDURE — 99348 HOME/RES VST EST LOW MDM 30: CPT

## 2024-08-13 NOTE — REVIEW OF SYSTEMS
[Feeling Poorly] : feeling poorly [Feeling Tired] : feeling tired [Depression] : depression [Negative] : Heme/Lymph [Chest Pain] : no chest pain [Shortness Of Breath] : no shortness of breath [Confused] : no confusion [Anxiety] : no anxiety

## 2024-08-13 NOTE — HISTORY OF PRESENT ILLNESS
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  The patient has consistently refused interventional cardiac procedures.  Upon discharge from [facility] in January hospice was recommended.  Hospice was never initiated.  Over the last several months the patient has experienced progressive frailty.  This is almost certainly secondary to his advanced congestive heart failure and triple-vessel coronary artery disease.  He is also limited by longstanding depression.  He continues to be somewhat reclusive and spends most of his day in bed.  Patient today has no specific complaints.  Complaining of tiredness.  He states that he is able to ambulate the length hallway.  He states that he has a good appetite.  He states that his depression is under good control.

## 2024-09-19 ENCOUNTER — RX RENEWAL (OUTPATIENT)
Age: 84
End: 2024-09-19

## 2024-09-25 NOTE — PHYSICAL EXAM
[Alert] : alert [No Acute Distress] : in no acute distress [EOMI] : extraocular movements were intact [Normal] : normal rate, regular rhythm, normal S1 and S2 and no murmur heard [PERRL] : pupils were equal in size, round, and reactive to light [Speech Grossly Normal] : speech grossly normal [Normal Affect] : the affect was normal [Normal Outer Ear/Nose] : the ears and nose were normal in appearance [Anxious] : anxious [Normal Appearance] : the appearance of the neck was normal [Supple] : the neck was supple [No Respiratory Distress] : no respiratory distress [Respiration, Rhythm And Depth] : normal respiratory rhythm and effort [Auscultation Breath Sounds / Voice Sounds] : lungs were clear to auscultation bilaterally [Normal S1, S2] : normal S1 and S2 [Edema] : edema was not present [Normal] : normal skin color and pigmentation [No Focal Deficits] : no focal deficits [Normal Affect] : the affect was normal [Normal Mood] : the mood was normal

## 2024-10-08 ENCOUNTER — APPOINTMENT (OUTPATIENT)
Dept: GERIATRICS | Facility: ASSISTED LIVING FACILITY | Age: 84
End: 2024-10-08
Payer: MEDICARE

## 2024-10-08 VITALS — SYSTOLIC BLOOD PRESSURE: 105 MMHG | DIASTOLIC BLOOD PRESSURE: 79 MMHG | HEART RATE: 78 BPM

## 2024-10-08 DIAGNOSIS — G30.1 ALZHEIMER'S DISEASE WITH LATE ONSET: ICD-10-CM

## 2024-10-08 DIAGNOSIS — R53.83 OTHER MALAISE: ICD-10-CM

## 2024-10-08 DIAGNOSIS — F02.80 ALZHEIMER'S DISEASE WITH LATE ONSET: ICD-10-CM

## 2024-10-08 DIAGNOSIS — Z71.89 OTHER SPECIFIED COUNSELING: ICD-10-CM

## 2024-10-08 DIAGNOSIS — I42.9 CARDIOMYOPATHY, UNSPECIFIED: ICD-10-CM

## 2024-10-08 DIAGNOSIS — F31.9 BIPOLAR DISORDER, UNSPECIFIED: ICD-10-CM

## 2024-10-08 DIAGNOSIS — I48.91 UNSPECIFIED ATRIAL FIBRILLATION: ICD-10-CM

## 2024-10-08 DIAGNOSIS — I23.6 THROMBOSIS OF ATRIUM, AURICULAR APPENDAGE, AND VENTRICLE AS CURRENT COMPLICATIONS FOLLOWING ACUTE MYOCARDIAL INFARCTION: ICD-10-CM

## 2024-10-08 DIAGNOSIS — R53.81 OTHER MALAISE: ICD-10-CM

## 2024-10-08 PROCEDURE — 99348 HOME/RES VST EST LOW MDM 30: CPT

## 2024-11-28 ENCOUNTER — EMERGENCY (EMERGENCY)
Facility: HOSPITAL | Age: 84
LOS: 1 days | Discharge: HOME | End: 2024-11-28
Attending: EMERGENCY MEDICINE | Admitting: EMERGENCY MEDICINE
Payer: MEDICARE

## 2024-11-28 VITALS
RESPIRATION RATE: 15 BRPM | OXYGEN SATURATION: 100 % | DIASTOLIC BLOOD PRESSURE: 66 MMHG | HEART RATE: 74 BPM | SYSTOLIC BLOOD PRESSURE: 109 MMHG | TEMPERATURE: 98 F

## 2024-11-28 LAB
ALBUMIN SERPL ELPH-MCNC: 3.9 G/DL — SIGNIFICANT CHANGE UP (ref 3.3–5)
ALP SERPL-CCNC: 87 U/L — SIGNIFICANT CHANGE UP (ref 40–120)
ALT FLD-CCNC: 32 U/L — SIGNIFICANT CHANGE UP (ref 4–41)
ANION GAP SERPL CALC-SCNC: 14 MMOL/L — SIGNIFICANT CHANGE UP (ref 7–14)
APTT BLD: 37.5 SEC — HIGH (ref 24.5–35.6)
AST SERPL-CCNC: 22 U/L — SIGNIFICANT CHANGE UP (ref 4–40)
BASOPHILS # BLD AUTO: 0.06 K/UL — SIGNIFICANT CHANGE UP (ref 0–0.2)
BASOPHILS NFR BLD AUTO: 0.9 % — SIGNIFICANT CHANGE UP (ref 0–2)
BILIRUB SERPL-MCNC: 0.8 MG/DL — SIGNIFICANT CHANGE UP (ref 0.2–1.2)
BLD GP AB SCN SERPL QL: NEGATIVE — SIGNIFICANT CHANGE UP
BUN SERPL-MCNC: 45 MG/DL — HIGH (ref 7–23)
CALCIUM SERPL-MCNC: 9.4 MG/DL — SIGNIFICANT CHANGE UP (ref 8.4–10.5)
CHLORIDE SERPL-SCNC: 107 MMOL/L — SIGNIFICANT CHANGE UP (ref 98–107)
CK SERPL-CCNC: 57 U/L — SIGNIFICANT CHANGE UP (ref 30–200)
CO2 SERPL-SCNC: 18 MMOL/L — LOW (ref 22–31)
CREAT SERPL-MCNC: 1.34 MG/DL — HIGH (ref 0.5–1.3)
EGFR: 52 ML/MIN/1.73M2 — LOW
EOSINOPHIL # BLD AUTO: 0.12 K/UL — SIGNIFICANT CHANGE UP (ref 0–0.5)
EOSINOPHIL NFR BLD AUTO: 1.8 % — SIGNIFICANT CHANGE UP (ref 0–6)
GLUCOSE SERPL-MCNC: 88 MG/DL — SIGNIFICANT CHANGE UP (ref 70–99)
HCT VFR BLD CALC: 41.7 % — SIGNIFICANT CHANGE UP (ref 39–50)
HGB BLD-MCNC: 13.4 G/DL — SIGNIFICANT CHANGE UP (ref 13–17)
IANC: 3.73 K/UL — SIGNIFICANT CHANGE UP (ref 1.8–7.4)
IMM GRANULOCYTES NFR BLD AUTO: 0.3 % — SIGNIFICANT CHANGE UP (ref 0–0.9)
INR BLD: 1.88 RATIO — HIGH (ref 0.85–1.16)
LYMPHOCYTES # BLD AUTO: 2.24 K/UL — SIGNIFICANT CHANGE UP (ref 1–3.3)
LYMPHOCYTES # BLD AUTO: 33.5 % — SIGNIFICANT CHANGE UP (ref 13–44)
MCHC RBC-ENTMCNC: 31.6 PG — SIGNIFICANT CHANGE UP (ref 27–34)
MCHC RBC-ENTMCNC: 32.1 G/DL — SIGNIFICANT CHANGE UP (ref 32–36)
MCV RBC AUTO: 98.3 FL — SIGNIFICANT CHANGE UP (ref 80–100)
MONOCYTES # BLD AUTO: 0.51 K/UL — SIGNIFICANT CHANGE UP (ref 0–0.9)
MONOCYTES NFR BLD AUTO: 7.6 % — SIGNIFICANT CHANGE UP (ref 2–14)
NEUTROPHILS # BLD AUTO: 3.73 K/UL — SIGNIFICANT CHANGE UP (ref 1.8–7.4)
NEUTROPHILS NFR BLD AUTO: 55.9 % — SIGNIFICANT CHANGE UP (ref 43–77)
NRBC # BLD: 0 /100 WBCS — SIGNIFICANT CHANGE UP (ref 0–0)
NRBC # FLD: 0 K/UL — SIGNIFICANT CHANGE UP (ref 0–0)
PLATELET # BLD AUTO: 130 K/UL — LOW (ref 150–400)
POTASSIUM SERPL-MCNC: 5.5 MMOL/L — HIGH (ref 3.5–5.3)
POTASSIUM SERPL-SCNC: 5.5 MMOL/L — HIGH (ref 3.5–5.3)
PROT SERPL-MCNC: 7 G/DL — SIGNIFICANT CHANGE UP (ref 6–8.3)
PROTHROM AB SERPL-ACNC: 22.2 SEC — HIGH (ref 9.9–13.4)
RBC # BLD: 4.24 M/UL — SIGNIFICANT CHANGE UP (ref 4.2–5.8)
RBC # FLD: 15.2 % — HIGH (ref 10.3–14.5)
RH IG SCN BLD-IMP: POSITIVE — SIGNIFICANT CHANGE UP
SODIUM SERPL-SCNC: 139 MMOL/L — SIGNIFICANT CHANGE UP (ref 135–145)
WBC # BLD: 6.68 K/UL — SIGNIFICANT CHANGE UP (ref 3.8–10.5)
WBC # FLD AUTO: 6.68 K/UL — SIGNIFICANT CHANGE UP (ref 3.8–10.5)

## 2024-11-28 PROCEDURE — 71045 X-RAY EXAM CHEST 1 VIEW: CPT | Mod: 26

## 2024-11-28 PROCEDURE — 70450 CT HEAD/BRAIN W/O DYE: CPT | Mod: 26,MC

## 2024-11-28 PROCEDURE — 93010 ELECTROCARDIOGRAM REPORT: CPT

## 2024-11-28 PROCEDURE — 72170 X-RAY EXAM OF PELVIS: CPT | Mod: 26

## 2024-11-28 PROCEDURE — 71250 CT THORAX DX C-: CPT | Mod: 26,MC

## 2024-11-28 PROCEDURE — 99284 EMERGENCY DEPT VISIT MOD MDM: CPT

## 2024-11-28 PROCEDURE — 74176 CT ABD & PELVIS W/O CONTRAST: CPT | Mod: 26,MC

## 2024-11-28 PROCEDURE — 72125 CT NECK SPINE W/O DYE: CPT | Mod: 26,MC

## 2024-11-28 RX ORDER — SODIUM CHLORIDE 9 MG/ML
1000 INJECTION, SOLUTION INTRAMUSCULAR; INTRAVENOUS; SUBCUTANEOUS ONCE
Refills: 0 | Status: COMPLETED | OUTPATIENT
Start: 2024-11-28 | End: 2024-11-28

## 2024-11-28 RX ORDER — SODIUM ZIRCONIUM CYCLOSILICATE 5 G/5G
10 POWDER, FOR SUSPENSION ORAL ONCE
Refills: 0 | Status: COMPLETED | OUTPATIENT
Start: 2024-11-28 | End: 2024-11-28

## 2024-11-28 RX ADMIN — SODIUM ZIRCONIUM CYCLOSILICATE 10 GRAM(S): 5 POWDER, FOR SUSPENSION ORAL at 23:40

## 2024-11-28 RX ADMIN — SODIUM CHLORIDE 1000 MILLILITER(S): 9 INJECTION, SOLUTION INTRAMUSCULAR; INTRAVENOUS; SUBCUTANEOUS at 23:31

## 2024-11-28 NOTE — ED PROVIDER NOTE - NSFOLLOWUPINSTRUCTIONS_ED_ALL_ED_FT
You came to the emergency room and had slightly elevated creatinine which can mean slight kidney injury.  Especially when comparing it to your old results, it is slightly elevated.  The best thing to do about this is to hydrate.    You can take Tylenol for the pain per the box instructions.  Please keep yourself safe as you are an increased fall risk.  Please stand up slowly, get your bearings, and move slowly.  This can only keep you safe while you are walking around especially on a blood thinner.    I also gave you a medication that lowers your potassium because it was slightly elevated.  I do not want you to worry about this.  The medication works passively through your stooling.

## 2024-11-28 NOTE — ED PROVIDER NOTE - PROGRESS NOTE DETAILS
KONRAD Aguilar PGY-3: Patient at this time with slightly elevated potassium for which she was given Lokelma.  Patient with slight ENMANUEL, likely due to hypovolemia or euvolemia.  Giving patient p.o. food and drink, also giving fluid.  Discussed the risk versus benefit of repeating the  creatinine and potassium but likely not necessary and will send back the patient.  patient and family member at bedside amenable to this plan of him going home.

## 2024-11-28 NOTE — ED PROVIDER NOTE - CLINICAL SUMMARY MEDICAL DECISION MAKING FREE TEXT BOX
- Chief Complaint: Unassisted fall with bleeding from arms  - History of Presenting Illness: Transfer from [facility]. Denies head injury. Denies current pain. Patient fell without any chest pain no shortness of breath.  The patient did not have any sign of impending doom or thought of impending doom prior to the fall.  Patient did not bite tongue, is incontinent at baseline.  Patient feeling well recently, no fevers no chills.  The accident has happened, patient without any fevers chills nausea vomiting.  No abnormal gait.     - Past Medical History: Hypertensive heart disease, Atherosclerotic heart disease, Hyperlipidemia, Chronic AFib, Dementia, Rheumatic mitral insufficiency, Bipolar ischemic cardiomyopathy    - Medication History: Eliquis, Aldactone, Plavix, Entresto, Xarelto  - Social History: Lives at assisted living facility    Objective:  - Vitals: /66  - General: Well kept appearance  - Skin: Acute and chronic bruising on bilateral arms, scant bruising on lower extremities, crusted blood under fingernails.   Patient with no obvious deformities to the upper shoulders bilateral, no obvious deformities or pain to the humerus bilateral, patient with full range of motion at the elbow without tenderness to palpation as well.  Patient radius and ulna without tenderness to palpation or deformity.  Patient wrist bilaterally without any tenderness to palpation or deformity as well.  Patient CMS intact in upper and lower extremities bilaterally.   Patient cranial nerves II through XII are intact grossly.  Patient with nontender abdomen.  Patient with normal S1-S2, lung sounds clear with no tracheal deviation.     at this time, plan for CBC CMP PT PTT type and screen.  There are no focal deficits, we will plan for cervical spine, plan for chest abdomen pelvis CT.  Plan for x-rays of the chest and the pelvis.  No pain control at this time as the patient's not in pain.  Will not place collar  is patient does not want this and is able to tell me why through Elsi's criteria.

## 2024-11-28 NOTE — ED PROVIDER NOTE - NSICDXPASTMEDICALHX_GEN_ALL_CORE_FT
PAST MEDICAL HISTORY:  Bipolar disorder     Bipolar mood disorder     CAD (coronary artery disease)     Hypercholesterolemia     Hypertension

## 2024-11-28 NOTE — ED ADULT TRIAGE NOTE - CHIEF COMPLAINT QUOTE
from NH. unwitnessed fall out of bed. laceration to right arm. bleeding controlled. on Eliquis. No complaints of chest pain, headache, nausea, dizziness, vomiting  SOB, fever, chills verbalized. Phx afib dementia Bipolar DO.

## 2024-11-28 NOTE — ED ADULT NURSE NOTE - NSFALLHARMRISKINTERV_ED_ALL_ED

## 2024-11-28 NOTE — ED PROVIDER NOTE - PATIENT PORTAL LINK FT
You can access the FollowMyHealth Patient Portal offered by [facility] by registering at the following website: [email or web address]. By joining [employer]’s FollowMyHealth portal, you will also be able to view your health information using other applications (apps) compatible with our system.

## 2024-11-28 NOTE — ED PROVIDER NOTE - ATTENDING CONTRIBUTION TO CARE
85 yo M with PMHx HTN, Atherosclerotic heart disease, Hyperlipidemia, Chronic AFib, Dementia, Rheumatic mitral insufficiency, Bipolar ischemic cardiomyopathy here s/p fall BIBEMS from [facility] [place]. Denies head injury. Denies current pain. Patient fell but denies syncope. Denies loss of consciousness. Denies chest pain no shortness of breath.      PE: Acute and chronic bruising on bilateral arms, scant bruising on lower extremities, crusted blood under fingernails.    Patient cranial nerves II through XII are intact grossly.  Patient with nontender abdomen.  Patient with normal S1-S2, lung sounds clear with no tracheal deviation.    at this time, plan for CBC CMP PT PTT type and screen.  There are no focal deficits, we will plan for cervical spine, plan for chest abdomen pelvis CT.  Plan for x-rays of the chest and the pelvis.  No pain control at this time as the patient's not in pain.     I performed a history and physical exam of the patient and discussed their management with the resident. I reviewed the resident's note and agree with the documented findings and plan of care. My medical decision making and observations are found above.

## 2024-11-28 NOTE — ED ADULT NURSE NOTE - OBJECTIVE STATEMENT
Pt received in spot 12. A&O2. PMH of afib on eliquis and dementia. Came into ED from nursing home for fall on blood thinner. Pt states unsure of how he fell but landed on right arm. Cut noted to right arm, bleeding controlled. Denies headstrike or LOC, SOB, chest pain, N/V/, dizziness, lightheadedness. Pt appears well, no signs of acute distress noted. Respirations even and unlabored. Pt noted to nolan down to 40's on CM, MD maki made aware. Pending Labs. Stretcher in lowest position. Call bell within reach. Comfort and safety maintained.

## 2024-11-28 NOTE — ED ADULT NURSE NOTE - CAS EDP DISCH TYPE
"   04/12/23 0733   YOB: 1967  Leighton Zhu                          Provider's Name  [facility] Medical Record No.  [id number]                             [facility] Date:  04/11/Assisted living facility

## 2024-11-29 VITALS
OXYGEN SATURATION: 100 % | HEART RATE: 78 BPM | SYSTOLIC BLOOD PRESSURE: 105 MMHG | TEMPERATURE: 99 F | RESPIRATION RATE: 16 BRPM | DIASTOLIC BLOOD PRESSURE: 56 MMHG

## 2024-11-29 NOTE — PROVIDER CONTACT NOTE (OTHER) - ASSESSMENT
Pt is returning back to [facility] at [address] [place] [address]. [facility] [phone] with rep: . P/U 8 AM. Trip# Pt is returning back to [facility] at [address] [place] [address]. [facility] [phone] with rep: Caridad. P/U 2:30am AM. Trip#[id number]

## 2024-12-03 ENCOUNTER — APPOINTMENT (OUTPATIENT)
Dept: GERIATRICS | Facility: ASSISTED LIVING FACILITY | Age: 84
End: 2024-12-03
Payer: MEDICARE

## 2024-12-03 VITALS — HEART RATE: 74 BPM | SYSTOLIC BLOOD PRESSURE: 124 MMHG | DIASTOLIC BLOOD PRESSURE: 80 MMHG

## 2024-12-03 DIAGNOSIS — F02.80 ALZHEIMER'S DISEASE WITH LATE ONSET: ICD-10-CM

## 2024-12-03 DIAGNOSIS — I48.91 UNSPECIFIED ATRIAL FIBRILLATION: ICD-10-CM

## 2024-12-03 DIAGNOSIS — I25.10 ATHEROSCLEROTIC HEART DISEASE OF NATIVE CORONARY ARTERY W/OUT ANGINA PECTORIS: ICD-10-CM

## 2024-12-03 DIAGNOSIS — F31.9 BIPOLAR DISORDER, UNSPECIFIED: ICD-10-CM

## 2024-12-03 DIAGNOSIS — G30.1 ALZHEIMER'S DISEASE WITH LATE ONSET: ICD-10-CM

## 2024-12-03 DIAGNOSIS — I50.9 HEART FAILURE, UNSPECIFIED: ICD-10-CM

## 2024-12-03 DIAGNOSIS — I42.9 CARDIOMYOPATHY, UNSPECIFIED: ICD-10-CM

## 2024-12-03 DIAGNOSIS — I23.6 THROMBOSIS OF ATRIUM, AURICULAR APPENDAGE, AND VENTRICLE AS CURRENT COMPLICATIONS FOLLOWING ACUTE MYOCARDIAL INFARCTION: ICD-10-CM

## 2024-12-03 DIAGNOSIS — Z71.89 OTHER SPECIFIED COUNSELING: ICD-10-CM

## 2024-12-03 PROCEDURE — 99348 HOME/RES VST EST LOW MDM 30: CPT

## 2024-12-31 ENCOUNTER — APPOINTMENT (OUTPATIENT)
Dept: GERIATRICS | Facility: ASSISTED LIVING FACILITY | Age: 84
End: 2024-12-31
Payer: MEDICARE

## 2024-12-31 DIAGNOSIS — L30.9 DERMATITIS, UNSPECIFIED: ICD-10-CM

## 2024-12-31 DIAGNOSIS — F02.80 ALZHEIMER'S DISEASE WITH LATE ONSET: ICD-10-CM

## 2024-12-31 DIAGNOSIS — G30.1 ALZHEIMER'S DISEASE WITH LATE ONSET: ICD-10-CM

## 2024-12-31 DIAGNOSIS — I25.10 ATHEROSCLEROTIC HEART DISEASE OF NATIVE CORONARY ARTERY W/OUT ANGINA PECTORIS: ICD-10-CM

## 2024-12-31 DIAGNOSIS — I50.9 HEART FAILURE, UNSPECIFIED: ICD-10-CM

## 2024-12-31 DIAGNOSIS — I23.6 THROMBOSIS OF ATRIUM, AURICULAR APPENDAGE, AND VENTRICLE AS CURRENT COMPLICATIONS FOLLOWING ACUTE MYOCARDIAL INFARCTION: ICD-10-CM

## 2024-12-31 PROCEDURE — 99347 HOME/RES VST EST SF MDM 20: CPT

## 2024-12-31 RX ORDER — TRIAMCINOLONE ACETONIDE 1 MG/G
0.1 OINTMENT TOPICAL
Qty: 1 | Refills: 0 | Status: ACTIVE | COMMUNITY
Start: 2024-12-31 | End: 1900-01-01

## 2025-02-25 ENCOUNTER — APPOINTMENT (OUTPATIENT)
Dept: GERIATRICS | Facility: ASSISTED LIVING FACILITY | Age: 85
End: 2025-02-25
Payer: MEDICARE

## 2025-02-25 VITALS — DIASTOLIC BLOOD PRESSURE: 80 MMHG | SYSTOLIC BLOOD PRESSURE: 130 MMHG | HEART RATE: 78 BPM

## 2025-02-25 DIAGNOSIS — I23.6 THROMBOSIS OF ATRIUM, AURICULAR APPENDAGE, AND VENTRICLE AS CURRENT COMPLICATIONS FOLLOWING ACUTE MYOCARDIAL INFARCTION: ICD-10-CM

## 2025-02-25 DIAGNOSIS — F31.9 BIPOLAR DISORDER, UNSPECIFIED: ICD-10-CM

## 2025-02-25 DIAGNOSIS — L30.9 DERMATITIS, UNSPECIFIED: ICD-10-CM

## 2025-02-25 DIAGNOSIS — I50.9 HEART FAILURE, UNSPECIFIED: ICD-10-CM

## 2025-02-25 DIAGNOSIS — Z71.89 OTHER SPECIFIED COUNSELING: ICD-10-CM

## 2025-02-25 DIAGNOSIS — I42.9 CARDIOMYOPATHY, UNSPECIFIED: ICD-10-CM

## 2025-02-25 DIAGNOSIS — I25.10 ATHEROSCLEROTIC HEART DISEASE OF NATIVE CORONARY ARTERY W/OUT ANGINA PECTORIS: ICD-10-CM

## 2025-02-25 DIAGNOSIS — I48.91 UNSPECIFIED ATRIAL FIBRILLATION: ICD-10-CM

## 2025-02-25 PROCEDURE — 99348 HOME/RES VST EST LOW MDM 30: CPT

## 2025-03-01 ENCOUNTER — INPATIENT (INPATIENT)
Facility: HOSPITAL | Age: 85
LOS: 2 days | Discharge: HOME | End: 2025-03-04
Attending: HOSPITALIST | Admitting: HOSPITALIST
Payer: OTHER GOVERNMENT

## 2025-03-01 VITALS
OXYGEN SATURATION: 97 % | DIASTOLIC BLOOD PRESSURE: 55 MMHG | RESPIRATION RATE: 20 BRPM | TEMPERATURE: 97 F | HEART RATE: 69 BPM | SYSTOLIC BLOOD PRESSURE: 94 MMHG | WEIGHT: 149.91 LBS

## 2025-03-01 DIAGNOSIS — K92.2 GASTROINTESTINAL HEMORRHAGE, UNSPECIFIED: ICD-10-CM

## 2025-03-01 LAB
ALBUMIN SERPL ELPH-MCNC: 4.3 G/DL — SIGNIFICANT CHANGE UP (ref 3.3–5)
ALP SERPL-CCNC: 63 U/L — SIGNIFICANT CHANGE UP (ref 40–120)
ALT FLD-CCNC: 16 U/L — SIGNIFICANT CHANGE UP (ref 4–41)
ANION GAP SERPL CALC-SCNC: 18 MMOL/L — HIGH (ref 7–14)
APTT BLD: 40 SEC — HIGH (ref 24.5–35.6)
AST SERPL-CCNC: 29 U/L — SIGNIFICANT CHANGE UP (ref 4–40)
BASOPHILS # BLD AUTO: 0.05 K/UL — SIGNIFICANT CHANGE UP (ref 0–0.2)
BASOPHILS NFR BLD AUTO: 0.6 % — SIGNIFICANT CHANGE UP (ref 0–2)
BILIRUB SERPL-MCNC: 0.9 MG/DL — SIGNIFICANT CHANGE UP (ref 0.2–1.2)
BLD GP AB SCN SERPL QL: NEGATIVE — SIGNIFICANT CHANGE UP
BUN SERPL-MCNC: 38 MG/DL — HIGH (ref 7–23)
CALCIUM SERPL-MCNC: 9.5 MG/DL — SIGNIFICANT CHANGE UP (ref 8.4–10.5)
CHLORIDE SERPL-SCNC: 104 MMOL/L — SIGNIFICANT CHANGE UP (ref 98–107)
CO2 SERPL-SCNC: 13 MMOL/L — LOW (ref 22–31)
CREAT SERPL-MCNC: 1.2 MG/DL — SIGNIFICANT CHANGE UP (ref 0.5–1.3)
EGFR: 60 ML/MIN/1.73M2 — SIGNIFICANT CHANGE UP
EGFR: 60 ML/MIN/1.73M2 — SIGNIFICANT CHANGE UP
EOSINOPHIL # BLD AUTO: 0 K/UL — SIGNIFICANT CHANGE UP (ref 0–0.5)
EOSINOPHIL NFR BLD AUTO: 0 % — SIGNIFICANT CHANGE UP (ref 0–6)
FLUAV AG NPH QL: SIGNIFICANT CHANGE UP
FLUBV AG NPH QL: SIGNIFICANT CHANGE UP
GLUCOSE SERPL-MCNC: 94 MG/DL — SIGNIFICANT CHANGE UP (ref 70–99)
HCT VFR BLD CALC: 38 % — LOW (ref 39–50)
HGB BLD-MCNC: 12.8 G/DL — LOW (ref 13–17)
IANC: 6.49 K/UL — SIGNIFICANT CHANGE UP (ref 1.8–7.4)
IMM GRANULOCYTES NFR BLD AUTO: 0.2 % — SIGNIFICANT CHANGE UP (ref 0–0.9)
INR BLD: 1.91 RATIO — HIGH (ref 0.85–1.16)
LYMPHOCYTES # BLD AUTO: 1.54 K/UL — SIGNIFICANT CHANGE UP (ref 1–3.3)
LYMPHOCYTES # BLD AUTO: 17.1 % — SIGNIFICANT CHANGE UP (ref 13–44)
MCHC RBC-ENTMCNC: 32.2 PG — SIGNIFICANT CHANGE UP (ref 27–34)
MCHC RBC-ENTMCNC: 33.7 G/DL — SIGNIFICANT CHANGE UP (ref 32–36)
MCV RBC AUTO: 95.5 FL — SIGNIFICANT CHANGE UP (ref 80–100)
MONOCYTES # BLD AUTO: 0.93 K/UL — HIGH (ref 0–0.9)
MONOCYTES NFR BLD AUTO: 10.3 % — SIGNIFICANT CHANGE UP (ref 2–14)
NEUTROPHILS # BLD AUTO: 6.49 K/UL — SIGNIFICANT CHANGE UP (ref 1.8–7.4)
NEUTROPHILS NFR BLD AUTO: 71.8 % — SIGNIFICANT CHANGE UP (ref 43–77)
NRBC # BLD AUTO: 0 K/UL — SIGNIFICANT CHANGE UP (ref 0–0)
NRBC # FLD: 0 K/UL — SIGNIFICANT CHANGE UP (ref 0–0)
NRBC BLD AUTO-RTO: 0 /100 WBCS — SIGNIFICANT CHANGE UP (ref 0–0)
OB PNL STL: NEGATIVE — SIGNIFICANT CHANGE UP
PLATELET # BLD AUTO: 108 K/UL — LOW (ref 150–400)
POTASSIUM SERPL-MCNC: 5.2 MMOL/L — SIGNIFICANT CHANGE UP (ref 3.5–5.3)
POTASSIUM SERPL-SCNC: 5.2 MMOL/L — SIGNIFICANT CHANGE UP (ref 3.5–5.3)
PROT SERPL-MCNC: 7.7 G/DL — SIGNIFICANT CHANGE UP (ref 6–8.3)
PROTHROM AB SERPL-ACNC: 22 SEC — HIGH (ref 9.9–13.4)
RBC # BLD: 3.98 M/UL — LOW (ref 4.2–5.8)
RBC # FLD: 15.4 % — HIGH (ref 10.3–14.5)
RH IG SCN BLD-IMP: POSITIVE — SIGNIFICANT CHANGE UP
RSV RNA NPH QL NAA+NON-PROBE: SIGNIFICANT CHANGE UP
SARS-COV-2 RNA SPEC QL NAA+PROBE: DETECTED
SODIUM SERPL-SCNC: 135 MMOL/L — SIGNIFICANT CHANGE UP (ref 135–145)
WBC # BLD: 9.03 K/UL — SIGNIFICANT CHANGE UP (ref 3.8–10.5)
WBC # FLD AUTO: 9.03 K/UL — SIGNIFICANT CHANGE UP (ref 3.8–10.5)

## 2025-03-01 PROCEDURE — 99285 EMERGENCY DEPT VISIT HI MDM: CPT | Mod: GC

## 2025-03-01 RX ADMIN — Medication 80 MILLIGRAM(S): at 18:44

## 2025-03-01 RX ADMIN — Medication 10 MG/HR: at 18:44

## 2025-03-01 RX ADMIN — Medication 1000 MILLILITER(S): at 19:12

## 2025-03-01 NOTE — ED ADULT NURSE NOTE - OBJECTIVE STATEMENT
pt sent in form [facility] for blood vomit/ BM. pt changed into gown at this time in spot 29, placed on CM SR noted. #20g  place in right fa, labs sent. pt aaxo 2, pt poorly responsive to questions, lethargic at times. multiple bruising noted on right hand, wrap in place. more bruising noted in left arm.

## 2025-03-01 NOTE — ED ADULT TRIAGE NOTE - CHIEF COMPLAINT QUOTE
Patient brought to EMS with complaints of GI bleed since 3pm, blood varies between bright and dark. Patient usually more alert but is currently a&ox1. Patient noted to be lethargic in triage, denies any CP or SOB, charge RN made aware. Patient currently covid positive since today. pmhx: dementia, AFib on Eliquis and plavix, CHF, HLD, HTN, bipolar disorder

## 2025-03-01 NOTE — ED PROVIDER NOTE - ATTENDING CONTRIBUTION TO CARE
I performed a face-to-face evaluation of the patient and performed a history and physical examination. I agree with the history and physical examination. If this was a PA visit, I personally saw the patient with the PA and performed a substantive portion of the visit including all aspects of the medical decision making.    BIB EMS for hematemesis. Concern for GIB (Eliquis). Check Hb and T & S. Admit. No indication for imaging, as no pain and not currently bleeding externally (ie, no evidence of ongoing bleed that would prompt a call to IR). No role for PPI in undifferentiated UGIB, as there are no statistically significant benefit in the primary outcomes of mortality, risk of re-bleeding, or need for surgical intervention.     Nabor HENDERSON, Ross J, Roshni ZACARIAS, Ray S, Eagle BAPTISTE, Natasha WOODWARD, Elliot DANIEL. Proton pump inhibitor treatment initiated prior to endoscopic diagnosis in upper gastrointestinal bleeding. [place] Database Syst Rev. 2010;7:YE435552.    and may be harmful in people of  descent (though may be helpful in people of  descent)     Prachi Zapata, Edwar DAVIDSON, Eagle BAPTISTE. Proton pump inhibitor treatment for acute peptic ulcer bleeding. [place] Database Syst Rev. 2004;(3):LA176608.

## 2025-03-01 NOTE — ED ADULT TRIAGE NOTE - TEMP AT ED ARRIVAL (C)
Anesthesia Evaluation     . Pt has had prior anesthetic. Type: General    No history of anesthetic complications          ROS/MED HX    ENT/Pulmonary:  - neg pulmonary ROS     Neurologic: Comment: ADD/ADHD      Cardiovascular:  - neg cardiovascular ROS       METS/Exercise Tolerance:  >4 METS   Hematologic:  - neg hematologic  ROS       Musculoskeletal:   (+) fracture upper extremity: Other (comment) (right hamate), , -       GI/Hepatic:  - neg GI/hepatic ROS       Renal/Genitourinary:  - ROS Renal section negative       Endo:  - neg endo ROS       Psychiatric:  - neg psychiatric ROS       Infectious Disease:  - neg infectious disease ROS       Malignancy:      - no malignancy   Other:    - neg other ROS                 Physical Exam  Normal systems: pulmonary and dental    Airway   Mallampati: I  TM distance: >3 FB  Neck ROM: full    Dental     Cardiovascular   Rhythm and rate: regular and normal      Pulmonary    breath sounds clear to auscultation                    Anesthesia Plan      History & Physical Review  History and physical reviewed and following examination; no interval change.    ASA Status:  1 .    NPO Status:  > 8 hours    Plan for Periph. Nerve Block for postop pain, General, LMA and MAC with Intravenous and Propofol induction. Maintenance will be Balanced.    PONV prophylaxis:  Ondansetron (or other 5HT-3) and Dexamethasone or Solumedrol  Right ICB/SCB for post-op pain control      Postoperative Care  Postoperative pain management:  Peripheral nerve block (Single Shot), IV analgesics and Oral pain medications.      Consents  Anesthetic plan, risks, benefits and alternatives discussed with:  Patient..                          .   36.3

## 2025-03-01 NOTE — ED PROVIDER NOTE - OBJECTIVE STATEMENT
84yM presenting via EMS for concerns of GI bleed. EMS states he had episodes of bleeding since 3pm some bright red and some dark. pt usually more alert but is A&Ox1 in triage. pt was also diagnosed with COVID. coming from [facility]. 84yM presenting via EMS for concerns of GI bleed. EMS states he had episodes of bleeding since 3pm some bright red and some dark. pt usually more alert but is A&Ox1 in triage. coming from [facility]. patient has no complaints at this time.

## 2025-03-01 NOTE — ED PROVIDER NOTE - IN ACCORDANCE WITH [place] STATE LAW, WE OFFER EVERY PATIENT A HEPATITIS C TEST. WOULD YOU LIKE TO BE TESTED TODAY?

## 2025-03-01 NOTE — ED PROVIDER NOTE - CLINICAL SUMMARY MEDICAL DECISION MAKING FREE TEXT BOX
Rajinder: [facility] for hematemesis. Concern for GIB (Eliquis). Check Hb and T & S. Admit. No indication for imaging, as no pain and not currently bleeding externally (ie, no evidence of ongoing bleed that would prompt a call to IR). No role for PPI in undifferentiated UGIB, as there are no statistically significant benefit in the primary outcomes of mortality, risk of re-bleeding, or need for surgical intervention.     Nabor HENDERSON, Ross GILLETTE, Roshni ZACARIAS, Ray S, Eagle BAPTISTE, Natasha WOODWARD, Elliot DANIEL. Proton pump inhibitor treatment initiated prior to endoscopic diagnosis in upper gastrointestinal bleeding. [place] Database Syst Rev. 2010;7:WF416868.    and may be harmful in people of  descent (though may be helpful in people of  descent)     Roshni ZACARIAS, Prachi BACA, Edwar DAVIDSON, Eagle BAPITSTE. Proton pump inhibitor treatment for acute peptic ulcer bleeding. [place] Database Syst Rev. 2004;(3):[id number]. Rajinder: [facility] for hematemesis. Concern for GIB (Eliquis). Check Hb and T & S. Admit. No indication for imaging, as no pain and not currently bleeding externally (ie, no evidence of ongoing bleed that would prompt a call to IR). No role for PPI in undifferentiated UGIB, as there are no statistically significant benefit in the primary outcomes of mortality, risk of re-bleeding, or need for surgical intervention.     aNbor HENDERSON, Ross GILLETTE, Roshni ZACARIAS, Ray S, Eagle BAPTISTE, Natasha WOODWARD, Elliot DANIEL. Proton pump inhibitor treatment initiated prior to endoscopic diagnosis in upper gastrointestinal bleeding. [place] Database Syst Rev. 2010;7:[id number].    and may be harmful in people of  descent (though may be helpful in people of  descent)     Roshni ZACARIAS, Prachi BACA, Edwar DAVIDSON, Eagle BAPTISTE. Proton pump inhibitor treatment for acute peptic ulcer bleeding. [place] Database Syst Rev. 2004;(3):EK402704.    Surekhasarah Carver PGY-1:  84yM PMH afib on eliquis, HTN, HLD, CAD, Dementia presenting from [facility] for episodes of bleeding. unclear if hematemesis or hematochezia. pt arrives somnolent but arousable. slightly hypotensive 90s/60s. concern for GI bleed. A&Ox2 but not answering questions. unsure what happened today. denies any nausea or abdominal pain. abdominal exam benign. rectal exam with brown stool on glove. will obtain labs including cbc, cmp, coags, t&s. started on protonix for possible UGIB. will need admission for GI bleed and GI consult.

## 2025-03-01 NOTE — ED PROVIDER NOTE - PROGRESS NOTE DETAILS
Surekha Carver PGY-1:  pt signed out. pending labs. will need admission for GI bleed. Dr. Jose Grider PGY1: Patient signed out to me at time of shift change.  Hemoglobin 12.3 not requiring transfusion at this time.  Fecal occult negative.  Will consult GI to admit patient. Dr. Jose Grider PGY1: Nephew came in to visit patient and states was found to be COVID positive at facility diagnosed earlier today. GI consult sent

## 2025-03-01 NOTE — ED ADULT TRIAGE NOTE - AS TEMP SITE
Antonio George is a 48 y.o. female presenting for/with:    Chief Complaint   Patient presents with    Pre-op Exam     Scheduled for surgery with Dr Mahendra Montgomery on 1/19/2023. .. she will be having pre op testing done on Wed at [facility]       Visit Vitals  /72 (BP 1 Location: Left arm, BP Patient Position: Sitting)   Pulse 82   Temp 98 °F (36.7 °C) (Temporal)   Resp 18   Ht 5' 4\" (1.626 m)   Wt 275 lb 6.4 oz (124.9 kg)   LMP 12/21/2022 (Exact Date)   SpO2 98%   BMI 47.27 kg/m²     Pain Scale: 0 - No pain/10  Pain Location:     1. \"Have you been to the ER, urgent care clinic since your last visit? Hospitalized since your last visit? \" No    2. \"Have you seen or consulted any other health care providers outside of the [address] [place] [address] since your last visit? \" No     3. For patients aged 39-70: Has the patient had a colonoscopy / FIT/ Cologuard? Yes - Care Gap present. Most recent result on file      If the patient is female:    4. For patients aged 41-77: Has the patient had a mammogram within the past 2 years? Yes - Care Gap present. Most recent result on file      5. For patients aged 21-65: Has the patient had a pap smear? Yes - Care Gap present. Most recent result on file          Patient    Learning Assessment 3/29/2022   PRIMARY LEARNER Patient   HIGHEST LEVEL OF EDUCATION - PRIMARY LEARNER  -   BARRIERS PRIMARY LEARNER -   PRIMARY LANGUAGE ENGLISH   LEARNER PREFERENCE PRIMARY READING   ANSWERED BY pt   RELATIONSHIP SELF     Fall Risk Assessment, last 12 mths 4/22/2022   Able to walk? Yes   Fall in past 12 months? 0   Do you feel unsteady? 0   Are you worried about falling 0       3 most recent PHQ Screens 1/9/2023   Little interest or pleasure in doing things Not at all   Feeling down, depressed, irritable, or hopeless Not at all   Total Score PHQ 2 0     Abuse Screening Questionnaire 7/14/2022   Do you ever feel afraid of your partner?  N   Are you in a relationship with someone who physically or mentally threatens you? N   Is it safe for you to go home?  Y       ADL Assessment 7/14/2022   Feeding yourself No Help Needed   Getting from bed to chair No Help Needed   Getting dressed No Help Needed   Bathing or showering No Help Needed   Walk across the room (includes cane/walker) No Help Needed   Using the telphone No Help Needed   Taking your medications No Help Needed   Preparing meals No Help Needed   Managing money (expenses/bills) No Help Needed   Moderately strenuous housework (laundry) No Help Needed   Shopping for personal items (toiletries/medicines) No Help Needed   Shopping for groceries No Help Needed   Driving No Help Needed   Climbing a flight of stairs No Help Needed   Getting to places beyond walking distances No Help Needed      Advance Care Planning 12/28/2022   Patient's Healthcare Decision Maker is: -   Confirm Advance Directive None   Patient Would Like to Complete Advance Directive - oral

## 2025-03-01 NOTE — ED PROVIDER NOTE - GASTROINTESTINAL, MLM
Abdomen soft, non-tender, no guarding. Abdomen soft, non-tender, no guarding. rectal exam performed with RN as chaperone. brown stool on glove.

## 2025-03-01 NOTE — ED ADULT NURSE REASSESSMENT NOTE - NS ED NURSE REASSESS COMMENT FT1
Pt resting in stretcher, breathing even and unlabored on room air. NAD noted. No signs/symptoms of hematemesis/bloody stools at this time. Protonix drip infusing at 10ml/hr as ordered. Pt is admitted, pending bed assignment. Per pt's nephew at bedside the pt tested positive for covid at his nursing facility earlier today. Covid swab obtained and sent to lab. Care ongoing, safety maintained.

## 2025-03-02 DIAGNOSIS — U07.1 COVID-19: ICD-10-CM

## 2025-03-02 DIAGNOSIS — I50.20 UNSPECIFIED SYSTOLIC (CONGESTIVE) HEART FAILURE: ICD-10-CM

## 2025-03-02 DIAGNOSIS — I25.10 ATHEROSCLEROTIC HEART DISEASE OF NATIVE CORONARY ARTERY WITHOUT ANGINA PECTORIS: ICD-10-CM

## 2025-03-02 DIAGNOSIS — Z29.9 ENCOUNTER FOR PROPHYLACTIC MEASURES, UNSPECIFIED: ICD-10-CM

## 2025-03-02 DIAGNOSIS — K92.2 GASTROINTESTINAL HEMORRHAGE, UNSPECIFIED: ICD-10-CM

## 2025-03-02 DIAGNOSIS — I48.91 UNSPECIFIED ATRIAL FIBRILLATION: ICD-10-CM

## 2025-03-02 DIAGNOSIS — F03.90 UNSPECIFIED DEMENTIA, UNSPECIFIED SEVERITY, WITHOUT BEHAVIORAL DISTURBANCE, PSYCHOTIC DISTURBANCE, MOOD DISTURBANCE, AND ANXIETY: ICD-10-CM

## 2025-03-02 LAB
ALBUMIN SERPL ELPH-MCNC: 3.8 G/DL — SIGNIFICANT CHANGE UP (ref 3.3–5)
ALP SERPL-CCNC: 53 U/L — SIGNIFICANT CHANGE UP (ref 40–120)
ALT FLD-CCNC: 17 U/L — SIGNIFICANT CHANGE UP (ref 4–41)
ANION GAP SERPL CALC-SCNC: 14 MMOL/L — SIGNIFICANT CHANGE UP (ref 7–14)
AST SERPL-CCNC: 24 U/L — SIGNIFICANT CHANGE UP (ref 4–40)
BASOPHILS # BLD AUTO: 0.03 K/UL — SIGNIFICANT CHANGE UP (ref 0–0.2)
BASOPHILS NFR BLD AUTO: 0.5 % — SIGNIFICANT CHANGE UP (ref 0–2)
BILIRUB SERPL-MCNC: 0.8 MG/DL — SIGNIFICANT CHANGE UP (ref 0.2–1.2)
BUN SERPL-MCNC: 35 MG/DL — HIGH (ref 7–23)
CALCIUM SERPL-MCNC: 9.2 MG/DL — SIGNIFICANT CHANGE UP (ref 8.4–10.5)
CHLORIDE SERPL-SCNC: 108 MMOL/L — HIGH (ref 98–107)
CO2 SERPL-SCNC: 15 MMOL/L — LOW (ref 22–31)
CREAT SERPL-MCNC: 1.04 MG/DL — SIGNIFICANT CHANGE UP (ref 0.5–1.3)
EGFR: 71 ML/MIN/1.73M2 — SIGNIFICANT CHANGE UP
EGFR: 71 ML/MIN/1.73M2 — SIGNIFICANT CHANGE UP
EOSINOPHIL # BLD AUTO: 0.02 K/UL — SIGNIFICANT CHANGE UP (ref 0–0.5)
EOSINOPHIL NFR BLD AUTO: 0.3 % — SIGNIFICANT CHANGE UP (ref 0–6)
GLUCOSE BLDC GLUCOMTR-MCNC: 68 MG/DL — LOW (ref 70–99)
GLUCOSE BLDC GLUCOMTR-MCNC: 80 MG/DL — SIGNIFICANT CHANGE UP (ref 70–99)
GLUCOSE SERPL-MCNC: 69 MG/DL — LOW (ref 70–99)
HCT VFR BLD CALC: 34.9 % — LOW (ref 39–50)
HGB BLD-MCNC: 11.7 G/DL — LOW (ref 13–17)
IANC: 4.1 K/UL — SIGNIFICANT CHANGE UP (ref 1.8–7.4)
IMM GRANULOCYTES NFR BLD AUTO: 0.2 % — SIGNIFICANT CHANGE UP (ref 0–0.9)
INR BLD: 1.61 RATIO — HIGH (ref 0.85–1.16)
LYMPHOCYTES # BLD AUTO: 1.73 K/UL — SIGNIFICANT CHANGE UP (ref 1–3.3)
LYMPHOCYTES # BLD AUTO: 26.9 % — SIGNIFICANT CHANGE UP (ref 13–44)
MCHC RBC-ENTMCNC: 32.4 PG — SIGNIFICANT CHANGE UP (ref 27–34)
MCHC RBC-ENTMCNC: 33.5 G/DL — SIGNIFICANT CHANGE UP (ref 32–36)
MCV RBC AUTO: 96.7 FL — SIGNIFICANT CHANGE UP (ref 80–100)
MONOCYTES # BLD AUTO: 0.55 K/UL — SIGNIFICANT CHANGE UP (ref 0–0.9)
MONOCYTES NFR BLD AUTO: 8.5 % — SIGNIFICANT CHANGE UP (ref 2–14)
NEUTROPHILS # BLD AUTO: 4.1 K/UL — SIGNIFICANT CHANGE UP (ref 1.8–7.4)
NEUTROPHILS NFR BLD AUTO: 63.6 % — SIGNIFICANT CHANGE UP (ref 43–77)
NRBC # BLD AUTO: 0 K/UL — SIGNIFICANT CHANGE UP (ref 0–0)
NRBC # FLD: 0 K/UL — SIGNIFICANT CHANGE UP (ref 0–0)
NRBC BLD AUTO-RTO: 0 /100 WBCS — SIGNIFICANT CHANGE UP (ref 0–0)
PLATELET # BLD AUTO: 95 K/UL — LOW (ref 150–400)
POTASSIUM SERPL-MCNC: 4.6 MMOL/L — SIGNIFICANT CHANGE UP (ref 3.5–5.3)
POTASSIUM SERPL-SCNC: 4.6 MMOL/L — SIGNIFICANT CHANGE UP (ref 3.5–5.3)
PROT SERPL-MCNC: 6.4 G/DL — SIGNIFICANT CHANGE UP (ref 6–8.3)
PROTHROM AB SERPL-ACNC: 19.1 SEC — HIGH (ref 9.9–13.4)
RBC # BLD: 3.61 M/UL — LOW (ref 4.2–5.8)
RBC # FLD: 15.5 % — HIGH (ref 10.3–14.5)
SODIUM SERPL-SCNC: 137 MMOL/L — SIGNIFICANT CHANGE UP (ref 135–145)
WBC # BLD: 6.44 K/UL — SIGNIFICANT CHANGE UP (ref 3.8–10.5)
WBC # FLD AUTO: 6.44 K/UL — SIGNIFICANT CHANGE UP (ref 3.8–10.5)

## 2025-03-02 PROCEDURE — 99223 1ST HOSP IP/OBS HIGH 75: CPT

## 2025-03-02 RX ORDER — SPIRONOLACTONE 25 MG
25 TABLET ORAL DAILY
Refills: 0 | Status: DISCONTINUED | OUTPATIENT
Start: 2025-03-02 | End: 2025-03-04

## 2025-03-02 RX ORDER — ATORVASTATIN CALCIUM 80 MG/1
80 TABLET, FILM COATED ORAL AT BEDTIME
Refills: 0 | Status: DISCONTINUED | OUTPATIENT
Start: 2025-03-02 | End: 2025-03-04

## 2025-03-02 RX ORDER — ACETAMINOPHEN 500 MG/5ML
650 LIQUID (ML) ORAL EVERY 6 HOURS
Refills: 0 | Status: DISCONTINUED | OUTPATIENT
Start: 2025-03-02 | End: 2025-03-04

## 2025-03-02 RX ORDER — SACUBITRIL AND VALSARTAN 49; 51 MG/1; MG/1
1 TABLET, FILM COATED ORAL
Refills: 0 | Status: DISCONTINUED | OUTPATIENT
Start: 2025-03-02 | End: 2025-03-04

## 2025-03-02 RX ADMIN — Medication 10 MG/HR: at 11:25

## 2025-03-02 RX ADMIN — ATORVASTATIN CALCIUM 80 MILLIGRAM(S): 80 TABLET, FILM COATED ORAL at 21:36

## 2025-03-02 RX ADMIN — Medication 10 MG/HR: at 21:41

## 2025-03-02 NOTE — ED ADULT NURSE REASSESSMENT NOTE - NS ED NURSE REASSESS COMMENT FT1
Pt resting in stretcher, breathing even and unlabored on room air. NAD noted. Pt remains a-fib 40s-50s on monitor. At baseline mentation A&Ox2. On isolation precautions for Covid. Protonix drip infusing at 10ml/hr as ordered. Pt is admitted, pending bed assignment. Safety maintained, care ongoing.

## 2025-03-02 NOTE — H&P ADULT - PROBLEM SELECTOR PLAN 4
- AFib on cardiac monitor  - Low suspicion of bleeding - c/w Eliquis  - Monitor on cardiac monitor - AFib on cardiac monitor  - Low suspicion of bleeding - if no evidence of bleeding 3/3, can restart Eliquis  - Monitor on cardiac monitor

## 2025-03-02 NOTE — H&P ADULT - PROBLEM SELECTOR PLAN 2
- Sent in for evaluation for COVID  - RVP positive  - Patient on room air, no respiratory distress, no hypoxia  - Supportive care  - Monitor respiratory status

## 2025-03-02 NOTE — H&P ADULT - PROBLEM SELECTOR PLAN 5
- C/w clopidogrel  - Trend CBC  - Monitor for any bleeding - If no evidence of bleeding 3/3, can restart Plavix  - Trend CBC  - Monitor for any bleeding

## 2025-03-02 NOTE — H&P ADULT - HISTORY OF PRESENT ILLNESS
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 History acquired from patient but in question given history of dementia. Patient states that he has had bloody bowel movements for one week which he says occurred acutely with no temporally related events. He endorses alternating black stools with brown stools with bright red blood mixed in; he also endorses blood dripping into toilet and bright red blood on the toilet tissue when wiping. He denies taking blood thinners, PeptoBismol, iron, or NSAIDs. He denies a history of hemorrhoids. He also says that he does not really take medications. He stated that he had a colonoscopy 2 years ago and it was normal. No other complaints. Power of  Eladio () contacted, states that patient was sent to ED due to positive COVID test and complaint of bleeding; Eladio also states that it is unlikely that patient's story is accurate given dementia, specifically referencing that patient could not have had colonoscopy 2 years ago given he was placed in assisted living facility 4 years ago.    In the ED:  VS: BP /50-61, HR 51-69, RR 15-20, O2 Sat 97-98, Tmax 98.8  Labs: WBC/Hgb/plts 9.03/12.8/108 --> 6.44/11.7/95. INR 1.91 --> 1.61. BUN/Cr 38/1.2 --> 35/1.04, rest of CMP wnl. RVP notable for +COVID.     Patient given: NaCl 1L bolus, pantoprazole 80 mg x 1 with infusion initiated. 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 History acquired from patient but in question given history of dementia. Patient states that he has had bloody bowel movements for one week which he says occurred acutely with no temporally related events. He endorses alternating black stools with brown stools with bright red blood mixed in; he also endorses blood dripping into toilet and bright red blood on the toilet tissue when wiping. He denies taking blood thinners, PeptoBismol, iron, or NSAIDs. He denies a history of hemorrhoids. He also says that he does not really take medications. He stated that he had a colonoscopy 2 years ago and it was normal. No other complaints. Power of  Eladio () contacted, states that patient was sent to ED due to positive COVID test and complaint of bleeding; Eladio also states that it is unlikely that patient's story is accurate given dementia, specifically referencing that patient could not have had colonoscopy 2 years ago given he was placed in assisted living facility 4 years ago.    In the ED:  VS: BP /50-61, HR 51-69, RR 15-20, O2 Sat 97-98, Tmax 98.8  Labs: WBC/Hgb/plts 9.03/12.8/108 --> 6.44/11.7/95. INR 1.91 --> 1.61. BUN/Cr 38/1.2 --> 35/1.04, rest of CMP wnl. RVP notable for +COVID.     Patient given: NaCl 1L bolus, pantoprazole 80 mg x 1 with infusion initiated. 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 History acquired from patient but in question given history of dementia. Patient states that he has had bloody bowel movements for one week which he says occurred acutely with no temporally related events. He endorses alternating black stools with brown stools with bright red blood mixed in; he also endorses blood dripping into toilet and bright red blood on the toilet tissue when wiping. He denies taking blood thinners, PeptoBismol, iron, or NSAIDs. He denies a history of hemorrhoids. He also says that he does not really take medications. He stated that he had a colonoscopy 2 years ago and it was normal. No other complaints. Power of  Eladio () contacted, states that patient was sent to ED due to positive COVID test and complaint of bleeding; Eladio also states that it is unlikely that patient's story is accurate given dementia, specifically referencing that patient could not have had colonoscopy 2 years ago given he was placed in assisted living facility 4 years ago.    In the ED:  VS: BP /50-61, HR 51-69, RR 15-20, O2 Sat 97-98, Tmax 98.8  Labs: WBC/Hgb/plts 9.03/12.8/108 --> 6.44/11.7/95. INR 1.91 --> 1.61. BUN/Cr 38/1.2 --> 35/1.04, rest of CMP wnl. RVP notable for +COVID.     Patient given: NaCl 1L bolus, pantoprazole 80 mg x 1 with infusion initiated.

## 2025-03-02 NOTE — PATIENT PROFILE ADULT - PRO INTERPRETER NEED 2
Name:   Delroy Bob  Age/ Sex:   32 y.o. female     Procedure:   EEG     Date of Recordin23    Date of Interpretation:    23    Interpreting Physician: Cassandra Sparks MD     Indication:    1. Muscular weakness    2. Weakness    3.  Syncope and collapse        Current Facility-Administered Medications:     pantoprazole (PROTONIX) tablet 40 mg, 40 mg, Oral, QAM AC, Jordy Mcgee MD    Vitamin D (CHOLECALCIFEROL) tablet 1,000 Units, 1,000 Units, Oral, Daily, Staci Vargas MD, 1,000 Units at 23 1138    sodium chloride flush 0.9 % injection 5-40 mL, 5-40 mL, IntraVENous, 2 times per day, Jordy Mcgee MD, 10 mL at 23 1021    sodium chloride flush 0.9 % injection 5-40 mL, 5-40 mL, IntraVENous, PRN, Jordy Mcgee MD    0.9 % sodium chloride infusion, , IntraVENous, PRN, Jordy Mcgee MD    potassium chloride (KLOR-CON) extended release tablet 40 mEq, 40 mEq, Oral, PRN **OR** potassium bicarb-citric acid (EFFER-K) effervescent tablet 40 mEq, 40 mEq, Oral, PRN **OR** potassium chloride 10 mEq/100 mL IVPB (Peripheral Line), 10 mEq, IntraVENous, PRN, Jordy Mcgee MD    magnesium sulfate 2000 mg in 50 mL IVPB premix, 2,000 mg, IntraVENous, PRN, Jordy Mcgee MD    ondansetron (ZOFRAN-ODT) disintegrating tablet 4 mg, 4 mg, Oral, Q8H PRN **OR** ondansetron (ZOFRAN) injection 4 mg, 4 mg, IntraVENous, Q6H PRN, Jordy Mcgee MD, 4 mg at 23 3195    polyethylene glycol (GLYCOLAX) packet 17 g, 17 g, Oral, Daily PRN, Jordy Mcgee MD    acetaminophen (TYLENOL) tablet 650 mg, 650 mg, Oral, Q6H PRN **OR** acetaminophen (TYLENOL) suppository 650 mg, 650 mg, Rectal, Q6H PRN, Jordy Mcgee MD    lactated ringers IV soln infusion, , IntraVENous, Continuous, Jordy Mcgee MD, Last Rate: 75 mL/hr at 23, New Bag at 23    Technical: Digital EEG recorded in 10-20 international placement system, multiple montages    Interpretation:  Patient is English

## 2025-03-02 NOTE — H&P ADULT - PROBLEM SELECTOR PLAN 8
DVT ppx: Eliquis  Disposition: Pending clinical course DVT ppx: Holding Eliquis and Plavix  Disposition: Pending clinical course

## 2025-03-02 NOTE — H&P ADULT - PROBLEM SELECTOR PLAN 3
- TTE 2023 demonstrating Left ventricular systolic function severely decreased with an ejection fraction of 29% with visible LV thrombus, apical segment/apex akinesis, lateral wall, entire inferior wall, basal and mid anterior septum, basal and midinferior septum, and apical anterior segment hypokinesis  - No evidence of fluid overload on examination  - Continue with GDMT - Entresto, spironolactone  - Trend BMP

## 2025-03-02 NOTE — H&P ADULT - ASSESSMENT
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

## 2025-03-02 NOTE — PATIENT PROFILE ADULT - FALL HARM RISK - HARM RISK INTERVENTIONS

## 2025-03-02 NOTE — H&P ADULT - PROBLEM SELECTOR PLAN 1
- Sent in for evaluation for GI bleed (melena and hematochezia)  - On presentation, Hgb 12.8 --> 11.7, suspect hemodilution as all cell lines decreased  - BUN/Cr 38/1.2 --> 35/1.04 - possibly dehydration  - Digital rectal examination demonstrating brown stool with no evidence of bleeding - low suspicion for bleeding at this time  - Trend CBC/BMP  - Hold off on GI consultation at this time  - Can continue with Eliquis  - Monitor stools for any evidence of bleeding - Sent in for evaluation for GI bleed (melena and hematochezia)  - On presentation, Hgb 12.8 --> 11.7, suspect hemodilution as all cell lines decreased (likely in the setting of fluid administration)  - BUN/Cr 38/1.2 --> 35/1.04 - possibly dehydration  - Digital rectal examination demonstrating brown stool with no evidence of bleeding - low suspicion for bleeding at this time  - Trend CBC/BMP  - Hold off on GI consultation at this time  - Can continue with Eliquis  - Monitor stools for any evidence of bleeding - Sent in for evaluation for GI bleed (melena and hematochezia)  - On presentation, Hgb 12.8 --> 11.7, suspect hemodilution as all cell lines decreased (likely in the setting of fluid administration)  - BUN/Cr 38/1.2 --> 35/1.04 - possibly dehydration  - Digital rectal examination demonstrating brown stool with no evidence of bleeding - low suspicion for bleeding at this time  - Trend CBC/BMP  - Hold off on GI consultation at this time  - If no evidence of bleeding 3/3, can restart Plavix and Eliquis  - Monitor stools for any evidence of bleeding

## 2025-03-02 NOTE — H&P ADULT - PROBLEM/PLAN-6
----- Message from Ladi Cline sent at 9/8/2021  1:57 PM EDT -----  Subject: Message to Provider    QUESTIONS  Information for Provider? Pt is requesting a refill for her ALPRAZolam   (XANAX) 0.5 MG tablet. She did not get the refill from her last schedule   VV Pt is also calling about? escitalopram (LEXAPRO) 20 MG tablet her   prescription is for her to take 1.5 day. She only has 30 pill which will   not last for 30 days if taken 1.5 per day. She will need an increase in   the amount of tablets   ---------------------------------------------------------------------------  --------------  CALL BACK INFO  What is the best way for the office to contact you? OK to leave message on   voicemail  Preferred Call Back Phone Number? [phone]  ---------------------------------------------------------------------------  --------------  SCRIPT ANSWERS  Relationship to Patient?  Self DISPLAY PLAN FREE TEXT

## 2025-03-03 LAB
ANION GAP SERPL CALC-SCNC: 12 MMOL/L — SIGNIFICANT CHANGE UP (ref 7–14)
BUN SERPL-MCNC: 29 MG/DL — HIGH (ref 7–23)
CALCIUM SERPL-MCNC: 8.9 MG/DL — SIGNIFICANT CHANGE UP (ref 8.4–10.5)
CHLORIDE SERPL-SCNC: 108 MMOL/L — HIGH (ref 98–107)
CO2 SERPL-SCNC: 16 MMOL/L — LOW (ref 22–31)
CREAT SERPL-MCNC: 0.93 MG/DL — SIGNIFICANT CHANGE UP (ref 0.5–1.3)
EGFR: 81 ML/MIN/1.73M2 — SIGNIFICANT CHANGE UP
EGFR: 81 ML/MIN/1.73M2 — SIGNIFICANT CHANGE UP
GLUCOSE SERPL-MCNC: 71 MG/DL — SIGNIFICANT CHANGE UP (ref 70–99)
HCT VFR BLD CALC: 34.6 % — LOW (ref 39–50)
HGB BLD-MCNC: 11.6 G/DL — LOW (ref 13–17)
MAGNESIUM SERPL-MCNC: 1.6 MG/DL — SIGNIFICANT CHANGE UP (ref 1.6–2.6)
MCHC RBC-ENTMCNC: 32 PG — SIGNIFICANT CHANGE UP (ref 27–34)
MCHC RBC-ENTMCNC: 33.5 G/DL — SIGNIFICANT CHANGE UP (ref 32–36)
MCV RBC AUTO: 95.3 FL — SIGNIFICANT CHANGE UP (ref 80–100)
NRBC # BLD AUTO: 0 K/UL — SIGNIFICANT CHANGE UP (ref 0–0)
NRBC # FLD: 0 K/UL — SIGNIFICANT CHANGE UP (ref 0–0)
NRBC BLD AUTO-RTO: 0 /100 WBCS — SIGNIFICANT CHANGE UP (ref 0–0)
PHOSPHATE SERPL-MCNC: 2.9 MG/DL — SIGNIFICANT CHANGE UP (ref 2.5–4.5)
PLATELET # BLD AUTO: 103 K/UL — LOW (ref 150–400)
POTASSIUM SERPL-MCNC: 4.2 MMOL/L — SIGNIFICANT CHANGE UP (ref 3.5–5.3)
POTASSIUM SERPL-SCNC: 4.2 MMOL/L — SIGNIFICANT CHANGE UP (ref 3.5–5.3)
RBC # BLD: 3.63 M/UL — LOW (ref 4.2–5.8)
RBC # FLD: 15.2 % — HIGH (ref 10.3–14.5)
SODIUM SERPL-SCNC: 136 MMOL/L — SIGNIFICANT CHANGE UP (ref 135–145)
WBC # BLD: 5.12 K/UL — SIGNIFICANT CHANGE UP (ref 3.8–10.5)
WBC # FLD AUTO: 5.12 K/UL — SIGNIFICANT CHANGE UP (ref 3.8–10.5)

## 2025-03-03 PROCEDURE — 99233 SBSQ HOSP IP/OBS HIGH 50: CPT

## 2025-03-03 RX ORDER — CLOPIDOGREL BISULFATE 75 MG/1
75 TABLET, FILM COATED ORAL DAILY
Refills: 0 | Status: DISCONTINUED | OUTPATIENT
Start: 2025-03-03 | End: 2025-03-04

## 2025-03-03 RX ORDER — APIXABAN 2.5 MG/1
5 TABLET, FILM COATED ORAL
Refills: 0 | Status: DISCONTINUED | OUTPATIENT
Start: 2025-03-03 | End: 2025-03-04

## 2025-03-03 RX ADMIN — SACUBITRIL AND VALSARTAN 1 TABLET(S): 49; 51 TABLET, FILM COATED ORAL at 17:11

## 2025-03-03 RX ADMIN — APIXABAN 5 MILLIGRAM(S): 2.5 TABLET, FILM COATED ORAL at 17:11

## 2025-03-03 RX ADMIN — Medication 25 MILLIGRAM(S): at 04:53

## 2025-03-03 RX ADMIN — Medication 40 MILLIGRAM(S): at 17:11

## 2025-03-03 RX ADMIN — ATORVASTATIN CALCIUM 80 MILLIGRAM(S): 80 TABLET, FILM COATED ORAL at 22:06

## 2025-03-03 RX ADMIN — SACUBITRIL AND VALSARTAN 1 TABLET(S): 49; 51 TABLET, FILM COATED ORAL at 04:54

## 2025-03-03 NOTE — PROGRESS NOTE ADULT - PROBLEM SELECTOR PLAN 4
- AFib on cardiac monitor  - no evidence of bleeding 3/3, will restart Eliquis  - Monitor on cardiac monitor

## 2025-03-03 NOTE — PROGRESS NOTE ADULT - PROBLEM SELECTOR PLAN 1
- Sent in for evaluation for possible GI bleed (melena and hematochezia)  - On presentation, Hgb 12.8 --> 11.7, suspect hemodilution as all cell lines decreased (likely in the setting of fluid administration)  - BUN/Cr 38/1.2 --> 35/1.04 - possibly dehydration  - Digital rectal examination demonstrating brown stool with no evidence of bleeding - low suspicion for bleeding at this time  - Trend CBC/BMP  - Hold off on GI consultation at this time  - no evidence of bleeding 3/3, will restart Plavix and Eliquis  - Monitor for any evidence of bleeding

## 2025-03-03 NOTE — PROGRESS NOTE ADULT - ASSESSMENT
Quitting Smoking    Quitting smoking is the most important step you can take to improve your health. We're glad you have set a goal to improve your health.    Quit Smoking Resources    In addition to medications, use the STAR plan to help you successfully quit.   Stick with your quit date!   Tell friends, family, and coworkers your quit date. Request their understanding and support.  Anticipate and prepare for challenges. Some examples are withdrawal symptoms, being around others who smoke, and drinking alcohol.  Remove all tobacco products and paraphernalia from your environment. Make your home and vehicles smoke-free.    Free resources for additional support:  National tobacco quitline: 1-800-QUIT-NOW (1-587.388.4082).  SmokefreeTXT is a free text program to assist you in quitting. Visit https://www.smokefree.gov/smokefreetxt for more information.  Feel free to call your care manager at ([phone]) for additional support.      
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

## 2025-03-03 NOTE — PROGRESS NOTE ADULT - PROBLEM SELECTOR PLAN 2
- Sent in for evaluation for COVID  - RVP positive  - Patient on room air, no respiratory distress, no hypoxia  - Supportive care  - Monitor respiratory status  - dsouza snot need remdes or dex

## 2025-03-03 NOTE — PROGRESS NOTE ADULT - SUBJECTIVE AND OBJECTIVE BOX
[facility] Division of Hospital Medicine  Quinton Wang MD  Pager [phone]    Patient is a 84y old  Male who presents with a chief complaint of GI bleed (02 Mar 2025 15:27)      SUBJECTIVE / OVERNIGHT EVENTS: No evidence bleeding. no abdominal pain      MEDICATIONS  (STANDING):  atorvastatin 80 milliGRAM(s) Oral at bedtime  pantoprazole Infusion 8 mG/Hr (10 mL/Hr) IV Continuous <Continuous>  sacubitril 24 mG/valsartan 26 mG 1 Tablet(s) Oral two times a day  spironolactone 25 milliGRAM(s) Oral daily    MEDICATIONS  (PRN):  acetaminophen     Tablet .. 650 milliGRAM(s) Oral every 6 hours PRN Temp greater or equal to 38C (100.4F), Moderate Pain (4 - 6)      CAPILLARY BLOOD GLUCOSE        I&O's Summary    02 Mar 2025 07:01  -  03 Mar 2025 07:00  --------------------------------------------------------  IN: 460 mL / OUT: 900 mL / NET: -440 mL        PHYSICAL EXAM:  Vital Signs Last 24 Hrs  T(C): 36.7 (03 Mar 2025 12:00), Max: 36.7 (02 Mar 2025 13:52)  T(F): 98 (03 Mar 2025 12:00), Max: 98 (02 Mar 2025 13:52)  HR: 72 (03 Mar 2025 12:00) (58 - 87)  BP: 100/59 (03 Mar 2025 12:00) (100/59 - 113/61)  BP(mean): 74 (02 Mar 2025 15:27) (74 - 74)  RR: 18 (03 Mar 2025 12:00) (16 - 18)  SpO2: 98% (03 Mar 2025 12:00) (96% - 100%)    Parameters below as of 03 Mar 2025 12:00  Patient On (Oxygen Delivery Method): room air      CONSTITUTIONAL: NAD  EYES: EOMI, conjunctiva and sclera clear  ENMT: Moist oral mucosa  NECK: Supple  RESPIRATORY: Breathing unlabored, CTAB  CARDIOVASCULAR: S1S2 no MRG  ABDOMEN: Nontender to palpation, normoactive bowel sounds, no rebound/guarding  MUSCULOSKELETAL: no clubbing or cyanosis of digits  NEUROLOGY: No focal deficits   SKIN: No rashes or lesions    LABS:                        11.6   5.12  )-----------( 103      ( 03 Mar 2025 05:41 )             34.6     03-03    136  |  108[H]  |  29[H]  ----------------------------<  71  4.2   |  16[L]  |  0.93    Ca    8.9      03 Mar 2025 05:41  Phos  2.9     03-03  Mg     1.60     03-03    TPro  6.4  /  Alb  3.8  /  TBili  0.8  /  DBili  x   /  AST  24  /  ALT  17  /  AlkPhos  53  03-02    PT/INR - ( 02 Mar 2025 09:25 )   PT: 19.1 sec;   INR: 1.61 ratio         PTT - ( 01 Mar 2025 18:00 )  PTT:40.0 sec      Urinalysis Basic - ( 03 Mar 2025 05:41 )    Color: x / Appearance: x / SG: x / pH: x  Gluc: 71 mg/dL / Ketone: x  / Bili: x / Urobili: x   Blood: x / Protein: x / Nitrite: x   Leuk Esterase: x / RBC: x / WBC x   Sq Epi: x / Non Sq Epi: x / Bacteria: x

## 2025-03-04 ENCOUNTER — TRANSCRIPTION ENCOUNTER (OUTPATIENT)
Age: 85
End: 2025-03-04

## 2025-03-04 VITALS
TEMPERATURE: 98 F | DIASTOLIC BLOOD PRESSURE: 64 MMHG | SYSTOLIC BLOOD PRESSURE: 104 MMHG | HEART RATE: 58 BPM | OXYGEN SATURATION: 97 % | RESPIRATION RATE: 17 BRPM

## 2025-03-04 LAB
ANION GAP SERPL CALC-SCNC: 14 MMOL/L — SIGNIFICANT CHANGE UP (ref 7–14)
BUN SERPL-MCNC: 27 MG/DL — HIGH (ref 7–23)
CALCIUM SERPL-MCNC: 9.1 MG/DL — SIGNIFICANT CHANGE UP (ref 8.4–10.5)
CHLORIDE SERPL-SCNC: 108 MMOL/L — HIGH (ref 98–107)
CO2 SERPL-SCNC: 14 MMOL/L — LOW (ref 22–31)
CREAT SERPL-MCNC: 1.13 MG/DL — SIGNIFICANT CHANGE UP (ref 0.5–1.3)
EGFR: 64 ML/MIN/1.73M2 — SIGNIFICANT CHANGE UP
EGFR: 64 ML/MIN/1.73M2 — SIGNIFICANT CHANGE UP
GLUCOSE SERPL-MCNC: 78 MG/DL — SIGNIFICANT CHANGE UP (ref 70–99)
HCT VFR BLD CALC: 37.1 % — LOW (ref 39–50)
HGB BLD-MCNC: 12.7 G/DL — LOW (ref 13–17)
MAGNESIUM SERPL-MCNC: 1.6 MG/DL — SIGNIFICANT CHANGE UP (ref 1.6–2.6)
MCHC RBC-ENTMCNC: 32.1 PG — SIGNIFICANT CHANGE UP (ref 27–34)
MCHC RBC-ENTMCNC: 34.2 G/DL — SIGNIFICANT CHANGE UP (ref 32–36)
MCV RBC AUTO: 93.7 FL — SIGNIFICANT CHANGE UP (ref 80–100)
NRBC # BLD AUTO: 0 K/UL — SIGNIFICANT CHANGE UP (ref 0–0)
NRBC # FLD: 0 K/UL — SIGNIFICANT CHANGE UP (ref 0–0)
NRBC BLD AUTO-RTO: 0 /100 WBCS — SIGNIFICANT CHANGE UP (ref 0–0)
PHOSPHATE SERPL-MCNC: 2.9 MG/DL — SIGNIFICANT CHANGE UP (ref 2.5–4.5)
PLATELET # BLD AUTO: 128 K/UL — LOW (ref 150–400)
POTASSIUM SERPL-MCNC: 4.2 MMOL/L — SIGNIFICANT CHANGE UP (ref 3.5–5.3)
POTASSIUM SERPL-SCNC: 4.2 MMOL/L — SIGNIFICANT CHANGE UP (ref 3.5–5.3)
RBC # BLD: 3.96 M/UL — LOW (ref 4.2–5.8)
RBC # FLD: 14.6 % — HIGH (ref 10.3–14.5)
SODIUM SERPL-SCNC: 136 MMOL/L — SIGNIFICANT CHANGE UP (ref 135–145)
WBC # BLD: 5.85 K/UL — SIGNIFICANT CHANGE UP (ref 3.8–10.5)
WBC # FLD AUTO: 5.85 K/UL — SIGNIFICANT CHANGE UP (ref 3.8–10.5)

## 2025-03-04 PROCEDURE — 99239 HOSP IP/OBS DSCHRG MGMT >30: CPT

## 2025-03-04 RX ORDER — ACETAMINOPHEN 500 MG/5ML
2 LIQUID (ML) ORAL
Qty: 0 | Refills: 0 | DISCHARGE
Start: 2025-03-04

## 2025-03-04 RX ADMIN — Medication 25 MILLIGRAM(S): at 05:57

## 2025-03-04 RX ADMIN — APIXABAN 5 MILLIGRAM(S): 2.5 TABLET, FILM COATED ORAL at 05:56

## 2025-03-04 RX ADMIN — SACUBITRIL AND VALSARTAN 1 TABLET(S): 49; 51 TABLET, FILM COATED ORAL at 05:56

## 2025-03-04 RX ADMIN — Medication 40 MILLIGRAM(S): at 05:57

## 2025-03-04 RX ADMIN — APIXABAN 5 MILLIGRAM(S): 2.5 TABLET, FILM COATED ORAL at 17:50

## 2025-03-04 RX ADMIN — Medication 40 MILLIGRAM(S): at 17:50

## 2025-03-04 RX ADMIN — SACUBITRIL AND VALSARTAN 1 TABLET(S): 49; 51 TABLET, FILM COATED ORAL at 17:49

## 2025-03-04 RX ADMIN — CLOPIDOGREL BISULFATE 75 MILLIGRAM(S): 75 TABLET, FILM COATED ORAL at 11:58

## 2025-03-04 NOTE — DISCHARGE NOTE NURSING/CASE MANAGEMENT/SOCIAL WORK - NSDCPEFALRISK_GEN_ALL_CORE
For information on Fall & Injury Prevention, visit: [email or web address] OR  [email or web address] OR  https://www.cdc.gov/steadi/patient.html

## 2025-03-04 NOTE — DISCHARGE NOTE NURSING/CASE MANAGEMENT/SOCIAL WORK - PATIENT PORTAL LINK FT
You can access the FollowMyHealth Patient Portal offered by [facility] by registering at the following website: [email or web address]. By joining [facility]’s FollowMyHealth portal, you will also be able to view your health information using other applications (apps) compatible with our system.

## 2025-03-04 NOTE — DISCHARGE NOTE NURSING/CASE MANAGEMENT/SOCIAL WORK - NSDCVIVACCINE_GEN_ALL_CORE_FT
influenza, high-dose, quadrivalent; 07-Sep-2023 14:53; Akilah Garcia (ERNESTO); [employer]; [id number] (Exp. Date: 07-Jun-2024); IntraMuscular; Deltoid Right.; 0.7 milliLiter(s); VIS (VIS Published: 06-Aug-2021, VIS Presented: 07-Sep-2023);

## 2025-03-04 NOTE — DISCHARGE NOTE PROVIDER - NSDCMRMEDTOKEN_GEN_ALL_CORE_FT
acetaminophen 325 mg oral tablet: 2 tab(s) orally every 6 hours As needed Temp greater or equal to 38C (100.4F), Mild Pain (1 - 3)  Aldactone 25 mg oral tablet: 1 tab(s) orally once a day  apixaban 5 mg oral tablet: 1 tab(s) orally every 12 hours  atorvastatin 80 mg oral tablet: 1 tab(s) orally once a day (at bedtime)  clopidogrel 75 mg oral tablet: 1 tab(s) orally once a day  sacubitril-valsartan 24 mg-26 mg oral tablet: 1 tab(s) orally 2 times a day   acetaminophen 325 mg oral tablet: 2 tab(s) orally every 6 hours As needed Temp greater or equal to 38C (100.4F), Moderate Pain (4 - 6)  Aldactone 25 mg oral tablet: 1 tab(s) orally once a day  apixaban 5 mg oral tablet: 1 tab(s) orally every 12 hours  atorvastatin 80 mg oral tablet: 1 tab(s) orally once a day (at bedtime)  clopidogrel 75 mg oral tablet: 1 tab(s) orally once a day  sacubitril-valsartan 24 mg-26 mg oral tablet: 1 tab(s) orally 2 times a day   acetaminophen 325 mg oral tablet: 2 tab(s) orally every 6 hours As needed Temp greater or equal to 38C (100.4F), Moderate Pain (4 - 6)  Aldactone 25 mg oral tablet: 1 tab(s) orally once a day  apixaban 5 mg oral tablet: 1 tab(s) orally every 12 hours  atorvastatin 80 mg oral tablet: 1 tab(s) orally once a day (at bedtime)  clopidogrel 75 mg oral tablet: 1 tab(s) orally once a day  pantoprazole 40 mg oral delayed release tablet: 1 tab(s) orally once a day  sacubitril-valsartan 24 mg-26 mg oral tablet: 1 tab(s) orally 2 times a day

## 2025-03-04 NOTE — DISCHARGE NOTE PROVIDER - ATTENDING DISCHARGE PHYSICAL EXAMINATION:
Vital Signs Last 24 Hrs  T(C): 36.8 (04 Mar 2025 04:00), Max: 36.8 (04 Mar 2025 04:00)  T(F): 98.2 (04 Mar 2025 04:00), Max: 98.2 (04 Mar 2025 04:00)  HR: 70 (04 Mar 2025 04:00) (70 - 74)  BP: 117/62 (04 Mar 2025 04:00) (100/59 - 117/62)  BP(mean): --  RR: 18 (04 Mar 2025 04:00) (18 - 18)  SpO2: 98% (04 Mar 2025 04:00) (98% - 100%)    Parameters below as of 04 Mar 2025 04:00  Patient On (Oxygen Delivery Method): room air  GENERAL: NAD  EYES: EOMI, conjunctiva and sclera clear  NECK: Supple, No JVD  CHEST/LUNG: Clear to auscultation bilaterally; No wheeze  HEART: Regular rate and rhythm; No murmurs, rubs, or gallops  ABDOMEN: Soft, Nontender, Nondistended; Bowel sounds present  EXTREMITIES:  2+ Peripheral Pulses, No clubbing, cyanosis, or edema  NEUROLOGY: non-focal  SKIN: No rashes or lesions

## 2025-03-04 NOTE — DISCHARGE NOTE NURSING/CASE MANAGEMENT/SOCIAL WORK - FINANCIAL ASSISTANCE
[facility] provides services at a reduced cost to those who are determined to be eligible through [facility]’s financial assistance program. Information regarding [facility]’s financial assistance program can be found by going to [email or web address] or by calling [phone].

## 2025-03-04 NOTE — DISCHARGE NOTE PROVIDER - NSDCFUADDAPPT_GEN_ALL_CORE_FT
APPTS ARE READY TO BE MADE: [x] YES    Best Family or Patient Contact (if needed):    Additional Information about above appointments (if needed):    1: Johanna Lynch telemedecine follow up  2:   3:     Other comments or requests:    APPTS ARE READY TO BE MADE: [x] YES    Best Family or Patient Contact (if needed):    Additional Information about above appointments (if needed):    1: Johanna brunsonedecine follow up  2:   3:     Other comments or requests:     Patient's PAU Mcclain refused to provide their date-of-birth; unable to proceed with referral information.

## 2025-03-04 NOTE — DISCHARGE NOTE PROVIDER - HOSPITAL COURSE
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 History acquired from patient but in question given history of dementia. Patient states that he has had bloody bowel movements for one week which he says occurred acutely with no temporally related events. He endorses alternating black stools with brown stools with bright red blood mixed in; he also endorses blood dripping into toilet and bright red blood on the toilet tissue when wiping. He denies taking blood thinners, PeptoBismol, iron, or NSAIDs. He denies a history of hemorrhoids. He also says that he does not really take medications. He stated that he had a colonoscopy 2 years ago and it was normal. No other complaints. Power of  Eladio () contacted, states that patient was sent to ED due to positive COVID test and complaint of bleeding; Eladio also states that it is unlikely that patient's story is accurate given dementia, specifically referencing that patient could not have had colonoscopy 2 years ago given he was placed in assisted living facility 4 years ago.    In the ED:  VS: BP /50-61, HR 51-69, RR 15-20, O2 Sat 97-98, Tmax 98.8  Labs: WBC/Hgb/plts 9.03/12.8/108 --> 6.44/11.7/95. INR 1.91 --> 1.61. BUN/Cr 38/1.2 --> 35/1.04, rest of CMP wnl. RVP notable for +COVID.     Patient given: NaCl 1L bolus, pantoprazole 80 mg x 1 with infusion initiated.    Hopsital Course: patient was placed on PPI and monitored, Hb remained stable and there was no evidence of GO bleeding.  COVID was asymptomatic and did not require treatment.  He was stable for discharge on 3/4/2025

## 2025-03-04 NOTE — DISCHARGE NOTE PROVIDER - CARE PROVIDER_API CALL
Brent Dior  Geriatric Medicine  [address] [place] [address], [address]  [place] [address]  Phone: [phone]  Fax: [phone]  Follow Up Time:

## 2025-03-04 NOTE — DISCHARGE NOTE NURSING/CASE MANAGEMENT/SOCIAL WORK - NSDCFUADDAPPT_GEN_ALL_CORE_FT
APPTS ARE READY TO BE MADE: [x] YES    Best Family or Patient Contact (if needed):    Additional Information about above appointments (if needed):    1: Johanna Lynch telemedecine follow up  2:   3:     Other comments or requests:

## 2025-03-04 NOTE — DISCHARGE NOTE PROVIDER - NSDCCPCAREPLAN_GEN_ALL_CORE_FT
PRINCIPAL DISCHARGE DIAGNOSIS  Diagnosis: Dark stools  Assessment and Plan of Treatment: You were monitored and there was no evidence of GI bleeding.  Hemoglobin remained stable. Continue to take protonix for GI protection.      SECONDARY DISCHARGE DIAGNOSES  Diagnosis: COVID-19  Assessment and Plan of Treatment: You tested poitive for COVI virus but had no symptoms.  Treatment was not required.  CDC recommends that you wear a mask when around others for 10 days since you tested positive on 3/2/25

## 2025-04-02 PROBLEM — F43.20 GRIEF REACTION: Status: ACTIVE | Noted: 2023-12-05

## 2025-05-06 ENCOUNTER — INPATIENT (INPATIENT)
Facility: HOSPITAL | Age: 85
LOS: 5 days | Discharge: NURSING, REHAB OR LONG-TERM CARE | End: 2025-05-12
Attending: STUDENT IN AN ORGANIZED HEALTH CARE EDUCATION/TRAINING PROGRAM | Admitting: STUDENT IN AN ORGANIZED HEALTH CARE EDUCATION/TRAINING PROGRAM
Payer: MEDICARE

## 2025-05-06 VITALS
HEART RATE: 69 BPM | OXYGEN SATURATION: 98 % | SYSTOLIC BLOOD PRESSURE: 101 MMHG | RESPIRATION RATE: 22 BRPM | HEIGHT: 69 IN | WEIGHT: 149.91 LBS | DIASTOLIC BLOOD PRESSURE: 62 MMHG | TEMPERATURE: 98 F

## 2025-05-06 LAB
ALBUMIN SERPL ELPH-MCNC: 4.2 G/DL — SIGNIFICANT CHANGE UP (ref 3.3–5)
ALP SERPL-CCNC: 87 U/L — SIGNIFICANT CHANGE UP (ref 40–120)
ALT FLD-CCNC: 9 U/L — SIGNIFICANT CHANGE UP (ref 4–41)
ANION GAP SERPL CALC-SCNC: 13 MMOL/L — SIGNIFICANT CHANGE UP (ref 7–14)
AST SERPL-CCNC: 14 U/L — SIGNIFICANT CHANGE UP (ref 4–40)
BASOPHILS # BLD AUTO: 0.05 K/UL — SIGNIFICANT CHANGE UP (ref 0–0.2)
BASOPHILS NFR BLD AUTO: 0.6 % — SIGNIFICANT CHANGE UP (ref 0–2)
BILIRUB SERPL-MCNC: 1.2 MG/DL — SIGNIFICANT CHANGE UP (ref 0.2–1.2)
BLOOD GAS VENOUS COMPREHENSIVE RESULT: SIGNIFICANT CHANGE UP
BUN SERPL-MCNC: 42 MG/DL — HIGH (ref 7–23)
CALCIUM SERPL-MCNC: 9.5 MG/DL — SIGNIFICANT CHANGE UP (ref 8.4–10.5)
CHLORIDE SERPL-SCNC: 107 MMOL/L — SIGNIFICANT CHANGE UP (ref 98–107)
CO2 SERPL-SCNC: 19 MMOL/L — LOW (ref 22–31)
CREAT SERPL-MCNC: 1.48 MG/DL — HIGH (ref 0.5–1.3)
EGFR: 46 ML/MIN/1.73M2 — LOW
EGFR: 46 ML/MIN/1.73M2 — LOW
EOSINOPHIL # BLD AUTO: 0.06 K/UL — SIGNIFICANT CHANGE UP (ref 0–0.5)
EOSINOPHIL NFR BLD AUTO: 0.7 % — SIGNIFICANT CHANGE UP (ref 0–6)
FLUAV AG NPH QL: SIGNIFICANT CHANGE UP
FLUBV AG NPH QL: SIGNIFICANT CHANGE UP
GLUCOSE SERPL-MCNC: 101 MG/DL — HIGH (ref 70–99)
HCT VFR BLD CALC: 34.5 % — LOW (ref 39–50)
HGB BLD-MCNC: 11 G/DL — LOW (ref 13–17)
IANC: 6.26 K/UL — SIGNIFICANT CHANGE UP (ref 1.8–7.4)
IMM GRANULOCYTES NFR BLD AUTO: 0.3 % — SIGNIFICANT CHANGE UP (ref 0–0.9)
LIDOCAIN IGE QN: 46 U/L — SIGNIFICANT CHANGE UP (ref 7–60)
LYMPHOCYTES # BLD AUTO: 1.78 K/UL — SIGNIFICANT CHANGE UP (ref 1–3.3)
LYMPHOCYTES # BLD AUTO: 20.2 % — SIGNIFICANT CHANGE UP (ref 13–44)
MAGNESIUM SERPL-MCNC: 2.2 MG/DL — SIGNIFICANT CHANGE UP (ref 1.6–2.6)
MCHC RBC-ENTMCNC: 31.5 PG — SIGNIFICANT CHANGE UP (ref 27–34)
MCHC RBC-ENTMCNC: 31.9 G/DL — LOW (ref 32–36)
MCV RBC AUTO: 98.9 FL — SIGNIFICANT CHANGE UP (ref 80–100)
MONOCYTES # BLD AUTO: 0.64 K/UL — SIGNIFICANT CHANGE UP (ref 0–0.9)
MONOCYTES NFR BLD AUTO: 7.3 % — SIGNIFICANT CHANGE UP (ref 2–14)
NEUTROPHILS # BLD AUTO: 6.26 K/UL — SIGNIFICANT CHANGE UP (ref 1.8–7.4)
NEUTROPHILS NFR BLD AUTO: 70.9 % — SIGNIFICANT CHANGE UP (ref 43–77)
NRBC # BLD AUTO: 0 K/UL — SIGNIFICANT CHANGE UP (ref 0–0)
NRBC # FLD: 0 K/UL — SIGNIFICANT CHANGE UP (ref 0–0)
NRBC BLD AUTO-RTO: 0 /100 WBCS — SIGNIFICANT CHANGE UP (ref 0–0)
NT-PROBNP SERPL-SCNC: HIGH PG/ML
PHOSPHATE SERPL-MCNC: 4 MG/DL — SIGNIFICANT CHANGE UP (ref 2.5–4.5)
PLATELET # BLD AUTO: 177 K/UL — SIGNIFICANT CHANGE UP (ref 150–400)
POTASSIUM SERPL-MCNC: 5.3 MMOL/L — SIGNIFICANT CHANGE UP (ref 3.5–5.3)
POTASSIUM SERPL-SCNC: 5.3 MMOL/L — SIGNIFICANT CHANGE UP (ref 3.5–5.3)
PROT SERPL-MCNC: 7 G/DL — SIGNIFICANT CHANGE UP (ref 6–8.3)
RBC # BLD: 3.49 M/UL — LOW (ref 4.2–5.8)
RBC # FLD: 17 % — HIGH (ref 10.3–14.5)
RSV RNA NPH QL NAA+NON-PROBE: SIGNIFICANT CHANGE UP
SARS-COV-2 RNA SPEC QL NAA+PROBE: SIGNIFICANT CHANGE UP
SODIUM SERPL-SCNC: 139 MMOL/L — SIGNIFICANT CHANGE UP (ref 135–145)
SOURCE RESPIRATORY: SIGNIFICANT CHANGE UP
TROPONIN T, HIGH SENSITIVITY RESULT: 61 NG/L — CRITICAL HIGH
TSH SERPL-MCNC: 1.22 UIU/ML — SIGNIFICANT CHANGE UP (ref 0.27–4.2)
WBC # BLD: 8.82 K/UL — SIGNIFICANT CHANGE UP (ref 3.8–10.5)
WBC # FLD AUTO: 8.82 K/UL — SIGNIFICANT CHANGE UP (ref 3.8–10.5)

## 2025-05-06 PROCEDURE — 99285 EMERGENCY DEPT VISIT HI MDM: CPT

## 2025-05-06 PROCEDURE — 71045 X-RAY EXAM CHEST 1 VIEW: CPT | Mod: 26

## 2025-05-06 RX ORDER — FUROSEMIDE 10 MG/ML
40 INJECTION INTRAMUSCULAR; INTRAVENOUS DAILY
Refills: 0 | Status: DISCONTINUED | OUTPATIENT
Start: 2025-05-06 | End: 2025-05-07

## 2025-05-06 NOTE — ED PROVIDER NOTE - ATTENDING CONTRIBUTION TO CARE
Brief HPI:  84 year old M pmh bipolar disorder, dementia (AAOx2 at baseline), CAD, CHF, LV thrombus, afib on eliquis, recent hospitalization for GI bleed and covid presents with adult son for difficulty breathing.  Patient unable to provide full history, denies complaints.  Per son, he states that patient has appeared to have difficulty breathing for several days.  No reported fever, chills, complaints of chest pain, nausea, vomiting, diarrhea, bloody or dark stool.  No reported trauma.  No history of pulmonary embolism.     Vitals:   Reviewed    Exam:    GEN:  Non-toxic appearing, non-distressed, speaking full sentences, non-diaphoretic, AAOx3  HEENT:  NCAT, neck supple, EOMI, PERRLA, sclera anicteric, no conjunctival pallor or injection, no stridor, normal voice, no tonsillar exudate, uvula midline  CV:  regular rhythm and rate, s1/s2 audible, no murmurs, rubs or gallops, peripheral pulses 2+ and symmetric  PULM:  non-labored respirations, lungs clear to auscultation bilaterally, no wheezes, crackles or rales  ABD:  non distended, non-tender, no rebound, no guarding, negative Russo's sign, bowel sounds normal, no cvat  MSK:  no gross deformity, non-tender extremities and joints, range of motion grossly normal appearing, no extremity edema, extremities warm and well perfused   NEURO:  AAOx3, CN II-XII intact, motor 5/5 in upper and lower extremities bilaterally, sensation grossly intact in extremities and trunk, finger to nose testing wnl, no nystagmus, negative Romberg, no pronator drift, no gait deficit  SKIN:  warm, dry, no rash or vesicles     A/P: Brief HPI:  84 year old M pmh bipolar disorder, dementia (AAOx2 at baseline), CAD, CHF, LV thrombus, afib on eliquis, recent hospitalization for GI bleed and covid presents with adult son for difficulty breathing.  Patient unable to provide full history, denies complaints.  Per son, he states that patient has appeared to have difficulty breathing for several days.  Son also reporting patient acting slightly more confused than usual.  No reported fever, chills, complaints of chest pain, nausea, vomiting, diarrhea, bloody or dark stool.  No reported trauma.  No history of pulmonary embolism.     Vitals:   Reviewed    Exam:    GEN:  Non-toxic appearing, non-distressed, speaking full sentences, non-diaphoretic, alert, oriented to person  HEENT:  NCAT, neck supple, EOMI, PERRLA, sclera anicteric, no conjunctival pallor or injection, no stridor, normal voice, no tonsillar exudate, uvula midline  CV:  irregular, s1/s2 audible, no murmurs, rubs or gallops, peripheral pulses 2+ and symmetric  PULM:  non-labored respirations, diminished breath sounds bilaterally   ABD:  non distended, non-tender, no rebound, no guarding, negative Russo's sign, bowel sounds normal, no cvat  MSK:  no gross deformity, non-tender extremities and joints, range of motion grossly normal appearing, no extremity edema, extremities warm and well perfused   NEURO:  CN II-XII intact, motor 5/5 in upper and lower extremities bilaterally, sensation grossly intact in extremities and trunk, finger to nose testing wnl  SKIN:  ecchymoses to b/l arms and hands     A/P:  84 year old M pmh bipolar disorder, dementia (AAOx2 at baseline), CAD, CHF, LV thrombus, afib on eliquis, recent hospitalization for GI bleed and covid presents with adult son for difficulty breathing.   VSS.  No hypoxemia.  Ekg afib with lbbb, non-ischemic.  Diminished breath sounds but no respiratory distress.  Possible chf.  PE considered however patient is on a/c.  Will obtain labs, cxr, ctpe and ct head.  Disposition pending.

## 2025-05-06 NOTE — ED ADULT NURSE NOTE - OBJECTIVE STATEMENT
Pt arrives to ED 1A A&Ox2-3, PMHx of dementia, DM, HTN, HLD. Pt C/O SOB x 2-3 days. Denies CP, fever, cough. Respirations are even and unlabored, capillary refill is 3 seconds bilaterally. 20G IV placed in left AC, labs drawn and sent. Bed in lowest position, safety maintained.

## 2025-05-06 NOTE — ED ADULT TRIAGE NOTE - CHIEF COMPLAINT QUOTE
Patient c/o SOB starting today. Denies fever, cough, chest pain. No signs of respiratory distress. Phx dementia, HTN, HLD

## 2025-05-07 ENCOUNTER — RESULT REVIEW (OUTPATIENT)
Age: 85
End: 2025-05-07

## 2025-05-07 DIAGNOSIS — D53.9 NUTRITIONAL ANEMIA, UNSPECIFIED: ICD-10-CM

## 2025-05-07 DIAGNOSIS — I50.23 ACUTE ON CHRONIC SYSTOLIC (CONGESTIVE) HEART FAILURE: ICD-10-CM

## 2025-05-07 DIAGNOSIS — I48.20 CHRONIC ATRIAL FIBRILLATION, UNSPECIFIED: ICD-10-CM

## 2025-05-07 DIAGNOSIS — N17.9 ACUTE KIDNEY FAILURE, UNSPECIFIED: ICD-10-CM

## 2025-05-07 DIAGNOSIS — J96.01 ACUTE RESPIRATORY FAILURE WITH HYPOXIA: ICD-10-CM

## 2025-05-07 DIAGNOSIS — I10 ESSENTIAL (PRIMARY) HYPERTENSION: ICD-10-CM

## 2025-05-07 DIAGNOSIS — R79.89 OTHER SPECIFIED ABNORMAL FINDINGS OF BLOOD CHEMISTRY: ICD-10-CM

## 2025-05-07 DIAGNOSIS — I25.10 ATHEROSCLEROTIC HEART DISEASE OF NATIVE CORONARY ARTERY WITHOUT ANGINA PECTORIS: ICD-10-CM

## 2025-05-07 DIAGNOSIS — E78.5 HYPERLIPIDEMIA, UNSPECIFIED: ICD-10-CM

## 2025-05-07 DIAGNOSIS — I50.9 HEART FAILURE, UNSPECIFIED: ICD-10-CM

## 2025-05-07 DIAGNOSIS — Z29.9 ENCOUNTER FOR PROPHYLACTIC MEASURES, UNSPECIFIED: ICD-10-CM

## 2025-05-07 DIAGNOSIS — R63.8 OTHER SYMPTOMS AND SIGNS CONCERNING FOOD AND FLUID INTAKE: ICD-10-CM

## 2025-05-07 LAB
APPEARANCE UR: CLEAR — SIGNIFICANT CHANGE UP
BILIRUB UR-MCNC: NEGATIVE — SIGNIFICANT CHANGE UP
COLOR SPEC: YELLOW — SIGNIFICANT CHANGE UP
CREAT ?TM UR-MCNC: 29 MG/DL — SIGNIFICANT CHANGE UP
DIFF PNL FLD: NEGATIVE — SIGNIFICANT CHANGE UP
GLUCOSE BLDC GLUCOMTR-MCNC: 104 MG/DL — HIGH (ref 70–99)
GLUCOSE UR QL: NEGATIVE MG/DL — SIGNIFICANT CHANGE UP
KETONES UR-MCNC: NEGATIVE MG/DL — SIGNIFICANT CHANGE UP
LEUKOCYTE ESTERASE UR-ACNC: NEGATIVE — SIGNIFICANT CHANGE UP
NITRITE UR-MCNC: NEGATIVE — SIGNIFICANT CHANGE UP
OSMOLALITY UR: 369 MOSM/KG — SIGNIFICANT CHANGE UP (ref 50–1200)
PH UR: 6 — SIGNIFICANT CHANGE UP (ref 5–8)
POTASSIUM UR-SCNC: 18.6 MMOL/L — SIGNIFICANT CHANGE UP
PROT ?TM UR-MCNC: 9 MG/DL — SIGNIFICANT CHANGE UP
PROT UR-MCNC: NEGATIVE MG/DL — SIGNIFICANT CHANGE UP
PROT/CREAT UR-RTO: 0.3 RATIO — HIGH (ref 0–0.2)
SODIUM UR-SCNC: 105 MMOL/L — SIGNIFICANT CHANGE UP
SP GR SPEC: 1.02 — SIGNIFICANT CHANGE UP (ref 1–1.03)
TROPONIN T, HIGH SENSITIVITY RESULT: 59 NG/L — CRITICAL HIGH
UROBILINOGEN FLD QL: 1 MG/DL — SIGNIFICANT CHANGE UP (ref 0.2–1)
UUN UR-MCNC: 262.5 MG/DL — SIGNIFICANT CHANGE UP

## 2025-05-07 PROCEDURE — 76376 3D RENDER W/INTRP POSTPROCES: CPT | Mod: 26

## 2025-05-07 PROCEDURE — 71275 CT ANGIOGRAPHY CHEST: CPT | Mod: 26

## 2025-05-07 PROCEDURE — 70450 CT HEAD/BRAIN W/O DYE: CPT | Mod: 26

## 2025-05-07 PROCEDURE — 93306 TTE W/DOPPLER COMPLETE: CPT | Mod: 26

## 2025-05-07 PROCEDURE — 99223 1ST HOSP IP/OBS HIGH 75: CPT | Mod: AI

## 2025-05-07 RX ORDER — FUROSEMIDE 10 MG/ML
20 INJECTION INTRAMUSCULAR; INTRAVENOUS
Refills: 0 | Status: DISCONTINUED | OUTPATIENT
Start: 2025-05-07 | End: 2025-05-08

## 2025-05-07 RX ORDER — CLOPIDOGREL BISULFATE 75 MG/1
75 TABLET, FILM COATED ORAL DAILY
Refills: 0 | Status: DISCONTINUED | OUTPATIENT
Start: 2025-05-07 | End: 2025-05-12

## 2025-05-07 RX ORDER — FUROSEMIDE 10 MG/ML
40 INJECTION INTRAMUSCULAR; INTRAVENOUS ONCE
Refills: 0 | Status: DISCONTINUED | OUTPATIENT
Start: 2025-05-07 | End: 2025-05-07

## 2025-05-07 RX ORDER — APIXABAN 2.5 MG/1
5 TABLET, FILM COATED ORAL
Refills: 0 | Status: DISCONTINUED | OUTPATIENT
Start: 2025-05-07 | End: 2025-05-12

## 2025-05-07 RX ORDER — FUROSEMIDE 10 MG/ML
20 INJECTION INTRAMUSCULAR; INTRAVENOUS ONCE
Refills: 0 | Status: COMPLETED | OUTPATIENT
Start: 2025-05-07 | End: 2025-05-07

## 2025-05-07 RX ORDER — ATORVASTATIN CALCIUM 80 MG/1
80 TABLET, FILM COATED ORAL AT BEDTIME
Refills: 0 | Status: DISCONTINUED | OUTPATIENT
Start: 2025-05-07 | End: 2025-05-12

## 2025-05-07 RX ORDER — ACETAMINOPHEN 500 MG/5ML
650 LIQUID (ML) ORAL EVERY 6 HOURS
Refills: 0 | Status: DISCONTINUED | OUTPATIENT
Start: 2025-05-07 | End: 2025-05-12

## 2025-05-07 RX ORDER — IPRATROPIUM BROMIDE AND ALBUTEROL SULFATE .5; 2.5 MG/3ML; MG/3ML
3 SOLUTION RESPIRATORY (INHALATION) EVERY 6 HOURS
Refills: 0 | Status: DISCONTINUED | OUTPATIENT
Start: 2025-05-07 | End: 2025-05-08

## 2025-05-07 RX ADMIN — Medication 1 APPLICATION(S): at 08:32

## 2025-05-07 RX ADMIN — IPRATROPIUM BROMIDE AND ALBUTEROL SULFATE 3 MILLILITER(S): .5; 2.5 SOLUTION RESPIRATORY (INHALATION) at 20:34

## 2025-05-07 RX ADMIN — CLOPIDOGREL BISULFATE 75 MILLIGRAM(S): 75 TABLET, FILM COATED ORAL at 11:04

## 2025-05-07 RX ADMIN — IPRATROPIUM BROMIDE AND ALBUTEROL SULFATE 3 MILLILITER(S): .5; 2.5 SOLUTION RESPIRATORY (INHALATION) at 20:43

## 2025-05-07 RX ADMIN — ATORVASTATIN CALCIUM 80 MILLIGRAM(S): 80 TABLET, FILM COATED ORAL at 21:51

## 2025-05-07 RX ADMIN — FUROSEMIDE 20 MILLIGRAM(S): 10 INJECTION INTRAMUSCULAR; INTRAVENOUS at 03:20

## 2025-05-07 RX ADMIN — FUROSEMIDE 20 MILLIGRAM(S): 10 INJECTION INTRAMUSCULAR; INTRAVENOUS at 14:09

## 2025-05-07 RX ADMIN — FUROSEMIDE 40 MILLIGRAM(S): 10 INJECTION INTRAMUSCULAR; INTRAVENOUS at 00:57

## 2025-05-07 RX ADMIN — APIXABAN 5 MILLIGRAM(S): 2.5 TABLET, FILM COATED ORAL at 10:35

## 2025-05-07 RX ADMIN — APIXABAN 5 MILLIGRAM(S): 2.5 TABLET, FILM COATED ORAL at 21:51

## 2025-05-07 NOTE — PHYSICAL THERAPY INITIAL EVALUATION ADULT - GENERAL OBSERVATIONS, REHAB EVAL
Consult received, chart reviewed. Patient received in bed, no apparent distress, +tele, +pulse ox, +NC. Pt. agreeable to participate in PT.

## 2025-05-07 NOTE — PATIENT PROFILE ADULT - FALL HARM RISK - RISK INTERVENTIONS

## 2025-05-07 NOTE — H&P ADULT - NSHPPOADEEPVENOUSTHROMB_GEN_A_CORE
Is This A New Presentation, Or A Follow-Up?: Skin Lesion How Severe Is Your Skin Lesion?: mild Has Your Skin Lesion Been Treated?: not been treated no

## 2025-05-07 NOTE — H&P ADULT - PROBLEM SELECTOR PLAN 1
- Hx of CAD and HFrEF (29%) p/w sob.   - CXR showing cardiomegaly with pulm edema  - CT chest showing Cardiomegaly with secondary signs of congestive heart failure, with associated pulmonary interstitial edema and bilateral pleural effusions  - TTE showing global left ventricular hypokinesis and increased LV mass and eccentric hypertrophy with LVEF 30%, enlarged right ventricular cavity size and reduced right ventricular systolic function  - BNP 92296  - now on 3L NC  - at home is on Aldactone 25 and sacubitril-valsartan 24-26 BID    - c/w lasix 20 IV BID  - cards c/s: appreciate recs  - strict I/Os  - closely monitor volume status

## 2025-05-07 NOTE — PHYSICAL THERAPY INITIAL EVALUATION ADULT - PERTINENT HX OF CURRENT PROBLEM, REHAB EVAL
Pt. presented with shortness of breath. Per documentation, pt. found to have cardiomegaly with secondary signs of congestive heart failure, with associated pulmonary interstitial edema and bilateral pleural effusions.

## 2025-05-07 NOTE — H&P ADULT - PROBLEM SELECTOR PLAN 9
Hospital Bundle  Nutrition: DASH Diet   PPX  ---VTE: eliquis  ---GI: diet  Access: PIV  Code Status: FULL  Dispo: pending medical w/u, PT

## 2025-05-07 NOTE — H&P ADULT - NSHPLABSRESULTS_GEN_ALL_CORE
LABS:                          11.0   8.82  )-----------( 177      ( 06 May 2025 21:30 )             34.5     05-06    139  |  107  |  42[H]  ----------------------------<  101[H]  5.3   |  19[L]  |  1.48[H]    Ca    9.5      06 May 2025 21:30  Phos  4.0     05-06  Mg     2.20     05-06    TPro  7.0  /  Alb  4.2  /  TBili  1.2  /  DBili  x   /  AST  14  /  ALT  9   /  AlkPhos  87  05-06

## 2025-05-07 NOTE — H&P ADULT - ASSESSMENT
84 year old M pmh bipolar disorder, dementia (AAOx2 at baseline), CAD, CHF (EF 29%), LV thrombus, afib on eliquis presents from assisted living facility for shortness of breath found to have cardiomegaly with secondary signs of congestive heart failure, with   associated pulmonary interstitial edema and bilateral pleural effusions.   84 year old M pmh bipolar disorder, dementia (AAOx2 at baseline), CAD, CHF (EF 29%), LV thrombus, afib on eliquis presents from assisted living facility for shortness of breath found to have cardiomegaly with secondary signs of congestive heart failure, with associated pulmonary interstitial edema and bilateral pleural effusions.   84 year old M pmh bipolar disorder, dementia (AAOx2 at baseline), CAD, CHF (EF 29%), LV thrombus, afib on eliquis presents from assisted living facility for shortness of breath found to have cardiomegaly with secondary signs of congestive heart failure, with associated pulmonary interstitial edema and bilateral pleural effusions.

## 2025-05-07 NOTE — PHYSICAL THERAPY INITIAL EVALUATION ADULT - LEVEL OF INDEPENDENCE: SUPINE/SIT, REHAB EVAL
to be assessed, deferred at this time. Pt. noted to be slightly winded/wheezing. Pt. denied shortness of breath. SpO2 range 88-93% on O2.

## 2025-05-07 NOTE — H&P ADULT - NSHPPHYSICALEXAM_GEN_ALL_CORE
Vital Signs Last 24 Hrs  T(C): 36.3 (07 May 2025 11:30), Max: 36.7 (06 May 2025 18:21)  T(F): 97.4 (07 May 2025 11:30), Max: 98.1 (06 May 2025 23:41)  HR: 65 (07 May 2025 11:30) (65 - 89)  BP: 116/72 (07 May 2025 11:30) (101/62 - 117/60)  BP(mean): 73 (07 May 2025 03:11) (73 - 77)  RR: 18 (07 May 2025 11:30) (16 - 22)  SpO2: 93% (07 May 2025 11:30) (93% - 99%)    Parameters below as of 07 May 2025 06:15  Patient On (Oxygen Delivery Method): room air        PHYSICAL EXAM:  GENERAL: NAD, lying in bed comfortably  HEAD:  Atraumatic, Normocephalic  EYES: EOMI, PERRL, conjunctiva and sclera clear  ENT: Moist mucous membranes  CHEST/LUNG: Clear to auscultation bilaterally; No rales, rhonchi, wheezing, or rubs.   HEART: Regular rate and rhythm; No murmurs, rubs, or gallops  ABDOMEN: Bowel sounds present; Soft, Nontender, Nondistended.   EXTREMITIES:  2+ Peripheral Pulses, brisk capillary refill. No clubbing, cyanosis, or edema  NERVOUS SYSTEM:  Alert & Oriented X1, speech clear. No deficits   MSK: FROM all 4 extremities, full and equal strength  SKIN: Scattered ecchymosis throughout upper and lower extremities

## 2025-05-07 NOTE — H&P ADULT - TIME BILLING
review of laboratory data, radiology results, consultants' recommendations, documentation in [place], discussion with patient/ACP and interdisciplinary staff (such as , social workers, etc). Interventions were performed as documented above.

## 2025-05-07 NOTE — ED ADULT NURSE REASSESSMENT NOTE - NS ED NURSE REASSESS COMMENT FT1
BREAK RN, Pt awake and alert, , resp even and unlabored. Pt in NAD. Meds given per MDF orders. Pt has no complaints.  Denies CP, SOB, HA, dizziness, palpitations, blurry vision. Resting comfortably. Safety maintained, plan of care ongoing.

## 2025-05-07 NOTE — H&P ADULT - HISTORY OF PRESENT ILLNESS
84 year old M pmh bipolar disorder, dementia (AAOx2 at baseline), CAD, CHF, LV thrombus, afib on eliquis, recent hospitalization for GI bleed and covid presents from assisted living facility for shortness of breath. Patient unable to provide full history, history obtained from nephew and collateral from [facility] medical coordinator. On 5/6 around 1pm the patient reported to the medical coordinator that he was feeling "lousy" ,"short of breath", and didn't feel like getting out of bed which is what prompted them to bring him into the hospital. Normally the patient walks around and does what he needs to do to take care of some of his basic needs.  Per neph, he states that patient has appeared to have difficulty breathing since Sunday.  No reported fever, chills, complaints of chest pain, nausea, vomiting, URI symptoms, bloody or dark stool.  No reported trauma.  No history of pulmonary embolism.    84 year old M pmh bipolar disorder, dementia (AAOx2 at baseline), CAD, CHF, LV thrombus, afib on eliquis, recent hospitalization for GI bleed and covid presents from assisted living facility for shortness of breath. Patient unable to provide full history, history obtained from nephew and collateral from [facility] medical coordinator. On 5/6 around 1pm the patient reported to the medical coordinator that he was feeling "lousy" ,"short of breath", and didn't feel like getting out of bed which is what prompted them to bring him into the hospital. Normally the patient walks around and does what he needs to do to take care of some of his basic needs.  Per nephew, he states that patient has appeared to have difficulty breathing since Sunday.  No reported fever, chills, complaints of chest pain, nausea, vomiting, URI symptoms, bloody or dark stool.  No reported trauma.  No history of pulmonary embolism.

## 2025-05-07 NOTE — PHYSICAL THERAPY INITIAL EVALUATION ADULT - ADDITIONAL COMMENTS
Pt. unable to provide information on social history or previous level of function secondary to cognitive status. Per chart, pt. lives at an assisted living facility and previously ambulated independently with use of rolling walker.     Pt. was left in bed post PT Evaluation, no apparent distress, all lines intact, HR 61 bpm. RN made aware of pt. status.

## 2025-05-07 NOTE — H&P ADULT - ATTENDING COMMENTS
Pt is an 83 yo M with PMH a-fib (eliquis), dementia (AOx2), HTN, HLD, BP d/o, CAD, CHF (EF 29%), and LV thrombus p/w SOB. Hemodynamically stable on arrival with labs showing macrocytic anemia, Cr 1.48, trop 61--59, lipase neg, TSH WNL, BNP 65168, UA neg, and COVID/flu/RSV neg. CTH neg, CTA chest neg PE but c/w volume overload, and TTE with EF 30%, mod pHTN, sev MR/TR, mild AR, and LA sev dilated. Given 20mg IV lasix.    Pt seen and examined at bedside sleeping comfortably, easily arousable but minimally opened eyes while conversive. Complained of being sleepy as he did not sleep much last night. Denies acute complaints, says his breathing is better and denies any breathing issues. Agreeable with plan for cardiology involvement and PT eval. See GOC above, agreeable to DNR/DNI; would not want to be on machines or have prolonged suffering. Exam notable for comfortable on 2L NC, bibasilar crackles with poor inspiratory effort, intermittent coughing, RRR, +S1/S2, overly nourished abdomen with BS x4, 1+ edema BL LE, and fungal changes to BL toe nails.     Plan to c/w IV lasix 20mg BID and consult cardiology given advanced heart failure. Start duonebs and check urine lytes. Hold aldactone and entresto to prioritize diuresis, given low-normal BPs. PT consulted.     Discussed with HS, rest as outlined above.

## 2025-05-08 ENCOUNTER — TRANSCRIPTION ENCOUNTER (OUTPATIENT)
Age: 85
End: 2025-05-08

## 2025-05-08 LAB
ALBUMIN SERPL ELPH-MCNC: 3.7 G/DL — SIGNIFICANT CHANGE UP (ref 3.3–5)
ALP SERPL-CCNC: 87 U/L — SIGNIFICANT CHANGE UP (ref 40–120)
ALT FLD-CCNC: 9 U/L — SIGNIFICANT CHANGE UP (ref 4–41)
ANION GAP SERPL CALC-SCNC: 19 MMOL/L — HIGH (ref 7–14)
AST SERPL-CCNC: 20 U/L — SIGNIFICANT CHANGE UP (ref 4–40)
BASOPHILS # BLD AUTO: 0.05 K/UL — SIGNIFICANT CHANGE UP (ref 0–0.2)
BASOPHILS NFR BLD AUTO: 0.7 % — SIGNIFICANT CHANGE UP (ref 0–2)
BILIRUB SERPL-MCNC: 0.9 MG/DL — SIGNIFICANT CHANGE UP (ref 0.2–1.2)
BUN SERPL-MCNC: 44 MG/DL — HIGH (ref 7–23)
CALCIUM SERPL-MCNC: 9.2 MG/DL — SIGNIFICANT CHANGE UP (ref 8.4–10.5)
CHLORIDE SERPL-SCNC: 107 MMOL/L — SIGNIFICANT CHANGE UP (ref 98–107)
CO2 SERPL-SCNC: 11 MMOL/L — LOW (ref 22–31)
CREAT SERPL-MCNC: 1.37 MG/DL — HIGH (ref 0.5–1.3)
EGFR: 51 ML/MIN/1.73M2 — LOW
EGFR: 51 ML/MIN/1.73M2 — LOW
EOSINOPHIL # BLD AUTO: 0.09 K/UL — SIGNIFICANT CHANGE UP (ref 0–0.5)
EOSINOPHIL NFR BLD AUTO: 1.2 % — SIGNIFICANT CHANGE UP (ref 0–6)
GLUCOSE SERPL-MCNC: 80 MG/DL — SIGNIFICANT CHANGE UP (ref 70–99)
HCT VFR BLD CALC: 34.2 % — LOW (ref 39–50)
HGB BLD-MCNC: 10.8 G/DL — LOW (ref 13–17)
IANC: 4.72 K/UL — SIGNIFICANT CHANGE UP (ref 1.8–7.4)
IMM GRANULOCYTES NFR BLD AUTO: 0.3 % — SIGNIFICANT CHANGE UP (ref 0–0.9)
LYMPHOCYTES # BLD AUTO: 1.82 K/UL — SIGNIFICANT CHANGE UP (ref 1–3.3)
LYMPHOCYTES # BLD AUTO: 24.1 % — SIGNIFICANT CHANGE UP (ref 13–44)
MAGNESIUM SERPL-MCNC: 2.1 MG/DL — SIGNIFICANT CHANGE UP (ref 1.6–2.6)
MCHC RBC-ENTMCNC: 31.6 G/DL — LOW (ref 32–36)
MCHC RBC-ENTMCNC: 32 PG — SIGNIFICANT CHANGE UP (ref 27–34)
MCV RBC AUTO: 101.2 FL — HIGH (ref 80–100)
MONOCYTES # BLD AUTO: 0.86 K/UL — SIGNIFICANT CHANGE UP (ref 0–0.9)
MONOCYTES NFR BLD AUTO: 11.4 % — SIGNIFICANT CHANGE UP (ref 2–14)
NEUTROPHILS # BLD AUTO: 4.72 K/UL — SIGNIFICANT CHANGE UP (ref 1.8–7.4)
NEUTROPHILS NFR BLD AUTO: 62.3 % — SIGNIFICANT CHANGE UP (ref 43–77)
NRBC # BLD AUTO: 0 K/UL — SIGNIFICANT CHANGE UP (ref 0–0)
NRBC # FLD: 0 K/UL — SIGNIFICANT CHANGE UP (ref 0–0)
NRBC BLD AUTO-RTO: 0 /100 WBCS — SIGNIFICANT CHANGE UP (ref 0–0)
PHOSPHATE SERPL-MCNC: 4.6 MG/DL — HIGH (ref 2.5–4.5)
PLATELET # BLD AUTO: 135 K/UL — LOW (ref 150–400)
POTASSIUM SERPL-MCNC: 5.1 MMOL/L — SIGNIFICANT CHANGE UP (ref 3.5–5.3)
POTASSIUM SERPL-SCNC: 5.1 MMOL/L — SIGNIFICANT CHANGE UP (ref 3.5–5.3)
PROT SERPL-MCNC: 6.8 G/DL — SIGNIFICANT CHANGE UP (ref 6–8.3)
RBC # BLD: 3.38 M/UL — LOW (ref 4.2–5.8)
RBC # FLD: 17 % — HIGH (ref 10.3–14.5)
SODIUM SERPL-SCNC: 137 MMOL/L — SIGNIFICANT CHANGE UP (ref 135–145)
WBC # BLD: 7.56 K/UL — SIGNIFICANT CHANGE UP (ref 3.8–10.5)
WBC # FLD AUTO: 7.56 K/UL — SIGNIFICANT CHANGE UP (ref 3.8–10.5)

## 2025-05-08 PROCEDURE — 99222 1ST HOSP IP/OBS MODERATE 55: CPT | Mod: GC

## 2025-05-08 PROCEDURE — 99232 SBSQ HOSP IP/OBS MODERATE 35: CPT

## 2025-05-08 RX ORDER — FUROSEMIDE 10 MG/ML
1 INJECTION INTRAMUSCULAR; INTRAVENOUS
Qty: 30 | Refills: 0
Start: 2025-05-08 | End: 2025-06-06

## 2025-05-08 RX ORDER — SACUBITRIL AND VALSARTAN 6; 6 MG/1; MG/1
1 PELLET ORAL
Refills: 0 | Status: DISCONTINUED | OUTPATIENT
Start: 2025-05-08 | End: 2025-05-12

## 2025-05-08 RX ORDER — SPIRONOLACTONE 25 MG
25 TABLET ORAL DAILY
Refills: 0 | Status: DISCONTINUED | OUTPATIENT
Start: 2025-05-08 | End: 2025-05-12

## 2025-05-08 RX ORDER — FUROSEMIDE 10 MG/ML
40 INJECTION INTRAMUSCULAR; INTRAVENOUS
Refills: 0 | Status: DISCONTINUED | OUTPATIENT
Start: 2025-05-08 | End: 2025-05-11

## 2025-05-08 RX ADMIN — ATORVASTATIN CALCIUM 80 MILLIGRAM(S): 80 TABLET, FILM COATED ORAL at 21:57

## 2025-05-08 RX ADMIN — CLOPIDOGREL BISULFATE 75 MILLIGRAM(S): 75 TABLET, FILM COATED ORAL at 08:57

## 2025-05-08 RX ADMIN — FUROSEMIDE 20 MILLIGRAM(S): 10 INJECTION INTRAMUSCULAR; INTRAVENOUS at 05:17

## 2025-05-08 RX ADMIN — APIXABAN 5 MILLIGRAM(S): 2.5 TABLET, FILM COATED ORAL at 05:17

## 2025-05-08 RX ADMIN — Medication 40 MILLIGRAM(S): at 05:17

## 2025-05-08 RX ADMIN — APIXABAN 5 MILLIGRAM(S): 2.5 TABLET, FILM COATED ORAL at 16:32

## 2025-05-08 RX ADMIN — IPRATROPIUM BROMIDE AND ALBUTEROL SULFATE 3 MILLILITER(S): .5; 2.5 SOLUTION RESPIRATORY (INHALATION) at 03:00

## 2025-05-08 RX ADMIN — Medication 1 APPLICATION(S): at 09:00

## 2025-05-08 RX ADMIN — IPRATROPIUM BROMIDE AND ALBUTEROL SULFATE 3 MILLILITER(S): .5; 2.5 SOLUTION RESPIRATORY (INHALATION) at 08:19

## 2025-05-08 NOTE — CONSULT NOTE ADULT - ASSESSMENT
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 CT PE showed pulmonary edema with bilateral pleural effusions. He was started on 20mg IV Lasix BID. Cardiology is consulted for management of heart failure.     #HFrEF  - TTE (5/7/25): LVEF 30%. Global LV hypokinesis. Increased LV mass w eccentric hypertrophy. No LV thrombus . Severe LA dilation. Enlarged RV. Reduced RV systolic function. Severe MR. Mild AR. PASP 56.   - CT PE (5/7/25): No pulmonary embolism. Cardiomegaly. Pulmonary interstitial edema. Bilateral pleural effusions. Pulmonary artery measures up to 3.9 cm, suggests pulmonary arterial hypertension. Ascending aorta is ectatic, measuring up to 4.0 cm, unchanged since 2024 examination  - HS Trop T: 61 -> 59  - Pro-BNP: 14,738  - Home Meds: Entresto 24-26 BID, Aldactone 25 qd, Eliquis 5 BID, Plavix 75 qd, Atorvastatin 80mg qd  - Telemetry showing Afib with slow ventricular response, rates in 60s  Plan  - Continue to diurese while inpatient, recommend Furosemide 40mg IV BID  - Titrate diuresis to maintain net negative 1-2L fluid balance daily  - Strict I/O and Daily Weights  - When medically optimized would discharge on Furosemide 40mg PO daily  - Continue GDMT as tolerated    NOTE INCOMPLETE UNTIL ATTESTED BY ATTENDING

## 2025-05-08 NOTE — DISCHARGE NOTE PROVIDER - NSFOLLOWUPCLINICS_GEN_ALL_ED_FT
Cardiology at [facility]  Cardiology  [address] [place], [place] [address]  Phone: [phone]  Fax:

## 2025-05-08 NOTE — CONSULT NOTE ADULT - SUBJECTIVE AND OBJECTIVE BOX
PROGRESS NOTE:     Patient is a 84y old  Male who presents with a chief complaint of     INTERVAL HISTORY: NAEO. VSS. ROS negative.    MEDICATIONS  (STANDING):  apixaban 5 milliGRAM(s) Oral two times a day  atorvastatin 80 milliGRAM(s) Oral at bedtime  chlorhexidine 2% Cloths 1 Application(s) Topical daily  clopidogrel Tablet 75 milliGRAM(s) Oral daily  furosemide   Injectable 20 milliGRAM(s) IV Push two times a day  pantoprazole    Tablet 40 milliGRAM(s) Oral before breakfast  sacubitril 24 mG/valsartan 26 mG 1 Tablet(s) Oral two times a day  spironolactone 25 milliGRAM(s) Oral daily    MEDICATIONS  (PRN):  acetaminophen     Tablet .. 650 milliGRAM(s) Oral every 6 hours PRN Temp greater or equal to 38C (100.4F), Mild Pain (1 - 3)    PHYSICAL EXAM:  Vital Signs Last 24 Hrs  T(C): 36.4 (08 May 2025 04:56), Max: 36.8 (07 May 2025 14:00)  T(F): 97.5 (08 May 2025 04:56), Max: 98.2 (07 May 2025 14:00)  HR: 61 (08 May 2025 08:33) (36 - 79)  BP: 109/60 (08 May 2025 04:56) (102/72 - 119/73)  BP(mean): --  RR: 18 (08 May 2025 04:56) (18 - 19)  SpO2: 95% (08 May 2025 08:33) (79% - 100%)    Parameters below as of 08 May 2025 04:56  Patient On (Oxygen Delivery Method): nasal cannula  O2 Flow (L/min): 6      GENERAL: NAD, lying in bed tired  HEAD:  Atraumatic, Normocephalic  EYES: EOMI, PERRL, conjunctiva and sclera clear  ENT: Moist mucous membranes  CHEST/LUNG: Clear to auscultation bilaterally; No rales, rhonchi, wheezing, or rubs.   HEART: Irregular rate; No murmurs, rubs, or gallops  ABDOMEN: Bowel sounds present; Soft, Nontender, Nondistended.   EXTREMITIES:  2+ Peripheral Pulses, brisk capillary refill. No clubbing, cyanosis, or edema  NERVOUS SYSTEM:  Alert & Oriented X1, speech clear. No deficits   MSK: FROM all 4 extremities, full and equal strength  SKIN: Scattered ecchymosis throughout upper and lower extremities    LABS:                        10.8   7.56  )-----------( 135      ( 08 May 2025 06:00 )             34.2     05-08    137  |  107  |  44[H]  ----------------------------<  80  5.1   |  11[L]  |  1.37[H]    Ca    9.2      08 May 2025 06:00  Phos  4.6     05-08  Mg     2.10     05-08    TPro  6.8  /  Alb  3.7  /  TBili  0.9  /  DBili  x   /  AST  20  /  ALT  9   /  AlkPhos  87  05-08      Urinalysis Basic - ( 08 May 2025 06:00 )  Color: x / Appearance: x / SG: x / pH: x  Gluc: 80 mg/dL / Ketone: x  / Bili: x / Urobili: x   Blood: x / Protein: x / Nitrite: x   Leuk Esterase: x / RBC: x / WBC x   Sq Epi: x / Non Sq Epi: x / Bacteria: x      ******************************  Authored By: Rosalio Shukla MD PGY1  Internal Medicine  MS Teams  ******************************

## 2025-05-08 NOTE — DIETITIAN INITIAL EVALUATION ADULT - PHYSCIAL ASSESSMENT
Nephew unsure pt's UBW. Dosing weight 68kg (5/6). Weight trend is relatively stable per [facility] weight history: 68kg (5/6); 68kg(3/1); 67.6kg (6/17/24). Patient is on diuretic, weight loss may occur secondary to fluid loss.

## 2025-05-08 NOTE — DIETITIAN INITIAL EVALUATION ADULT - ORAL INTAKE PTA/DIET HISTORY
Met patient at bedside. Patient is not alert at time of visit. Comprehensive chart review completed. Obtained collateral from his nephew via phone and RN. Nephew reports patient has fair appetite / PO intake PTA. Prior use of Ensure nutrition shakes PTA. No other reported issues.

## 2025-05-08 NOTE — CONSULT NOTE ADULT - ATTENDING COMMENTS
The patient was seen and examined with the Cardiology Consultation Teaching Service.     The patient is an 84-year-old man with coronary artery disease, prior PCI, chronic heart failure with reduced LV function and severe MR, and atrial fibrillation who was referred to our hospital for worsening dyspnea.      The patient was very lethargic during our interview. He was oriented to person.     No chest pain  No current dyspnea  No palpitations     PMH/PSH:  Coronary artery disease     Percutaneous coronary intervention to the LAD     Reported multivessel disease on medical management  Chronic heart failure with reduced LV function     Ischemic cardiomyopathy, LVEF 30%     Prior LV thrombus  Atrial fibrillation on apixaban   Bipolar disorder  Dementia    Comfortable-appearing man in no acute distress  Lethargic, responsive to stimuli, oriented to person  Afebrile  Vital signs stable  JVP is not elevated  Clear lungs, poor effort  Irregularly irregular   Soft systolic murmur  Extremities are warm and perfused  Ecchymoses of the upper extremities  No peripheral edema     Macrocytic anemia Hb 10.8  Thrombocytopenia 135K  Metabolic acidosis 11, AG 19  GFR 51    Hs-troponin 59, 61  Pro-BNP 14K    ECG demonstrates atrial fibrillation, occasional PVCs, left axis, LBBB  CXR with pulmonary congestion    Echocardiography demonstrates global severely reduced LV function, LVEF 30%, increased LV mass, severe MR, mild AI, severely dilated LA; estimated pulmonary pressures are moderately elevated, estimated RA pressures are elevated    CT Angiography of the chest demonstrates moderate right pleural effusion, interstitial edema, no pulmonary emboli, aortic and coronary calcifications, cardiomegaly    Impression and Recommendations:   84-year-old man with coronary artery disease, prior PCI, chronic heart failure with reduced LV function and severe MR, and atrial fibrillation who was referred to our hospital for worsening dyspnea.      The patient has no complaints but was altered during the time of our interview. He is not grossly volume overloaded on examination, but there is evidence of volume overload on his chest imaging and echocardiography.     I would increase the patient's intravenous diuresis to furosemide 40mg BID for a goal net negative 1-2L per day. Please follow strict I&Os and obtain daily standing weights if possible.     Continue sacubitril-valsartan and spironolactone for his heart failure with reduced LV function.   Continue clopidogrel and atorvastatin for his known coronary disease and PCI.   Continue apixaban for stroke prevention in atrial fibrillation.     Unclear why the patient is so lethargic, though we do not know his baseline mental status. His basic metabolic panel is suggestive of a metabolic acidosis with an anion gap of 19. Defer further work-up and management to the primary team.    Gabriel Gustafson MD [facility] FACP  Cardiology  [phone]

## 2025-05-08 NOTE — DIETITIAN INITIAL EVALUATION ADULT - REASON FOR ADMISSION
Per chart review, 84 year old Male pmh bipolar disorder, dementia (AAOx2 at baseline), CAD, CHF (EF 29%), LV thrombus, afib on eliquis presents from assisted living facility for shortness of breath found to have cardiomegaly with secondary signs of congestive heart failure, with associated pulmonary interstitial edema and bilateral pleural effusions.

## 2025-05-08 NOTE — DISCHARGE NOTE PROVIDER - NSDCCPTREATMENT_GEN_ALL_CORE_FT
PRINCIPAL PROCEDURE  Procedure: CTA chest w/w/o contrast  Findings and Treatment: LUNGS AND LARGE AIRWAYS: Motion artifact limits evaluation. Patent   central airways. There is mild interseptal lobular thickening at the lung   bases. 2 mm pulmonary nodule left lower lobe (303:62). Bilateral lower   lobe and bibasilar subsegmental atelectasis.  PLEURA:Moderate right and trace left pleural effusions.  VESSELS: There is no contrast filling defects within the main, lobar, and   several segmental branches of the pulmonary artery. There are a few   segmental and several subsegmental branches of the bilateral upper lobes   that are inadequately evaluated secondary to suboptimal contrast   opacification. The main pulmonary arteries dilated to 3.9 cm. The   ascending aorta is ectatic, measuring 4.0 cm. Aortic and coronary artery   calcifications. Intravenous contrast refluxes from the right atrium and   into the intrahepatic IVC, and hepatic veins; consistent with congestive   heart failure.  HEART: Heart size is enlarged. No pericardial effusion.  MEDIASTINUM AND BRIJESH: No lymphadenopathy. Aortic valvular and mitral   annular calcifications.  CHEST WALL AND LOWER NECK: Bilateral gynecomastia.  VISUALIZED UPPER ABDOMEN: Stable calcification of the right hepatic lobe.  BONES: Degenerative changes.  IMPRESSION:  1.  No pulmonary embolism to thesegmental branches.  2.  Cardiomegaly with secondary signs of congestive heart failure, with   associated pulmonary interstitial edema and bilateral pleural effusions.  3.  No evidence of pneumonia.  4.  The main pulmonary artery measures up to 3.9 cm, suggests pulmonary   arterial hypertension.  5.  The ascending aorta is ectatic, measuring up to 4.0 cm, unchanged   since 2024 examination        SECONDARY PROCEDURE  Procedure: CT head  Findings and Treatment: VENTRICLES AND SULCI: Stable prominence of the ventricles and cortical   sulci, nonspecific, likely reflects parenchymal volume loss.  INTRA-AXIAL: No mass effect, acute hemorrhage, or midline shift.  Stable   scattered foci of white matter hypoattenuation without associated mass   effect, nonspecific, likely chronic microvascular disease. Skull base   vascular calcifications. Chronic infarct right superior cerebellar   hemisphere.  EXTRA-AXIAL: No mass or fluid collection. Basal cisterns are normal in   appearance.  VISUALIZED SINUSES:  Mucosal thickening of the left maxillary sinus.   Small retention cysts versus polyps of the bilateral maxillary sinuses.  TYMPANOMASTOID CAVITIES:  Clear.  VISUALIZED ORBITS: Chronic right lamina papyracea defect. No acute   findings.  CALVARIUM: Intact.  IMPRESSION:  No acute intracranial hemorrhage, mass effect, or midline shift.      Procedure: Complete transthoracic echocardiography (TTE)  Findings and Treatment: CONCLUSIONS:      1. The left ventricular cavity is normal in size. Left ventricular wall thickness is normal. Left ventricular systolic function is severely decreased with a calculated ejection fraction of 30 % by 3D. There is global left ventricular hypokinesis. There is increased LV mass and eccentric hypertrophy. There is no evidence of a left ventricular thrombus.   2. Enlarged right ventricular cavity size and reduced right ventricular systolic function. Tricuspid annular plane systolic excursion (TAPSE) is 1.1 cm (normal >=1.7 cm).   3. Structurally normal mitral valve with normal leaflet excursion. There is calcification of the mitral valve annulus. There is symmetric leaflet tethering. There is severe mitral regurgitation.   4. The aortic valve anatomy cannot be determined. There is calcification of the aortic valve leaflets. There is mild aortic regurgitation.   5. The left atrium is severely dilated with an indexed volume of 75.49 ml/m².   6. Structurally normal tricuspid valve with normalleaflet excursion. There is severe tricuspid regurgitation. Estimated pulmonary artery systolic pressure is 56 mmHg, consistent with moderate pulmonary hypertension.   7. The inferior vena cava is dilated measuring 2.54 cm in diameter, (dilated >2.1cm) with abnormal inspiratory collapse (abnormal <50%) consistent with elevated right atrial pressure (~15, range 10-20mmHg).

## 2025-05-08 NOTE — DIETITIAN INITIAL EVALUATION ADULT - PERTINENT LABORATORY DATA
05-08    137  |  107  |  44[H]  ----------------------------<  80  5.1   |  11[L]  |  1.37[H]    Ca    9.2      08 May 2025 06:00  Phos  4.6     05-08  Mg     2.10     05-08    TPro  6.8  /  Alb  3.7  /  TBili  0.9  /  DBili  x   /  AST  20  /  ALT  9   /  AlkPhos  87  05-08

## 2025-05-08 NOTE — PROGRESS NOTE ADULT - SUBJECTIVE AND OBJECTIVE BOX
PROGRESS NOTE:   Authored by Louise Washington MD  Internal Medicine Resident Physician, PGY-1      Patient is a 84y old  Male who presents with a chief complaint of     SUBJECTIVE / OVERNIGHT EVENTS: ***, patient seen and examined at bedside    MEDICATIONS  (STANDING):  albuterol/ipratropium for Nebulization 3 milliLiter(s) Nebulizer every 6 hours  apixaban 5 milliGRAM(s) Oral two times a day  atorvastatin 80 milliGRAM(s) Oral at bedtime  chlorhexidine 2% Cloths 1 Application(s) Topical daily  clopidogrel Tablet 75 milliGRAM(s) Oral daily  furosemide   Injectable 20 milliGRAM(s) IV Push two times a day  pantoprazole    Tablet 40 milliGRAM(s) Oral before breakfast    MEDICATIONS  (PRN):  acetaminophen     Tablet .. 650 milliGRAM(s) Oral every 6 hours PRN Temp greater or equal to 38C (100.4F), Mild Pain (1 - 3)      CAPILLARY BLOOD GLUCOSE        I&O's Summary      PHYSICAL EXAM:  Vital Signs Last 24 Hrs  T(C): 36.4 (08 May 2025 04:56), Max: 36.8 (07 May 2025 14:00)  T(F): 97.5 (08 May 2025 04:56), Max: 98.2 (07 May 2025 14:00)  HR: 65 (08 May 2025 04:56) (36 - 79)  BP: 109/60 (08 May 2025 04:56) (102/72 - 119/73)  BP(mean): --  RR: 18 (08 May 2025 04:56) (18 - 19)  SpO2: 98% (08 May 2025 04:56) (79% - 100%)    Parameters below as of 08 May 2025 04:56  Patient On (Oxygen Delivery Method): nasal cannula  O2 Flow (L/min): 6    CONSTITUTIONAL: Well-groomed, in no apparent distress  RESPIRATORY: Breathing comfortably; no dullness to percussion; lungs CTA without wheeze/rhonchi/rales  CARDIOVASCULAR: +S1S2, RRR, no M/G/R; pedal pulses full and symmetric; no lower extremity edema  GASTROINTESTINAL: No palpable masses or tenderness, +BS throughout, no rebound/guarding; no hepatosplenomegaly; no hernia palpated  SKIN: No rashes or ulcers noted  NEUROLOGIC: A+O x 3, CN II-XII intact; sensation intact in LEs b/l to light touch    LABS:                        11.0   8.82  )-----------( 177      ( 06 May 2025 21:30 )             34.5     -    139  |  107  |  42[H]  ----------------------------<  101[H]  5.3   |  19[L]  |  1.48[H]    Ca    9.5      06 May 2025 21:30  Phos  4.0     05-  Mg     2.20     -    TPro  7.0  /  Alb  4.2  /  TBili  1.2  /  DBili  x   /  AST  14  /  ALT  9   /  AlkPhos  87  -06          Urinalysis Basic - ( 07 May 2025 10:30 )    Color: Yellow / Appearance: Clear / S.017 / pH: x  Gluc: x / Ketone: Negative mg/dL  / Bili: Negative / Urobili: 1.0 mg/dL   Blood: x / Protein: Negative mg/dL / Nitrite: Negative   Leuk Esterase: Negative / RBC: x / WBC x   Sq Epi: x / Non Sq Epi: x / Bacteria: x          RADIOLOGY & ADDITIONAL TESTS:  Results Reviewed:   Imaging Personally Reviewed:  Electrocardiogram Personally Reviewed:   PROGRESS NOTE:   Authored by Louise Washington MD  Internal Medicine Resident Physician, PGY-1      Patient is a 84y old  Male who presents with a chief complaint of     SUBJECTIVE / OVERNIGHT EVENTS: Pt with asymptomatic bradycardia overnight, resolved on it's own. Patient seen and examined at bedside, resting comfortably. Denies any pain or SOB.     MEDICATIONS  (STANDING):  albuterol/ipratropium for Nebulization 3 milliLiter(s) Nebulizer every 6 hours  apixaban 5 milliGRAM(s) Oral two times a day  atorvastatin 80 milliGRAM(s) Oral at bedtime  chlorhexidine 2% Cloths 1 Application(s) Topical daily  clopidogrel Tablet 75 milliGRAM(s) Oral daily  furosemide   Injectable 20 milliGRAM(s) IV Push two times a day  pantoprazole    Tablet 40 milliGRAM(s) Oral before breakfast    MEDICATIONS  (PRN):  acetaminophen     Tablet .. 650 milliGRAM(s) Oral every 6 hours PRN Temp greater or equal to 38C (100.4F), Mild Pain (1 - 3)      CAPILLARY BLOOD GLUCOSE        I&O's Summary      PHYSICAL EXAM:  Vital Signs Last 24 Hrs  T(C): 36.4 (08 May 2025 04:56), Max: 36.8 (07 May 2025 14:00)  T(F): 97.5 (08 May 2025 04:56), Max: 98.2 (07 May 2025 14:00)  HR: 65 (08 May 2025 04:56) (36 - 79)  BP: 109/60 (08 May 2025 04:56) (102/72 - 119/73)  BP(mean): --  RR: 18 (08 May 2025 04:56) (18 - 19)  SpO2: 98% (08 May 2025 04:56) (79% - 100%)    Parameters below as of 08 May 2025 04:56  Patient On (Oxygen Delivery Method): nasal cannula  O2 Flow (L/min): 6    PHYSICAL EXAM:  GENERAL: NAD, lying in bed comfortably  HEAD:  Atraumatic, Normocephalic  EYES: EOMI, PERRL, conjunctiva and sclera clear  ENT: Moist mucous membranes  CHEST/LUNG: Clear to auscultation bilaterally; No rales, rhonchi, wheezing, or rubs.   HEART: Regular rate and rhythm; No murmurs, rubs, or gallops  ABDOMEN: Bowel sounds present; Soft, Nontender, Nondistended.   EXTREMITIES:  2+ Peripheral Pulses, brisk capillary refill. No clubbing, cyanosis, or edema  NERVOUS SYSTEM:  Alert & Oriented X1, speech clear. No deficits   MSK: FROM all 4 extremities, full and equal strength  SKIN: Scattered ecchymosis throughout upper and lower extremities      LABS:                        11.0   8.82  )-----------( 177      ( 06 May 2025 21:30 )             34.5     05-06    139  |  107  |  42[H]  ----------------------------<  101[H]  5.3   |  19[L]  |  1.48[H]    Ca    9.5      06 May 2025 21:30  Phos  4.0     05-06  Mg     2.20     05-06    TPro  7.0  /  Alb  4.2  /  TBili  1.2  /  DBili  x   /  AST  14  /  ALT  9   /  AlkPhos  87  05-06          Urinalysis Basic - ( 07 May 2025 10:30 )    Color: Yellow / Appearance: Clear / S.017 / pH: x  Gluc: x / Ketone: Negative mg/dL  / Bili: Negative / Urobili: 1.0 mg/dL   Blood: x / Protein: Negative mg/dL / Nitrite: Negative   Leuk Esterase: Negative / RBC: x / WBC x   Sq Epi: x / Non Sq Epi: x / Bacteria: x          RADIOLOGY & ADDITIONAL TESTS:  Results Reviewed:   Imaging Personally Reviewed:  Electrocardiogram Personally Reviewed:   PROGRESS NOTE:   Authored by Louise Washington MD  Internal Medicine Resident Physician, PGY-1      Patient is a 84y old  Male who presents with a chief complaint of     SUBJECTIVE / OVERNIGHT EVENTS: Pt with asymptomatic bradycardia overnight, resolved on it's own. Patient seen and examined at bedside, resting comfortably. Denies any pain or SOB. Discussed plan for d/c today with nephew who is in agreement.     MEDICATIONS  (STANDING):  albuterol/ipratropium for Nebulization 3 milliLiter(s) Nebulizer every 6 hours  apixaban 5 milliGRAM(s) Oral two times a day  atorvastatin 80 milliGRAM(s) Oral at bedtime  chlorhexidine 2% Cloths 1 Application(s) Topical daily  clopidogrel Tablet 75 milliGRAM(s) Oral daily  furosemide   Injectable 20 milliGRAM(s) IV Push two times a day  pantoprazole    Tablet 40 milliGRAM(s) Oral before breakfast    MEDICATIONS  (PRN):  acetaminophen     Tablet .. 650 milliGRAM(s) Oral every 6 hours PRN Temp greater or equal to 38C (100.4F), Mild Pain (1 - 3)      CAPILLARY BLOOD GLUCOSE        I&O's Summary      PHYSICAL EXAM:  Vital Signs Last 24 Hrs  T(C): 36.4 (08 May 2025 04:56), Max: 36.8 (07 May 2025 14:00)  T(F): 97.5 (08 May 2025 04:56), Max: 98.2 (07 May 2025 14:00)  HR: 65 (08 May 2025 04:56) (36 - 79)  BP: 109/60 (08 May 2025 04:56) (102/72 - 119/73)  BP(mean): --  RR: 18 (08 May 2025 04:56) (18 - 19)  SpO2: 98% (08 May 2025 04:56) (79% - 100%)    Parameters below as of 08 May 2025 04:56  Patient On (Oxygen Delivery Method): nasal cannula  O2 Flow (L/min): 6    PHYSICAL EXAM:  GENERAL: NAD, lying in bed comfortably  HEAD:  Atraumatic, Normocephalic  EYES: EOMI, PERRL, conjunctiva and sclera clear  ENT: Moist mucous membranes  CHEST/LUNG: Clear to auscultation bilaterally; No rales, rhonchi, wheezing, or rubs.   HEART: Regular rate and rhythm; No murmurs, rubs, or gallops  ABDOMEN: Bowel sounds present; Soft, Nontender, Nondistended.   EXTREMITIES:  2+ Peripheral Pulses, brisk capillary refill. No clubbing, cyanosis, or edema  NERVOUS SYSTEM:  Alert & Oriented X1, speech clear. No deficits   MSK: FROM all 4 extremities, full and equal strength  SKIN: Scattered ecchymosis throughout upper and lower extremities      LABS:                        11.0   8.82  )-----------( 177      ( 06 May 2025 21:30 )             34.5     05-06    139  |  107  |  42[H]  ----------------------------<  101[H]  5.3   |  19[L]  |  1.48[H]    Ca    9.5      06 May 2025 21:30  Phos  4.0     05-06  Mg     2.20     05-06    TPro  7.0  /  Alb  4.2  /  TBili  1.2  /  DBili  x   /  AST  14  /  ALT  9   /  AlkPhos  87  05-06          Urinalysis Basic - ( 07 May 2025 10:30 )    Color: Yellow / Appearance: Clear / S.017 / pH: x  Gluc: x / Ketone: Negative mg/dL  / Bili: Negative / Urobili: 1.0 mg/dL   Blood: x / Protein: Negative mg/dL / Nitrite: Negative   Leuk Esterase: Negative / RBC: x / WBC x   Sq Epi: x / Non Sq Epi: x / Bacteria: x          RADIOLOGY & ADDITIONAL TESTS:  Results Reviewed:   Imaging Personally Reviewed:  Electrocardiogram Personally Reviewed:

## 2025-05-08 NOTE — DISCHARGE NOTE PROVIDER - CARE PROVIDERS DIRECT ADDRESSES
[email or web address]

## 2025-05-08 NOTE — DIETITIAN INITIAL EVALUATION ADULT - ADD RECOMMEND
1. Recommend adding Ensure Plus High Protein Therapeutic Shake 2x daily (700kcal, 40g protein).   2. Encourage PO intake and honor food preferences as able.   3. Please consistently document %PO intake in nursing flowsheets   4. Monitor PO intake, Labs, weights, BMs, and skin integrity.

## 2025-05-08 NOTE — DIETITIAN INITIAL EVALUATION ADULT - NS FNS DIET ORDER
Problem: Safety  Goal: Will remain free from injury  Outcome: PROGRESSING AS EXPECTED  Patient did not have a fall this shift. Safety measures intact. Call bell in reach. Refusing bed alarm after further education from this RN    Problem: Pain Management  Goal: Pain level will decrease to patient’s comfort goal  Outcome: PROGRESSING AS EXPECTED  Patient denies pain this shift or the need for interventions         Diet, Regular:   DASH/TLC {Sodium & Cholesterol Restricted} (DASH)  1000mL Fluid Restriction ([id number]) (05-07-25 @ 15:32)

## 2025-05-08 NOTE — PROGRESS NOTE ADULT - ATTENDING COMMENTS
Pt is an 83 yo M with PMH a-fib (eliquis), dementia (AOx2), HTN, HLD, BP d/o, CAD, CHF (EF 29%), and LV thrombus p/w SOB. Hemodynamically stable on arrival with labs showing macrocytic anemia, Cr 1.48, trop 61--59, lipase neg, TSH WNL, BNP 77985, UA neg, and COVID/flu/RSV neg. CTH neg, CTA chest neg PE but c/w volume overload, and TTE with EF 30%, mod pHTN, sev MR/TR, mild AR, and LA sev dilated. Given 20mg IV lasix.    Pt seen and examined at bedside sleeping comfortably, easily arousable. Denies acute complaints, says his breathing is better and denies any breathing issues. Agreeable with plan for DC home today. Exam notable for comfortable on RA, improved bibasilar crackles, improved inspiratory effort, RRR, +S1/S2, overly nourished abdomen with BS x4, trace edema BL LE, and fungal changes to BL toe nails.     Plan to c/w IV lasix 40mg BID while inpt per cards recs; will DC on PO lasix 40mg daily. Resume home meds and plan for DC home today.    Discussed with HS, rest as outlined above.

## 2025-05-08 NOTE — DIETITIAN INITIAL EVALUATION ADULT - PERTINENT MEDS FT
MEDICATIONS  (STANDING):  apixaban 5 milliGRAM(s) Oral two times a day  atorvastatin 80 milliGRAM(s) Oral at bedtime  chlorhexidine 2% Cloths 1 Application(s) Topical daily  clopidogrel Tablet 75 milliGRAM(s) Oral daily  furosemide   Injectable 40 milliGRAM(s) IV Push two times a day  pantoprazole    Tablet 40 milliGRAM(s) Oral before breakfast  sacubitril 24 mG/valsartan 26 mG 1 Tablet(s) Oral two times a day  spironolactone 25 milliGRAM(s) Oral daily    MEDICATIONS  (PRN):  acetaminophen     Tablet .. 650 milliGRAM(s) Oral every 6 hours PRN Temp greater or equal to 38C (100.4F), Mild Pain (1 - 3)

## 2025-05-08 NOTE — DIETITIAN INITIAL EVALUATION ADULT - OBTAIN CURRENT WEIGHT
Alternatives Discussed Intro (Do Not Add Period): I discussed alternative treatments to Mohs surgery and specifically discussed the risks and benefits of yes

## 2025-05-08 NOTE — DIETITIAN INITIAL EVALUATION ADULT - FLUID ACCUMULATION
No edema noted per RN flowsheets.  Pulmonary edema with bilateral pleural effusions noted per chart review

## 2025-05-08 NOTE — DIETITIAN INITIAL EVALUATION ADULT - OTHER INFO
As per RN, patient has varies intake depends on his mood, in average ~50% of intake in house. No reported any in house nausea, vomiting, diarrhea, or constipation. Fecal incontinence noted per RN flowsheets. No reported chewing/swallowing issues. No known food allergies. Medications notable for diuretics. Recommend adding Ensure Plus High Protein Therapeutic Shake 2x daily (700kcal, 40g protein). DNR/Trial NIV noted. Education is not applicable secondary to cognitive limitation. RD to remain available for further nutritional interventions as indicated

## 2025-05-08 NOTE — DISCHARGE NOTE PROVIDER - ATTENDING DISCHARGE PHYSICAL EXAMINATION:
VITAL SIGNS:  T(C): 36.4 (05-08-25 @ 04:56), Max: 36.8 (05-07-25 @ 14:00)  T(F): 97.5 (05-08-25 @ 04:56), Max: 98.2 (05-07-25 @ 14:00)  HR: 61 (05-08-25 @ 08:33) (36 - 79)  BP: 109/60 (05-08-25 @ 04:56) (102/72 - 119/73)  BP(mean): --  RR: 18 (05-08-25 @ 04:56) (18 - 19)  SpO2: 95% (05-08-25 @ 08:33) (79% - 100%)  Wt(kg): --    PHYSICAL EXAM:  Constitutional: resting comfortably in bed; NAD; sleeping but arousable  Head: NC/AT  Eyes: PERRL, EOMI, anicteric sclera  ENT: no nasal discharge; MMM  Neck: supple; no JVD  Respiratory: bibasilar crackles, good inspiratory effort, comfortable on RA  Cardiac: +S1/S2; RRR; no M/R/G  Gastrointestinal: soft, NT/ND; no rebound or guarding; +BSx4  Extremities: WWP, no clubbing or cyanosis; trace BL LE edema  Musculoskeletal: NROM x4; no joint swelling, tenderness or erythema  Vascular: 2+ radial, DP/PT pulses B/L  Dermatologic: skin warm, dry and intact; no rashes, wounds, or scars  Neurologic: AAOx2 (self, hospital); CNII-XII grossly intact; no focal deficits  Psychiatric: affect and characteristics of appearance, verbalizations, behaviors are appropriate Vital Signs Last 24 Hrs  T(C): 36.1 (12 May 2025 11:35), Max: 36.7 (12 May 2025 05:30)  T(F): 97 (12 May 2025 11:35), Max: 98 (12 May 2025 05:30)  HR: 57 (12 May 2025 11:45) (54 - 84)  BP: 92/60 (12 May 2025 11:45) (89/64 - 101/56)  BP(mean): --  RR: 18 (12 May 2025 11:35) (16 - 18)  SpO2: 99% (12 May 2025 11:35) (93% - 99%)    Parameters below as of 12 May 2025 10:35  Patient On (Oxygen Delivery Method): room air    PHYSICAL EXAM:  Constitutional: resting comfortably in bed; NAD; sleeping but arousable  Head: NC/AT  Eyes: PERRL, EOMI, anicteric sclera  ENT: no nasal discharge; MMM  Neck: supple; no JVD  Respiratory: bibasilar crackles, good inspiratory effort, comfortable on RA  Cardiac: +S1/S2; RRR; no M/R/G  Gastrointestinal: soft, NT/ND; no rebound or guarding; +BSx4  Extremities: WWP, no clubbing or cyanosis; trace BL LE edema  Musculoskeletal: NROM x4; no joint swelling, tenderness or erythema  Vascular: 2+ radial, DP/PT pulses B/L  Dermatologic: skin warm, dry and intact; no rashes, wounds, or scars  Neurologic: AAOx2 (self, hospital); CNII-XII grossly intact; no focal deficits  Psychiatric: affect and characteristics of appearance, verbalizations, behaviors are appropriate

## 2025-05-08 NOTE — PROGRESS NOTE ADULT - PROBLEM SELECTOR PLAN 1
- Hx of CAD and HFrEF (29%) p/w sob.   - CXR showing cardiomegaly with pulm edema  - CT chest showing Cardiomegaly with secondary signs of congestive heart failure, with associated pulmonary interstitial edema and bilateral pleural effusions  - TTE showing global left ventricular hypokinesis and increased LV mass and eccentric hypertrophy with LVEF 30%, enlarged right ventricular cavity size and reduced right ventricular systolic function  - BNP 26215  - now on 3L NC  - at home is on Aldactone 25 and sacubitril-valsartan 24-26 BID    - c/w lasix 20 IV BID  - cards c/s: appreciate recs  - strict I/Os  - closely monitor volume status - Hx of CAD and HFrEF (29%) p/w sob.   - CXR showing cardiomegaly with pulm edema  - CT chest showing Cardiomegaly with secondary signs of congestive heart failure, with associated pulmonary interstitial edema and bilateral pleural effusions  - TTE showing global left ventricular hypokinesis and increased LV mass and eccentric hypertrophy with LVEF 30%, enlarged right ventricular cavity size and reduced right ventricular systolic function  - BNP 89282  - now on RA  -  home is on Aldactone 25 and sacubitril-valsartan 24-26 BID  - f/u cards consult     - c/w lasix 20 IV BID  - cards c/s: appreciate recs  - strict I/Os  - closely monitor volume status  - restart Aldactone 25 and sacubitril-valsartan 24-26 BID  -f/u cards consult

## 2025-05-08 NOTE — DISCHARGE NOTE PROVIDER - CARE PROVIDER_API CALL
Brent Dior  Geriatric Medicine  [address] [place] [address], [address]  [place] [address]  Phone: [phone]  Fax: [phone]  Follow Up Time: 1 week

## 2025-05-08 NOTE — DISCHARGE NOTE PROVIDER - NSDCMRMEDTOKEN_GEN_ALL_CORE_FT
Aldactone 25 mg oral tablet: 1 tab(s) orally once a day  apixaban 5 mg oral tablet: 1 tab(s) orally every 12 hours  atorvastatin 80 mg oral tablet: 1 tab(s) orally once a day (at bedtime)  clopidogrel 75 mg oral tablet: 1 tab(s) orally once a day  Lasix 40 mg oral tablet: 1 tab(s) orally once a day  pantoprazole 40 mg oral delayed release tablet: 1 tab(s) orally once a day  sacubitril-valsartan 24 mg-26 mg oral tablet: 1 tab(s) orally 2 times a day   apixaban 5 mg oral tablet: 1 tab(s) orally every 12 hours  atorvastatin 80 mg oral tablet: 1 tab(s) orally once a day (at bedtime)  clopidogrel 75 mg oral tablet: 1 tab(s) orally once a day  Lasix 40 mg oral tablet: 1 tab(s) orally once a day  pantoprazole 40 mg oral delayed release tablet: 1 tab(s) orally once a day

## 2025-05-08 NOTE — DIETITIAN INITIAL EVALUATION ADULT - PROBLEM SELECTOR PLAN 1
- Hx of CAD and HFrEF (29%) p/w sob.   - CXR showing cardiomegaly with pulm edema  - CT chest showing Cardiomegaly with secondary signs of congestive heart failure, with associated pulmonary interstitial edema and bilateral pleural effusions  - TTE showing global left ventricular hypokinesis and increased LV mass and eccentric hypertrophy with LVEF 30%, enlarged right ventricular cavity size and reduced right ventricular systolic function  - BNP 61194  - now on 3L NC  - at home is on Aldactone 25 and sacubitril-valsartan 24-26 BID    - c/w lasix 20 IV BID  - cards c/s: appreciate recs  - strict I/Os  - closely monitor volume status

## 2025-05-08 NOTE — PROGRESS NOTE ADULT - ASSESSMENT
84 year old M pmh bipolar disorder, dementia (AAOx2 at baseline), CAD, CHF (EF 29%), LV thrombus, afib on eliquis presents from assisted living facility for shortness of breath found to have cardiomegaly with secondary signs of congestive heart failure, with associated pulmonary interstitial edema and bilateral pleural effusions.  84 year old M pmh bipolar disorder, dementia (AAOx2 at baseline), CAD, CHF (EF 29%), LV thrombus, afib on eliquis presents from assisted living facility for shortness of breath found to have cardiomegaly with secondary signs of congestive heart failure, with associated pulmonary interstitial edema and bilateral pleural effusions. Patient is stable for discharge.

## 2025-05-08 NOTE — DISCHARGE NOTE PROVIDER - NSDCCPCAREPLAN_GEN_ALL_CORE_FT
PRINCIPAL DISCHARGE DIAGNOSIS  Diagnosis: CHF exacerbation  Assessment and Plan of Treatment: You came to the hospital with shortness of breath and were found to have extra fluid on your body, likely due to your history of heart failure. You were evaluated by cardiology and had an ECHO that showed your heart function is stable from prior. Your home medications were continued and you were started on a medication called lasix. You received this via IV in the hospital and are now being discharged on the oral form. Continue to take this as prescribed. Follow with your heart doctor within 2 weeks of discharge. Follow with your primary care doctor within 1 week of discharge.     PRINCIPAL DISCHARGE DIAGNOSIS  Diagnosis: CHF exacerbation  Assessment and Plan of Treatment: You came to the hospital with shortness of breath and were found to have extra fluid on your body, likely due to your history of heart failure. You were evaluated by cardiology and had an ECHO that showed your heart function is stable from prior. Your home medications were continued and you were started on a medication called lasix. You received this via IV in the hospital and are now being discharged on the oral form. Continue to take this as prescribed. Follow with your heart doctor within 2 weeks of discharge. Follow with your primary care doctor within 1 week of discharge.  Do not resume your home Entresto and Aldactone medications until you have seen your outpatient provider. Return to the hospital to the hospital or contact your primary care doctor is you are not improving as expected or have any worsening symptoms.     PRINCIPAL DISCHARGE DIAGNOSIS  Diagnosis: CHF exacerbation  Assessment and Plan of Treatment: You came to the hospital with shortness of breath and were found to have extra fluid on your body, likely due to your history of heart failure. You were evaluated by cardiology and had an ECHO that showed your heart function is stable from prior. Your home medications were continued and you were started on a medication called lasix. You received this via IV in the hospital and are now being discharged on the oral form. Continue to take this as prescribed. Follow with your heart doctor within 2 weeks of discharge. Follow with your primary care doctor within 1 week of discharge.  Do not resume your home Entresto and Aldactone medications until you have seen your outpatient provider. Also discuss with your provider about starting at SGLT-2i medication for your heart failure. Return to the hospital to the hospital or contact your primary care doctor is you are not improving as expected or have any worsening symptoms.

## 2025-05-08 NOTE — DISCHARGE NOTE PROVIDER - NSDCFUADDAPPT_GEN_ALL_CORE_FT
APPTS ARE READY TO BE MADE: [X] YES    Best Family or Patient Contact (if needed):    Additional Information about above appointments (if needed):    1: Geriatrics   2: Cardiology   3:     Other comments or requests:    APPTS ARE READY TO BE MADE: [X] YES    Best Family or Patient Contact (if needed):    Additional Information about above appointments (if needed):    1: Geriatrics   2: Cardiology   3:     Other comments or requests:     Patient is being transferred. Caregiver will arrange follow up.

## 2025-05-08 NOTE — DISCHARGE NOTE PROVIDER - HOSPITAL COURSE
HPI:  84 year old M pmh bipolar disorder, dementia (AAOx2 at baseline), CAD, CHF, LV thrombus, afib on eliquis, recent hospitalization for GI bleed and covid presents from assisted living facility for shortness of breath. Patient unable to provide full history, history obtained from nephew and collateral from [facility] medical coordinator. On 5/6 around 1pm the patient reported to the medical coordinator that he was feeling "lousy" ,"short of breath", and didn't feel like getting out of bed which is what prompted them to bring him into the hospital. Normally the patient walks around and does what he needs to do to take care of some of his basic needs.  Per nephew, he states that patient has appeared to have difficulty breathing since Sunday.  No reported fever, chills, complaints of chest pain, nausea, vomiting, URI symptoms, bloody or dark stool.  No reported trauma.  No history of pulmonary embolism.    (07 May 2025 09:31)    Hospital Course:  Patient was evaluated for       The patient is afebrile, hemodynamically stable and medically optimized for discharge to ___ with follow up with ____. On day of discharge, patient is clinically stable with no new exam findings or acute symptoms compared to prior. The patient was seen by the attending physician on the date of discharge and deemed stable and acceptable for discharge. The patient's chronic medical conditions were treated accordingly per the patient's home medication regimen. The patient's medication reconciliation (with changes made to chronic medications), follow up appointments, discharge orders, instructions, and significant lab and diagnostic studies are as noted.      Important Medication Changes and Reason:      Active or Pending Issues Requiring Follow-up:      Advanced Directives:   [ ] Full code  [ ] DNR  [ ] Hospice             HPI:  84 year old M pmh bipolar disorder, dementia (AAOx2 at baseline), CAD, CHF, LV thrombus, afib on eliquis, recent hospitalization for GI bleed and covid presents from assisted living facility for shortness of breath. Patient unable to provide full history, history obtained from nephew and collateral from [facility] medical coordinator. On 5/6 around 1pm the patient reported to the medical coordinator that he was feeling "lousy" ,"short of breath", and didn't feel like getting out of bed which is what prompted them to bring him into the hospital. Normally the patient walks around and does what he needs to do to take care of some of his basic needs.  Per nephew, he states that patient has appeared to have difficulty breathing since Sunday.  No reported fever, chills, complaints of chest pain, nausea, vomiting, URI symptoms, bloody or dark stool.  No reported trauma.  No history of pulmonary embolism.    (07 May 2025 09:31)    Hospital Course:  CT imaging showed cardiomegaly with secondary signs of congestive heart failure, with associated pulmonary interstitial edema and bilateral pleural effusions suggestive of an exacerbation of chronic heart failure. He was managed with iv diuretics and tolerated it well. Echo showed EF of 30%, stable from Echo in 2023. The patient was seen by cardiology who recommended 40mg Lasix daily  on discharge and follow-up outpatient.     The patient is afebrile, hemodynamically stable and medically optimized for discharge home with follow up with cardiology. On day of discharge, patient is clinically stable with no new exam findings or acute symptoms compared to prior. The patient was seen by the attending physician on the date of discharge and deemed stable and acceptable for discharge. The patient's chronic medical conditions were treated accordingly per the patient's home medication regimen. The patient's medication reconciliation (with changes made to chronic medications), follow up appointments, discharge orders, instructions, and significant lab and diagnostic studies are as noted.      Important Medication Changes and Reason:  -Lasix 40mg Daily       Active or Pending Issues Requiring Follow-up:  -Chronic Heart Failure, Cardiology       Advanced Directives:   [X] Full code  [ ] DNR  [ ] Hospice             HPI:  84 year old M pmh bipolar disorder, dementia (AAOx2 at baseline), CAD, CHF, LV thrombus, afib on eliquis, recent hospitalization for GI bleed and covid presents from assisted living facility for shortness of breath. Patient unable to provide full history, history obtained from nephew and collateral from [facility] medical coordinator. On 5/6 around 1pm the patient reported to the medical coordinator that he was feeling "lousy" ,"short of breath", and didn't feel like getting out of bed which is what prompted them to bring him into the hospital. Normally the patient walks around and does what he needs to do to take care of some of his basic needs.  Per nephew, he states that patient has appeared to have difficulty breathing since Sunday.  No reported fever, chills, complaints of chest pain, nausea, vomiting, URI symptoms, bloody or dark stool.  No reported trauma.  No history of pulmonary embolism.    (07 May 2025 09:31)    Hospital Course:  CT imaging showed cardiomegaly with secondary signs of congestive heart failure, with associated pulmonary interstitial edema and bilateral pleural effusions suggestive of an exacerbation of chronic heart failure. He was managed with iv diuretics and tolerated it well. Echo showed EF of 30%, stable from Echo in 2023. The patient was seen by cardiology who recommended 40mg Lasix daily  on discharge and follow-up outpatient. His entresto and aldactone were held due to soft BPs but per cards: rec restarting as able as well as adding on SGLT-2i    The patient is afebrile, hemodynamically stable and medically optimized for discharge home with follow up with cardiology. On day of discharge, patient is clinically stable with no new exam findings or acute symptoms compared to prior. The patient was seen by the attending physician on the date of discharge and deemed stable and acceptable for discharge. The patient's chronic medical conditions were treated accordingly per the patient's home medication regimen. The patient's medication reconciliation (with changes made to chronic medications), follow up appointments, discharge orders, instructions, and significant lab and diagnostic studies are as noted.      Important Medication Changes and Reason:  - continue Lasix 40mg Daily   - start home entresto 24/26mg BID and aldactone 25 daily as tolerated  - restart SGLT-2i empagliflozin 10mg daily as tolerated      Active or Pending Issues Requiring Follow-up:  -Chronic Heart Failure, Cardiology       Advanced Directives:   [X] Full code  [ ] DNR  [ ] Hospice

## 2025-05-09 ENCOUNTER — TRANSCRIPTION ENCOUNTER (OUTPATIENT)
Age: 85
End: 2025-05-09

## 2025-05-09 LAB
ALBUMIN SERPL ELPH-MCNC: 3.9 G/DL — SIGNIFICANT CHANGE UP (ref 3.3–5)
ALP SERPL-CCNC: 85 U/L — SIGNIFICANT CHANGE UP (ref 40–120)
ALT FLD-CCNC: 10 U/L — SIGNIFICANT CHANGE UP (ref 4–41)
ANION GAP SERPL CALC-SCNC: 18 MMOL/L — HIGH (ref 7–14)
AST SERPL-CCNC: 16 U/L — SIGNIFICANT CHANGE UP (ref 4–40)
BASOPHILS # BLD AUTO: 0.05 K/UL — SIGNIFICANT CHANGE UP (ref 0–0.2)
BASOPHILS NFR BLD AUTO: 0.7 % — SIGNIFICANT CHANGE UP (ref 0–2)
BILIRUB SERPL-MCNC: 1.1 MG/DL — SIGNIFICANT CHANGE UP (ref 0.2–1.2)
BUN SERPL-MCNC: 44 MG/DL — HIGH (ref 7–23)
CALCIUM SERPL-MCNC: 9.4 MG/DL — SIGNIFICANT CHANGE UP (ref 8.4–10.5)
CHLORIDE SERPL-SCNC: 107 MMOL/L — SIGNIFICANT CHANGE UP (ref 98–107)
CO2 SERPL-SCNC: 15 MMOL/L — LOW (ref 22–31)
CREAT SERPL-MCNC: 1.28 MG/DL — SIGNIFICANT CHANGE UP (ref 0.5–1.3)
EGFR: 55 ML/MIN/1.73M2 — LOW
EGFR: 55 ML/MIN/1.73M2 — LOW
EOSINOPHIL # BLD AUTO: 0.19 K/UL — SIGNIFICANT CHANGE UP (ref 0–0.5)
EOSINOPHIL NFR BLD AUTO: 2.6 % — SIGNIFICANT CHANGE UP (ref 0–6)
GLUCOSE SERPL-MCNC: 76 MG/DL — SIGNIFICANT CHANGE UP (ref 70–99)
HCT VFR BLD CALC: 36.3 % — LOW (ref 39–50)
HGB BLD-MCNC: 11.5 G/DL — LOW (ref 13–17)
IANC: 4.8 K/UL — SIGNIFICANT CHANGE UP (ref 1.8–7.4)
IMM GRANULOCYTES NFR BLD AUTO: 0.4 % — SIGNIFICANT CHANGE UP (ref 0–0.9)
LYMPHOCYTES # BLD AUTO: 1.65 K/UL — SIGNIFICANT CHANGE UP (ref 1–3.3)
LYMPHOCYTES # BLD AUTO: 22.5 % — SIGNIFICANT CHANGE UP (ref 13–44)
MAGNESIUM SERPL-MCNC: 2.1 MG/DL — SIGNIFICANT CHANGE UP (ref 1.6–2.6)
MCHC RBC-ENTMCNC: 31.3 PG — SIGNIFICANT CHANGE UP (ref 27–34)
MCHC RBC-ENTMCNC: 31.7 G/DL — LOW (ref 32–36)
MCV RBC AUTO: 98.6 FL — SIGNIFICANT CHANGE UP (ref 80–100)
MONOCYTES # BLD AUTO: 0.61 K/UL — SIGNIFICANT CHANGE UP (ref 0–0.9)
MONOCYTES NFR BLD AUTO: 8.3 % — SIGNIFICANT CHANGE UP (ref 2–14)
NEUTROPHILS # BLD AUTO: 4.8 K/UL — SIGNIFICANT CHANGE UP (ref 1.8–7.4)
NEUTROPHILS NFR BLD AUTO: 65.5 % — SIGNIFICANT CHANGE UP (ref 43–77)
NRBC # BLD AUTO: 0 K/UL — SIGNIFICANT CHANGE UP (ref 0–0)
NRBC # FLD: 0 K/UL — SIGNIFICANT CHANGE UP (ref 0–0)
NRBC BLD AUTO-RTO: 0 /100 WBCS — SIGNIFICANT CHANGE UP (ref 0–0)
PHOSPHATE SERPL-MCNC: 4 MG/DL — SIGNIFICANT CHANGE UP (ref 2.5–4.5)
PLATELET # BLD AUTO: 150 K/UL — SIGNIFICANT CHANGE UP (ref 150–400)
POTASSIUM SERPL-MCNC: 4.8 MMOL/L — SIGNIFICANT CHANGE UP (ref 3.5–5.3)
POTASSIUM SERPL-SCNC: 4.8 MMOL/L — SIGNIFICANT CHANGE UP (ref 3.5–5.3)
PROT SERPL-MCNC: 6.7 G/DL — SIGNIFICANT CHANGE UP (ref 6–8.3)
RBC # BLD: 3.68 M/UL — LOW (ref 4.2–5.8)
RBC # FLD: 16.9 % — HIGH (ref 10.3–14.5)
SODIUM SERPL-SCNC: 140 MMOL/L — SIGNIFICANT CHANGE UP (ref 135–145)
WBC # BLD: 7.33 K/UL — SIGNIFICANT CHANGE UP (ref 3.8–10.5)
WBC # FLD AUTO: 7.33 K/UL — SIGNIFICANT CHANGE UP (ref 3.8–10.5)

## 2025-05-09 PROCEDURE — 99232 SBSQ HOSP IP/OBS MODERATE 35: CPT | Mod: GC

## 2025-05-09 PROCEDURE — 99232 SBSQ HOSP IP/OBS MODERATE 35: CPT

## 2025-05-09 RX ADMIN — Medication 40 MILLIGRAM(S): at 05:29

## 2025-05-09 RX ADMIN — Medication 25 MILLIGRAM(S): at 05:29

## 2025-05-09 RX ADMIN — SACUBITRIL AND VALSARTAN 1 TABLET(S): 6; 6 PELLET ORAL at 05:29

## 2025-05-09 RX ADMIN — Medication 1 APPLICATION(S): at 12:14

## 2025-05-09 RX ADMIN — SACUBITRIL AND VALSARTAN 1 TABLET(S): 6; 6 PELLET ORAL at 16:49

## 2025-05-09 RX ADMIN — ATORVASTATIN CALCIUM 80 MILLIGRAM(S): 80 TABLET, FILM COATED ORAL at 21:58

## 2025-05-09 RX ADMIN — CLOPIDOGREL BISULFATE 75 MILLIGRAM(S): 75 TABLET, FILM COATED ORAL at 12:10

## 2025-05-09 RX ADMIN — FUROSEMIDE 40 MILLIGRAM(S): 10 INJECTION INTRAMUSCULAR; INTRAVENOUS at 12:10

## 2025-05-09 RX ADMIN — FUROSEMIDE 40 MILLIGRAM(S): 10 INJECTION INTRAMUSCULAR; INTRAVENOUS at 05:30

## 2025-05-09 RX ADMIN — APIXABAN 5 MILLIGRAM(S): 2.5 TABLET, FILM COATED ORAL at 05:29

## 2025-05-09 RX ADMIN — APIXABAN 5 MILLIGRAM(S): 2.5 TABLET, FILM COATED ORAL at 16:49

## 2025-05-09 NOTE — PROGRESS NOTE ADULT - ASSESSMENT
84 year old M pmh bipolar disorder, dementia (AAOx2 at baseline), CAD, CHF (EF 29%), LV thrombus, afib on eliquis presents from assisted living facility for shortness of breath found to have cardiomegaly with secondary signs of congestive heart failure, with associated pulmonary interstitial edema and bilateral pleural effusions. Patient is stable for discharge.

## 2025-05-09 NOTE — PROGRESS NOTE ADULT - ATTENDING COMMENTS
The patient was seen and examined with the Cardiology Consultation Teaching Service.     No overnight events    No chest pain  No dyspnea, orthopnea or PND  No palpitations or dizziness     The patient is much more awake today.    PMH/PSH:  Coronary artery disease     Percutaneous coronary intervention to the LAD     Reported multivessel disease on medical management  Chronic heart failure with reduced LV function     Ischemic cardiomyopathy, LVEF 30%     Prior LV thrombus  Atrial fibrillation on apixaban   Bipolar disorder  Dementia    Comfortable-appearing man in no acute distress  Lethargic, responsive to stimuli, oriented to person  Afebrile  Vital signs stable  JVP is elevated  Basilar rales  Irregularly irregular   Soft systolic murmur  Extremities are warm and perfused  Ecchymoses of the upper extremities  No peripheral edema     Macrocytic anemia Hb 11.5  Metabolic acidosis 15, AG 18  GFR 55    Hs-troponin 59, 61  Pro-BNP 14K    Echocardiography demonstrates global severely reduced LV function, LVEF 30%, increased LV mass, severe MR, mild AI, severely dilated LA; estimated pulmonary pressures are moderately elevated, estimated RA pressures are elevated    CT Angiography of the chest demonstrates moderate right pleural effusion, interstitial edema, no pulmonary emboli, aortic and coronary calcifications, cardiomegaly    Impression and Recommendations:   84-year-old man with coronary artery disease, prior PCI, chronic heart failure with reduced LV function and severe MR, and atrial fibrillation who was referred to our hospital for worsening dyspnea.      The patient is more awake and able to participate in an examination. There is evidence of volume overload on examination, as well as on his initial chest imaging and echocardiography.     I would continue the patient's intravenous diuresis to furosemide 40mg BID for a goal net negative 1-2L per day. Please follow strict I&Os and obtain daily standing weights if possible.     Continue sacubitril-valsartan and spironolactone for his heart failure with reduced LV function.   Continue clopidogrel and atorvastatin for his known coronary disease and PCI.   Continue apixaban for stroke prevention in atrial fibrillation.     Gabriel Gustafson MD FAC FACP  Cardiology  [phone]

## 2025-05-09 NOTE — DISCHARGE NOTE NURSING/CASE MANAGEMENT/SOCIAL WORK - PATIENT PORTAL LINK FT
You can access the FollowMyHealth Patient Portal offered by [facility] by registering at the following website: [email or web address]. By joining [facility]’s FollowMyHealth portal, you will also be able to view your health information using other applications (apps) compatible with our system.

## 2025-05-09 NOTE — PROGRESS NOTE ADULT - PROBLEM SELECTOR PLAN 9
Hospital Bundle  Nutrition: DASH Diet   PPX  ---VTE: eliquis  ---GI: diet  Access: PIV  Code Status: FULL  Dispo: pending medical w/u, PT Hospital Bundle  Nutrition: DASH Diet   PPX  ---VTE: eliquis  ---GI: diet  Access: PIV  Code Status: DNR/ Trial NIV  Dispo: back to [facility]

## 2025-05-09 NOTE — PROGRESS NOTE ADULT - SUBJECTIVE AND OBJECTIVE BOX
PROGRESS NOTE:     Patient is a 84y old  Male who presents with a chief complaint of Per chart review, 84 year old Male pmh bipolar disorder, dementia (AAOx2 at baseline), CAD, CHF (EF 29%), LV thrombus, afib on eliquis presents from assisted living facility for shortness of breath found to have cardiomegaly with secondary signs of congestive heart failure, with associated pulmonary interstitial edema and bilateral pleural effusions.  (08 May 2025 13:22)      INTERVAL HISTORY: NAEO. VSS. ROS negative.    MEDICATIONS  (STANDING):  apixaban 5 milliGRAM(s) Oral two times a day  atorvastatin 80 milliGRAM(s) Oral at bedtime  chlorhexidine 2% Cloths 1 Application(s) Topical daily  clopidogrel Tablet 75 milliGRAM(s) Oral daily  furosemide   Injectable 40 milliGRAM(s) IV Push two times a day  pantoprazole    Tablet 40 milliGRAM(s) Oral before breakfast  sacubitril 24 mG/valsartan 26 mG 1 Tablet(s) Oral two times a day  spironolactone 25 milliGRAM(s) Oral daily    MEDICATIONS  (PRN):  acetaminophen     Tablet .. 650 milliGRAM(s) Oral every 6 hours PRN Temp greater or equal to 38C (100.4F), Mild Pain (1 - 3)      PHYSICAL EXAM:  Vital Signs Last 24 Hrs  T(C): 37 (09 May 2025 05:00), Max: 37 (08 May 2025 14:29)  T(F): 98.6 (09 May 2025 05:00), Max: 98.6 (08 May 2025 14:29)  HR: 70 (09 May 2025 05:00) (60 - 70)  BP: 110/68 (09 May 2025 05:00) (88/63 - 110/68)  BP(mean): --  RR: 19 (09 May 2025 05:00) (17 - 20)  SpO2: 100% (09 May 2025 05:00) (56% - 100%)    Parameters below as of 09 May 2025 05:00  Patient On (Oxygen Delivery Method): nasal cannula  O2 Flow (L/min): 4      GENERAL: NAD, lying in bed, more awake than yesterday  HEAD:  Atraumatic, Normocephalic  EYES: EOMI, PERRL, conjunctiva and sclera clear  ENT: Moist mucous membranes. +JVD  CHEST/LUNG: Mild crackles at bilateral lung bases. Remainder of lung fields clear to auscultation;  HEART: Irregular rhythm, borderline bradycardic rate; Holosystolic murmur present.   ABDOMEN: Bowel sounds present; Soft, Nontender, Nondistended.   EXTREMITIES:  2+ Peripheral Pulses, brisk capillary refill. No clubbing, cyanosis, or edema  NERVOUS SYSTEM:  Alert & Oriented X1-2, speech clear. No deficits   MSK: FROM all 4 extremities, full and equal strength  SKIN: Scattered ecchymosis throughout upper and lower extremities    LABS:                  11.5   7.33  )-----------( 150      ( 09 May 2025 05:14 )             36.3     05-09    140  |  107  |  44[H]  ----------------------------<  76  4.8   |  15[L]  |  1.28    Ca    9.4      09 May 2025 05:14  Phos  4.0     05-09  Mg     2.10     05-09    TPro  6.7  /  Alb  3.9  /  TBili  1.1  /  DBili  x   /  AST  16  /  ALT  10  /  AlkPhos  85  05-09      Urinalysis Basic - ( 09 May 2025 05:14 )  Color: x / Appearance: x / SG: x / pH: x  Gluc: 76 mg/dL / Ketone: x  / Bili: x / Urobili: x   Blood: x / Protein: x / Nitrite: x   Leuk Esterase: x / RBC: x / WBC x   Sq Epi: x / Non Sq Epi: x / Bacteria: x        ******************************  Authored By: Rosalio Shukla MD PGY1  Internal Medicine  MS Teams  ******************************

## 2025-05-09 NOTE — DISCHARGE NOTE NURSING/CASE MANAGEMENT/SOCIAL WORK - FINANCIAL ASSISTANCE
[facility] provides services at a reduced cost to those who are determined to be eligible through [facility]’s financial assistance program. Information regarding [facility]’s financial assistance program can be found by going to [email or web address] or by calling [phone].

## 2025-05-09 NOTE — PROGRESS NOTE ADULT - PROBLEM SELECTOR PLAN 6
- at home is on Aldactone 25 and sacubitril-valsartan 24-26 BID, holding mireya  - lasix as above - at home is on Aldactone 25 and sacubitril-valsartan 24-26 BID, restart  - lasix as above

## 2025-05-09 NOTE — PROGRESS NOTE ADULT - SUBJECTIVE AND OBJECTIVE BOX
PROGRESS NOTE:   Authored by Louise Washington MD  Internal Medicine Resident Physician, PGY-1      Patient is a 84y old  Male who presents with a chief complaint of Per chart review, 84 year old Male pmh bipolar disorder, dementia (AAOx2 at baseline), CAD, CHF (EF 29%), LV thrombus, afib on eliquis presents from assisted living facility for shortness of breath found to have cardiomegaly with secondary signs of congestive heart failure, with associated pulmonary interstitial edema and bilateral pleural effusions.  (08 May 2025 13:22)      SUBJECTIVE / OVERNIGHT EVENTS: ***, patient seen and examined at bedside    MEDICATIONS  (STANDING):  apixaban 5 milliGRAM(s) Oral two times a day  atorvastatin 80 milliGRAM(s) Oral at bedtime  chlorhexidine 2% Cloths 1 Application(s) Topical daily  clopidogrel Tablet 75 milliGRAM(s) Oral daily  furosemide   Injectable 40 milliGRAM(s) IV Push two times a day  pantoprazole    Tablet 40 milliGRAM(s) Oral before breakfast  sacubitril 24 mG/valsartan 26 mG 1 Tablet(s) Oral two times a day  spironolactone 25 milliGRAM(s) Oral daily    MEDICATIONS  (PRN):  acetaminophen     Tablet .. 650 milliGRAM(s) Oral every 6 hours PRN Temp greater or equal to 38C (100.4F), Mild Pain (1 - 3)      CAPILLARY BLOOD GLUCOSE        I&O's Summary      PHYSICAL EXAM:  Vital Signs Last 24 Hrs  T(C): 37 (09 May 2025 05:00), Max: 37 (08 May 2025 14:29)  T(F): 98.6 (09 May 2025 05:00), Max: 98.6 (08 May 2025 14:29)  HR: 70 (09 May 2025 05:00) (60 - 70)  BP: 110/68 (09 May 2025 05:00) (88/63 - 110/68)  BP(mean): --  RR: 19 (09 May 2025 05:00) (17 - 20)  SpO2: 100% (09 May 2025 05:00) (56% - 100%)    Parameters below as of 09 May 2025 05:00  Patient On (Oxygen Delivery Method): nasal cannula  O2 Flow (L/min): 4    CONSTITUTIONAL: Well-groomed, in no apparent distress  RESPIRATORY: Breathing comfortably; no dullness to percussion; lungs CTA without wheeze/rhonchi/rales  CARDIOVASCULAR: +S1S2, RRR, no M/G/R; pedal pulses full and symmetric; no lower extremity edema  GASTROINTESTINAL: No palpable masses or tenderness, +BS throughout, no rebound/guarding; no hepatosplenomegaly; no hernia palpated  SKIN: No rashes or ulcers noted  NEUROLOGIC: A+O x 3, CN II-XII intact; sensation intact in LEs b/l to light touch    LABS:                        11.5   7.33  )-----------( 150      ( 09 May 2025 05:14 )             36.3     05-09    140  |  107  |  44[H]  ----------------------------<  76  4.8   |  15[L]  |  1.28    Ca    9.4      09 May 2025 05:14  Phos  4.0     05-09  Mg     2.10     05-09    TPro  6.7  /  Alb  3.9  /  TBili  1.1  /  DBili  x   /  AST  16  /  ALT  10  /  AlkPhos  85  05-09          Urinalysis Basic - ( 09 May 2025 05:14 )    Color: x / Appearance: x / SG: x / pH: x  Gluc: 76 mg/dL / Ketone: x  / Bili: x / Urobili: x   Blood: x / Protein: x / Nitrite: x   Leuk Esterase: x / RBC: x / WBC x   Sq Epi: x / Non Sq Epi: x / Bacteria: x          RADIOLOGY & ADDITIONAL TESTS:  Results Reviewed:   Imaging Personally Reviewed:  Electrocardiogram Personally Reviewed:   PROGRESS NOTE:   Authored by Louise Washington MD  Internal Medicine Resident Physician, PGY-1      Patient is a 84y old  Male who presents with a chief complaint of Per chart review, 84 year old Male pmh bipolar disorder, dementia (AAOx2 at baseline), CAD, CHF (EF 29%), LV thrombus, afib on eliquis presents from assisted living facility for shortness of breath found to have cardiomegaly with secondary signs of congestive heart failure, with associated pulmonary interstitial edema and bilateral pleural effusions.  (08 May 2025 13:22)      SUBJECTIVE / OVERNIGHT EVENTS: Overnight the patient has desats to 56% and was placed on 4L. Asymptomatic. Patient seen and examined at bedside and he has new no concerns.     MEDICATIONS  (STANDING):  apixaban 5 milliGRAM(s) Oral two times a day  atorvastatin 80 milliGRAM(s) Oral at bedtime  chlorhexidine 2% Cloths 1 Application(s) Topical daily  clopidogrel Tablet 75 milliGRAM(s) Oral daily  furosemide   Injectable 40 milliGRAM(s) IV Push two times a day  pantoprazole    Tablet 40 milliGRAM(s) Oral before breakfast  sacubitril 24 mG/valsartan 26 mG 1 Tablet(s) Oral two times a day  spironolactone 25 milliGRAM(s) Oral daily    MEDICATIONS  (PRN):  acetaminophen     Tablet .. 650 milliGRAM(s) Oral every 6 hours PRN Temp greater or equal to 38C (100.4F), Mild Pain (1 - 3)      CAPILLARY BLOOD GLUCOSE        I&O's Summary      PHYSICAL EXAM:  Vital Signs Last 24 Hrs  T(C): 37 (09 May 2025 05:00), Max: 37 (08 May 2025 14:29)  T(F): 98.6 (09 May 2025 05:00), Max: 98.6 (08 May 2025 14:29)  HR: 70 (09 May 2025 05:00) (60 - 70)  BP: 110/68 (09 May 2025 05:00) (88/63 - 110/68)  BP(mean): --  RR: 19 (09 May 2025 05:00) (17 - 20)  SpO2: 100% (09 May 2025 05:00) (56% - 100%)    Parameters below as of 09 May 2025 05:00  Patient On (Oxygen Delivery Method): nasal cannula  O2 Flow (L/min): 4    PHYSICAL EXAM:  GENERAL: NAD, lying in bed comfortably  HEAD:  Atraumatic, Normocephalic  EYES: EOMI, PERRL, conjunctiva and sclera clear  ENT: Moist mucous membranes  CHEST/LUNG: Clear to auscultation bilaterally; No rales, rhonchi, wheezing, or rubs.   HEART: Regular rate and rhythm; No murmurs, rubs, or gallops  ABDOMEN: Bowel sounds present; Soft, Nontender, Nondistended.   EXTREMITIES:  2+ Peripheral Pulses, brisk capillary refill. No clubbing, cyanosis, or edema  NERVOUS SYSTEM:  Alert & Oriented X1, speech clear. No deficits   MSK: FROM all 4 extremities, full and equal strength  SKIN: Scattered ecchymosis throughout upper and lower extremities      LABS:                        11.5   7.33  )-----------( 150      ( 09 May 2025 05:14 )             36.3     05-09    140  |  107  |  44[H]  ----------------------------<  76  4.8   |  15[L]  |  1.28    Ca    9.4      09 May 2025 05:14  Phos  4.0     05-09  Mg     2.10     05-09    TPro  6.7  /  Alb  3.9  /  TBili  1.1  /  DBili  x   /  AST  16  /  ALT  10  /  AlkPhos  85  05-09          Urinalysis Basic - ( 09 May 2025 05:14 )    Color: x / Appearance: x / SG: x / pH: x  Gluc: 76 mg/dL / Ketone: x  / Bili: x / Urobili: x   Blood: x / Protein: x / Nitrite: x   Leuk Esterase: x / RBC: x / WBC x   Sq Epi: x / Non Sq Epi: x / Bacteria: x          RADIOLOGY & ADDITIONAL TESTS:  Results Reviewed:   Imaging Personally Reviewed:  Electrocardiogram Personally Reviewed:

## 2025-05-09 NOTE — DISCHARGE NOTE NURSING/CASE MANAGEMENT/SOCIAL WORK - NSDCFUADDAPPT_GEN_ALL_CORE_FT
APPTS ARE READY TO BE MADE: [X] YES    Best Family or Patient Contact (if needed):    Additional Information about above appointments (if needed):    1: Geriatrics   2: Cardiology   3:     Other comments or requests:

## 2025-05-09 NOTE — PROGRESS NOTE ADULT - ASSESSMENT
Justice Guzman is a 84 year old male with a history of HFrEF, prior LV thrombus?, afib (currently on eliquis), CAD, bipolar disorder, and dementia (AAOx2 at baseline), who presented to [facility] 5/6/25 from his assisted living facility for shortness of breath. CT PE showed pulmonary edema with bilateral pleural effusions. He was started on 20mg IV Lasix BID. Cardiology is consulted for management of heart failure.     #HFrEF  - TTE (5/7/25): LVEF 30%. Global LV hypokinesis. Increased LV mass w eccentric hypertrophy. No LV thrombus . Severe LA dilation. Enlarged RV. Reduced RV systolic function. Severe MR. Mild AR. PASP 56.   - CT PE (5/7/25): No pulmonary embolism. Cardiomegaly. Pulmonary interstitial edema. Bilateral pleural effusions. Pulmonary artery measures up to 3.9 cm, suggests pulmonary arterial hypertension. Ascending aorta is ectatic, measuring up to 4.0 cm, unchanged since 2024 examination  - HS Trop T: 61 -> 59  - Pro-BNP: 14,738  - Home Meds: Entresto 24-26 BID, Aldactone 25 qd, Eliquis 5 BID, Plavix 75 qd, Atorvastatin 80mg qd  - Telemetry showing Afib with slow ventricular response, rates in 60s  Plan  - Recommend continuing inpatient diuresis with Furosemide 40mg IV BID  - Titrate diuresis to maintain net negative 1-2L fluid balance daily  - Strict I/O and Daily Weights  - When medically optimized would discharge on Furosemide 40mg PO daily  - Consider initiation of SGLT2i on discharge  - Continue GDMT as tolerated    NOTE INCOMPLETE UNTIL ATTESTED BY ATTENDING

## 2025-05-09 NOTE — DISCHARGE NOTE NURSING/CASE MANAGEMENT/SOCIAL WORK - NSDCPEFALRISK_GEN_ALL_CORE
For information on Fall & Injury Prevention, visit: [email or web address] OR  [email or web address] OR  https://www.cdc.gov/steadi/patient.html

## 2025-05-09 NOTE — PROGRESS NOTE ADULT - ATTENDING COMMENTS
Pt is an 85 yo M with PMH a-fib (eliquis), dementia (AOx2), HTN, HLD, BP d/o, CAD, CHF (EF 29%), and LV thrombus p/w SOB. Hemodynamically stable on arrival with labs showing macrocytic anemia, Cr 1.48, trop 61--59, lipase neg, TSH WNL, BNP 32727, UA neg, and COVID/flu/RSV neg. CTH neg, CTA chest neg PE but c/w volume overload, and TTE with EF 30%, mod pHTN, sev MR/TR, mild AR, and LA sev dilated. Given 20mg IV lasix.    Pt seen and examined at bedside sleeping comfortably, easily arousable. Denies acute complaints, says his breathing is better and denies any breathing issues. Exam notable for comfortable on RA, improved bibasilar crackles, improved inspiratory effort, RRR, +S1/S2, overly nourished abdomen with BS x4, trace edema BL LE, and fungal changes to BL toe nails.     Plan to c/w IV lasix 40mg BID while inpt per cards recs; will DC on PO lasix 40mg daily. Resumed home meds. PT recs RICK - nephew in agreement, will start process. Will need auth, likely DC early next week.    Discussed with HS, rest as outlined above.

## 2025-05-09 NOTE — PROGRESS NOTE ADULT - PROBLEM SELECTOR PLAN 1
- Hx of CAD and HFrEF (29%) p/w sob.   - CXR showing cardiomegaly with pulm edema  - CT chest showing Cardiomegaly with secondary signs of congestive heart failure, with associated pulmonary interstitial edema and bilateral pleural effusions  - TTE showing global left ventricular hypokinesis and increased LV mass and eccentric hypertrophy with LVEF 30%, enlarged right ventricular cavity size and reduced right ventricular systolic function  - BNP 56453  - now on RA  -  home is on Aldactone 25 and sacubitril-valsartan 24-26 BID  - f/u cards consult     - c/w lasix 20 IV BID  - cards c/s: appreciate recs  - strict I/Os  - closely monitor volume status  - restart Aldactone 25 and sacubitril-valsartan 24-26 BID  -f/u cards consult - Hx of CAD and HFrEF (29%) p/w sob.   - CXR showing cardiomegaly with pulm edema  - CT chest showing Cardiomegaly with secondary signs of congestive heart failure, with associated pulmonary interstitial edema and bilateral pleural effusions  - TTE showing global left ventricular hypokinesis and increased LV mass and eccentric hypertrophy with LVEF 30%, enlarged right ventricular cavity size and reduced right ventricular systolic function  - BNP 37274  - now on RA  -  home is on Aldactone 25 and sacubitril-valsartan 24-26 BID  - cards consult appreciated     - c/w lasix 40 IV BID  - cards c/s: appreciate recs  - strict I/Os  - closely monitor volume status  - restart Aldactone 25 and sacubitril-valsartan 24-26 BID

## 2025-05-09 NOTE — PROVIDER CONTACT NOTE (OTHER) - RECOMMENDATIONS
MD Louise Washington made aware, will hold lasix.
MD Giovana Centeno made aware.
MD Taryn Akhtar made aware.
MD Taryn Akhtar made aware.

## 2025-05-10 LAB
ALBUMIN SERPL ELPH-MCNC: 4.1 G/DL — SIGNIFICANT CHANGE UP (ref 3.3–5)
ALP SERPL-CCNC: 87 U/L — SIGNIFICANT CHANGE UP (ref 40–120)
ALT FLD-CCNC: 9 U/L — SIGNIFICANT CHANGE UP (ref 4–41)
ANION GAP SERPL CALC-SCNC: 18 MMOL/L — HIGH (ref 7–14)
AST SERPL-CCNC: 14 U/L — SIGNIFICANT CHANGE UP (ref 4–40)
BILIRUB SERPL-MCNC: 1.3 MG/DL — HIGH (ref 0.2–1.2)
BUN SERPL-MCNC: 48 MG/DL — HIGH (ref 7–23)
CALCIUM SERPL-MCNC: 9.2 MG/DL — SIGNIFICANT CHANGE UP (ref 8.4–10.5)
CHLORIDE SERPL-SCNC: 101 MMOL/L — SIGNIFICANT CHANGE UP (ref 98–107)
CO2 SERPL-SCNC: 17 MMOL/L — LOW (ref 22–31)
CREAT SERPL-MCNC: 1.22 MG/DL — SIGNIFICANT CHANGE UP (ref 0.5–1.3)
EGFR: 58 ML/MIN/1.73M2 — LOW
EGFR: 58 ML/MIN/1.73M2 — LOW
GLUCOSE SERPL-MCNC: 95 MG/DL — SIGNIFICANT CHANGE UP (ref 70–99)
POTASSIUM SERPL-MCNC: 4.2 MMOL/L — SIGNIFICANT CHANGE UP (ref 3.5–5.3)
POTASSIUM SERPL-SCNC: 4.2 MMOL/L — SIGNIFICANT CHANGE UP (ref 3.5–5.3)
PROT SERPL-MCNC: 7 G/DL — SIGNIFICANT CHANGE UP (ref 6–8.3)
SODIUM SERPL-SCNC: 136 MMOL/L — SIGNIFICANT CHANGE UP (ref 135–145)

## 2025-05-10 PROCEDURE — 99232 SBSQ HOSP IP/OBS MODERATE 35: CPT

## 2025-05-10 PROCEDURE — 99232 SBSQ HOSP IP/OBS MODERATE 35: CPT | Mod: GC

## 2025-05-10 RX ADMIN — APIXABAN 5 MILLIGRAM(S): 2.5 TABLET, FILM COATED ORAL at 05:05

## 2025-05-10 RX ADMIN — Medication 25 MILLIGRAM(S): at 05:05

## 2025-05-10 RX ADMIN — Medication 1 APPLICATION(S): at 11:10

## 2025-05-10 RX ADMIN — SACUBITRIL AND VALSARTAN 1 TABLET(S): 6; 6 PELLET ORAL at 05:05

## 2025-05-10 RX ADMIN — Medication 40 MILLIGRAM(S): at 05:05

## 2025-05-10 RX ADMIN — CLOPIDOGREL BISULFATE 75 MILLIGRAM(S): 75 TABLET, FILM COATED ORAL at 11:10

## 2025-05-10 RX ADMIN — FUROSEMIDE 40 MILLIGRAM(S): 10 INJECTION INTRAMUSCULAR; INTRAVENOUS at 05:06

## 2025-05-10 RX ADMIN — APIXABAN 5 MILLIGRAM(S): 2.5 TABLET, FILM COATED ORAL at 17:08

## 2025-05-10 RX ADMIN — FUROSEMIDE 40 MILLIGRAM(S): 10 INJECTION INTRAMUSCULAR; INTRAVENOUS at 11:13

## 2025-05-10 RX ADMIN — ATORVASTATIN CALCIUM 80 MILLIGRAM(S): 80 TABLET, FILM COATED ORAL at 21:32

## 2025-05-10 NOTE — PROGRESS NOTE ADULT - SUBJECTIVE AND OBJECTIVE BOX
Interval Events/Subjective    Patient seen and examined at bedside.  No chest pain, shortness of breath, or palpitations            Telemetry review: A.mariann with some PVCs    Current Meds:  acetaminophen     Tablet .. 650 milliGRAM(s) Oral every 6 hours PRN  apixaban 5 milliGRAM(s) Oral two times a day  atorvastatin 80 milliGRAM(s) Oral at bedtime  chlorhexidine 2% Cloths 1 Application(s) Topical daily  clopidogrel Tablet 75 milliGRAM(s) Oral daily  furosemide   Injectable 40 milliGRAM(s) IV Push two times a day  pantoprazole    Tablet 40 milliGRAM(s) Oral before breakfast  sacubitril 24 mG/valsartan 26 mG 1 Tablet(s) Oral two times a day  spironolactone 25 milliGRAM(s) Oral daily      Vitals:  T(F): 97.1 (05-10), Max: 98.2 (05-09)  HR: 26 (05-10) (26 - 72)  BP: 108/67 (05-10) (97/58 - 117/88)  RR: 18 (05-10)  SpO2: 95% (05-10)  I&O's Summary    09 May 2025 07:01  -  10 May 2025 07:00  --------------------------------------------------------  IN: 450 mL / OUT: 650 mL / NET: -200 mL    Physical Exam:  GENERAL: NAD  HEAD:  Atraumatic, Normocephalic.  NECK: Supple, No JVD.  CHEST/LUNG: Clear to auscultation bilaterally; No rales, rhonchi, wheezing, or rubs. Speaking in full sentences  HEART: Regular rate and rhythm; No murmurs, rubs, or gallops.  ABDOMEN: Bowel sounds present; Soft, Nontender, Nondistended.   EXTREMITIES:  2+ Peripheral Pulses, brisk capillary refill. No clubbing, cyanosis, or edema.                          11.5   7.33  )-----------( 150      ( 09 May 2025 05:14 )             36.3     05-09    140  |  107  |  44[H]  ----------------------------<  76  4.8   |  15[L]  |  1.28    Ca    9.4      09 May 2025 05:14  Phos  4.0     05-09  Mg     2.10     05-09    TPro  6.7  /  Alb  3.9  /  TBili  1.1  /  DBili  x   /  AST  16  /  ALT  10  /  AlkPhos  85  05-09      CARDIAC MARKERS ( 06 May 2025 23:00 )  59 ng/L / x     / x     / x     / x     / x      CARDIAC MARKERS ( 06 May 2025 21:30 )  61 ng/L / x     / x     / x     / x     / x

## 2025-05-10 NOTE — PROVIDER CONTACT NOTE (OTHER) - ACTION/TREATMENT ORDERED:
house staff Padmini Shelley aware, rechecked in 1 hour and 95/60 house staff Padmini Shelley aware, aldactone, lasix, and entresto to be held in the morning, rechecked in 1 hour and 95/60

## 2025-05-10 NOTE — PROGRESS NOTE ADULT - PROBLEM SELECTOR PLAN 6
- at home is on Aldactone 25 and sacubitril-valsartan 24-26 BID, restart  - lasix as above - at home is on Aldactone 25 and sacubitril-valsartan 24-26 BID, continue  - lasix as above

## 2025-05-10 NOTE — PROGRESS NOTE ADULT - ATTENDING COMMENTS
The patient was seen and examined with the Cardiology Consultation Teaching Service.     No overnight events    No chest pain  No dyspnea, orthopnea or PND  No palpitations or dizziness     PMH/PSH:  Coronary artery disease     Percutaneous coronary intervention to the LAD     Reported multivessel disease on medical management  Chronic heart failure with reduced LV function     Ischemic cardiomyopathy, LVEF 30%     Prior LV thrombus  Atrial fibrillation on apixaban   Bipolar disorder  Dementia    Comfortable-appearing man in no acute distress  Alert and oriented  Afebrile  Vital signs stable  JVP is normal   Clear lungs  Irregularly irregular   Soft systolic murmur  Extremities are warm and perfused  Ecchymoses of the upper extremities  No peripheral edema     No new labs today  Previously:  Macrocytic anemia Hb 11.5  Metabolic acidosis 15, AG 18  GFR 55    Hs-troponin 59, 61  Pro-BNP 14K    Echocardiography demonstrates global severely reduced LV function, LVEF 30%, increased LV mass, severe MR, mild AI, severely dilated LA; estimated pulmonary pressures are moderately elevated, estimated RA pressures are elevated    CT Angiography of the chest demonstrates moderate right pleural effusion, interstitial edema, no pulmonary emboli, aortic and coronary calcifications, cardiomegaly    Impression and Recommendations:   84-year-old man with coronary artery disease, prior PCI, chronic heart failure with reduced LV function and severe MR, and atrial fibrillation who was referred to our hospital for worsening dyspnea.      The patient's examination is improving. I would continue the patient's intravenous diuresis to furosemide 40mg BID for a goal net negative 1-2L per day through the weekend. Please follow strict I&Os and obtain daily standing weights if possible. I would obtain daily labs given his active diuresis and prior metabolic derangements.    Continue sacubitril-valsartan and spironolactone for his heart failure with reduced LV function.   Continue clopidogrel and atorvastatin for his known coronary disease and PCI.   Continue apixaban for stroke prevention in atrial fibrillation.    Gabriel Gustafson MD FACC FACP  Cardiology  [phone]

## 2025-05-10 NOTE — PROGRESS NOTE ADULT - ASSESSMENT
84 y.o M with hx of HFrEF, prior LV thrombus, CAD prior CAD, a.fib on eliquis, bipolar disorder and dementia (AAOx2 at baseline) who was admitted for SOB in the setting of decompensated heart failure.     1. Decompensated heart failure  - Pending labs today to evaluate renal function while on IV diuresis  - IV lasix 40mg BID  - Entresto 24/26mg BID  - Aldactone 25mg daily    2. A.fib  - Eliquis 5mg BID    3. CAD with prior stents  - Plavix 75mg daily  - Lipitor 80mg daily    Recommendations are preliminary until attending attestation.    Rick Morales MD  Cardiology Fellow

## 2025-05-10 NOTE — PROGRESS NOTE ADULT - PROBLEM SELECTOR PLAN 1
- Hx of CAD and HFrEF (29%) p/w sob.   - CXR showing cardiomegaly with pulm edema  - CT chest showing Cardiomegaly with secondary signs of congestive heart failure, with associated pulmonary interstitial edema and bilateral pleural effusions  - TTE showing global left ventricular hypokinesis and increased LV mass and eccentric hypertrophy with LVEF 30%, enlarged right ventricular cavity size and reduced right ventricular systolic function  - BNP 38075  - now on RA  -  home is on Aldactone 25 and sacubitril-valsartan 24-26 BID  - cards consult appreciated     - c/w lasix 40 IV BID  - cards c/s: appreciate recs  - strict I/Os  - closely monitor volume status  - restart Aldactone 25 and sacubitril-valsartan 24-26 BID - Hx of CAD and HFrEF (29%) p/w sob.   - CXR showing cardiomegaly with pulm edema  - CT chest showing Cardiomegaly with secondary signs of congestive heart failure, with associated pulmonary interstitial edema and bilateral pleural effusions  - TTE showing global left ventricular hypokinesis and increased LV mass and eccentric hypertrophy with LVEF 30%, enlarged right ventricular cavity size and reduced right ventricular systolic function  - BNP 49088  - now on 1L NC  -  home is on Aldactone 25 and sacubitril-valsartan 24-26 BID  - cards following    - c/w lasix 40 IV BID  - f/u cards recs  - strict I/Os  - closely monitor volume status  - c/w  Aldactone 25 and sacubitril-valsartan 24-26 BID

## 2025-05-10 NOTE — PROGRESS NOTE ADULT - SUBJECTIVE AND OBJECTIVE BOX
PROGRESS NOTE:   Authored by Dr. Sofy Menjivar  Patient is a 84y old  Male who presents with a chief complaint of Per chart review, 84 year old Male pmh bipolar disorder, dementia (AAOx2 at baseline), CAD, CHF (EF 29%), LV thrombus, afib on eliquis presents from assisted living facility for shortness of breath found to have cardiomegaly with secondary signs of congestive heart failure, with associated pulmonary interstitial edema and bilateral pleural effusions.  (08 May 2025 13:22)        OVERNIGHT EVENTS: No acute overnight events. 3 sec pause with HR 26 in am. pt asymptomatic. HDS on 1L NC    SUBJECTIVE: Patient was seen and examined at bedside this morning.   Denies any nausea/vomiting/diarrhea, headache, shortness of breath, abdominal pain or chest pain/palpitations.   Patient responding appropriately to questions and able to make needs known.   Vital signs/imaging/labs/telemetry events reviewed.   No acute complaints or concerns. Pt is feeling well. Tolerating PO.  Stating he feels fine.     All other ROS neg except as above       MEDICATIONS  (STANDING):  apixaban 5 milliGRAM(s) Oral two times a day  atorvastatin 80 milliGRAM(s) Oral at bedtime  chlorhexidine 2% Cloths 1 Application(s) Topical daily  clopidogrel Tablet 75 milliGRAM(s) Oral daily  furosemide   Injectable 40 milliGRAM(s) IV Push two times a day  pantoprazole    Tablet 40 milliGRAM(s) Oral before breakfast  sacubitril 24 mG/valsartan 26 mG 1 Tablet(s) Oral two times a day  spironolactone 25 milliGRAM(s) Oral daily    MEDICATIONS  (PRN):  acetaminophen     Tablet .. 650 milliGRAM(s) Oral every 6 hours PRN Temp greater or equal to 38C (100.4F), Mild Pain (1 - 3)      CAPILLARY BLOOD GLUCOSE        I&O's Summary    09 May 2025 07:01  -  10 May 2025 07:00  --------------------------------------------------------  IN: 450 mL / OUT: 650 mL / NET: -200 mL    10 May 2025 07:01  -  10 May 2025 13:30  --------------------------------------------------------  IN: 200 mL / OUT: 500 mL / NET: -300 mL        PHYSICAL EXAM:  Vital Signs Last 24 Hrs  T(C): 36.4 (10 May 2025 11:43), Max: 36.8 (09 May 2025 20:52)  T(F): 97.5 (10 May 2025 11:43), Max: 98.2 (09 May 2025 20:52)  HR: 55 (10 May 2025 11:43) (26 - 72)  BP: 85/55 (10 May 2025 11:43) (85/55 - 108/67)  BP(mean): --  RR: 18 (10 May 2025 11:43) (18 - 18)  SpO2: 95% (10 May 2025 11:43) (95% - 99%)    Parameters below as of 10 May 2025 11:43  Patient On (Oxygen Delivery Method): nasal cannula  O2 Flow (L/min): 2      PHYSICAL EXAM:     GENERAL: NAD  HEAD:  Atraumatic, Normocephalic  EYES: EOMI, conjunctiva and sclera clear, no nystagmus noted  ENT: Moist mucous membranes  CHEST/LUNG: normal work of breathing, Clear to auscultation bilaterally; No rales, rhonchi, wheezing, or rubs. Unlabored respirations  HEART:  Regular rate and rhythm; No murmurs, rubs, or gallops, normal S1/S2  ABDOMEN: normal bowel sounds; Soft, nontender, nondistended, no organomegaly   EXTREMITIES:  no appreciable edema in b/l LE, 2+ Peripheral Pulses, brisk capillary refill. No clubbing, cyanosis  MSK: No gross deformities noted, ROM wnl   Neurological:  A&Ox2, no focal deficits   SKIN: warm and dry, no visible rash, extensive bruising on upper extremities  PSYCH: Normal mood, affect         LABS:                        11.5   7.33  )-----------( 150      ( 09 May 2025 05:14 )             36.3     05-10    136  |  101  |  48[H]  ----------------------------<  95  4.2   |  17[L]  |  1.22    Ca    9.2      10 May 2025 11:16  Phos  4.0     05-09  Mg     2.10     05-09    TPro  7.0  /  Alb  4.1  /  TBili  1.3[H]  /  DBili  x   /  AST  14  /  ALT  9   /  AlkPhos  87  05-10              Tele Reviewed:    RADIOLOGY & ADDITIONAL TESTS:  Results Reviewed: yes  Imaging Personally Reviewed: yes  Electrocardiogram Personally Reviewed: yes

## 2025-05-10 NOTE — PROGRESS NOTE ADULT - PROBLEM SELECTOR PLAN 9
Hospital Bundle  Nutrition: DASH Diet   PPX  ---VTE: eliquis  ---GI: diet  Access: PIV  Code Status: DNR/ Trial NIV  Dispo: [facility]  Nutrition: DASH Diet   PPX  ---VTE: eliquis  ---GI: diet  Access: PIV  Code Status: DNR/ Trial NIV  Dispo: [facility]

## 2025-05-10 NOTE — PROGRESS NOTE ADULT - PROBLEM SELECTOR PLAN 2
Cr 1.48 from BL of 1  - iso of CHF exac, c/f prerenal  - urine studies pending  - diuresis as above RESOLVED  Cr 1.48 from BL of 1  - iso of CHF exac, c/f prerenal  - diuresis as above

## 2025-05-10 NOTE — PROGRESS NOTE ADULT - ATTENDING COMMENTS
Acute on chronic CHF.  ENMANUEL -resolved.  Dementia.   HTN.  A FIB.  - Follow up on cards recs- Diuresis.   - C/w other meds.  - Pending RICK

## 2025-05-11 LAB
ALBUMIN SERPL ELPH-MCNC: 4 G/DL — SIGNIFICANT CHANGE UP (ref 3.3–5)
ALP SERPL-CCNC: 84 U/L — SIGNIFICANT CHANGE UP (ref 40–120)
ALT FLD-CCNC: 7 U/L — SIGNIFICANT CHANGE UP (ref 4–41)
ANION GAP SERPL CALC-SCNC: 14 MMOL/L — SIGNIFICANT CHANGE UP (ref 7–14)
AST SERPL-CCNC: 12 U/L — SIGNIFICANT CHANGE UP (ref 4–40)
BASOPHILS # BLD AUTO: 0.08 K/UL — SIGNIFICANT CHANGE UP (ref 0–0.2)
BASOPHILS NFR BLD AUTO: 1 % — SIGNIFICANT CHANGE UP (ref 0–2)
BILIRUB SERPL-MCNC: 1.4 MG/DL — HIGH (ref 0.2–1.2)
BUN SERPL-MCNC: 51 MG/DL — HIGH (ref 7–23)
CALCIUM SERPL-MCNC: 9.5 MG/DL — SIGNIFICANT CHANGE UP (ref 8.4–10.5)
CHLORIDE SERPL-SCNC: 102 MMOL/L — SIGNIFICANT CHANGE UP (ref 98–107)
CO2 SERPL-SCNC: 20 MMOL/L — LOW (ref 22–31)
CREAT SERPL-MCNC: 1.47 MG/DL — HIGH (ref 0.5–1.3)
EGFR: 47 ML/MIN/1.73M2 — LOW
EGFR: 47 ML/MIN/1.73M2 — LOW
EOSINOPHIL # BLD AUTO: 0.24 K/UL — SIGNIFICANT CHANGE UP (ref 0–0.5)
EOSINOPHIL NFR BLD AUTO: 3 % — SIGNIFICANT CHANGE UP (ref 0–6)
GLUCOSE SERPL-MCNC: 78 MG/DL — SIGNIFICANT CHANGE UP (ref 70–99)
HCT VFR BLD CALC: 34.8 % — LOW (ref 39–50)
HGB BLD-MCNC: 11.2 G/DL — LOW (ref 13–17)
IANC: 4.66 K/UL — SIGNIFICANT CHANGE UP (ref 1.8–7.4)
IMM GRANULOCYTES NFR BLD AUTO: 0.4 % — SIGNIFICANT CHANGE UP (ref 0–0.9)
LYMPHOCYTES # BLD AUTO: 2.29 K/UL — SIGNIFICANT CHANGE UP (ref 1–3.3)
LYMPHOCYTES # BLD AUTO: 29.1 % — SIGNIFICANT CHANGE UP (ref 13–44)
MAGNESIUM SERPL-MCNC: 1.9 MG/DL — SIGNIFICANT CHANGE UP (ref 1.6–2.6)
MCHC RBC-ENTMCNC: 30.9 PG — SIGNIFICANT CHANGE UP (ref 27–34)
MCHC RBC-ENTMCNC: 32.2 G/DL — SIGNIFICANT CHANGE UP (ref 32–36)
MCV RBC AUTO: 95.9 FL — SIGNIFICANT CHANGE UP (ref 80–100)
MONOCYTES # BLD AUTO: 0.58 K/UL — SIGNIFICANT CHANGE UP (ref 0–0.9)
MONOCYTES NFR BLD AUTO: 7.4 % — SIGNIFICANT CHANGE UP (ref 2–14)
NEUTROPHILS # BLD AUTO: 4.66 K/UL — SIGNIFICANT CHANGE UP (ref 1.8–7.4)
NEUTROPHILS NFR BLD AUTO: 59.1 % — SIGNIFICANT CHANGE UP (ref 43–77)
NRBC # BLD AUTO: 0 K/UL — SIGNIFICANT CHANGE UP (ref 0–0)
NRBC # FLD: 0 K/UL — SIGNIFICANT CHANGE UP (ref 0–0)
NRBC BLD AUTO-RTO: 0 /100 WBCS — SIGNIFICANT CHANGE UP (ref 0–0)
PHOSPHATE SERPL-MCNC: 3.4 MG/DL — SIGNIFICANT CHANGE UP (ref 2.5–4.5)
PLATELET # BLD AUTO: 175 K/UL — SIGNIFICANT CHANGE UP (ref 150–400)
POTASSIUM SERPL-MCNC: 4.1 MMOL/L — SIGNIFICANT CHANGE UP (ref 3.5–5.3)
POTASSIUM SERPL-SCNC: 4.1 MMOL/L — SIGNIFICANT CHANGE UP (ref 3.5–5.3)
PROT SERPL-MCNC: 6.8 G/DL — SIGNIFICANT CHANGE UP (ref 6–8.3)
RBC # BLD: 3.63 M/UL — LOW (ref 4.2–5.8)
RBC # FLD: 16.6 % — HIGH (ref 10.3–14.5)
SODIUM SERPL-SCNC: 136 MMOL/L — SIGNIFICANT CHANGE UP (ref 135–145)
WBC # BLD: 7.88 K/UL — SIGNIFICANT CHANGE UP (ref 3.8–10.5)
WBC # FLD AUTO: 7.88 K/UL — SIGNIFICANT CHANGE UP (ref 3.8–10.5)

## 2025-05-11 PROCEDURE — 93010 ELECTROCARDIOGRAM REPORT: CPT

## 2025-05-11 PROCEDURE — 99232 SBSQ HOSP IP/OBS MODERATE 35: CPT | Mod: GC

## 2025-05-11 PROCEDURE — 99232 SBSQ HOSP IP/OBS MODERATE 35: CPT

## 2025-05-11 RX ORDER — FUROSEMIDE 10 MG/ML
40 INJECTION INTRAMUSCULAR; INTRAVENOUS DAILY
Refills: 0 | Status: DISCONTINUED | OUTPATIENT
Start: 2025-05-12 | End: 2025-05-12

## 2025-05-11 RX ADMIN — FUROSEMIDE 40 MILLIGRAM(S): 10 INJECTION INTRAMUSCULAR; INTRAVENOUS at 13:39

## 2025-05-11 RX ADMIN — APIXABAN 5 MILLIGRAM(S): 2.5 TABLET, FILM COATED ORAL at 21:17

## 2025-05-11 RX ADMIN — APIXABAN 5 MILLIGRAM(S): 2.5 TABLET, FILM COATED ORAL at 10:01

## 2025-05-11 RX ADMIN — Medication 1 APPLICATION(S): at 13:27

## 2025-05-11 RX ADMIN — CLOPIDOGREL BISULFATE 75 MILLIGRAM(S): 75 TABLET, FILM COATED ORAL at 13:26

## 2025-05-11 RX ADMIN — ATORVASTATIN CALCIUM 80 MILLIGRAM(S): 80 TABLET, FILM COATED ORAL at 21:17

## 2025-05-11 NOTE — PROGRESS NOTE ADULT - SUBJECTIVE AND OBJECTIVE BOX
PROGRESS NOTE:   Authored by Louise Washington MD  Internal Medicine Resident Physician, PGY-1      Patient is a 84y old  Male who presents with a chief complaint of Per chart review, 84 year old Male pmh bipolar disorder, dementia (AAOx2 at baseline), CAD, CHF (EF 29%), LV thrombus, afib on eliquis presents from assisted living facility for shortness of breath found to have cardiomegaly with secondary signs of congestive heart failure, with associated pulmonary interstitial edema and bilateral pleural effusions.  (08 May 2025 13:22)      SUBJECTIVE / OVERNIGHT EVENTS: ***, patient seen and examined at bedside    MEDICATIONS  (STANDING):  apixaban 5 milliGRAM(s) Oral two times a day  atorvastatin 80 milliGRAM(s) Oral at bedtime  chlorhexidine 2% Cloths 1 Application(s) Topical daily  clopidogrel Tablet 75 milliGRAM(s) Oral daily  furosemide   Injectable 40 milliGRAM(s) IV Push two times a day  pantoprazole    Tablet 40 milliGRAM(s) Oral before breakfast  sacubitril 24 mG/valsartan 26 mG 1 Tablet(s) Oral two times a day  spironolactone 25 milliGRAM(s) Oral daily    MEDICATIONS  (PRN):  acetaminophen     Tablet .. 650 milliGRAM(s) Oral every 6 hours PRN Temp greater or equal to 38C (100.4F), Mild Pain (1 - 3)      CAPILLARY BLOOD GLUCOSE        I&O's Summary    10 May 2025 07:01  -  11 May 2025 07:00  --------------------------------------------------------  IN: 650 mL / OUT: 1250 mL / NET: -600 mL        PHYSICAL EXAM:  Vital Signs Last 24 Hrs  T(C): 36.4 (11 May 2025 05:00), Max: 36.4 (10 May 2025 11:43)  T(F): 97.6 (11 May 2025 05:00), Max: 97.6 (11 May 2025 05:00)  HR: 59 (11 May 2025 05:00) (26 - 81)  BP: 101/65 (11 May 2025 05:00) (85/55 - 127/86)  BP(mean): --  RR: 18 (11 May 2025 05:00) (17 - 18)  SpO2: 100% (11 May 2025 05:00) (95% - 100%)    Parameters below as of 11 May 2025 05:00  Patient On (Oxygen Delivery Method): nasal cannula  O2 Flow (L/min): 2    CONSTITUTIONAL: Well-groomed, in no apparent distress  RESPIRATORY: Breathing comfortably; no dullness to percussion; lungs CTA without wheeze/rhonchi/rales  CARDIOVASCULAR: +S1S2, RRR, no M/G/R; pedal pulses full and symmetric; no lower extremity edema  GASTROINTESTINAL: No palpable masses or tenderness, +BS throughout, no rebound/guarding; no hepatosplenomegaly; no hernia palpated  SKIN: No rashes or ulcers noted  NEUROLOGIC: A+O x 3, CN II-XII intact; sensation intact in LEs b/l to light touch    LABS:    05-10    136  |  101  |  48[H]  ----------------------------<  95  4.2   |  17[L]  |  1.22    Ca    9.2      10 May 2025 11:16    TPro  7.0  /  Alb  4.1  /  TBili  1.3[H]  /  DBili  x   /  AST  14  /  ALT  9   /  AlkPhos  87  05-10          Urinalysis Basic - ( 10 May 2025 11:16 )    Color: x / Appearance: x / SG: x / pH: x  Gluc: 95 mg/dL / Ketone: x  / Bili: x / Urobili: x   Blood: x / Protein: x / Nitrite: x   Leuk Esterase: x / RBC: x / WBC x   Sq Epi: x / Non Sq Epi: x / Bacteria: x          RADIOLOGY & ADDITIONAL TESTS:  Results Reviewed:   Imaging Personally Reviewed:  Electrocardiogram Personally Reviewed:   PROGRESS NOTE:   Authored by Louise Washington MD  Internal Medicine Resident Physician, PGY-1      Patient is a 84y old  Male who presents with a chief complaint of Per chart review, 84 year old Male pmh bipolar disorder, dementia (AAOx2 at baseline), CAD, CHF (EF 29%), LV thrombus, afib on eliquis presents from assisted living facility for shortness of breath found to have cardiomegaly with secondary signs of congestive heart failure, with associated pulmonary interstitial edema and bilateral pleural effusions.  (08 May 2025 13:22)      SUBJECTIVE / OVERNIGHT EVENTS: Overnight pt with multiple episodes of bradycardia to the 30s. Patient seen and examined at bedside    MEDICATIONS  (STANDING):  apixaban 5 milliGRAM(s) Oral two times a day  atorvastatin 80 milliGRAM(s) Oral at bedtime  chlorhexidine 2% Cloths 1 Application(s) Topical daily  clopidogrel Tablet 75 milliGRAM(s) Oral daily  furosemide   Injectable 40 milliGRAM(s) IV Push two times a day  pantoprazole    Tablet 40 milliGRAM(s) Oral before breakfast  sacubitril 24 mG/valsartan 26 mG 1 Tablet(s) Oral two times a day  spironolactone 25 milliGRAM(s) Oral daily    MEDICATIONS  (PRN):  acetaminophen     Tablet .. 650 milliGRAM(s) Oral every 6 hours PRN Temp greater or equal to 38C (100.4F), Mild Pain (1 - 3)      CAPILLARY BLOOD GLUCOSE        I&O's Summary    10 May 2025 07:01  -  11 May 2025 07:00  --------------------------------------------------------  IN: 650 mL / OUT: 1250 mL / NET: -600 mL        PHYSICAL EXAM:  Vital Signs Last 24 Hrs  T(C): 36.4 (11 May 2025 05:00), Max: 36.4 (10 May 2025 11:43)  T(F): 97.6 (11 May 2025 05:00), Max: 97.6 (11 May 2025 05:00)  HR: 59 (11 May 2025 05:00) (26 - 81)  BP: 101/65 (11 May 2025 05:00) (85/55 - 127/86)  BP(mean): --  RR: 18 (11 May 2025 05:00) (17 - 18)  SpO2: 100% (11 May 2025 05:00) (95% - 100%)    Parameters below as of 11 May 2025 05:00  Patient On (Oxygen Delivery Method): nasal cannula  O2 Flow (L/min): 2    CONSTITUTIONAL: Well-groomed, in no apparent distress  RESPIRATORY: Breathing comfortably; no dullness to percussion; lungs CTA without wheeze/rhonchi/rales  CARDIOVASCULAR: +S1S2, RRR, no M/G/R; pedal pulses full and symmetric; no lower extremity edema  GASTROINTESTINAL: No palpable masses or tenderness, +BS throughout, no rebound/guarding; no hepatosplenomegaly; no hernia palpated  SKIN: No rashes or ulcers noted  NEUROLOGIC: A+O x 3, CN II-XII intact; sensation intact in LEs b/l to light touch    LABS:    05-10    136  |  101  |  48[H]  ----------------------------<  95  4.2   |  17[L]  |  1.22    Ca    9.2      10 May 2025 11:16    TPro  7.0  /  Alb  4.1  /  TBili  1.3[H]  /  DBili  x   /  AST  14  /  ALT  9   /  AlkPhos  87  05-10          Urinalysis Basic - ( 10 May 2025 11:16 )    Color: x / Appearance: x / SG: x / pH: x  Gluc: 95 mg/dL / Ketone: x  / Bili: x / Urobili: x   Blood: x / Protein: x / Nitrite: x   Leuk Esterase: x / RBC: x / WBC x   Sq Epi: x / Non Sq Epi: x / Bacteria: x          RADIOLOGY & ADDITIONAL TESTS:  Results Reviewed:   Imaging Personally Reviewed:  Electrocardiogram Personally Reviewed:   PROGRESS NOTE:   Authored by Louise Washington MD  Internal Medicine Resident Physician, PGY-1      Patient is a 84y old  Male who presents with a chief complaint of Per chart review, 84 year old Male pmh bipolar disorder, dementia (AAOx2 at baseline), CAD, CHF (EF 29%), LV thrombus, afib on eliquis presents from assisted living facility for shortness of breath found to have cardiomegaly with secondary signs of congestive heart failure, with associated pulmonary interstitial edema and bilateral pleural effusions.  (08 May 2025 13:22)      SUBJECTIVE / OVERNIGHT EVENTS: Overnight pt with multiple episodes of bradycardia to the 30s. Patient seen and examined at bedside, he denies SOB, chest pain, palpitations.     MEDICATIONS  (STANDING):  apixaban 5 milliGRAM(s) Oral two times a day  atorvastatin 80 milliGRAM(s) Oral at bedtime  chlorhexidine 2% Cloths 1 Application(s) Topical daily  clopidogrel Tablet 75 milliGRAM(s) Oral daily  furosemide   Injectable 40 milliGRAM(s) IV Push two times a day  pantoprazole    Tablet 40 milliGRAM(s) Oral before breakfast  sacubitril 24 mG/valsartan 26 mG 1 Tablet(s) Oral two times a day  spironolactone 25 milliGRAM(s) Oral daily    MEDICATIONS  (PRN):  acetaminophen     Tablet .. 650 milliGRAM(s) Oral every 6 hours PRN Temp greater or equal to 38C (100.4F), Mild Pain (1 - 3)      CAPILLARY BLOOD GLUCOSE        I&O's Summary    10 May 2025 07:01  -  11 May 2025 07:00  --------------------------------------------------------  IN: 650 mL / OUT: 1250 mL / NET: -600 mL        PHYSICAL EXAM:  Vital Signs Last 24 Hrs  T(C): 36.4 (11 May 2025 05:00), Max: 36.4 (10 May 2025 11:43)  T(F): 97.6 (11 May 2025 05:00), Max: 97.6 (11 May 2025 05:00)  HR: 59 (11 May 2025 05:00) (26 - 81)  BP: 101/65 (11 May 2025 05:00) (85/55 - 127/86)  BP(mean): --  RR: 18 (11 May 2025 05:00) (17 - 18)  SpO2: 100% (11 May 2025 05:00) (95% - 100%)    Parameters below as of 11 May 2025 05:00  Patient On (Oxygen Delivery Method): nasal cannula  O2 Flow (L/min): 2    PHYSICAL EXAM:  GENERAL: NAD, lying in bed comfortably  HEAD:  Atraumatic, Normocephalic  EYES: EOMI, PERRL, conjunctiva and sclera clear  ENT: Moist mucous membranes  CHEST/LUNG: Clear to auscultation bilaterally; No rales, rhonchi, wheezing, or rubs.   HEART: Regular rate and rhythm; No murmurs, rubs, or gallops  ABDOMEN: Bowel sounds present; Soft, Nontender, Nondistended.   EXTREMITIES:  2+ Peripheral Pulses, brisk capillary refill. No clubbing, cyanosis, or edema  NERVOUS SYSTEM:  Alert & Oriented X1, speech clear. No deficits   MSK: FROM all 4 extremities, full and equal strength  SKIN: Scattered ecchymosis throughout upper and lower extremities      LABS:    05-10    136  |  101  |  48[H]  ----------------------------<  95  4.2   |  17[L]  |  1.22    Ca    9.2      10 May 2025 11:16    TPro  7.0  /  Alb  4.1  /  TBili  1.3[H]  /  DBili  x   /  AST  14  /  ALT  9   /  AlkPhos  87  05-10          Urinalysis Basic - ( 10 May 2025 11:16 )    Color: x / Appearance: x / SG: x / pH: x  Gluc: 95 mg/dL / Ketone: x  / Bili: x / Urobili: x   Blood: x / Protein: x / Nitrite: x   Leuk Esterase: x / RBC: x / WBC x   Sq Epi: x / Non Sq Epi: x / Bacteria: x          RADIOLOGY & ADDITIONAL TESTS:  Results Reviewed:   Imaging Personally Reviewed:  Electrocardiogram Personally Reviewed:

## 2025-05-11 NOTE — PROGRESS NOTE ADULT - SUBJECTIVE AND OBJECTIVE BOX
The patient was seen and examined with the Cardiology Consultation Teaching Service.     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  Chronic heart failure with reduced LV function     Ischemic cardiomyopathy, LVEF 30%     Prior LV thrombus  Atrial fibrillation on apixaban   Bipolar disorder  Dementia    Comfortable-appearing man in no acute distress  Alert and oriented, more lethargic  Afebrile  Vital signs stable  BP low-normal  I/O net negative 0.6L  JVP is normal   Clear lungs  Irregularly irregular   Soft systolic murmur  Extremities are warm and perfused  Ecchymoses of the upper extremities  No peripheral edema     Macrocytic anemia Hb 11.2  Improving metabolic acidosis 20, AG 14  GFR 47    Hs-troponin 59, 61  Pro-BNP 14K    Echocardiography demonstrates global severely reduced LV function, LVEF 30%, increased LV mass, severe MR, mild AI, severely dilated LA; estimated pulmonary pressures are moderately elevated, estimated RA pressures are elevated    CT Angiography of the chest demonstrates moderate right pleural effusion, interstitial edema, no pulmonary emboli, aortic and coronary calcifications, cardiomegaly

## 2025-05-11 NOTE — PROGRESS NOTE ADULT - ATTENDING COMMENTS
Bradycardia Episodes.   Acute on chronic CHF.  ENMANUEL.  Dementia.   HTN.  A FIB.  - Follow up on cards recs-   - On IV Diuresis  - Monitor BMP.   - Cardiac Tele.  - C/w other meds.  - Pending RICK . Bradycardia Episodes.   Acute on chronic CHFrEF.  ENMANUEL.  Dementia.   HTN.  A FIB.  - Follow up on cards recs-   - On IV Diuresis. GDMT as tolerated.  - Monitor BMP.   - Cardiac Tele.  - C/w other meds.  - Pending RICK .

## 2025-05-11 NOTE — PROGRESS NOTE ADULT - PROBLEM SELECTOR PLAN 9
Hospital Bundle  Nutrition: DASH Diet   PPX  ---VTE: eliquis  ---GI: diet  Access: PIV  Code Status: DNR/ Trial NIV  Dispo: [facility]  Nutrition: DASH Diet   PPX  ---VTE: eliquis  ---GI: diet  Access: PIV  Code Status: DNR/ Trial NIV  Dispo: pending clinical course

## 2025-05-11 NOTE — PROGRESS NOTE ADULT - ASSESSMENT
84 year old M pmh bipolar disorder, dementia (AAOx2 at baseline), CAD, CHF (EF 29%), LV thrombus, afib on eliquis presents from assisted living facility for shortness of breath found to have cardiomegaly with secondary signs of congestive heart failure, with associated pulmonary interstitial edema and bilateral pleural effusions. Patient is stable for discharge.  84 year old M pmh bipolar disorder, dementia (AAOx2 at baseline), CAD, CHF (EF 29%), LV thrombus, afib on eliquis presents from assisted living facility for shortness of breath found to have cardiomegaly with secondary signs of congestive heart failure, with associated pulmonary interstitial edema and bilateral pleural effusions. Admit to medicine for further HF management.

## 2025-05-11 NOTE — PROGRESS NOTE ADULT - ASSESSMENT
84-year-old man with coronary artery disease, prior PCI, chronic heart failure with reduced LV function and severe MR, and atrial fibrillation who was referred to our hospital for worsening dyspnea.      The patient's examination is improved, as is his metabolic acidosis. His GFR was improving but has now declined. I suspect the patient is euvolemic at this time. I would discontinue intravenous diuretics and plan to start him on oral maintenance of furosemide 40mg PO Daily tomorrow. Please make sure that the patient responds to this dose prior to discharge.     Continue sacubitril-valsartan and spironolactone for his heart failure with reduced LV function.   Continue clopidogrel and atorvastatin for his known coronary disease and PCI.   Continue apixaban for stroke prevention in atrial fibrillation.    The patient should have follow-up echocardiography in the outpatient setting while he is euvolemic to reassess the severity of his MR and pulmonary pressures. If his MR remains severe, CECILIA and evaluation for mitral valve repair/replacement may be warranted, although based on the patient's age and severely reduced LV function, conservative management may be best.     Gabriel Gustafson MD FACC FACP  Cardiology  [phone]

## 2025-05-11 NOTE — PROGRESS NOTE ADULT - PROBLEM SELECTOR PLAN 1
- Hx of CAD and HFrEF (29%) p/w sob.   - CXR showing cardiomegaly with pulm edema  - CT chest showing Cardiomegaly with secondary signs of congestive heart failure, with associated pulmonary interstitial edema and bilateral pleural effusions  - TTE showing global left ventricular hypokinesis and increased LV mass and eccentric hypertrophy with LVEF 30%, enlarged right ventricular cavity size and reduced right ventricular systolic function  - BNP 17660  - now on 1L NC  -  home is on Aldactone 25 and sacubitril-valsartan 24-26 BID  - cards following    - c/w lasix 40 IV BID  - f/u cards recs  - strict I/Os  - closely monitor volume status  - c/w  Aldactone 25 and sacubitril-valsartan 24-26 BID - Hx of CAD and HFrEF (29%) p/w sob.   - CXR showing cardiomegaly with pulm edema  - CT chest showing Cardiomegaly with secondary signs of congestive heart failure, with associated pulmonary interstitial edema and bilateral pleural effusions  - TTE showing global left ventricular hypokinesis and increased LV mass and eccentric hypertrophy with LVEF 30%, enlarged right ventricular cavity size and reduced right ventricular systolic function  - BNP 45955  - now on 1L NC  -  home is on Aldactone 25 and sacubitril-valsartan 24-26 BID  - cards following    - c/w lasix 40 IV BID  - f/u cards recs  - strict I/Os  - closely monitor volume status  - c/w Aldactone 25 and sacubitril-valsartan 24-26 BID

## 2025-05-11 NOTE — PROVIDER CONTACT NOTE (OTHER) - ACTION/TREATMENT ORDERED:
house staff Padmini Shelley aware, 12 lead ecg ordered and placed into chart, will continue to monitor

## 2025-05-12 VITALS — HEART RATE: 57 BPM | DIASTOLIC BLOOD PRESSURE: 60 MMHG | SYSTOLIC BLOOD PRESSURE: 92 MMHG

## 2025-05-12 LAB
ALBUMIN SERPL ELPH-MCNC: 3.6 G/DL — SIGNIFICANT CHANGE UP (ref 3.3–5)
ALP SERPL-CCNC: 74 U/L — SIGNIFICANT CHANGE UP (ref 40–120)
ALT FLD-CCNC: 7 U/L — SIGNIFICANT CHANGE UP (ref 4–41)
ANION GAP SERPL CALC-SCNC: 18 MMOL/L — HIGH (ref 7–14)
AST SERPL-CCNC: 18 U/L — SIGNIFICANT CHANGE UP (ref 4–40)
BASOPHILS # BLD AUTO: 0.06 K/UL — SIGNIFICANT CHANGE UP (ref 0–0.2)
BASOPHILS NFR BLD AUTO: 0.7 % — SIGNIFICANT CHANGE UP (ref 0–2)
BILIRUB SERPL-MCNC: 1.2 MG/DL — SIGNIFICANT CHANGE UP (ref 0.2–1.2)
BUN SERPL-MCNC: 49 MG/DL — HIGH (ref 7–23)
CALCIUM SERPL-MCNC: 9.3 MG/DL — SIGNIFICANT CHANGE UP (ref 8.4–10.5)
CHLORIDE SERPL-SCNC: 102 MMOL/L — SIGNIFICANT CHANGE UP (ref 98–107)
CO2 SERPL-SCNC: 15 MMOL/L — LOW (ref 22–31)
CREAT SERPL-MCNC: 1.3 MG/DL — SIGNIFICANT CHANGE UP (ref 0.5–1.3)
EGFR: 54 ML/MIN/1.73M2 — LOW
EGFR: 54 ML/MIN/1.73M2 — LOW
EOSINOPHIL # BLD AUTO: 0.19 K/UL — SIGNIFICANT CHANGE UP (ref 0–0.5)
EOSINOPHIL NFR BLD AUTO: 2.1 % — SIGNIFICANT CHANGE UP (ref 0–6)
GLUCOSE SERPL-MCNC: 71 MG/DL — SIGNIFICANT CHANGE UP (ref 70–99)
HCT VFR BLD CALC: 35.2 % — LOW (ref 39–50)
HGB BLD-MCNC: 11.5 G/DL — LOW (ref 13–17)
IANC: 5.28 K/UL — SIGNIFICANT CHANGE UP (ref 1.8–7.4)
IMM GRANULOCYTES NFR BLD AUTO: 0.2 % — SIGNIFICANT CHANGE UP (ref 0–0.9)
LYMPHOCYTES # BLD AUTO: 2.5 K/UL — SIGNIFICANT CHANGE UP (ref 1–3.3)
LYMPHOCYTES # BLD AUTO: 28.2 % — SIGNIFICANT CHANGE UP (ref 13–44)
MAGNESIUM SERPL-MCNC: 1.9 MG/DL — SIGNIFICANT CHANGE UP (ref 1.6–2.6)
MCHC RBC-ENTMCNC: 31.3 PG — SIGNIFICANT CHANGE UP (ref 27–34)
MCHC RBC-ENTMCNC: 32.7 G/DL — SIGNIFICANT CHANGE UP (ref 32–36)
MCV RBC AUTO: 95.7 FL — SIGNIFICANT CHANGE UP (ref 80–100)
MONOCYTES # BLD AUTO: 0.82 K/UL — SIGNIFICANT CHANGE UP (ref 0–0.9)
MONOCYTES NFR BLD AUTO: 9.2 % — SIGNIFICANT CHANGE UP (ref 2–14)
NEUTROPHILS # BLD AUTO: 5.28 K/UL — SIGNIFICANT CHANGE UP (ref 1.8–7.4)
NEUTROPHILS NFR BLD AUTO: 59.6 % — SIGNIFICANT CHANGE UP (ref 43–77)
NRBC # BLD AUTO: 0 K/UL — SIGNIFICANT CHANGE UP (ref 0–0)
NRBC # FLD: 0 K/UL — SIGNIFICANT CHANGE UP (ref 0–0)
NRBC BLD AUTO-RTO: 0 /100 WBCS — SIGNIFICANT CHANGE UP (ref 0–0)
PHOSPHATE SERPL-MCNC: 3.4 MG/DL — SIGNIFICANT CHANGE UP (ref 2.5–4.5)
PLATELET # BLD AUTO: 168 K/UL — SIGNIFICANT CHANGE UP (ref 150–400)
POTASSIUM SERPL-MCNC: 4.6 MMOL/L — SIGNIFICANT CHANGE UP (ref 3.5–5.3)
POTASSIUM SERPL-SCNC: 4.6 MMOL/L — SIGNIFICANT CHANGE UP (ref 3.5–5.3)
PROT SERPL-MCNC: 6.5 G/DL — SIGNIFICANT CHANGE UP (ref 6–8.3)
RBC # BLD: 3.68 M/UL — LOW (ref 4.2–5.8)
RBC # FLD: 16.7 % — HIGH (ref 10.3–14.5)
SODIUM SERPL-SCNC: 135 MMOL/L — SIGNIFICANT CHANGE UP (ref 135–145)
WBC # BLD: 8.87 K/UL — SIGNIFICANT CHANGE UP (ref 3.8–10.5)
WBC # FLD AUTO: 8.87 K/UL — SIGNIFICANT CHANGE UP (ref 3.8–10.5)

## 2025-05-12 PROCEDURE — 99239 HOSP IP/OBS DSCHRG MGMT >30: CPT

## 2025-05-12 PROCEDURE — 99233 SBSQ HOSP IP/OBS HIGH 50: CPT

## 2025-05-12 RX ORDER — FUROSEMIDE 10 MG/ML
1 INJECTION INTRAMUSCULAR; INTRAVENOUS
Qty: 30 | Refills: 0
Start: 2025-05-12 | End: 2025-06-10

## 2025-05-12 RX ADMIN — APIXABAN 5 MILLIGRAM(S): 2.5 TABLET, FILM COATED ORAL at 05:34

## 2025-05-12 RX ADMIN — SACUBITRIL AND VALSARTAN 1 TABLET(S): 6; 6 PELLET ORAL at 05:35

## 2025-05-12 RX ADMIN — CLOPIDOGREL BISULFATE 75 MILLIGRAM(S): 75 TABLET, FILM COATED ORAL at 16:50

## 2025-05-12 RX ADMIN — APIXABAN 5 MILLIGRAM(S): 2.5 TABLET, FILM COATED ORAL at 16:50

## 2025-05-12 RX ADMIN — FUROSEMIDE 40 MILLIGRAM(S): 10 INJECTION INTRAMUSCULAR; INTRAVENOUS at 05:35

## 2025-05-12 RX ADMIN — Medication 40 MILLIGRAM(S): at 05:35

## 2025-05-12 NOTE — PROVIDER CONTACT NOTE (OTHER) - ASSESSMENT
Patient asymptomatic resting in bed.
patient denies chest pain, shortness of breath, headache, dizziness, blurry vision; no change in mentation.
Patient A&Ox1, VS as documented, Afib paced on tele. 2L NC and  monitoring
Patient asymptomatic resting in bed.
patient denies chest pain, shortness of breath, headache, dizziness, blurry vision; no change in mentation.
Patient A&Ox1, VS as documented, Patient denies chest pain and SOB.
Patient asymptomatic resting in bed.
patient denies chest pain, shortness of breath, headache, dizziness, blurry vision; no change in mentation.
Patient asymptomatic resting in bed.
Patient asymptomatic resting in bed on 3L NC.

## 2025-05-12 NOTE — PROGRESS NOTE ADULT - PROBLEM SELECTOR PLAN 4
patient rate controlled w/o RVR  - c/w home eliquis 5 BID

## 2025-05-12 NOTE — PROGRESS NOTE ADULT - PROBLEM SELECTOR PROBLEM 2
ENMANUEL (acute kidney injury)

## 2025-05-12 NOTE — PROGRESS NOTE ADULT - SUBJECTIVE AND OBJECTIVE BOX
PROGRESS NOTE:   Authored by Louise Washington MD  Internal Medicine Resident Physician, PGY-1      Patient is a 84y old  Male who presents with a chief complaint of Per chart review, 84 year old Male pmh bipolar disorder, dementia (AAOx2 at baseline), CAD, CHF (EF 29%), LV thrombus, afib on eliquis presents from assisted living facility for shortness of breath found to have cardiomegaly with secondary signs of congestive heart failure, with associated pulmonary interstitial edema and bilateral pleural effusions.  (08 May 2025 13:22)      SUBJECTIVE / OVERNIGHT EVENTS: ***, patient seen and examined at bedside    MEDICATIONS  (STANDING):  apixaban 5 milliGRAM(s) Oral two times a day  atorvastatin 80 milliGRAM(s) Oral at bedtime  chlorhexidine 2% Cloths 1 Application(s) Topical daily  clopidogrel Tablet 75 milliGRAM(s) Oral daily  furosemide    Tablet 40 milliGRAM(s) Oral daily  pantoprazole    Tablet 40 milliGRAM(s) Oral before breakfast  sacubitril 24 mG/valsartan 26 mG 1 Tablet(s) Oral two times a day  spironolactone 25 milliGRAM(s) Oral daily    MEDICATIONS  (PRN):  acetaminophen     Tablet .. 650 milliGRAM(s) Oral every 6 hours PRN Temp greater or equal to 38C (100.4F), Mild Pain (1 - 3)      CAPILLARY BLOOD GLUCOSE        I&O's Summary    11 May 2025 07:01  -  12 May 2025 07:00  --------------------------------------------------------  IN: 180 mL / OUT: 1000 mL / NET: -820 mL        PHYSICAL EXAM:  Vital Signs Last 24 Hrs  T(C): 36.7 (12 May 2025 05:30), Max: 36.7 (12 May 2025 05:30)  T(F): 98 (12 May 2025 05:30), Max: 98 (12 May 2025 05:30)  HR: 59 (12 May 2025 05:30) (54 - 67)  BP: 95/59 (12 May 2025 05:30) (88/57 - 114/49)  BP(mean): --  RR: 17 (12 May 2025 05:30) (16 - 18)  SpO2: 98% (12 May 2025 05:30) (98% - 100%)    Parameters below as of 12 May 2025 05:30  Patient On (Oxygen Delivery Method): nasal cannula  O2 Flow (L/min): 2    CONSTITUTIONAL: Well-groomed, in no apparent distress  RESPIRATORY: Breathing comfortably; no dullness to percussion; lungs CTA without wheeze/rhonchi/rales  CARDIOVASCULAR: +S1S2, RRR, no M/G/R; pedal pulses full and symmetric; no lower extremity edema  GASTROINTESTINAL: No palpable masses or tenderness, +BS throughout, no rebound/guarding; no hepatosplenomegaly; no hernia palpated  SKIN: No rashes or ulcers noted  NEUROLOGIC: A+O x 3, CN II-XII intact; sensation intact in LEs b/l to light touch    LABS:                        11.2   7.88  )-----------( 175      ( 11 May 2025 06:22 )             34.8     05-11    136  |  102  |  51[H]  ----------------------------<  78  4.1   |  20[L]  |  1.47[H]    Ca    9.5      11 May 2025 06:22  Phos  3.4     05-11  Mg     1.90     05-11    TPro  6.8  /  Alb  4.0  /  TBili  1.4[H]  /  DBili  x   /  AST  12  /  ALT  7   /  AlkPhos  84  05-11          Urinalysis Basic - ( 11 May 2025 06:22 )    Color: x / Appearance: x / SG: x / pH: x  Gluc: 78 mg/dL / Ketone: x  / Bili: x / Urobili: x   Blood: x / Protein: x / Nitrite: x   Leuk Esterase: x / RBC: x / WBC x   Sq Epi: x / Non Sq Epi: x / Bacteria: x          RADIOLOGY & ADDITIONAL TESTS:  Results Reviewed:   Imaging Personally Reviewed:  Electrocardiogram Personally Reviewed:   PROGRESS NOTE:   Authored by Louise Washington MD  Internal Medicine Resident Physician, PGY-1      Patient is a 84y old  Male who presents with a chief complaint of Per chart review, 84 year old Male pmh bipolar disorder, dementia (AAOx2 at baseline), CAD, CHF (EF 29%), LV thrombus, afib on eliquis presents from assisted living facility for shortness of breath found to have cardiomegaly with secondary signs of congestive heart failure, with associated pulmonary interstitial edema and bilateral pleural effusions.  (08 May 2025 13:22)      SUBJECTIVE / OVERNIGHT EVENTS: Patient seen and examined at bedside. No acute events overnight. Patient denies any new concerns this morning.     MEDICATIONS  (STANDING):  apixaban 5 milliGRAM(s) Oral two times a day  atorvastatin 80 milliGRAM(s) Oral at bedtime  chlorhexidine 2% Cloths 1 Application(s) Topical daily  clopidogrel Tablet 75 milliGRAM(s) Oral daily  furosemide    Tablet 40 milliGRAM(s) Oral daily  pantoprazole    Tablet 40 milliGRAM(s) Oral before breakfast  sacubitril 24 mG/valsartan 26 mG 1 Tablet(s) Oral two times a day  spironolactone 25 milliGRAM(s) Oral daily    MEDICATIONS  (PRN):  acetaminophen     Tablet .. 650 milliGRAM(s) Oral every 6 hours PRN Temp greater or equal to 38C (100.4F), Mild Pain (1 - 3)      CAPILLARY BLOOD GLUCOSE        I&O's Summary    11 May 2025 07:01  -  12 May 2025 07:00  --------------------------------------------------------  IN: 180 mL / OUT: 1000 mL / NET: -820 mL        PHYSICAL EXAM:  Vital Signs Last 24 Hrs  T(C): 36.7 (12 May 2025 05:30), Max: 36.7 (12 May 2025 05:30)  T(F): 98 (12 May 2025 05:30), Max: 98 (12 May 2025 05:30)  HR: 59 (12 May 2025 05:30) (54 - 67)  BP: 95/59 (12 May 2025 05:30) (88/57 - 114/49)  BP(mean): --  RR: 17 (12 May 2025 05:30) (16 - 18)  SpO2: 98% (12 May 2025 05:30) (98% - 100%)    Parameters below as of 12 May 2025 05:30  Patient On (Oxygen Delivery Method): nasal cannula  O2 Flow (L/min): 2    PHYSICAL EXAM:  GENERAL: NAD, lying in bed comfortably  HEAD:  Atraumatic, Normocephalic  EYES: EOMI, PERRL, conjunctiva and sclera clear  ENT: Moist mucous membranes  CHEST/LUNG: Clear to auscultation bilaterally; No rales, rhonchi, wheezing, or rubs.   HEART: Regular rate and rhythm; No murmurs, rubs, or gallops  ABDOMEN: Bowel sounds present; Soft, Nontender, Nondistended.   EXTREMITIES:  2+ Peripheral Pulses, brisk capillary refill. No clubbing, cyanosis, or edema  NERVOUS SYSTEM:  Alert & Oriented X1, speech clear. No deficits   MSK: FROM all 4 extremities, full and equal strength  SKIN: Scattered ecchymosis throughout upper and lower extremities      LABS:                        11.2   7.88  )-----------( 175      ( 11 May 2025 06:22 )             34.8     05-11    136  |  102  |  51[H]  ----------------------------<  78  4.1   |  20[L]  |  1.47[H]    Ca    9.5      11 May 2025 06:22  Phos  3.4     05-11  Mg     1.90     05-11    TPro  6.8  /  Alb  4.0  /  TBili  1.4[H]  /  DBili  x   /  AST  12  /  ALT  7   /  AlkPhos  84  05-11          Urinalysis Basic - ( 11 May 2025 06:22 )    Color: x / Appearance: x / SG: x / pH: x  Gluc: 78 mg/dL / Ketone: x  / Bili: x / Urobili: x   Blood: x / Protein: x / Nitrite: x   Leuk Esterase: x / RBC: x / WBC x   Sq Epi: x / Non Sq Epi: x / Bacteria: x          RADIOLOGY & ADDITIONAL TESTS:  Results Reviewed:   Imaging Personally Reviewed:  Electrocardiogram Personally Reviewed:

## 2025-05-12 NOTE — PROGRESS NOTE ADULT - SUBJECTIVE AND OBJECTIVE BOX
Cardiology Progress Note  ------------------------------------------------------------------------------------------  SUBJECTIVE:   No events overnight. Denies CP, SOB or Palpitations. Respiratory status is improved when compared to admission     -------------------------------------------------------------------------------------------  ROS:  CV: chest pain (-), palpitation (-), orthopnea (-), PND (-), edema (-)  PULM: SOB (-), cough (-), wheezing (-), hemoptysis (-).   CONST: fever (-), chills (-) or fatigue (-)  GI: abdominal distension (-), abdominal pain (-) , nausea/vomiting (-), hematemesis, (-), melena (-), hematochezia (-)  : dysuria (-), frequency (-), hematuria (-).   NEURO: numbness (-), weakness (-), dizziness (-)  MSK: myalgia (-), joint pain (-)   SKIN: itching (-), rash (-)  HEENT:  visual changes (-); vertigo or throat pain (-);  neck stiffness (-)   Psych: change in mood (-), anxiety (-), depression (-)     All other review of systems is negative unless indicated above.   -------------------------------------------------------------------------------------------  VS:  T(F): 98 (05-12), Max: 98 (05-12)  HR: 59 (05-12) (54 - 67)  BP: 95/59 (05-12) (88/57 - 114/49)  RR: 17 (05-12)  SpO2: 98% (05-12)  I&O's Summary    11 May 2025 07:01  -  12 May 2025 07:00  --------------------------------------------------------  IN: 180 mL / OUT: 1000 mL / NET: -820 mL      ------------------------------------------------------------------------------------------  PHYSICAL EXAM:    -------------------------------------------------------------------------------------------  LABS:  05-12    135  |  102  |  49[H]  ----------------------------<  71  4.6   |  15[L]  |  1.30    Ca    9.3      12 May 2025 06:20  Phos  3.4     05-12  Mg     1.90     05-12    TPro  6.5  /  Alb  3.6  /  TBili  1.2  /  DBili  x   /  AST  18  /  ALT  7   /  AlkPhos  74  05-12    Creatinine Trend: 1.30<--, 1.47<--, 1.22<--, 1.28<--, 1.37<--, 1.48<--                        11.5   8.87  )-----------( 168      ( 12 May 2025 06:20 )             35.2         Lipid Panel: T(F): 98 (05-12), Max: 98 (05-12)  HR: 59 (05-12) (54 - 67)  BP: 95/59 (05-12) (88/57 - 114/49)  RR: 17 (05-12)  SpO2: 98% (05-12)  Cardiac Enzymes:         -------------------------------------------------------------------------------------------  Meds:  acetaminophen     Tablet .. 650 milliGRAM(s) Oral every 6 hours PRN  apixaban 5 milliGRAM(s) Oral two times a day  atorvastatin 80 milliGRAM(s) Oral at bedtime  chlorhexidine 2% Cloths 1 Application(s) Topical daily  clopidogrel Tablet 75 milliGRAM(s) Oral daily  furosemide    Tablet 40 milliGRAM(s) Oral daily  pantoprazole    Tablet 40 milliGRAM(s) Oral before breakfast  sacubitril 24 mG/valsartan 26 mG 1 Tablet(s) Oral two times a day  spironolactone 25 milliGRAM(s) Oral daily    -------------------------------------------------------------------------------------------  Cardiovascular Diagnostic Testing:  -------------------------------------------------------------------------------------------  ECG: rate controlled AF    Echo:     TTE 5/7/25  CONCLUSIONS:      1. The left ventricular cavity is normal in size. Left ventricular wall thickness is normal. Left ventricular systolic function is severely decreased with a calculated ejection fraction of 30 % by 3D. There is global left ventricular hypokinesis. There is increased LV mass and eccentric hypertrophy. There is no evidence of a left ventricular thrombus.   2. Enlarged right ventricular cavity size and reduced right ventricular systolic function. Tricuspid annular plane systolic excursion (TAPSE) is 1.1 cm (normal >=1.7 cm).   3. Structurally normal mitral valve with normal leaflet excursion. There is calcification of the mitral valve annulus. There is symmetric leaflet tethering. There is severe mitral regurgitation.   4. The aortic valve anatomy cannot be determined. There is calcification of the aortic valve leaflets. There is mild aortic regurgitation.   5. The left atrium is severely dilated with an indexed volume of 75.49 ml/m².   6. Structurally normal tricuspid valve with normalleaflet excursion. There is severe tricuspid regurgitation. Estimated pulmonary artery systolic pressure is 56 mmHg, consistent with moderate pulmonary hypertension.   7. The inferior vena cava is dilated measuring 2.54 cm in diameter, (dilated >2.1cm) with abnormal inspiratory collapse (abnormal <50%) consistent with elevated right atrial pressure (~15, range 10-20mmHg).    -------------------------------------------------------------------------------------------  Assessment and Plan:   -------------------------------------------------------------------------------------------  84 y.o M with hx of HFrEF, prior LV thrombus, CAD prior CAD, a.fib on eliquis, bipolar disorder and dementia (AAOx2 at baseline) who was admitted for SOB in the setting of decompensated heart failure    Problems Assessed:   • Acute decompensated HF  • Rate controlled AF  • CAD, hx of PCI    Plan:   1. Decompensated heart failure  - BID  - Entresto 24/26mg BID  - Aldactone 25mg daily    2. A.fib  - Eliquis 5mg BID    3. CAD with prior stents  - Plavix 75mg daily  - Lipitor 80mg daily      ------------------------------------------------------------------------------------------  Shaji Faust MD   of Cardiology  [facility] at [address], [address] [place] [address]  Phone: [phone]  Fax: [phone]  Please check [email or web address]: [email or web address] for cardiology service schedule and contact information.   ------------------------------------------------------------------------------------------  Billing Statement:   76/56/51/36 minutes spent on total encounter. Necessity of time spent during this encounter on this date of service was due to review of medical information in EMR, co-ordination of hospital and outpatient care, discussion with patient and communication with primary team.   ------------------------------------------------------------------------------------------- Cardiology Progress Note  ------------------------------------------------------------------------------------------  SUBJECTIVE:   No events overnight. Denies CP, SOB or Palpitations. Respiratory status is improved when compared to admission     -------------------------------------------------------------------------------------------  ROS:  CV: chest pain (-), palpitation (-), orthopnea (-), PND (-), edema (-)  PULM: SOB (-), cough (-), wheezing (-), hemoptysis (-).   CONST: fever (-), chills (-) or fatigue (-)  GI: abdominal distension (-), abdominal pain (-) , nausea/vomiting (-), hematemesis, (-), melena (-), hematochezia (-)  : dysuria (-), frequency (-), hematuria (-).   NEURO: numbness (-), weakness (-), dizziness (-)  MSK: myalgia (-), joint pain (-)   SKIN: itching (-), rash (-)  HEENT:  visual changes (-); vertigo or throat pain (-);  neck stiffness (-)   Psych: change in mood (-), anxiety (-), depression (-)     All other review of systems is negative unless indicated above.   -------------------------------------------------------------------------------------------  VS:  T(F): 98 (05-12), Max: 98 (05-12)  HR: 59 (05-12) (54 - 67)  BP: 95/59 (05-12) (88/57 - 114/49)  RR: 17 (05-12)  SpO2: 98% (05-12)  I&O's Summary    11 May 2025 07:01  -  12 May 2025 07:00  --------------------------------------------------------  IN: 180 mL / OUT: 1000 mL / NET: -820 mL      ------------------------------------------------------------------------------------------  PHYSICAL EXAM:  T(C): 36.7 (05-12-25 @ 05:30), Max: 36.7 (05-12-25 @ 05:30)  HR: 59 (05-12-25 @ 05:30) (54 - 67)  BP: 95/59 (05-12-25 @ 05:30) (88/57 - 114/49)  RR: 17 (05-12-25 @ 05:30) (16 - 18)  SpO2: 98% (05-12-25 @ 05:30) (98% - 100%)    CONSTITUTIONAL: Well groomed, no apparent distress  EYES: PERRLA and symmetric, EOMI, No conjunctival or scleral injection, non-icteric  ENMT: Dry mucus membranes  RESP: No respiratory distress, no use of accessory muscles; CTA b/l, no WRR  CV: RRR, +S1S2, no MRG; no JVD; no peripheral edema  GI: Soft, NT, ND, no rebound, no guarding; no palpable masses; no hepatosplenomegaly; no hernia palpated  LYMPH: No cervical LAD or tenderness; no axillary LAD or tenderness; no inguinal LAD or tenderness  MSK: Normal gait; No digital clubbing or cyanosis; examination of the (head/neck/spine/ribs/pelvis, RUE, LUE, RLE, LLE) without misalignment,            Normal ROM without pain, no spinal tenderness, normal muscle strength/tone  SKIN: No rashes or ulcers noted; no subcutaneous nodules or induration palpable  NEURO: CN II-XII intact; normal reflexes in upper and lower extremities, sensation intact in upper and lower extremities b/l to light touch   PSYCH: Appropriate insight/judgment; A+O x 3, mood and affect appropriate, recent/remote memory intact  -------------------------------------------------------------------------------------------  LABS:  05-12    135  |  102  |  49[H]  ----------------------------<  71  4.6   |  15[L]  |  1.30    Ca    9.3      12 May 2025 06:20  Phos  3.4     05-12  Mg     1.90     05-12    TPro  6.5  /  Alb  3.6  /  TBili  1.2  /  DBili  x   /  AST  18  /  ALT  7   /  AlkPhos  74  05-12    Creatinine Trend: 1.30<--, 1.47<--, 1.22<--, 1.28<--, 1.37<--, 1.48<--                        11.5   8.87  )-----------( 168      ( 12 May 2025 06:20 )             35.2         Lipid Panel: T(F): 98 (05-12), Max: 98 (05-12)  HR: 59 (05-12) (54 - 67)  BP: 95/59 (05-12) (88/57 - 114/49)  RR: 17 (05-12)  SpO2: 98% (05-12)  Cardiac Enzymes:         -------------------------------------------------------------------------------------------  Meds:  acetaminophen     Tablet .. 650 milliGRAM(s) Oral every 6 hours PRN  apixaban 5 milliGRAM(s) Oral two times a day  atorvastatin 80 milliGRAM(s) Oral at bedtime  chlorhexidine 2% Cloths 1 Application(s) Topical daily  clopidogrel Tablet 75 milliGRAM(s) Oral daily  furosemide    Tablet 40 milliGRAM(s) Oral daily  pantoprazole    Tablet 40 milliGRAM(s) Oral before breakfast  sacubitril 24 mG/valsartan 26 mG 1 Tablet(s) Oral two times a day  spironolactone 25 milliGRAM(s) Oral daily    -------------------------------------------------------------------------------------------  Cardiovascular Diagnostic Testing:  -------------------------------------------------------------------------------------------  ECG: rate controlled AF    Echo:     TTE 5/7/25  CONCLUSIONS:      1. The left ventricular cavity is normal in size. Left ventricular wall thickness is normal. Left ventricular systolic function is severely decreased with a calculated ejection fraction of 30 % by 3D. There is global left ventricular hypokinesis. There is increased LV mass and eccentric hypertrophy. There is no evidence of a left ventricular thrombus.   2. Enlarged right ventricular cavity size and reduced right ventricular systolic function. Tricuspid annular plane systolic excursion (TAPSE) is 1.1 cm (normal >=1.7 cm).   3. Structurally normal mitral valve with normal leaflet excursion. There is calcification of the mitral valve annulus. There is symmetric leaflet tethering. There is severe mitral regurgitation.   4. The aortic valve anatomy cannot be determined. There is calcification of the aortic valve leaflets. There is mild aortic regurgitation.   5. The left atrium is severely dilated with an indexed volume of 75.49 ml/m².   6. Structurally normal tricuspid valve with normalleaflet excursion. There is severe tricuspid regurgitation. Estimated pulmonary artery systolic pressure is 56 mmHg, consistent with moderate pulmonary hypertension.   7. The inferior vena cava is dilated measuring 2.54 cm in diameter, (dilated >2.1cm) with abnormal inspiratory collapse (abnormal <50%) consistent with elevated right atrial pressure (~15, range 10-20mmHg).    -------------------------------------------------------------------------------------------  Assessment and Plan:   -------------------------------------------------------------------------------------------  84 y.o M with hx of HFrEF, prior LV thrombus, CAD prior CAD, a.fib on eliquis, bipolar disorder and dementia (AAOx2 at baseline) who was admitted for SOB in the setting of decompensated heart failure    Problems Assessed:   • Acute decompensated HF  • Rate controlled AF  • CAD, hx of PCI    Plan:   1. Decompensated heart failure  - Volume status improved, can maintain on lasix 40 mg QD  - C/w Entresto 24/26mg BID  - C/w Aldactone 25mg daily    2. A.fib  - C/w Eliquis 5mg BID    3. CAD with prior stents  - C/w Plavix 75mg daily  - C/w Lipitor 80mg daily      ------------------------------------------------------------------------------------------  Shaji Faust MD   of Cardiology  [facility]  [address], [address]  [place] [address]  Phone: [phone]  Fax: [phone]  Please check [email or web address] password: [email or web address]" for cardiology service schedule and contact information.   ------------------------------------------------------------------------------------------  Billing Statement:   76/56/51/36 minutes spent on total encounter. Necessity of time spent during this encounter on this date of service was due to review of medical information in EMR, co-ordination of hospital and outpatient care, discussion with patient and communication with primary team.   ------------------------------------------------------------------------------------------- Cardiology Progress Note  ------------------------------------------------------------------------------------------  SUBJECTIVE:   No events overnight. Denies CP, SOB or Palpitations. Respiratory status is improved when compared to admission     -------------------------------------------------------------------------------------------  ROS:  CV: chest pain (-), palpitation (-), orthopnea (-), PND (-), edema (-)  PULM: SOB (-), cough (-), wheezing (-), hemoptysis (-).   CONST: fever (-), chills (-) or fatigue (-)  GI: abdominal distension (-), abdominal pain (-) , nausea/vomiting (-), hematemesis, (-), melena (-), hematochezia (-)  : dysuria (-), frequency (-), hematuria (-).   NEURO: numbness (-), weakness (-), dizziness (-)  MSK: myalgia (-), joint pain (-)   SKIN: itching (-), rash (-)  HEENT:  visual changes (-); vertigo or throat pain (-);  neck stiffness (-)   Psych: change in mood (-), anxiety (-), depression (-)     All other review of systems is negative unless indicated above.   -------------------------------------------------------------------------------------------  VS:  T(F): 98 (05-12), Max: 98 (05-12)  HR: 59 (05-12) (54 - 67)  BP: 95/59 (05-12) (88/57 - 114/49)  RR: 17 (05-12)  SpO2: 98% (05-12)  I&O's Summary    11 May 2025 07:01  -  12 May 2025 07:00  --------------------------------------------------------  IN: 180 mL / OUT: 1000 mL / NET: -820 mL      ------------------------------------------------------------------------------------------  PHYSICAL EXAM:  T(C): 36.7 (05-12-25 @ 05:30), Max: 36.7 (05-12-25 @ 05:30)  HR: 59 (05-12-25 @ 05:30) (54 - 67)  BP: 95/59 (05-12-25 @ 05:30) (88/57 - 114/49)  RR: 17 (05-12-25 @ 05:30) (16 - 18)  SpO2: 98% (05-12-25 @ 05:30) (98% - 100%)    CONSTITUTIONAL: Well groomed, no apparent distress  EYES: PERRLA and symmetric, EOMI, No conjunctival or scleral injection, non-icteric  ENMT: Dry mucus membranes  RESP: No respiratory distress, no use of accessory muscles; CTA b/l, no WRR  CV: RRR, +S1S2, no MRG; no JVD; no peripheral edema  GI: Soft, NT, ND, no rebound, no guarding; no palpable masses; no hepatosplenomegaly; no hernia palpated  LYMPH: No cervical LAD or tenderness; no axillary LAD or tenderness; no inguinal LAD or tenderness  MSK: Normal gait; No digital clubbing or cyanosis; examination of the (head/neck/spine/ribs/pelvis, RUE, LUE, RLE, LLE) without misalignment,            Normal ROM without pain, no spinal tenderness, normal muscle strength/tone  SKIN: No rashes or ulcers noted; no subcutaneous nodules or induration palpable  NEURO: CN II-XII intact; normal reflexes in upper and lower extremities, sensation intact in upper and lower extremities b/l to light touch   PSYCH: Appropriate insight/judgment; A+O x 3, mood and affect appropriate, recent/remote memory intact  -------------------------------------------------------------------------------------------  LABS:  05-12    135  |  102  |  49[H]  ----------------------------<  71  4.6   |  15[L]  |  1.30    Ca    9.3      12 May 2025 06:20  Phos  3.4     05-12  Mg     1.90     05-12    TPro  6.5  /  Alb  3.6  /  TBili  1.2  /  DBili  x   /  AST  18  /  ALT  7   /  AlkPhos  74  05-12    Creatinine Trend: 1.30<--, 1.47<--, 1.22<--, 1.28<--, 1.37<--, 1.48<--                        11.5   8.87  )-----------( 168      ( 12 May 2025 06:20 )             35.2         Lipid Panel: T(F): 98 (05-12), Max: 98 (05-12)  HR: 59 (05-12) (54 - 67)  BP: 95/59 (05-12) (88/57 - 114/49)  RR: 17 (05-12)  SpO2: 98% (05-12)  Cardiac Enzymes:         -------------------------------------------------------------------------------------------  Meds:  acetaminophen     Tablet .. 650 milliGRAM(s) Oral every 6 hours PRN  apixaban 5 milliGRAM(s) Oral two times a day  atorvastatin 80 milliGRAM(s) Oral at bedtime  chlorhexidine 2% Cloths 1 Application(s) Topical daily  clopidogrel Tablet 75 milliGRAM(s) Oral daily  furosemide    Tablet 40 milliGRAM(s) Oral daily  pantoprazole    Tablet 40 milliGRAM(s) Oral before breakfast  sacubitril 24 mG/valsartan 26 mG 1 Tablet(s) Oral two times a day  spironolactone 25 milliGRAM(s) Oral daily    -------------------------------------------------------------------------------------------  Cardiovascular Diagnostic Testing:  -------------------------------------------------------------------------------------------  ECG: rate controlled AF    Echo:     TTE 5/7/25  CONCLUSIONS:      1. The left ventricular cavity is normal in size. Left ventricular wall thickness is normal. Left ventricular systolic function is severely decreased with a calculated ejection fraction of 30 % by 3D. There is global left ventricular hypokinesis. There is increased LV mass and eccentric hypertrophy. There is no evidence of a left ventricular thrombus.   2. Enlarged right ventricular cavity size and reduced right ventricular systolic function. Tricuspid annular plane systolic excursion (TAPSE) is 1.1 cm (normal >=1.7 cm).   3. Structurally normal mitral valve with normal leaflet excursion. There is calcification of the mitral valve annulus. There is symmetric leaflet tethering. There is severe mitral regurgitation.   4. The aortic valve anatomy cannot be determined. There is calcification of the aortic valve leaflets. There is mild aortic regurgitation.   5. The left atrium is severely dilated with an indexed volume of 75.49 ml/m².   6. Structurally normal tricuspid valve with normalleaflet excursion. There is severe tricuspid regurgitation. Estimated pulmonary artery systolic pressure is 56 mmHg, consistent with moderate pulmonary hypertension.   7. The inferior vena cava is dilated measuring 2.54 cm in diameter, (dilated >2.1cm) with abnormal inspiratory collapse (abnormal <50%) consistent with elevated right atrial pressure (~15, range 10-20mmHg).    -------------------------------------------------------------------------------------------  Assessment and Plan:   -------------------------------------------------------------------------------------------  84 y.o M with hx of HFrEF, prior LV thrombus, CAD prior CAD, a.fib on eliquis, bipolar disorder and dementia (AAOx2 at baseline) who was admitted for SOB in the setting of decompensated heart failure    Problems Assessed:   • Acute decompensated HF  • Rate controlled AF  • CAD, hx of PCI    Plan:   1. Decompensated heart failure  - Volume status improved, can maintain on lasix 40 mg QD  - C/w Entresto 24/26mg BID  - C/w Aldactone 25mg daily  - add [id number]     2. A.fib  - C/w Eliquis 5mg BID    3. CAD with prior stents  - C/w Plavix 75mg daily  - C/w Lipitor 80mg daily      ------------------------------------------------------------------------------------------  Shaji Faust MD   of Cardiology  [facility]  [address], [address] [place] [address]  Phone: [phone]  Fax: [phone]  Please check [email or web address] password: [email or web address]" for cardiology service schedule and contact information.   ------------------------------------------------------------------------------------------  Billing Statement:   76/56/51/36 minutes spent on total encounter. Necessity of time spent during this encounter on this date of service was due to review of medical information in EMR, co-ordination of hospital and outpatient care, discussion with patient and communication with primary team.   ------------------------------------------------------------------------------------------- Cardiology Progress Note  ------------------------------------------------------------------------------------------  SUBJECTIVE:   No events overnight. Denies CP, SOB or Palpitations. Respiratory status is improved when compared to admission     -------------------------------------------------------------------------------------------  ROS:  CV: chest pain (-), palpitation (-), orthopnea (-), PND (-), edema (-)  PULM: SOB (-), cough (-), wheezing (-), hemoptysis (-).   CONST: fever (-), chills (-) or fatigue (-)  GI: abdominal distension (-), abdominal pain (-) , nausea/vomiting (-), hematemesis, (-), melena (-), hematochezia (-)  : dysuria (-), frequency (-), hematuria (-).   NEURO: numbness (-), weakness (-), dizziness (-)  MSK: myalgia (-), joint pain (-)   SKIN: itching (-), rash (-)  HEENT:  visual changes (-); vertigo or throat pain (-);  neck stiffness (-)   Psych: change in mood (-), anxiety (-), depression (-)     All other review of systems is negative unless indicated above.   -------------------------------------------------------------------------------------------  VS:  T(F): 98 (05-12), Max: 98 (05-12)  HR: 59 (05-12) (54 - 67)  BP: 95/59 (05-12) (88/57 - 114/49)  RR: 17 (05-12)  SpO2: 98% (05-12)  I&O's Summary    11 May 2025 07:01  -  12 May 2025 07:00  --------------------------------------------------------  IN: 180 mL / OUT: 1000 mL / NET: -820 mL      ------------------------------------------------------------------------------------------  PHYSICAL EXAM:  T(C): 36.7 (05-12-25 @ 05:30), Max: 36.7 (05-12-25 @ 05:30)  HR: 59 (05-12-25 @ 05:30) (54 - 67)  BP: 95/59 (05-12-25 @ 05:30) (88/57 - 114/49)  RR: 17 (05-12-25 @ 05:30) (16 - 18)  SpO2: 98% (05-12-25 @ 05:30) (98% - 100%)    CONSTITUTIONAL: Well groomed, no apparent distress  EYES: PERRLA and symmetric, EOMI, No conjunctival or scleral injection, non-icteric  ENMT: Dry mucus membranes  RESP: No respiratory distress, no use of accessory muscles; CTA b/l, no WRR  CV: RRR, +S1S2, no MRG; no JVD; no peripheral edema  GI: Soft, NT, ND, no rebound, no guarding; no palpable masses; no hepatosplenomegaly; no hernia palpated  LYMPH: No cervical LAD or tenderness; no axillary LAD or tenderness; no inguinal LAD or tenderness  MSK: Normal gait; No digital clubbing or cyanosis; examination of the (head/neck/spine/ribs/pelvis, RUE, LUE, RLE, LLE) without misalignment,            Normal ROM without pain, no spinal tenderness, normal muscle strength/tone  SKIN: No rashes or ulcers noted; no subcutaneous nodules or induration palpable  NEURO: CN II-XII intact; normal reflexes in upper and lower extremities, sensation intact in upper and lower extremities b/l to light touch   PSYCH: Appropriate insight/judgment; A+O x 3, mood and affect appropriate, recent/remote memory intact  -------------------------------------------------------------------------------------------  LABS:  05-12    135  |  102  |  49[H]  ----------------------------<  71  4.6   |  15[L]  |  1.30    Ca    9.3      12 May 2025 06:20  Phos  3.4     05-12  Mg     1.90     05-12    TPro  6.5  /  Alb  3.6  /  TBili  1.2  /  DBili  x   /  AST  18  /  ALT  7   /  AlkPhos  74  05-12    Creatinine Trend: 1.30<--, 1.47<--, 1.22<--, 1.28<--, 1.37<--, 1.48<--                        11.5   8.87  )-----------( 168      ( 12 May 2025 06:20 )             35.2         Lipid Panel: T(F): 98 (05-12), Max: 98 (05-12)  HR: 59 (05-12) (54 - 67)  BP: 95/59 (05-12) (88/57 - 114/49)  RR: 17 (05-12)  SpO2: 98% (05-12)  Cardiac Enzymes:         -------------------------------------------------------------------------------------------  Meds:  acetaminophen     Tablet .. 650 milliGRAM(s) Oral every 6 hours PRN  apixaban 5 milliGRAM(s) Oral two times a day  atorvastatin 80 milliGRAM(s) Oral at bedtime  chlorhexidine 2% Cloths 1 Application(s) Topical daily  clopidogrel Tablet 75 milliGRAM(s) Oral daily  furosemide    Tablet 40 milliGRAM(s) Oral daily  pantoprazole    Tablet 40 milliGRAM(s) Oral before breakfast  sacubitril 24 mG/valsartan 26 mG 1 Tablet(s) Oral two times a day  spironolactone 25 milliGRAM(s) Oral daily    -------------------------------------------------------------------------------------------  Cardiovascular Diagnostic Testing:  -------------------------------------------------------------------------------------------  ECG: rate controlled AF    Echo:     TTE 5/7/25  CONCLUSIONS:      1. The left ventricular cavity is normal in size. Left ventricular wall thickness is normal. Left ventricular systolic function is severely decreased with a calculated ejection fraction of 30 % by 3D. There is global left ventricular hypokinesis. There is increased LV mass and eccentric hypertrophy. There is no evidence of a left ventricular thrombus.   2. Enlarged right ventricular cavity size and reduced right ventricular systolic function. Tricuspid annular plane systolic excursion (TAPSE) is 1.1 cm (normal >=1.7 cm).   3. Structurally normal mitral valve with normal leaflet excursion. There is calcification of the mitral valve annulus. There is symmetric leaflet tethering. There is severe mitral regurgitation.   4. The aortic valve anatomy cannot be determined. There is calcification of the aortic valve leaflets. There is mild aortic regurgitation.   5. The left atrium is severely dilated with an indexed volume of 75.49 ml/m².   6. Structurally normal tricuspid valve with normalleaflet excursion. There is severe tricuspid regurgitation. Estimated pulmonary artery systolic pressure is 56 mmHg, consistent with moderate pulmonary hypertension.   7. The inferior vena cava is dilated measuring 2.54 cm in diameter, (dilated >2.1cm) with abnormal inspiratory collapse (abnormal <50%) consistent with elevated right atrial pressure (~15, range 10-20mmHg).    -------------------------------------------------------------------------------------------  Assessment and Plan:   -------------------------------------------------------------------------------------------  84 y.o M with hx of HFrEF, prior LV thrombus, CAD prior CAD, a.fib on eliquis, bipolar disorder and dementia (AAOx2 at baseline) who was admitted for SOB in the setting of decompensated heart failure    Problems Assessed:   • Acute decompensated HF  • Rate controlled AF  • CAD, hx of PCI    Plan:   1. Decompensated heart failure  - Volume status improved, can maintain on lasix 40 mg QD  - C/w Entresto 24/26mg BID  - C/w Aldactone 25mg daily  - add SGLT2i     2. A.fib  - C/w Eliquis 5mg BID    3. CAD with prior stents  - C/w Plavix 75mg daily  - C/w Lipitor 80mg daily      ------------------------------------------------------------------------------------------  Shaji Faust MD   of Cardiology  [facility]  [address], [address] [place] [id number]  Phone: [phone]  Fax: [phone]  Please check [email or web address]: [email or web address]" for cardiology service schedule and contact information.   ------------------------------------------------------------------------------------------  Billing Statement:   51 minutes spent on total encounter. Necessity of time spent during this encounter on this date of service was due to review of medical information in EMR, co-ordination of hospital and outpatient care, discussion with patient and communication with primary team.   -------------------------------------------------------------------------------------------

## 2025-05-12 NOTE — PROVIDER CONTACT NOTE (OTHER) - DATE AND TIME:
10-May-2025 19:59
12-May-2025 11:35
08-May-2025 14:30
11-May-2025 02:00
08-May-2025 16:31
08-May-2025 23:38
11-May-2025 03:34
11-May-2025 05:18
08-May-2025 02:05
11-May-2025 11:55
09-May-2025 00:58
10-May-2025 19:31
10-May-2025 21:30

## 2025-05-12 NOTE — PROGRESS NOTE ADULT - PROVIDER SPECIALTY LIST ADULT
Cardiology
Cardiology
Internal Medicine
Cardiology
Cardiology
Internal Medicine

## 2025-05-12 NOTE — PROVIDER CONTACT NOTE (OTHER) - BACKGROUND
(Admit Diagnosis) Heart failure  (PMH) Bipolar mood disorder  (PMH) CAD (coronary artery disease)  (PMH) Hypertension  (PMH) Hypercholesterolemia
Patient has a PMH of hypertension, bipolar mood disorder, and coronary artery disease.
(Admit Diagnosis) Heart failure  (PMH) Bipolar mood disorder  (PMH) CAD (coronary artery disease)  (PMH) Hypertension  (PMH) Hypercholesterolemia
(Admit Diagnosis) Heart failure  (PMH) Bipolar mood disorder  (PMH) CAD (coronary artery disease)  (PMH) Hypertension  (PMH) Hypercholesterolemia
Patient has a PMH of hypertension, bipolar mood disorder, and coronary artery disease.
Patient admitted for  HF. PMH CAD, HTN, dementia
Patient has a PMH of hypertension, bipolar mood disorder, and coronary artery disease.
(Admit Diagnosis) Heart failure  (PMH) Bipolar mood disorder  (PMH) CAD (coronary artery disease)  (PMH) Hypertension  (PMH) Hypercholesterolemia
Patient admitted for HF, SOB. PMH dementia, HLD, CAD, HTN, bipolar disorder
(Admit Diagnosis) Heart failure  (PMH) Bipolar mood disorder  (PMH) CAD (coronary artery disease)  (PMH) Hypertension
(Admit Diagnosis) Heart failure  (PMH) Bipolar mood disorder  (PMH) CAD (coronary artery disease)  (PMH) Hypertension  (PMH) Hypercholesterolemia
Patient has a PMH of hypertension, bipolar mood disorder, and coronary artery disease.
Patient has a PMH of hypertension, bipolar mood disorder, and coronary artery disease.

## 2025-05-12 NOTE — PROVIDER CONTACT NOTE (OTHER) - SITUATION
patient heart rate dropped to 44 on tele monitor, patient asymptomatic
patient heart rate dropped to 30 on tele monitor, then returned to 60-70 patient asymptomatic
BP 97/56
Patient HR bradycardic to 27-30, non-sustaining. Patient bp 88/57, repeat 92/52
Patient desat to 79%. Patient pulse ox switched to crash cart pulse ox and reading 100%. Patient has poor perfusion to extremities ACP aware. Patient pulse ox on ear.
Patient heart rate non sustaining 36 with 3 second pause
patient blood pressure 88/52
patient heart rate dropped to 34 on tele monitor then came up to 53-58
patient refusing eliquis and protonix, educated on importance, still refused
BP 95/50
Patient BP hypotensive
patient heart rate dropped to 31 on tele monitor, patient asymptomatic
Patient desat to 56% when crash cart pulse ox used patient 100%.

## 2025-05-12 NOTE — PROGRESS NOTE ADULT - PROBLEM SELECTOR PLAN 3
Trop 61 then 59 and EKG w/o STEMI findings. Patient w/o CP  - c/w home plavix 75

## 2025-05-12 NOTE — PROGRESS NOTE ADULT - ASSESSMENT
84 year old M pmh bipolar disorder, dementia (AAOx2 at baseline), CAD, CHF (EF 29%), LV thrombus, afib on eliquis presents from assisted living facility for shortness of breath found to have cardiomegaly with secondary signs of congestive heart failure, with associated pulmonary interstitial edema and bilateral pleural effusions. Admit to medicine for further HF management.

## 2025-05-12 NOTE — PROGRESS NOTE ADULT - PROBLEM SELECTOR PLAN 1
- Hx of CAD and HFrEF (29%) p/w sob.   - CXR showing cardiomegaly with pulm edema  - CT chest showing Cardiomegaly with secondary signs of congestive heart failure, with associated pulmonary interstitial edema and bilateral pleural effusions  - TTE showing global left ventricular hypokinesis and increased LV mass and eccentric hypertrophy with LVEF 30%, enlarged right ventricular cavity size and reduced right ventricular systolic function  - BNP 64877  - now on 1L NC  -  home is on Aldactone 25 and sacubitril-valsartan 24-26 BID  - cards following    - c/w lasix 40 IV BID  - f/u cards recs  - strict I/Os  - closely monitor volume status  - c/w Aldactone 25 and sacubitril-valsartan 24-26 BID - Hx of CAD and HFrEF (29%) p/w sob.   - CXR showing cardiomegaly with pulm edema  - CT chest showing Cardiomegaly with secondary signs of congestive heart failure, with associated pulmonary interstitial edema and bilateral pleural effusions  - TTE showing global left ventricular hypokinesis and increased LV mass and eccentric hypertrophy with LVEF 30%, enlarged right ventricular cavity size and reduced right ventricular systolic function  - BNP 74697  - now on 1L NC  -  home is on Aldactone 25 and sacubitril-valsartan 24-26 BID  - cards following    - c/w lasix 40 PO daily   - f/u cards recs  - strict I/Os  - closely monitor volume status  - c/w Aldactone 25 and sacubitril-valsartan 24-26 BID

## 2025-05-12 NOTE — PROGRESS NOTE ADULT - PROBLEM SELECTOR PLAN 9
Hospital Bundle  Nutrition: DASH Diet   PPX  ---VTE: eliquis  ---GI: diet  Access: PIV  Code Status: DNR/ Trial NIV  Dispo: pending clinical course

## 2025-05-12 NOTE — PROGRESS NOTE ADULT - TIME BILLING
review of laboratory data, radiology results, consultants' recommendations, documentation in [place], discussion with patient/ACP and interdisciplinary staff (such as , social workers, etc). Interventions were performed as documented above.
review of laboratory data, radiology results, consultants' recommendations, documentation in [place], discussion with patient/ACP and interdisciplinary staff (such as , social workers, etc). Interventions were performed as documented above.
review of labs, imaging, notes, discussion of plan with patient, family, and consultant.
review of laboratory data, radiology results, consultants' recommendations, documentation in [place], discussion with patient/ACP and interdisciplinary staff (such as , social workers, etc). Interventions were performed as documented above.
review of labs, imaging, notes, discussion of plan with patient, family, and consultant

## 2025-05-12 NOTE — PROGRESS NOTE ADULT - PROBLEM SELECTOR PLAN 5
A&Ox2 at baseline. Currently at baseline

## 2025-05-12 NOTE — PROVIDER CONTACT NOTE (OTHER) - NAME OF MD/NP/PA/DO NOTIFIED:
Louise Washington
house staff Padmini Shelley
MD Giovana Centeno
house staff Padmini Shelley
house staff Padmini Shelley
MD Taryn Akhtar
Louise Washington
Louise Washington
house staff Padmini Shelley
house staff Padmini Shelley
Louise Washington
MD Taryn Akhtar
house staff Padmini Shelley
Improved

## 2025-05-12 NOTE — PROGRESS NOTE ADULT - ATTENDING COMMENTS
Pt is an 85 yo M with PMH a-fib (eliquis), dementia (AOx2), HTN, HLD, BP d/o, CAD, CHF (EF 29%), and LV thrombus p/w SOB. Hemodynamically stable on arrival with labs showing macrocytic anemia, Cr 1.48, trop 61--59, lipase neg, TSH WNL, BNP 17940, UA neg, and COVID/flu/RSV neg. CTH neg, CTA chest neg PE but c/w volume overload, and TTE with EF 30%, mod pHTN, sev MR/TR, mild AR, and LA sev dilated. Started on IV lasix, now on PO.    Pt seen and examined at bedside sleeping comfortably, easily arousable. Denies acute complaints, wants to be discharged and is tired of being in the hospital. Exam notable for comfortable on RA, CTA BL, good inspiratory effort, RRR, +S1/S2, overly nourished abdomen with BS x4, trace edema BL LE, and fungal changes to BL toe nails.     Plan to c/w PO lasix 40mg daily per cards recs; BP low normal, holding spironolactone and entresto -- will instruct facility to resume as able with lasix as priority. PT recs RICK - nephew in agreement, DC today.     Discussed with HS, rest as outlined above.

## 2025-05-12 NOTE — PROGRESS NOTE ADULT - PROBLEM SELECTOR PLAN 7
- c/w home atorvastatin 80

## 2025-05-12 NOTE — PROGRESS NOTE ADULT - PROBLEM SELECTOR PLAN 8
hx of Bipolar  Not on meds at home

## 2025-05-12 NOTE — PROVIDER CONTACT NOTE (OTHER) - REASON
BP 97/56
patient refusing eliquis and protonix
Patient BP hypotensive
Patient HR bradycardic to 27-30, non-sustaining. Patient bp 88/57, repeat 92/52
patient blood pressure 88/52
patient heart rate dropped to 30 on tele monitor for few seconds, then returned to 60-70
patient heart rate dropped to 31 on tele monitor for few seconds
patient heart rate dropped to 34 on tele monitor then came up to 53-58
Patient desat to 56% when crash cart pulse ox used patient 100%
patient heart rate dropped to 44 on tele monitor for few seconds
Patient desat to 79%
BP 95/50
Patient heart rate non sustaining 36 with 3 second pause

## 2025-05-14 ENCOUNTER — EMERGENCY (EMERGENCY)
Facility: HOSPITAL | Age: 85
LOS: 1 days | End: 2025-05-14
Attending: EMERGENCY MEDICINE | Admitting: EMERGENCY MEDICINE
Payer: OTHER GOVERNMENT

## 2025-05-14 VITALS
HEART RATE: 69 BPM | RESPIRATION RATE: 18 BRPM | TEMPERATURE: 96 F | SYSTOLIC BLOOD PRESSURE: 102 MMHG | OXYGEN SATURATION: 94 % | DIASTOLIC BLOOD PRESSURE: 71 MMHG | HEIGHT: 69 IN

## 2025-05-14 VITALS
DIASTOLIC BLOOD PRESSURE: 59 MMHG | RESPIRATION RATE: 16 BRPM | HEART RATE: 63 BPM | TEMPERATURE: 98 F | SYSTOLIC BLOOD PRESSURE: 93 MMHG | OXYGEN SATURATION: 96 %

## 2025-05-14 LAB
ALBUMIN SERPL ELPH-MCNC: 4 G/DL — SIGNIFICANT CHANGE UP (ref 3.3–5)
ALP SERPL-CCNC: 78 U/L — SIGNIFICANT CHANGE UP (ref 40–120)
ALT FLD-CCNC: 8 U/L — SIGNIFICANT CHANGE UP (ref 4–41)
ANION GAP SERPL CALC-SCNC: 16 MMOL/L — HIGH (ref 7–14)
APTT BLD: 46.5 SEC — HIGH (ref 26.1–36.8)
AST SERPL-CCNC: 15 U/L — SIGNIFICANT CHANGE UP (ref 4–40)
BASOPHILS # BLD AUTO: 0.07 K/UL — SIGNIFICANT CHANGE UP (ref 0–0.2)
BASOPHILS NFR BLD AUTO: 0.8 % — SIGNIFICANT CHANGE UP (ref 0–2)
BILIRUB SERPL-MCNC: 1.3 MG/DL — HIGH (ref 0.2–1.2)
BUN SERPL-MCNC: 54 MG/DL — HIGH (ref 7–23)
CALCIUM SERPL-MCNC: 9.4 MG/DL — SIGNIFICANT CHANGE UP (ref 8.4–10.5)
CHLORIDE SERPL-SCNC: 101 MMOL/L — SIGNIFICANT CHANGE UP (ref 98–107)
CO2 SERPL-SCNC: 19 MMOL/L — LOW (ref 22–31)
CREAT SERPL-MCNC: 1.41 MG/DL — HIGH (ref 0.5–1.3)
EGFR: 49 ML/MIN/1.73M2 — LOW
EGFR: 49 ML/MIN/1.73M2 — LOW
EOSINOPHIL # BLD AUTO: 0.13 K/UL — SIGNIFICANT CHANGE UP (ref 0–0.5)
EOSINOPHIL NFR BLD AUTO: 1.5 % — SIGNIFICANT CHANGE UP (ref 0–6)
GLUCOSE SERPL-MCNC: 94 MG/DL — SIGNIFICANT CHANGE UP (ref 70–99)
HCT VFR BLD CALC: 34.4 % — LOW (ref 39–50)
HGB BLD-MCNC: 11.2 G/DL — LOW (ref 13–17)
IANC: 6.03 K/UL — SIGNIFICANT CHANGE UP (ref 1.8–7.4)
IMM GRANULOCYTES NFR BLD AUTO: 0.2 % — SIGNIFICANT CHANGE UP (ref 0–0.9)
INR BLD: 3.81 RATIO — HIGH (ref 0.85–1.16)
LYMPHOCYTES # BLD AUTO: 1.83 K/UL — SIGNIFICANT CHANGE UP (ref 1–3.3)
LYMPHOCYTES # BLD AUTO: 20.8 % — SIGNIFICANT CHANGE UP (ref 13–44)
MCHC RBC-ENTMCNC: 30.9 PG — SIGNIFICANT CHANGE UP (ref 27–34)
MCHC RBC-ENTMCNC: 32.6 G/DL — SIGNIFICANT CHANGE UP (ref 32–36)
MCV RBC AUTO: 94.8 FL — SIGNIFICANT CHANGE UP (ref 80–100)
MONOCYTES # BLD AUTO: 0.73 K/UL — SIGNIFICANT CHANGE UP (ref 0–0.9)
MONOCYTES NFR BLD AUTO: 8.3 % — SIGNIFICANT CHANGE UP (ref 2–14)
NEUTROPHILS # BLD AUTO: 6.03 K/UL — SIGNIFICANT CHANGE UP (ref 1.8–7.4)
NEUTROPHILS NFR BLD AUTO: 68.4 % — SIGNIFICANT CHANGE UP (ref 43–77)
NRBC # BLD AUTO: 0 K/UL — SIGNIFICANT CHANGE UP (ref 0–0)
NRBC # FLD: 0 K/UL — SIGNIFICANT CHANGE UP (ref 0–0)
NRBC BLD AUTO-RTO: 0 /100 WBCS — SIGNIFICANT CHANGE UP (ref 0–0)
PLATELET # BLD AUTO: 183 K/UL — SIGNIFICANT CHANGE UP (ref 150–400)
POTASSIUM SERPL-MCNC: 4.7 MMOL/L — SIGNIFICANT CHANGE UP (ref 3.5–5.3)
POTASSIUM SERPL-SCNC: 4.7 MMOL/L — SIGNIFICANT CHANGE UP (ref 3.5–5.3)
PROT SERPL-MCNC: 7 G/DL — SIGNIFICANT CHANGE UP (ref 6–8.3)
PROTHROM AB SERPL-ACNC: 43.6 SEC — HIGH (ref 9.9–13.4)
RBC # BLD: 3.63 M/UL — LOW (ref 4.2–5.8)
RBC # FLD: 16.6 % — HIGH (ref 10.3–14.5)
SODIUM SERPL-SCNC: 136 MMOL/L — SIGNIFICANT CHANGE UP (ref 135–145)
TROPONIN T, HIGH SENSITIVITY RESULT: 65 NG/L — CRITICAL HIGH
WBC # BLD: 8.81 K/UL — SIGNIFICANT CHANGE UP (ref 3.8–10.5)
WBC # FLD AUTO: 8.81 K/UL — SIGNIFICANT CHANGE UP (ref 3.8–10.5)

## 2025-05-14 PROCEDURE — 70450 CT HEAD/BRAIN W/O DYE: CPT | Mod: 26

## 2025-05-14 PROCEDURE — 99285 EMERGENCY DEPT VISIT HI MDM: CPT

## 2025-05-14 PROCEDURE — 93010 ELECTROCARDIOGRAM REPORT: CPT

## 2025-05-14 PROCEDURE — 71045 X-RAY EXAM CHEST 1 VIEW: CPT | Mod: 26

## 2025-05-14 PROCEDURE — 72125 CT NECK SPINE W/O DYE: CPT | Mod: 26

## 2025-05-21 ENCOUNTER — INPATIENT (INPATIENT)
Facility: HOSPITAL | Age: 85
LOS: 7 days | Discharge: HOSPICE | DRG: 189 | End: 2025-05-29
Attending: INTERNAL MEDICINE | Admitting: INTERNAL MEDICINE
Payer: MEDICARE

## 2025-05-21 VITALS
SYSTOLIC BLOOD PRESSURE: 97 MMHG | HEIGHT: 69 IN | DIASTOLIC BLOOD PRESSURE: 54 MMHG | OXYGEN SATURATION: 98 % | WEIGHT: 169.98 LBS | RESPIRATION RATE: 18 BRPM | TEMPERATURE: 98 F | HEART RATE: 90 BPM

## 2025-05-21 DIAGNOSIS — I50.23 ACUTE ON CHRONIC SYSTOLIC (CONGESTIVE) HEART FAILURE: ICD-10-CM

## 2025-05-21 LAB
ADD ON TEST-SPECIMEN IN LAB: SIGNIFICANT CHANGE UP
AMMONIA BLD-MCNC: <10 UMOL/L — LOW (ref 11–55)
APPEARANCE UR: CLEAR — SIGNIFICANT CHANGE UP
APTT BLD: 44.1 SEC — HIGH (ref 26.1–36.8)
APTT BLD: 47.1 SEC — HIGH (ref 26.1–36.8)
BACTERIA # UR AUTO: NEGATIVE /HPF — SIGNIFICANT CHANGE UP
BASOPHILS # BLD AUTO: 0.02 K/UL — SIGNIFICANT CHANGE UP (ref 0–0.2)
BASOPHILS NFR BLD AUTO: 0.2 % — SIGNIFICANT CHANGE UP (ref 0–2)
BILIRUB UR-MCNC: ABNORMAL
CAST: 12 /LPF — HIGH (ref 0–4)
COLOR SPEC: SIGNIFICANT CHANGE UP
DIFF PNL FLD: NEGATIVE — SIGNIFICANT CHANGE UP
EOSINOPHIL # BLD AUTO: 0 K/UL — SIGNIFICANT CHANGE UP (ref 0–0.5)
EOSINOPHIL NFR BLD AUTO: 0 % — SIGNIFICANT CHANGE UP (ref 0–6)
GAS PNL BLDV: SIGNIFICANT CHANGE UP
GLUCOSE UR QL: NEGATIVE MG/DL — SIGNIFICANT CHANGE UP
HCT VFR BLD CALC: 36.4 % — LOW (ref 39–50)
HGB BLD-MCNC: 11.6 G/DL — LOW (ref 13–17)
IMM GRANULOCYTES NFR BLD AUTO: 0.4 % — SIGNIFICANT CHANGE UP (ref 0–0.9)
INR BLD: 5.46 RATIO — CRITICAL HIGH (ref 0.85–1.16)
INR BLD: 6.73 RATIO — CRITICAL HIGH (ref 0.85–1.16)
KETONES UR QL: ABNORMAL MG/DL
LEUKOCYTE ESTERASE UR-ACNC: ABNORMAL
LYMPHOCYTES # BLD AUTO: 1.48 K/UL — SIGNIFICANT CHANGE UP (ref 1–3.3)
LYMPHOCYTES # BLD AUTO: 12.9 % — LOW (ref 13–44)
MCHC RBC-ENTMCNC: 30.8 PG — SIGNIFICANT CHANGE UP (ref 27–34)
MCHC RBC-ENTMCNC: 31.9 G/DL — LOW (ref 32–36)
MCV RBC AUTO: 96.6 FL — SIGNIFICANT CHANGE UP (ref 80–100)
MONOCYTES # BLD AUTO: 1.02 K/UL — HIGH (ref 0–0.9)
MONOCYTES NFR BLD AUTO: 8.9 % — SIGNIFICANT CHANGE UP (ref 2–14)
NEUTROPHILS # BLD AUTO: 8.94 K/UL — HIGH (ref 1.8–7.4)
NEUTROPHILS NFR BLD AUTO: 77.6 % — HIGH (ref 43–77)
NITRITE UR-MCNC: NEGATIVE — SIGNIFICANT CHANGE UP
NRBC BLD AUTO-RTO: 0 /100 WBCS — SIGNIFICANT CHANGE UP (ref 0–0)
NT-PROBNP SERPL-SCNC: HIGH PG/ML (ref 0–300)
PH UR: 5 — SIGNIFICANT CHANGE UP (ref 5–8)
PLATELET # BLD AUTO: 151 K/UL — SIGNIFICANT CHANGE UP (ref 150–400)
PROT UR-MCNC: 30 MG/DL
PROTHROM AB SERPL-ACNC: 61.7 SEC — HIGH (ref 9.9–13.4)
PROTHROM AB SERPL-ACNC: 76 SEC — HIGH (ref 9.9–13.4)
RBC # BLD: 3.77 M/UL — LOW (ref 4.2–5.8)
RBC # FLD: 17.3 % — HIGH (ref 10.3–14.5)
RBC CASTS # UR COMP ASSIST: 0 /HPF — SIGNIFICANT CHANGE UP (ref 0–4)
REVIEW: SIGNIFICANT CHANGE UP
SP GR SPEC: 1.02 — SIGNIFICANT CHANGE UP (ref 1–1.03)
SQUAMOUS # UR AUTO: 1 /HPF — SIGNIFICANT CHANGE UP (ref 0–5)
TROPONIN T, HIGH SENSITIVITY RESULT: 79 NG/L — HIGH (ref 0–51)
TROPONIN T, HIGH SENSITIVITY RESULT: 81 NG/L — HIGH (ref 0–51)
UROBILINOGEN FLD QL: 2 MG/DL (ref 0.2–1)
WBC # BLD: 11.51 K/UL — HIGH (ref 3.8–10.5)
WBC # FLD AUTO: 11.51 K/UL — HIGH (ref 3.8–10.5)
WBC UR QL: 1 /HPF — SIGNIFICANT CHANGE UP (ref 0–5)

## 2025-05-21 PROCEDURE — 71045 X-RAY EXAM CHEST 1 VIEW: CPT | Mod: 26

## 2025-05-21 PROCEDURE — 99291 CRITICAL CARE FIRST HOUR: CPT | Mod: GC

## 2025-05-21 PROCEDURE — 99291 CRITICAL CARE FIRST HOUR: CPT

## 2025-05-21 PROCEDURE — 93010 ELECTROCARDIOGRAM REPORT: CPT | Mod: 76

## 2025-05-21 PROCEDURE — 70450 CT HEAD/BRAIN W/O DYE: CPT | Mod: 26

## 2025-05-21 RX ORDER — DOBUTAMINE 250 MG/20ML
2.5 INJECTION INTRAVENOUS
Qty: 500 | Refills: 0 | Status: DISCONTINUED | OUTPATIENT
Start: 2025-05-21 | End: 2025-05-22

## 2025-05-21 RX ORDER — PIPERACILLIN-TAZO-DEXTROSE,ISO 3.375G/5
3.38 IV SOLUTION, PIGGYBACK PREMIX FROZEN(ML) INTRAVENOUS ONCE
Refills: 0 | Status: COMPLETED | OUTPATIENT
Start: 2025-05-21 | End: 2025-05-21

## 2025-05-21 RX ORDER — PIPERACILLIN-TAZO-DEXTROSE,ISO 3.375G/5
3.38 IV SOLUTION, PIGGYBACK PREMIX FROZEN(ML) INTRAVENOUS ONCE
Refills: 0 | Status: DISCONTINUED | OUTPATIENT
Start: 2025-05-21 | End: 2025-05-21

## 2025-05-21 RX ORDER — FUROSEMIDE 10 MG/ML
80 INJECTION INTRAMUSCULAR; INTRAVENOUS ONCE
Refills: 0 | Status: COMPLETED | OUTPATIENT
Start: 2025-05-21 | End: 2025-05-21

## 2025-05-21 RX ORDER — CEFTRIAXONE 500 MG/1
1000 INJECTION, POWDER, FOR SOLUTION INTRAMUSCULAR; INTRAVENOUS ONCE
Refills: 0 | Status: COMPLETED | OUTPATIENT
Start: 2025-05-21 | End: 2025-05-21

## 2025-05-21 RX ORDER — FUROSEMIDE 10 MG/ML
60 INJECTION INTRAMUSCULAR; INTRAVENOUS ONCE
Refills: 0 | Status: COMPLETED | OUTPATIENT
Start: 2025-05-21 | End: 2025-05-21

## 2025-05-21 RX ORDER — AZITHROMYCIN 250 MG
500 CAPSULE ORAL ONCE
Refills: 0 | Status: DISCONTINUED | OUTPATIENT
Start: 2025-05-21 | End: 2025-05-21

## 2025-05-21 RX ORDER — SODIUM CHLORIDE 9 G/1000ML
1000 INJECTION, SOLUTION INTRAVENOUS ONCE
Refills: 0 | Status: DISCONTINUED | OUTPATIENT
Start: 2025-05-21 | End: 2025-05-21

## 2025-05-21 RX ADMIN — Medication 200 GRAM(S): at 23:08

## 2025-05-21 RX ADMIN — Medication 200 GRAM(S): at 23:45

## 2025-05-21 RX ADMIN — FUROSEMIDE 60 MILLIGRAM(S): 10 INJECTION INTRAMUSCULAR; INTRAVENOUS at 19:46

## 2025-05-21 RX ADMIN — CEFTRIAXONE 100 MILLIGRAM(S): 500 INJECTION, POWDER, FOR SOLUTION INTRAMUSCULAR; INTRAVENOUS at 18:41

## 2025-05-21 RX ADMIN — FUROSEMIDE 80 MILLIGRAM(S): 10 INJECTION INTRAMUSCULAR; INTRAVENOUS at 22:43

## 2025-05-21 NOTE — PATIENT PROFILE ADULT - FALL HARM RISK - CONCLUSION
Discussed with patient. To call back tomorrow to schedule OV for me to see the wound   Fall with Harm Risk

## 2025-05-21 NOTE — PATIENT PROFILE ADULT - FALL HARM RISK - HARM RISK INTERVENTIONS

## 2025-05-21 NOTE — H&P ADULT - NSHPLABSRESULTS_GEN_ALL_CORE
11.6   11.51 )-----------( 151      ( 21 May 2025 17:58 )             36.4   05-21    136  |  100  |  75[H]  ----------------------------<  99  5.3   |  12[L]  |  1.97[H]    Ca    9.6      21 May 2025 20:43    TPro  6.9  /  Alb  4.0  /  TBili  3.2[H]  /  DBili  x   /  AST  200[H]  /  ALT  264[H]  /  AlkPhos  154[H]  05-21

## 2025-05-21 NOTE — ED PROVIDER NOTE - PROGRESS NOTE DETAILS
Reji Underwood MD PGY-7: Patient w/elevated WBC when compared to labs done 5 days prior and now with AMS and lethargy for which reason sepsis high is high on differential. Will obtain cultures and start empirically on broad spectrum abx tx. Will contact next of kin to clarify status. Attending MD Wilder: Patient's lab work notable for elevated BNP level elevated LFTs acidosis acute on chronic renal insufficiency mild troponin elevation also with elevated INR 5.46.  Extremities are slightly cool to the touch, POCUS with moderately to severely depressed ejection fraction dilated IVC, concern for LFT abnormalities being secondary to possible hepatic congestion from heart failure.  Will initiate IV diuresis, also would be concern for cardiogenic shock given elevated lactate level and altered mental status.  Will have to clarify goals of care with next of kin.  Patient's oxygen saturation with somewhat poor [place] is in the mid 80s, will initiate noninvasive ventilation for now for respiratory support Eveline Narvaez MD, PGY3:  CICU consulted will come see pt Eveline Narvaez MD, PGY3:  CICU at bedside, recommends seeing how pt responds to bipap and lasix and reassess. GOC discussion completed with OZ Diallo filled out Attending MD Wilder: Patient seen by the CCU, at this time they recommend repeat perfusion markers after IV diuresis monitor urine output and reassess patient.  They do not believe the patient at this juncture necessitate CCU level care but will follow-up on repeat perfusion markers. Reji Underwood MD PGY-7: Patient w/elevated WBC when compared to labs done 5 days prior and now with AMS and lethargy for which reason sepsis high is high on differential. Will obtain cultures and start empirically on broad spectrum abx tx. Will contact next of kin to clarify status of living will and goals of care. Eveline Narvaez MD, PGY3:   pt w/ worsening acidosis and lactic acid, minimal urine output despite lasix, will give additional lasix, mental status unchanged. CICU accepts pt for admission

## 2025-05-21 NOTE — ED ADULT NURSE REASSESSMENT NOTE - NS ED NURSE REASSESS COMMENT FT1
patient hypoxic to 85% with semi-accurate wave form on monitor, difficult to obtain O2 sat with multiple probe placements. Pt placed on NRB mask 15L. MD Wilder aware.

## 2025-05-21 NOTE — ED ADULT NURSE NOTE - CAS EDN DISCHARGE INTERVENTIONS
"Subjective:       Patient ID: Smita Bailey is a 67 y.o. female.    Vitals:  height is 5' 3" (1.6 m) and weight is 63 kg (139 lb). Her temperature is 98.9 °F (37.2 °C). Her blood pressure is 129/72 and her pulse is 79. Her oxygen saturation is 97%.     Chief Complaint: COVID-19 Concerns    Patient present with covid concerns. Patient complains of sore throat,diarrhea,ear fullness,post nasal drip and fever. Taken no OTC meds. Roxy N/V. Roxy covid 19 exposure.     Other  This is a new problem. The current episode started today. The problem occurs constantly. The problem has been waxing and waning. Associated symptoms include a fever and a sore throat. Nothing aggravates the symptoms. She has tried nothing for the symptoms.       Constitution: Positive for fever.   HENT: Positive for ear pain, postnasal drip and sore throat.        Objective:      Physical Exam   Constitutional: She is oriented to person, place, and time. She appears well-developed. She is cooperative.  Non-toxic appearance. She does not appear ill. No distress.   HENT:   Head: Normocephalic and atraumatic.   Ears:   Right Ear: Hearing, external ear and ear canal normal. A middle ear effusion is present.   Left Ear: Hearing, external ear and ear canal normal. A middle ear effusion is present.   Nose: Nose normal. No mucosal edema, rhinorrhea or nasal deformity. No epistaxis. Right sinus exhibits no maxillary sinus tenderness and no frontal sinus tenderness. Left sinus exhibits no maxillary sinus tenderness and no frontal sinus tenderness.   Mouth/Throat: Uvula is midline, oropharynx is clear and moist and mucous membranes are normal. No trismus in the jaw. Normal dentition. No uvula swelling. No oropharyngeal exudate, posterior oropharyngeal edema or posterior oropharyngeal erythema.   Eyes: Conjunctivae and lids are normal. No scleral icterus.   Neck: Trachea normal, full passive range of motion without pain and phonation normal. Neck " supple. No neck rigidity. No edema and no erythema present.   Cardiovascular: Normal rate, regular rhythm, normal heart sounds and normal pulses.   Pulmonary/Chest: Effort normal and breath sounds normal. No respiratory distress. She has no decreased breath sounds. She has no rhonchi.   Abdominal: Normal appearance.   Musculoskeletal: Normal range of motion.         General: No deformity.   Neurological: She is alert and oriented to person, place, and time. She exhibits normal muscle tone. Coordination normal.   Skin: Skin is warm, dry, intact, not diaphoretic and not pale. Psychiatric: Her speech is normal and behavior is normal. Judgment and thought content normal.   Nursing note and vitals reviewed.        Assessment:       1. Sinusitis, unspecified chronicity, unspecified location    2. Suspected Covid-19 Virus Infection        Plan:         Sinusitis, unspecified chronicity, unspecified location    Suspected Covid-19 Virus Infection  -     POCT COVID-19 Rapid Screening      Results for orders placed or performed in visit on 09/30/20   POCT COVID-19 Rapid Screening   Result Value Ref Range    POC Rapid COVID Negative Negative     Acceptable Yes         COVID Education  · Your symptoms are likely due to a viral infection. These infections can last up to 14 days, but you should notice some improvement of your symptoms within the first 7-10 days. Viral infections will not improve with antibiotics. If your symptoms persist >10 days without improvement or if you have any new or worsening symptoms, this is an indication that you may have developed a bacterial infection and should return to your primary care provider or to Urgent Care.   · Getting plenty of rest is very important to fighting infections.  · Increase fluids.   · OTC Mucinex as advised  · May apply warm compresses as needed for facial pain and congestion.   · Saline nasal spray to loosen nasal congestion.  · Flonase or Nasacort to reduce  inflammation in the sinus cavities.  · You may take an over the counter antihistamine for allergy symptoms such as sneezing, itchy/watery eyes, scratchy throat, or congestion.  · Take Tylenol as needed for sore throat, body aches, or fever.  · Follow up with your primary care provider if symptoms persist >10 days or sooner for any new or worsening symptoms.   Go to the ER for any fever that does not improve with Tylenol, neck stiffness, rash, severe headache, vision changes, shortness of breath, chest pain, facial swelling, severe facial pain, or any other new and concerning symptoms.       Arm band on/IV intact/Admission wristband placed

## 2025-05-21 NOTE — ED ADULT NURSE REASSESSMENT NOTE - NS ED NURSE REASSESS COMMENT FT1
Woodruff catheter placed per MD order. Pt tolerated well. RN accompanied by MD at bedside for sterile confirmation. Pt repositioned in stretcher for comfort.

## 2025-05-21 NOTE — ED ADULT NURSE REASSESSMENT NOTE - NS ED NURSE REASSESS COMMENT FT1
2 RNs present at bedside for sterile confirmation to perform straight cath. pt tolerated well. 300cc clear marylu urine drained from bladder.

## 2025-05-21 NOTE — ED ADULT NURSE NOTE - NSFALLHARMRISKINTERV_ED_ALL_ED
Assistance OOB with selected safe patient handling equipment if applicable/Communicate risk of Fall with Harm to all staff, patient, and family/Monitor for mental status changes and reorient to person, place, and time, as needed/Move patient closer to nursing station/within visual sight of ED staff/Provide patient with walking aids/Provide visual cue: red socks, yellow wristband, yellow gown, etc/Reinforce activity limits and safety measures with patient and family/Toileting schedule using arm’s reach rule for commode and bathroom/Use of alarms - bed, stretcher, chair and/or video monitoring/Bed in lowest position, wheels locked, appropriate side rails in place/Call bell, personal items and telephone in reach/Instruct patient to call for assistance before getting out of bed/chair/stretcher/Non-slip footwear applied when patient is off stretcher/[place] to call system/Physically safe environment - no spills, clutter or unnecessary equipment/Purposeful Proactive Rounding/Room/bathroom lighting operational, light cord in reach

## 2025-05-21 NOTE — ED PROVIDER NOTE - CARE PLAN
Principal Discharge DX:	Acute on chronic systolic congestive heart failure  Secondary Diagnosis:	Acute respiratory failure with hypoxia  Secondary Diagnosis:	Acute kidney injury superimposed on chronic kidney disease   1

## 2025-05-21 NOTE — ED ADULT NURSE NOTE - OBJECTIVE STATEMENT
Pt is a 85 y/o male with PMH afib, dementia, HTN, HLD presenting to the ED by EMS for lethargy. Pt is alert and oriented to person, unable to provide history. Per EMS, pt sent for 1 day of lethargy and hypotension at nursing facility. On arrival, pt noted to be drowsy but aroused with verbal and tactile stimuli. Pt breathing is calm and unlabored, able to spontaneously move all extremities. Abd soft, nontender, nondistended. B/L feet are cool to touch with edema and darker discoloration; capillary refill approximately 4 seconds on feet. Excluding feet, pt is warm, dry with normal color for race with skin intact. Large amount of bruising noted from hands to elbows b/l on UE. No nonverbal indicators of pain present.

## 2025-05-21 NOTE — ED PROVIDER NOTE - ATTENDING CONTRIBUTION TO CARE
Attending MD Wilder:  I have seen and examined this patient and fully participated in the care of this patient as the teaching attending. I personally made/approved the management plan and take responsibility for the patient management.      Patient was brought to the emergency department with altered mental status. Patient is unable to provide a coherent history. Patient is not currently reporting feeling sick. Patient denies any pain or trouble breathing. Patient denies any recent falls. Patient very limited historian otherwise (baseline dementia).    Past Medical History (may not be comprehensive): Atrial fibrillation (on Eliquis), dementia (alert and oriented to person and place), hypertension, hyperlipidemia, bipolar disorder, coronary artery disease, congestive heart failure (ejection fraction 29%), left ventricular thrombus.    Medication History (may not be comprehensive): Eliquis    - Cardiovascular: Denies pain, denies trouble breathing.  - Neurological: Altered mental status, unable to provide coherent history.      Patient's vital signs are notable for blood pressure 97/54, patient is sitting in the stretcher in no apparent acute distress awake and alert oriented to self only poor historian.  No overt respiratory distress.  Relatively clear lungs anteriorly the abdomen is soft nontender nondistended.  Ankles and shins are warm to the touch however both feet are cool to the touch with some erin discoloration.  Capillary refill is about 3 to 4 seconds in both feet.  Some bipedal edema present.  DP pulses not easily palpable will have to obtain Doppler signal.  Patient moves all extremity spontaneously head is atraumatic.    ECG recorded at 5:07 PM independently interpreted by me , Dr Jose Wilder,  at 5:51 PM shows A-fib wide-complex irregular rhythm, left bundle branch block no modified Sgarbossa criteria    Patient is presenting for evaluation of reported lethargy and "hypotension".  Patient himself unfortunately is a very limited historian and does not provide much along the lines of complaints, patient appears to have some baseline hypotension based on chart review so presenting blood pressure 97/54 is not largely off of baseline.  Given broad differential will obtain infectious workup, metabolic workup screening head CT and likely admission      *The above represents an initial assessment/impression. Please refer to progress notes for potential changes in patient clinical course*

## 2025-05-21 NOTE — ED ADULT NURSE NOTE - NSFALLASSISTNEEDED_ED_ALL_ED
Depression Screening Entered On:  10/9/2019 8:09 AM CDT    Performed On:  10/4/2019 8:08 AM CDT by Gladys Romero               Depression Screening   Little Interest - Pleasure in Activities :   Nearly every day   Feeling Down, Depressed, Hopeless :   More than half the days   Initial Depression Screen Score :   5    Trouble Falling or Staying Asleep :   More than half the days   Feeling Tired or Little Energy :   More than half the days   Poor Appetite or Overeating :   Several days   Feeling Bad About Yourself :   More than half the days   Trouble Concentrating :   Nearly every day   Moving or Speaking Slowly :   Several days   Thoughts Better Off Dead or Hurting Self :   Several days   Detailed Depression Screen Score :   12    Total Depression Screen Score :   17    WILLIE Difficulty with Work, Home, Others :   Very difficult   Gladys Romero - 10/9/2019 8:08 AM CDT   Standing/Walking/Toileting

## 2025-05-21 NOTE — H&P ADULT - ASSESSMENT
Assessment: 84M PMHx Dementia, CAD, AFib on eliquis, HFrEF p/w AMS, found to have poor end organ perfusion markers and elevated INR, adm CICU for acute hypoxic respiratory failure requiring bipap/avaps & possible cardiogenic shock.      Plan:  NEURO  #AMS  - A&Ox1-2  - unclear baseline, possible dementia  -  CTH: Senescent cerebral changes without acute intracranial hemorrhage   - continue to monitor mental status as per protocol     RESPIRATORY  #Acute hypoxic respiratory failure   - DNR/DNI, MOLST in chart  - placed on bipap in the ER, continue noninvasive measures   - currently on AVAPS /backup rate14/exp pressure 8/FiO2 80%  - trend ABGs  - unable to r/o PE with CTA given inability to lie flat and elevated creatinine   - CTA chest  neg for PE   - continue to monitor SpO2 with goal >94%    CARDIOVASCULAR  #Cardiogenic shock i/s/o ADHF  - no invasive lines per family  - preload reduction/diuresis: lasix PRN  - inotropic support:  2.5 (empiric)  - afterload reduction: holding while BP borderline  - TTE in AM    HEME  #Elevated INR  - Vitamin K 10mg ordered  - will check TEG & consider FFP if within GOC  - Monitor H/H and plts  - DVT PPX: SCDs    RENAL/  #ENMANUEL  - creatinine uptrending with minimal UOP  - Continue monitoring urine output, lytes, SCr/ BUN  - replete lytes prn with goal K >4 and Mg >2    GI  #Acute liver failure   - transaminitis, uptrending LFTs   - trend lactate   - elevated anion gap, likely i/s/o acute renal and liver failure   - consider NAC if no improvement with Vit K & FFP     ENDO  Follow up A1c, TSH, Lipid panel    ID  #Leukocytosis   - WBC 11  - infectious workup sent in ED   - UA neg   - continue Zosyn -  - monitor and trend WBC and temperature curve     Dispo: Maintain in ICU.    I have personally provided 65 minutes of direct critical care time, excluding time spent on separate procedures, in addition to the CICU Attending Dr. Jackson.    Loretta Mcclure PA-C   Assessment: 84M PMHx Dementia, CAD, AFib on eliquis, HFrEF p/w AMS, found to have poor end organ perfusion markers and elevated INR, adm CICU for acute hypoxic respiratory failure requiring bipap/avaps & possible cardiogenic shock.      Plan:  NEURO  #AMS  - A&Ox1-2  - unclear baseline, possible dementia  -  CTH: Senescent cerebral changes without acute intracranial hemorrhage   - continue to monitor mental status as per protocol     RESPIRATORY  #Acute hypoxic respiratory failure   - DNR/DNI, MOLST in chart  - placed on bipap in the ER, continue noninvasive measures   - currently on AVAPS /backup rate14/exp pressure 8/FiO2 80%  - trend ABGs  - unable to r/o PE with CTA given inability to lie flat and elevated creatinine   - CTA chest  neg for PE   - continue to monitor SpO2 with goal >94%    CARDIOVASCULAR  #Cardiogenic shock i/s/o ADHF  - no invasive lines per family  - preload reduction/diuresis: lasix PRN  - inotropic support:  2.5 (empiric)  - afterload reduction: holding while BP borderline  - TTE in AM    HEME  #Elevated INR  - Vitamin K 10mg ordered  - will check TEG & consider FFP if within GOC  - Monitor H/H and plts  - DVT PPX: SCDs    RENAL/  #ENMANUEL  - creatinine uptrending with minimal UOP  - Continue monitoring urine output, lytes, SCr/ BUN  - replete lytes prn with goal K >4 and Mg >2    GI  #Acute liver failure   - transaminitis, uptrending LFTs   - trend lactate   - elevated anion gap, likely i/s/o acute renal and liver failure   - consider NAC if no improvement with Vit K & FFP     ENDO  Follow up A1c, TSH, Lipid panel    ID  #Leukocytosis   - WBC 11  - infectious workup sent in ED   - UA neg   - continue Zosyn -  - monitor and trend WBC and temperature curve     I spoke with patient's nephew Ed at the bedside who confirmed that patient's code status is DNR/DNI (MOLST in chart) and that invasive lines & procedures do not align with the patient's goals of care. Plan to continue noninvasive measures of management with likely lean towards palliation in the near future. Palliative consult placed. Consent for blood products obtained in case of need for FFP with elevated INR. Family is planning to come to bedside in the morning and are open to discussing possible hospice & palliative measures. All questions answered & support provided.     Dispo: Maintain in ICU.    I have personally provided 65 minutes of direct critical care time, excluding time spent on separate procedures, in addition to the CICU Attending Dr. Jackson.    Loretta Mcclure PA-C

## 2025-05-21 NOTE — H&P ADULT - HISTORY OF PRESENT ILLNESS
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 In the ER, he was found to be hypoxic with O2 sat in 80s, placed on bipap. Labs were concerning for elevated Creatinine 1.97 (unclear baseline, 0.9-1.2 on previous admissions), elevated LFTs (AST/ALT 200s), elevated INR (6.73), and elevated lactate (5.4). He was given lasix IVP for aggressive diuresis & Vitamin K for reversal of elevated INR. Admitted to CICU for possible cardiogenic shock vs liver failure.       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 In the ER, he was found to be hypoxic with O2 sat in 80s, placed on bipap. Labs were concerning for elevated Creatinine 1.97 (unclear baseline, 0.9-1.2 on previous admissions), elevated LFTs (AST/ALT 200s), elevated INR (6.73), and elevated lactate (5.4). He was given lasix IVP for aggressive diuresis & Vitamin K for reversal of elevated INR. Admitted to CICU for possible cardiogenic shock vs liver failure.      Upon arrival to CICU, patient on AVAPS, alert, cool extremities, hemodynamically stable with /66, HR AFib rate controlled 78. Satting 93% on AVAPs.

## 2025-05-21 NOTE — H&P ADULT - NSHPPHYSICALEXAM_GEN_ALL_CORE
General: NAD  Neuro: A&Ox__  Respiratory: Breath sounds CTA in all lung fields b/l  CV: RRR, S1, S2. No murmurs, rubs or gallops  Abdominal: Soft, non-tender, non-distended  Extremities: No peripheral edema, 2+ peripheral pulses in UE/LE b/l General: NAD  Neuro: A&Ox1  Respiratory: Breath sounds CTA in all lung fields b/l  CV: irregular rate and rhythm  Abdominal: Soft, non-tender, non-distended  Extremities: No peripheral edema, cool extremities

## 2025-05-22 ENCOUNTER — RESULT REVIEW (OUTPATIENT)
Age: 85
End: 2025-05-22

## 2025-05-22 DIAGNOSIS — Z71.89 OTHER SPECIFIED COUNSELING: ICD-10-CM

## 2025-05-22 DIAGNOSIS — Z51.5 ENCOUNTER FOR PALLIATIVE CARE: ICD-10-CM

## 2025-05-22 DIAGNOSIS — R52 PAIN, UNSPECIFIED: ICD-10-CM

## 2025-05-22 DIAGNOSIS — R57.0 CARDIOGENIC SHOCK: ICD-10-CM

## 2025-05-22 DIAGNOSIS — R06.00 DYSPNEA, UNSPECIFIED: ICD-10-CM

## 2025-05-22 LAB
A1C WITH ESTIMATED AVERAGE GLUCOSE RESULT: 5.5 % — SIGNIFICANT CHANGE UP (ref 4–5.6)
ALBUMIN SERPL ELPH-MCNC: 3.9 G/DL — SIGNIFICANT CHANGE UP (ref 3.3–5)
ALBUMIN SERPL ELPH-MCNC: 4 G/DL — SIGNIFICANT CHANGE UP (ref 3.3–5)
ALP SERPL-CCNC: 140 U/L — HIGH (ref 40–120)
ALP SERPL-CCNC: 157 U/L — HIGH (ref 40–120)
ALT FLD-CCNC: 352 U/L — HIGH (ref 10–45)
ALT FLD-CCNC: 362 U/L — HIGH (ref 10–45)
AMYLASE P1 CFR SERPL: 100 U/L — SIGNIFICANT CHANGE UP (ref 25–125)
ANION GAP SERPL CALC-SCNC: 21 MMOL/L — HIGH (ref 5–17)
ANION GAP SERPL CALC-SCNC: 24 MMOL/L — HIGH (ref 5–17)
APTT BLD: 43.4 SEC — HIGH (ref 26.1–36.8)
APTT BLD: 44 SEC — HIGH (ref 26.1–36.8)
AST SERPL-CCNC: 260 U/L — HIGH (ref 10–40)
AST SERPL-CCNC: 282 U/L — HIGH (ref 10–40)
BASOPHILS # BLD AUTO: 0.02 K/UL — SIGNIFICANT CHANGE UP (ref 0–0.2)
BASOPHILS NFR BLD AUTO: 0.2 % — SIGNIFICANT CHANGE UP (ref 0–2)
BILIRUB DIRECT SERPL-MCNC: 1.5 MG/DL — HIGH (ref 0–0.3)
BILIRUB INDIRECT FLD-MCNC: 1.8 MG/DL — HIGH (ref 0.2–1)
BILIRUB SERPL-MCNC: 2.9 MG/DL — HIGH (ref 0.2–1.2)
BILIRUB SERPL-MCNC: 3.3 MG/DL — HIGH (ref 0.2–1.2)
BLD GP AB SCN SERPL QL: NEGATIVE — SIGNIFICANT CHANGE UP
BUN SERPL-MCNC: 77 MG/DL — HIGH (ref 7–23)
BUN SERPL-MCNC: 80 MG/DL — HIGH (ref 7–23)
CALCIUM SERPL-MCNC: 9.4 MG/DL — SIGNIFICANT CHANGE UP (ref 8.4–10.5)
CALCIUM SERPL-MCNC: 9.8 MG/DL — SIGNIFICANT CHANGE UP (ref 8.4–10.5)
CHLORIDE SERPL-SCNC: 98 MMOL/L — SIGNIFICANT CHANGE UP (ref 96–108)
CHLORIDE SERPL-SCNC: 99 MMOL/L — SIGNIFICANT CHANGE UP (ref 96–108)
CHOLEST SERPL-MCNC: 111 MG/DL — SIGNIFICANT CHANGE UP
CO2 SERPL-SCNC: 12 MMOL/L — LOW (ref 22–31)
CO2 SERPL-SCNC: 14 MMOL/L — LOW (ref 22–31)
CREAT SERPL-MCNC: 2.03 MG/DL — HIGH (ref 0.5–1.3)
CREAT SERPL-MCNC: 2.09 MG/DL — HIGH (ref 0.5–1.3)
D DIMER BLD IA.RAPID-MCNC: <150 NG/ML DDU — SIGNIFICANT CHANGE UP
EGFR: 31 ML/MIN/1.73M2 — LOW
EGFR: 31 ML/MIN/1.73M2 — LOW
EGFR: 32 ML/MIN/1.73M2 — LOW
EGFR: 32 ML/MIN/1.73M2 — LOW
EOSINOPHIL # BLD AUTO: 0 K/UL — SIGNIFICANT CHANGE UP (ref 0–0.5)
EOSINOPHIL NFR BLD AUTO: 0 % — SIGNIFICANT CHANGE UP (ref 0–6)
ESTIMATED AVERAGE GLUCOSE: 111 MG/DL — SIGNIFICANT CHANGE UP (ref 68–114)
FIBRINOGEN PPP-MCNC: 266 MG/DL — SIGNIFICANT CHANGE UP (ref 200–445)
FLUAV AG NPH QL: SIGNIFICANT CHANGE UP
FLUBV AG NPH QL: SIGNIFICANT CHANGE UP
GAS PNL BLDA: SIGNIFICANT CHANGE UP
GAS PNL BLDV: SIGNIFICANT CHANGE UP
GLUCOSE SERPL-MCNC: 100 MG/DL — HIGH (ref 70–99)
GLUCOSE SERPL-MCNC: 107 MG/DL — HIGH (ref 70–99)
HCT VFR BLD CALC: 35.2 % — LOW (ref 39–50)
HDLC SERPL-MCNC: 36 MG/DL — LOW
HEPARINASE TEG R TIME: 8.9 MIN — HIGH (ref 4.3–8.3)
HGB BLD-MCNC: 11.1 G/DL — LOW (ref 13–17)
IMM GRANULOCYTES NFR BLD AUTO: 0.5 % — SIGNIFICANT CHANGE UP (ref 0–0.9)
INR BLD: 5.55 RATIO — CRITICAL HIGH (ref 0.85–1.16)
INR BLD: 5.87 RATIO — CRITICAL HIGH (ref 0.85–1.16)
LACTATE BLDV-MCNC: 1.8 MMOL/L — SIGNIFICANT CHANGE UP (ref 0.5–2)
LACTATE BLDV-MCNC: 3.6 MMOL/L — HIGH (ref 0.5–2)
LACTATE SERPL-SCNC: 6 MMOL/L — CRITICAL HIGH (ref 0.5–2)
LDLC SERPL-MCNC: 58 MG/DL — SIGNIFICANT CHANGE UP
LIDOCAIN IGE QN: 47 U/L — SIGNIFICANT CHANGE UP (ref 7–60)
LIPID PNL WITH DIRECT LDL SERPL: 58 MG/DL — SIGNIFICANT CHANGE UP
LYMPHOCYTES # BLD AUTO: 1.37 K/UL — SIGNIFICANT CHANGE UP (ref 1–3.3)
LYMPHOCYTES # BLD AUTO: 10.6 % — LOW (ref 13–44)
MAGNESIUM SERPL-MCNC: 2.4 MG/DL — SIGNIFICANT CHANGE UP (ref 1.6–2.6)
MAGNESIUM SERPL-MCNC: 2.5 MG/DL — SIGNIFICANT CHANGE UP (ref 1.6–2.6)
MCHC RBC-ENTMCNC: 30.7 PG — SIGNIFICANT CHANGE UP (ref 27–34)
MCHC RBC-ENTMCNC: 31.5 G/DL — LOW (ref 32–36)
MCV RBC AUTO: 97.5 FL — SIGNIFICANT CHANGE UP (ref 80–100)
MONOCYTES # BLD AUTO: 1.12 K/UL — HIGH (ref 0–0.9)
MONOCYTES NFR BLD AUTO: 8.7 % — SIGNIFICANT CHANGE UP (ref 2–14)
MRSA PCR RESULT.: SIGNIFICANT CHANGE UP
NEUTROPHILS # BLD AUTO: 10.32 K/UL — HIGH (ref 1.8–7.4)
NEUTROPHILS NFR BLD AUTO: 80 % — HIGH (ref 43–77)
NONHDLC SERPL-MCNC: 75 MG/DL — SIGNIFICANT CHANGE UP
NRBC BLD AUTO-RTO: 0 /100 WBCS — SIGNIFICANT CHANGE UP (ref 0–0)
NT-PROBNP SERPL-SCNC: HIGH PG/ML (ref 0–300)
PHOSPHATE SERPL-MCNC: 6.1 MG/DL — HIGH (ref 2.5–4.5)
PHOSPHATE SERPL-MCNC: 6.2 MG/DL — HIGH (ref 2.5–4.5)
PLATELET # BLD AUTO: 162 K/UL — SIGNIFICANT CHANGE UP (ref 150–400)
POTASSIUM SERPL-MCNC: 5.6 MMOL/L — HIGH (ref 3.5–5.3)
POTASSIUM SERPL-MCNC: 5.8 MMOL/L — HIGH (ref 3.5–5.3)
POTASSIUM SERPL-SCNC: 5.6 MMOL/L — HIGH (ref 3.5–5.3)
POTASSIUM SERPL-SCNC: 5.8 MMOL/L — HIGH (ref 3.5–5.3)
PROT SERPL-MCNC: 6.7 G/DL — SIGNIFICANT CHANGE UP (ref 6–8.3)
PROT SERPL-MCNC: 6.8 G/DL — SIGNIFICANT CHANGE UP (ref 6–8.3)
PROTHROM AB SERPL-ACNC: 62.8 SEC — HIGH (ref 9.9–13.4)
PROTHROM AB SERPL-ACNC: 66.3 SEC — HIGH (ref 9.9–13.4)
RAPIDTEG MAXIMUM AMPLITUDE: 62.7 MM — SIGNIFICANT CHANGE UP (ref 52–70)
RBC # BLD: 3.61 M/UL — LOW (ref 4.2–5.8)
RBC # FLD: 17.2 % — HIGH (ref 10.3–14.5)
RH IG SCN BLD-IMP: POSITIVE — SIGNIFICANT CHANGE UP
RSV RNA NPH QL NAA+NON-PROBE: SIGNIFICANT CHANGE UP
S AUREUS DNA NOSE QL NAA+PROBE: SIGNIFICANT CHANGE UP
SARS-COV-2 RNA SPEC QL NAA+PROBE: SIGNIFICANT CHANGE UP
SODIUM SERPL-SCNC: 133 MMOL/L — LOW (ref 135–145)
SODIUM SERPL-SCNC: 135 MMOL/L — SIGNIFICANT CHANGE UP (ref 135–145)
SOURCE RESPIRATORY: SIGNIFICANT CHANGE UP
TEG FUNCTIONAL FIBRINOGEN: 18.2 MM — SIGNIFICANT CHANGE UP (ref 15–32)
TEG MAXIMUM AMPLITUDE: 58.5 MM — SIGNIFICANT CHANGE UP (ref 52–69)
TEG REACTION TIME: 13.2 MIN — HIGH (ref 4.6–9.1)
TRIGL SERPL-MCNC: 88 MG/DL — SIGNIFICANT CHANGE UP
TSH SERPL-MCNC: 2.29 UIU/ML — SIGNIFICANT CHANGE UP (ref 0.27–4.2)
UFH PPP CHRO-ACNC: >2 IU/ML — CRITICAL HIGH (ref 0.3–0.7)
WBC # BLD: 12.89 K/UL — HIGH (ref 3.8–10.5)
WBC # FLD AUTO: 12.89 K/UL — HIGH (ref 3.8–10.5)

## 2025-05-22 PROCEDURE — 93306 TTE W/DOPPLER COMPLETE: CPT | Mod: 26

## 2025-05-22 PROCEDURE — 99291 CRITICAL CARE FIRST HOUR: CPT | Mod: FS,25

## 2025-05-22 PROCEDURE — G0316 PROLONG INPT EVAL ADD15 M: CPT

## 2025-05-22 PROCEDURE — 71045 X-RAY EXAM CHEST 1 VIEW: CPT | Mod: 26

## 2025-05-22 PROCEDURE — 93010 ELECTROCARDIOGRAM REPORT: CPT

## 2025-05-22 PROCEDURE — 93308 TTE F-UP OR LMTD: CPT | Mod: 26

## 2025-05-22 PROCEDURE — 99497 ADVNCD CARE PLAN 30 MIN: CPT | Mod: 25

## 2025-05-22 PROCEDURE — 99223 1ST HOSP IP/OBS HIGH 75: CPT

## 2025-05-22 RX ORDER — HYDROMORPHONE/SOD CHLOR,ISO/PF 2 MG/10 ML
0.2 SYRINGE (ML) INJECTION
Refills: 0 | Status: DISCONTINUED | OUTPATIENT
Start: 2025-05-22 | End: 2025-05-27

## 2025-05-22 RX ORDER — PIPERACILLIN-TAZO-DEXTROSE,ISO 3.375G/5
3.38 IV SOLUTION, PIGGYBACK PREMIX FROZEN(ML) INTRAVENOUS ONCE
Refills: 0 | Status: COMPLETED | OUTPATIENT
Start: 2025-05-22 | End: 2025-05-21

## 2025-05-22 RX ORDER — GLYCOPYRROLATE 0.2 MG/ML
0.4 INJECTION INTRAMUSCULAR; INTRAVENOUS EVERY 6 HOURS
Refills: 0 | Status: DISCONTINUED | OUTPATIENT
Start: 2025-05-22 | End: 2025-05-29

## 2025-05-22 RX ORDER — BISACODYL 5 MG
10 TABLET, DELAYED RELEASE (ENTERIC COATED) ORAL DAILY
Refills: 0 | Status: DISCONTINUED | OUTPATIENT
Start: 2025-05-22 | End: 2025-05-29

## 2025-05-22 RX ORDER — ACETAMINOPHEN 500 MG/5ML
650 LIQUID (ML) ORAL EVERY 6 HOURS
Refills: 0 | Status: DISCONTINUED | OUTPATIENT
Start: 2025-05-22 | End: 2025-05-29

## 2025-05-22 RX ORDER — HYDROMORPHONE/SOD CHLOR,ISO/PF 2 MG/10 ML
0.5 SYRINGE (ML) INJECTION
Refills: 0 | Status: DISCONTINUED | OUTPATIENT
Start: 2025-05-22 | End: 2025-05-22

## 2025-05-22 RX ORDER — LORAZEPAM 4 MG/ML
0.5 VIAL (ML) INJECTION
Refills: 0 | Status: DISCONTINUED | OUTPATIENT
Start: 2025-05-22 | End: 2025-05-22

## 2025-05-22 RX ORDER — HYDROMORPHONE/SOD CHLOR,ISO/PF 2 MG/10 ML
0.5 SYRINGE (ML) INJECTION
Refills: 0 | Status: DISCONTINUED | OUTPATIENT
Start: 2025-05-22 | End: 2025-05-27

## 2025-05-22 RX ORDER — FUROSEMIDE 10 MG/ML
80 INJECTION INTRAMUSCULAR; INTRAVENOUS ONCE
Refills: 0 | Status: COMPLETED | OUTPATIENT
Start: 2025-05-22 | End: 2025-05-22

## 2025-05-22 RX ORDER — PIPERACILLIN-TAZO-DEXTROSE,ISO 3.375G/5
3.38 IV SOLUTION, PIGGYBACK PREMIX FROZEN(ML) INTRAVENOUS ONCE
Refills: 0 | Status: COMPLETED | OUTPATIENT
Start: 2025-05-22 | End: 2025-05-22

## 2025-05-22 RX ORDER — LORAZEPAM 4 MG/ML
0.25 VIAL (ML) INJECTION
Refills: 0 | Status: DISCONTINUED | OUTPATIENT
Start: 2025-05-22 | End: 2025-05-27

## 2025-05-22 RX ORDER — LORAZEPAM 4 MG/ML
0.5 VIAL (ML) INJECTION EVERY 4 HOURS
Refills: 0 | Status: DISCONTINUED | OUTPATIENT
Start: 2025-05-22 | End: 2025-05-22

## 2025-05-22 RX ORDER — PIPERACILLIN-TAZO-DEXTROSE,ISO 3.375G/5
3.38 IV SOLUTION, PIGGYBACK PREMIX FROZEN(ML) INTRAVENOUS EVERY 8 HOURS
Refills: 0 | Status: DISCONTINUED | OUTPATIENT
Start: 2025-05-22 | End: 2025-05-22

## 2025-05-22 RX ADMIN — Medication 25 GRAM(S): at 03:16

## 2025-05-22 RX ADMIN — Medication 1 APPLICATION(S): at 06:00

## 2025-05-22 RX ADMIN — Medication 102 MILLIGRAM(S): at 00:59

## 2025-05-22 RX ADMIN — DOBUTAMINE 5.78 MICROGRAM(S)/KG/MIN: 250 INJECTION INTRAVENOUS at 00:59

## 2025-05-22 RX ADMIN — DOBUTAMINE 5.78 MICROGRAM(S)/KG/MIN: 250 INJECTION INTRAVENOUS at 07:30

## 2025-05-22 RX ADMIN — FUROSEMIDE 80 MILLIGRAM(S): 10 INJECTION INTRAMUSCULAR; INTRAVENOUS at 12:30

## 2025-05-22 RX ADMIN — FUROSEMIDE 80 MILLIGRAM(S): 10 INJECTION INTRAMUSCULAR; INTRAVENOUS at 04:59

## 2025-05-22 NOTE — CONSULT NOTE ADULT - PROBLEM SELECTOR RECOMMENDATION 5
HCP: divine Mcclain    The patient's nephew, the designated healthcare proxy as per documentation in the chart. Given the patient's background in urology, the nephew stated the patient would never have wanted mechanical ventilation, CPR, dialysis, or artificial nutrition, preferring a natural death.    The nephew demonstrated a good understanding of the patient's medical condition and prognosis. We discussed the patient's various comorbidities and poor functional status, which place him at high risk for further medical events. I presented the option of comfort care, prioritizing end-of-life symptom management and de-escalating potentially burdensome interventions such as blood draws, imaging studies, other investigations, and vital signs monitoring. He agreed to focus on the patient's comfort and agreed to transfer to the PCU for symptom management and disposition planning, to be guided by the patient's clinical course.

## 2025-05-22 NOTE — PROGRESS NOTE ADULT - CRITICAL CARE ATTENDING COMMENT
Admitted with cardiogenic shock requiring Dobutamine and respiratory failure requiring Bipap  Patient is demented at baseline and has had multiple recent hospital admissions  After discussions with the patient's next of kin, the patient will be transitioned to Hospice  Will stop Dobutamine and all other medications not focused on his comfort  Transfer to PCU

## 2025-05-22 NOTE — CONSULT NOTE ADULT - CONSULT REASON
GaP team consulted for complex medical decision making in the setting of serious illness and symptoms management.

## 2025-05-22 NOTE — CONSULT NOTE ADULT - TIME-BASED
[address] [facility] [address] [facility], [place] ~ [phone]                Infant Custody Release   10/19/2023            Admission Information     Date & Time  10/19/2023 Provider  Leopoldo Cram, MD [id number]  [id number]           Discharge instructions for my  have been explained and I understand these instructions. _______________________________________________________  Signature of person receiving instructions. INFANT CUSTODY RELEASE  I hereby certify that I am taking custody of my baby. Baby's Name Boy Clyde    Corresponding ID Band # ___________________ verified.     Parent Signature:  _________________________________________________    RN Signature:  ____________________________________________________
95

## 2025-05-22 NOTE — CONSULT NOTE ADULT - SUBJECTIVE AND OBJECTIVE BOX
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 In the ER, he was found to be hypoxic with O2 sat in 80s, placed on bipap. Labs were concerning for elevated Creatinine 1.97 (unclear baseline, 0.9-1.2 on previous admissions), elevated LFTs (AST/ALT 200s), elevated INR (6.73), and elevated lactate (5.4). He was given lasix IVP for aggressive diuresis & Vitamin K for reversal of elevated INR. Admitted to CICU for possible cardiogenic shock vs liver failure.    Upon arrival to CICU, patient on AVAPS, alert, cool extremities, hemodynamically stable with /66, HR AFib rate controlled 78. Satting 93% on AVAPs. (21 May 2025 23:39)    PERTINENT PM/SXH:   Bipolar disorder  Hypercholesterolemia  Hypertension  CAD (coronary artery disease)  Bipolar mood disorder  No significant past surgical history      FAMILY HISTORY:  No pertinent family history in first degree relatives      SOCIAL HISTORY:   Significant other/partner[ ]  Children[ ]  Amish/Spirituality:  Substance hx:  [ ]   Tobacco hx:  [ ]   Alcohol hx: [ ]   Home Opioid hx:  [x ] I-Stop Reference No: [id number]  Living Situation: [ ]Home  [ x]Long term care  [ ]Rehab [ ]Other    ADVANCE DIRECTIVES:    DNR/MOLST  [ ]  Living Will  [ ]   DECISION MAKER(s):  [x ] Health Care Proxy(s)  [ ] Surrogate(s)  [ ] Guardian           Name(s): Phone Number(s): Chucky Mcclain [phone]    BASELINE (I)ADL(s) (prior to admission):  [place]: [ ]Total  [ ] Moderate [x ]Dependent    Allergies    palm oil (Unknown)  No Known Drug Allergies    Intolerances    MEDICATIONS  (STANDING):    MEDICATIONS  (PRN):  HYDROmorphone  Injectable 0.5 milliGRAM(s) IV Push every 2 hours PRN Moderate pain (4-6), Severe pain (7-10), Respiratory rate greater than 22  LORazepam   Injectable 0.5 milliGRAM(s) IV Push every 4 hours PRN Anxiety      ITEMS NOT CHECKED ARE NOT PRESENT  PRESENT SYMPTOMS: x[ ]Unable to self-report see CPOT, PAINADs, RDOS  Source if other than patient:  [ ]Family   [ ]Team     Pain: [ ]yes [ ]no  QOL impact -   Location -                    Aggravating factors -  Quality -  Radiation -  Timing-  Severity (0-10 scale):  Minimal acceptable level (0-10 scale):       Dyspnea:                           [ ]Mild [ ]Moderate [ ]Severe  Anxiety:                             [ ]Mild [ ]Moderate [ ]Severe  Fatigue:                             [ ]Mild [ ]Moderate [ ]Severe  Nausea:                             [ ]Mild [ ]Moderate [ ]Severe  Loss of appetite:              [ ]Mild [ ]Moderate [ ]Severe  Constipation:                    [ ]Mild [ ]Moderate [ ]Severe    PCSSQ [Palliative Care Spiritual Screening Question]   Severity (0-10):  Score of 4 or > indicate consideration of Chaplaincy referral.  Chaplaincy Referral: [ x] yes [ ] refused [ ] following [ ] deferred    Caregiver [place]? : [x ] yes [ ] no [ ] deferred:  Social work referral [ ] Patient & Family Centered Care Referral [ ]     Anticipatory Grief Present?: [ x] yes [ ] no  [ ] deferred: Palliative Social work referral [ ]  Patient & Family Centered Care Referral [ ]       Other Symptoms:  [ ]All other review of systems negative   [ x] Unable to obtain due to poor mentation    PHYSICAL EXAM:  Vital Signs Last 24 Hrs  T(C): 36.3 (22 May 2025 11:00), Max: 36.6 (21 May 2025 18:00)  T(F): 97.3 (22 May 2025 11:00), Max: 97.9 (21 May 2025 18:00)  HR: 55 (22 May 2025 12:00) (47 - 90)  BP: 90/45 (22 May 2025 12:00) (84/50 - 117/56)  BP(mean): 65 (22 May 2025 12:00) (63 - 81)  RR: 18 (22 May 2025 12:00) (15 - 28)  SpO2: 100% (22 May 2025 12:00) (93% - 100%)    Parameters below as of 22 May 2025 12:00  Patient On (Oxygen Delivery Method): nasal cannula w/ humidification  O2 Flow (L/min): 4   I&O's Summary    21 May 2025 07:01  -  22 May 2025 07:00  --------------------------------------------------------  IN: 187.9 mL / OUT: 1420 mL / NET: -1232.1 mL    22 May 2025 07:01  -  22 May 2025 13:02  --------------------------------------------------------  IN: 23.5 mL / OUT: 750 mL / NET: -726.5 mL        GENERAL:  []Alert  []Oriented  [ x]Lethargic  [ ]Cachexia  [ ]Unarousable  []Verbal  [ ]Non-Verbal  Behavioral:   [ ]Anxiety  [ ]Delirium [ ]Agitation [ ]Other  HEENT:  []Normal   [ x]Dry mouth   [ ]ET Tube/Trach  [ ]Oral lesions  PULMONARY:   []Clear [x ]Tachypnea  [ ]Audible excessive secretions   [ ]Rhonchi        [ ]Right [ ]Left [ ]Bilateral  [ ]Crackles        [ ]Right [ ]Left [ ]Bilateral  [ ]Wheezing     [ ]Right [ ]Left [ ]Bilateral  [ ]Diminished BS [ ] Right [ ]Left [ ]Bilateral  CARDIOVASCULAR:    []Regular [ ]Irregular [ ]Tachy  [ ]Salvatore [ ]Murmur [ ]Other  GASTROINTESTINAL:  [x]Soft  [ ]Distended   [x]+BS  [x]Non tender [ ]Tender  [ ]PEG [ ]OGT/ NGT   Last BM:  5/21  GENITOURINARY:  []Normal [x ]Incontinent   [ ]Oliguria/Anuria   [ ][place]  MUSCULOSKELETAL:   [ ]Normal   [x]Weakness  [ ]Bed/Wheelchair bound [ ]Edema  NEUROLOGIC:   []No focal deficits  [ x] Cognitive impairment  [ ] Dysphagia [ ]Dysarthria [ ] Paresis [ ]Other   SKIN: sacral DTI    []Normal  [ ]Rash   [ ]Pressure ulcer(s) [ ]y [ ]n present on admission    CRITICAL CARE:  [ ] Shock Present  [ ]Septic [ ]Cardiogenic [ ]Neurologic [ ]Hypovolemic  [ ]  Vasopressors [ ]  Inotropes   [ ]Respiratory failure present [ ]Mechanical ventilation [ ]Non-invasive ventilatory support [ ]High flow    [ ]Acute  [ ]Chronic [ ]Hypoxic  [ ]Hypercarbic [ ]Other  [ ]Other organ failure     LABS:                        11.1   12.89 )-----------( 162      ( 22 May 2025 00:57 )             35.2   05-22    133[L]  |  98  |  77[H]  ----------------------------<  107[H]  5.6[H]   |  14[L]  |  2.03[H]    Ca    9.4      22 May 2025 02:34  Phos  6.1     05-22  Mg     2.4     05-22    TPro  6.7  /  Alb  3.9  /  TBili  2.9[H]  /  DBili  x   /  AST  282[H]  /  ALT  362[H]  /  AlkPhos  140[H]  05-22  PT/INR - ( 22 May 2025 06:12 )   PT: 62.8 sec;   INR: 5.55 ratio         PTT - ( 22 May 2025 06:12 )  PTT:44.0 sec    Urinalysis Basic - ( 22 May 2025 02:34 )    Color: x / Appearance: x / SG: x / pH: x  Gluc: 107 mg/dL / Ketone: x  / Bili: x / Urobili: x   Blood: x / Protein: x / Nitrite: x   Leuk Esterase: x / RBC: x / WBC x   Sq Epi: x / Non Sq Epi: x / Bacteria: x      RADIOLOGY & ADDITIONAL STUDIES:  Xray Chest 1 View- PORTABLE-Urgent (Xray Chest 1 View- PORTABLE-Urgent .) (05.21.25 @ 18:43) >    ACC: [id number] EXAM:  XR CHEST PORTABLE URGENT 1V   ORDERED BY: DC CATES     PROCEDURE DATE:  05/21/2025      INTERPRETATION:  EXAMINATION: XR CHEST URGENT    CLINICAL INDICATION: screening    TECHNIQUE: Single frontal, portable view ofthe chest was obtained.    COMPARISON: Chest x-ray 5/14/2025.    FINDINGS:    The heart is enlarged.  Mild perihilar interstitial opacities  There is no pneumothorax or pleural effusion.  No acute abnormalities in the visualized osseous structures.    IMPRESSION:  Pulmonary vascular congestion.    CT Cervical Spine No Cont (05.14.25 @ 18:58) >    CT HEAD:  No acute intracranial hemorrhage, mass effect, or midline shift.    CT CERVICAL SPINE:  No acute fracture or traumatic subluxation.    Multi-level degenerative changes.      PROTEIN CALORIE MALNUTRITION PRESENT: [ ]mild [ ]moderate [ ]severe [ ]underweight [ ]morbid obesity  https://www.andeal.org/vault/2440/web/files/ONC/Table_Clinical%20Characteristics%20to%20Document%20Malnutrition-White%20JV%20et%20al%202012.pdf    Height (cm): 180.3 (05-21-25 @ 23:46), 175.3 (05-14-25 @ 16:35), 175.3 (05-06-25 @ 18:21)  Weight (kg): 63 (05-21-25 @ 23:46), 68 (05-06-25 @ 18:21), 68 (03-01-25 @ 16:55)  BMI (kg/m2): 19.4 (05-21-25 @ 23:46), 22.1 (05-14-25 @ 16:35), 22.1 (05-06-25 @ 18:21)    [ ]PPSV2 < or = to 30% [ ]significant weight loss  [ ]poor nutritional intake  [ ]anasarca[ ]Artificial Nutrition      Other REFERRALS:  [ ]Hospice  [ ]Child Life  [ ]Social Work  [ x]Case management [ ]Holistic Therapy                               Care Coordination Assessment 201    COGNITIVE/LEARNING 201  Mental Status    Answers: Alert,  Answers: Oriented: Person    ADMISSION HISTORY 201  Admitted From    Answers: Assisted Living    Notes: Admitted From    Notes: resides at the [facility] at the [facility] assisted living facility  (Ph: [phone]) Resides on [facility] dementia unit    Functional Status Prior to Admission    Answers: Assistive equipment,  Answers: Assistive person    FINANCIAL 201  Does patient have financial concerns for discharge?    Answers: None    LIVING ARRANGEMENTS/SUPPORT 201  Housing Environment    Answers: Assisted Living    Living Arrangements    Answers: Other    Sources of support/caregivers    Answers: Other nephew Johny Chucky [phone]    CAREGIVER CONTACT 201  Does the patient wish to identify a Caregiver?    Answers: Unable to assess    EMERGENCY CONTACTS OUTSIDE HOME 201  Emergency Contact    Answers: Emergency Contact Name Johny Pruitt,  Answers: Emergency Contact Phone # [phone],  Answers: Emergency Contact Relationship nephew/surrogate decision maker    DISCHARGE PLANNING 201  Potential Discharge Plan and Services    Answers: Anticipated Needs Unclear at Present    Anticipated Transportation Needs for Discharge    Answers: Ambulance    SCREENING 201  Social Work Screen and Referral    Answers: Advanced Illness    SUMMARY 201  Initial Clinical Summary    Notes: Social work reviewed [facility] patients chart via high-risk screening.  Patient is a 84 year old A&Ox1-2 single English speaking male. As per H&P  patient was admitted from his residence at the [facility] at the [facility] ([facility] dementia unit) ph:[phone]. Per medical chart,  patient has a PMH significant for CAD, bipolar disorder, CHF exacerbation, Afib  and dementia.     Due to patients dementia/mental status, he was unable to participate in bedside  initial assessment. Social work met patient at bedside to provide supportive  intervention and conduct initial assessment.  called emergency  contact/nephew Johny Pruitt to introduce self- voicemail received. Reviewed  assisted living documentation for collateral to complete initial intake.    Patient is single and retired; he was a medical doctor practicing in Urology.  Patient resides in the [place], [place] (since January 2022).  Per  facility, patient receives assistance in ADLs and mobility prior to admission.  [facility] the community post  hospitalization ([facility], physical therapy). Per [place], patient requires a  completed 3122 to return; it should be faxed to Wellness Dept at f:  [phone] or f: [phone] for review upon medical clearance. Contact  person at [facility] is Raj.   Patients healthcare proxy identified as Johny Pruitt, his nephew/emergency  contact (ph: [phone]). Original MOLST located in hard chart, patient DNR  status. Uploaded all documentation from assisted living to [facility], including  Living Will, demographics and med rec.  Caregiver identification not  appropriate due to patients mental status.     Patient is covered by Medicare and  insurance. Patient is of the  Buddhism karlos, pastoral care remains available for spiritual support. Patient  with history of bipolar disorder- under no current outpatient psychiatric  treatment. Patients PCP is Dr. Brent Dior, [phone]. Pharmacy of choice  for discharge meds is [facility], ph: [phone]. Social work  continues ongoing outreach to nephew Johny and assisted living facility. Social  work will continue to collaborate with interdisciplinary team.    Anticipated Discharge Plan and Services    Notes: pending hospital course       Electronically signed by:  Angelica Armas LCSW  Electronically signed on:  2025-05-22  11:10

## 2025-05-22 NOTE — CONSULT NOTE ADULT - PROBLEM SELECTOR RECOMMENDATION 6
Recommendations:   - Comfort measures: vitals q daily, no blood draws, no rapids, no MEWs, no code strokes, only palliative medications as per discussion with family  -Plan to transfer to PCU when a bed become available  -Case reviewed with primary team  -[place] consulted     Cecilia Betts MD   Geriatrics and Palliative Care Attending   [facility]     In the event of newly developing, evolving, or worsening symptoms, please contact the Palliative Medicine team via pager (if the patient is at [facility] #[phone] or if the patient is at [facility] #[phone]) The Geriatric and Palliative Medicine service has coverage 24 hours a day/ 7 days a week to provide medical recommendations regarding symptom management needs via telephone.

## 2025-05-22 NOTE — CONSULT NOTE ADULT - PROBLEM SELECTOR RECOMMENDATION 2
On admission placed on BiPAP-> transition to 6 Lt NC   - I-stop reviewed   - Advise IV Dilaudid 0.5 mg q2hrs PRN for dyspnea   - Bowel regimen while on opioids.  Monitor for constipation. On admission placed on BiPAP-> transition to 6 Lt NC   - I-stop reviewed   - Advise IV Dilaudid 0.5 mg q2hrs PRN for dyspnea   - IV Glycopyrrolate 0.4mg q6hrs PRN for excessive oral secretions   - Bowel regimen while on opioids.  Monitor for constipation.

## 2025-05-22 NOTE — CONSULT NOTE ADULT - PROBLEM SELECTOR RECOMMENDATION 3
Past medical history of dementia, full dependent in ALD's.   - Monitor for constipation, urinary retention, pain, hunger, thirst, etc.  Promote sleep wake cycle and reorientation as indicated.  - Advise IV Ativan 0.25mg q2hrs PRN for agitation

## 2025-05-22 NOTE — CONSULT NOTE ADULT - ASSESSMENT
84M PMHx Dementia, CAD, AFib on eliquis, HFrEF p/w AMS, found to have poor end organ perfusion markers and elevated INR, adm CICU for acute hypoxic respiratory failure requiring bipap/avaps & possible cardiogenic shock.  GaP team consulted for complex medical decision making in the setting of serious illness and symptoms management.

## 2025-05-22 NOTE — CONSULT NOTE ADULT - CONVERSATION DETAILS
I introduced myself and my role in the patient's care to the patient's nephew, who confirmed he is the healthcare proxy, with documentation present in the chart. 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    The nephew demonstrated a good understanding of the patient's medical condition and prognosis. We discussed the patient's various comorbidities and poor functional status, which place him at high risk for further medical events. I presented the option of comfort care, prioritizing end-of-life symptom management and de-escalating potentially burdensome interventions such as blood draws, imaging studies, other investigations, and vital signs monitoring. He agreed to focus on the patient's comfort and agreed to transfer to the PCU for symptom management and disposition planning, to be guided by the patient's clinical course.    A new MOLST form was completed and placed in the chart.    I answered all questions that arose during our discussion

## 2025-05-22 NOTE — PROGRESS NOTE ADULT - SUBJECTIVE AND OBJECTIVE BOX
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 In the ER, he was found to be hypoxic with O2 sat in 80s, placed on bipap. Labs were concerning for elevated Creatinine 1.97 (unclear baseline, 0.9-1.2 on previous admissions), elevated LFTs (AST/ALT 200s), elevated INR (6.73), and elevated lactate (5.4). He was given lasix IVP for aggressive diuresis & Vitamin K for reversal of elevated INR. Admitted to CICU for possible cardiogenic shock vs liver failure.      Upon arrival to CICU, patient on AVAPS, alert, cool extremities, hemodynamically stable with /66, HR AFib rate controlled 78. Satting 93% on AVAPs. (21 May 2025 23:39)      24 HOUR EVENTS:  - Admitted overnight iso c/f cardiogenic shock and started on dobutamine empirically  - Given vitamin K 10mg x1 for elevated INR  - Nephew is HCP - patient in DNR/DNI with no invasive lines or procedures    REVIEW OF SYSTEMS:  CONSTITUTIONAL: No weakness, fevers or chills  EYES/ENT: No visual changes;  No vertigo or throat pain   NECK: No pain or stiffness  RESPIRATORY: No cough, wheezing, hemoptysis; No shortness of breath  CARDIOVASCULAR: No chest pain or palpitations  GASTROINTESTINAL: No abdominal or epigastric pain. No nausea, vomiting, or hematemesis; No diarrhea or constipation. No melena or hematochezia.  GENITOURINARY: No dysuria, frequency or hematuria  NEUROLOGICAL: No numbness or weakness  SKIN: No itching, rashes      ICU Vital Signs Last 24 Hrs  T(C): 36.4 (22 May 2025 03:00), Max: 36.6 (21 May 2025 18:00)  T(F): 97.6 (22 May 2025 03:00), Max: 97.9 (21 May 2025 18:00)  HR: 59 (22 May 2025 06:00) (47 - 90)  BP: 92/54 (22 May 2025 06:00) (90/51 - 117/56)  BP(mean): 66 (22 May 2025 06:00) (65 - 81)  RR: 15 (22 May 2025 06:00) (15 - 26)  SpO2: 100% (22 May 2025 06:00) (93% - 100%)    O2 Parameters below as of 22 May 2025 03:00  Patient On (Oxygen Delivery Method): BiPAP/CPAP    O2 Concentration (%): 80    I&O's Summary    21 May 2025 07:01  -  22 May 2025 07:00  --------------------------------------------------------  IN: 183.2 mL / OUT: 1200 mL / NET: -1016.8 mL    PHYSICAL EXAM:  GENERAL: No acute distress, well-developed  HEAD:  Atraumatic, Normocephalic  EYES: EOMI, PERRLA, conjunctiva and sclera clear  NECK: Supple, no lymphadenopathy, no JVD  CHEST/LUNG: CTAB; No wheezes, rales, or rhonchi  HEART: Regular rate and rhythm. Normal S1/S2. No murmurs, rubs, or gallops  ABDOMEN: Soft, non-tender, non-distended; normal bowel sounds, no organomegaly  EXTREMITIES:  2+ peripheral pulses b/l, No clubbing, cyanosis, or edema  NEUROLOGY: A&O x 2  SKIN: No rashes or lesions    ============================I/O===========================   I&O's Detail    21 May 2025 07:01  -  22 May 2025 07:00  --------------------------------------------------------  IN:    DOBUTamine: 28.2 mL    IV PiggyBack: 155 mL  Total IN: 183.2 mL    OUT:    Indwelling Catheter - Urethral (mL): 1200 mL  Total OUT: 1200 mL    Total NET: -1016.8 mL    ============================ LABS =========================                        11.1   12.89 )-----------( 162      ( 22 May 2025 00:57 )             35.2     05-22    133[L]  |  98  |  77[H]  ----------------------------<  107[H]  5.6[H]   |  14[L]  |  2.03[H]    Ca    9.4      22 May 2025 02:34  Phos  6.1     05-22  Mg     2.4     05-22    TPro  6.7  /  Alb  3.9  /  TBili  2.9[H]  /  DBili  x   /  AST  282[H]  /  ALT  362[H]  /  AlkPhos  140[H]  05-22    Troponin T, High Sensitivity Result: 81 ng/L (05-21-25 @ 20:43)  Troponin T, High Sensitivity Result: 79 ng/L (05-21-25 @ 17:59)    LIVER FUNCTIONS - ( 22 May 2025 02:34 )  Alb: 3.9 g/dL / Pro: 6.7 g/dL / ALK PHOS: 140 U/L / ALT: 362 U/L / AST: 282 U/L / GGT: x           PT/INR - ( 22 May 2025 06:12 )   PT: 62.8 sec;   INR: 5.55 ratio         PTT - ( 22 May 2025 06:12 )  PTT:44.0 sec  ABG - ( 22 May 2025 00:45 )  pH, Arterial: 7.37  pH, Blood: x     /  pCO2: 23    /  pO2: 349   / HCO3: 13    / Base Excess: -10.3 /  SaO2: 99.4      Blood Gas Venous - Lactate: 1.8 mmol/L (05-22-25 @ 06:01)  Blood Gas Venous - Lactate: 3.6 mmol/L (05-22-25 @ 02:20)  Lactate, Blood: 6.0 mmol/L (05-22-25 @ 00:57)  Blood Gas Arterial, Lactate: 3.2 mmol/L (05-22-25 @ 00:45)  Blood Gas Venous - Lactate: 5.2 mmol/L (05-22-25 @ 00:35)  Blood Gas Venous - Lactate: 5.4 mmol/L (05-21-25 @ 22:05)  Blood Gas Venous - Lactate: 3.7 mmol/L (05-21-25 @ 19:40)  Blood Gas Venous - Lactate: 3.6 mmol/L (05-21-25 @ 17:58)    Urinalysis Basic - ( 22 May 2025 02:34 )    Color: x / Appearance: x / SG: x / pH: x  Gluc: 107 mg/dL / Ketone: x  / Bili: x / Urobili: x   Blood: x / Protein: x / Nitrite: x   Leuk Esterase: x / RBC: x / WBC x   Sq Epi: x / Non Sq Epi: x / Bacteria: x      ======================Micro/Rad/Cardio=================  Telemetry: Reviewed   EKG: Reviewed  CXR: Reviewed  Culture: Reviewed   Echo:   Cath:           JARRETT DIAZ  MRN-[id number]  Patient is a 84y old  Male who presents with a chief complaint of Acute hypoxic respiratory failure (21 May 2025 23:39)    HPI:  84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 ER from nursing home with worsening AMS. In the ER, he was found to be hypoxic with O2 sat in 80s, placed on bipap. Labs were concerning for elevated Creatinine 1.97 (unclear baseline, 0.9-1.2 on previous admissions), elevated LFTs (AST/ALT 200s), elevated INR (6.73), and elevated lactate (5.4). He was given lasix IVP for aggressive diuresis & Vitamin K for reversal of elevated INR. Admitted to CICU for possible cardiogenic shock vs liver failure.      Upon arrival to CICU, patient on AVAPS, alert, cool extremities, hemodynamically stable with /66, HR AFib rate controlled 78. Satting 93% on AVAPs. (21 May 2025 23:39)    24 HOUR EVENTS:  - Admitted overnight iso c/f cardiogenic shock and started on dobutamine 2.5 empirically  - Given vitamin K 10mg x1 for elevated INR  - Given 80 Lasix IVP x2 for diuresis with appropriate response  - Initially on BiPAP for hypoxia, then transitioned to AVAPs in CICU, now currently on 6L NC  - Lactate cleared  - Nephew is HCP - patient in DNR/DNI with no invasive lines or procedures    REVIEW OF SYSTEMS: Limited 2/2 mental status - currently denies pain      ICU Vital Signs Last 24 Hrs  T(C): 36.4 (22 May 2025 03:00), Max: 36.6 (21 May 2025 18:00)  T(F): 97.6 (22 May 2025 03:00), Max: 97.9 (21 May 2025 18:00)  HR: 59 (22 May 2025 06:00) (47 - 90)  BP: 92/54 (22 May 2025 06:00) (90/51 - 117/56)  BP(mean): 66 (22 May 2025 06:00) (65 - 81)  RR: 15 (22 May 2025 06:00) (15 - 26)  SpO2: 100% (22 May 2025 06:00) (93% - 100%)    O2 Parameters below as of 22 May 2025 03:00  Patient On (Oxygen Delivery Method): BiPAP/CPAP    O2 Concentration (%): 80    I&O's Summary    21 May 2025 07:01  -  22 May 2025 07:00  --------------------------------------------------------  IN: 183.2 mL / OUT: 1200 mL / NET: -1016.8 mL    PHYSICAL EXAM:  GENERAL: No acute distress, well-developed  HEAD:  Atraumatic, Normocephalic  EYES: EOMI, PERRLA, conjunctiva and sclera clear  NECK: Supple, no lymphadenopathy, no JVD  CHEST/LUNG: CTAB; No wheezes, rales, or rhonchi  HEART: Regular rate and rhythm. Normal S1/S2. No murmurs, rubs, or gallops  ABDOMEN: Soft, non-tender, non-distended; normal bowel sounds, no organomegaly  EXTREMITIES:  2+ peripheral pulses b/l, No clubbing, cyanosis, or edema  NEUROLOGY: A&O x 2  SKIN: No rashes or lesions    ============================I/O===========================   I&O's Detail    21 May 2025 07:01  -  22 May 2025 07:00  --------------------------------------------------------  IN:    DOBUTamine: 28.2 mL    IV PiggyBack: 155 mL  Total IN: 183.2 mL    OUT:    Indwelling Catheter - Urethral (mL): 1200 mL  Total OUT: 1200 mL    Total NET: -1016.8 mL    ============================ LABS =========================                        11.1   12.89 )-----------( 162      ( 22 May 2025 00:57 )             35.2     05-22    133[L]  |  98  |  77[H]  ----------------------------<  107[H]  5.6[H]   |  14[L]  |  2.03[H]    Ca    9.4      22 May 2025 02:34  Phos  6.1     05-22  Mg     2.4     05-22    TPro  6.7  /  Alb  3.9  /  TBili  2.9[H]  /  DBili  x   /  AST  282[H]  /  ALT  362[H]  /  AlkPhos  140[H]  05-22    Troponin T, High Sensitivity Result: 81 ng/L (05-21-25 @ 20:43)  Troponin T, High Sensitivity Result: 79 ng/L (05-21-25 @ 17:59)    LIVER FUNCTIONS - ( 22 May 2025 02:34 )  Alb: 3.9 g/dL / Pro: 6.7 g/dL / ALK PHOS: 140 U/L / ALT: 362 U/L / AST: 282 U/L / GGT: x           PT/INR - ( 22 May 2025 06:12 )   PT: 62.8 sec;   INR: 5.55 ratio         PTT - ( 22 May 2025 06:12 )  PTT:44.0 sec  ABG - ( 22 May 2025 00:45 )  pH, Arterial: 7.37  pH, Blood: x     /  pCO2: 23    /  pO2: 349   / HCO3: 13    / Base Excess: -10.3 /  SaO2: 99.4      Blood Gas Venous - Lactate: 1.8 mmol/L (05-22-25 @ 06:01)  Blood Gas Venous - Lactate: 3.6 mmol/L (05-22-25 @ 02:20)  Lactate, Blood: 6.0 mmol/L (05-22-25 @ 00:57)  Blood Gas Arterial, Lactate: 3.2 mmol/L (05-22-25 @ 00:45)  Blood Gas Venous - Lactate: 5.2 mmol/L (05-22-25 @ 00:35)  Blood Gas Venous - Lactate: 5.4 mmol/L (05-21-25 @ 22:05)  Blood Gas Venous - Lactate: 3.7 mmol/L (05-21-25 @ 19:40)  Blood Gas Venous - Lactate: 3.6 mmol/L (05-21-25 @ 17:58)    Urinalysis Basic - ( 22 May 2025 02:34 )    Color: x / Appearance: x / SG: x / pH: x  Gluc: 107 mg/dL / Ketone: x  / Bili: x / Urobili: x   Blood: x / Protein: x / Nitrite: x   Leuk Esterase: x / RBC: x / WBC x   Sq Epi: x / Non Sq Epi: x / Bacteria: x      ======================Micro/Rad/Cardio=================  Telemetry: Reviewed   EKG: Reviewed  CXR: Reviewed  Culture: Reviewed   Echo:   Cath:

## 2025-05-22 NOTE — CONSULT NOTE ADULT - TIME BILLING
Total Time Spent 95 minutes.    This includes chart review, patient assessment, discussion and collaboration with interdisciplinary team members, excluding ACP.    COUNSELING:  Face to face meeting to discuss Advanced Care Planning- Time Spent 25 minutes

## 2025-05-22 NOTE — PROGRESS NOTE ADULT - ASSESSMENT
Assessment: 84M PMHx Dementia, CAD, AFib on eliquis, HFrEF p/w AMS, found to have poor end organ perfusion markers and elevated INR, adm CICU for acute hypoxic respiratory failure requiring bipap/avaps & possible cardiogenic shock.      Plan:  NEURO  #AMS  - A&Ox1-2  - unclear baseline, possible dementia  -  CTH: Senescent cerebral changes without acute intracranial hemorrhage   - continue to monitor mental status as per protocol     RESPIRATORY  #Acute hypoxic respiratory failure   - DNR/DNI, MOLST in chart  - placed on bipap in the ER, continue noninvasive measures then transitioned to AVAPS /backup rate14/exp pressure 8/FiO2 80% trend ABGs  - Transitioned to 6L NC this am, tolerating currently  - unable to r/o PE with CTA given inability to lie flat and elevated creatinine   - CTA chest  neg for PE   - continue to monitor SpO2 with goal >94%    CARDIOVASCULAR  #Cardiogenic shock i/s/o ADHF  - no invasive lines per family  - preload reduction/diuresis: s/p 80 Lasix IVP x2 for diuresis with appropriate response  - inotropic support:  2.5 (empiric)  - afterload reduction: holding while BP borderline  - TTE in AM    HEME  #Elevated INR  - Vitamin K 10mg given with improvement of INR to 5.55 from 6.73  - will check TEG & consider FFP if within GOC  - Monitor H/H and plts  - DVT PPX: SCDs    RENAL/  #ENMANUEL  - creatinine uptrending with minimal UOP  - Continue monitoring urine output, lytes, SCr/ BUN  - replete lytes prn with goal K >4 and Mg >2    GI  #Acute liver failure   - transaminitis, uptrending LFTs   - trend lactate   - elevated anion gap, likely i/s/o acute renal and liver failure   - consider NAC if no improvement with Vit K & FFP     ENDO  Follow up A1c, TSH, Lipid panel    ID  #Leukocytosis   - WBC 11  - infectious workup sent in ED   - UA neg   - continue Zosyn -  - monitor and trend WBC and temperature curve     Code Status: DNR/DNI    ======================= DISPOSITION  =====================  [X] Critical   [ ] Guarded    [ ] Stable    [X] Maintain in CICU  [ ] Downgrade to Telemetry  [ ] Discharge Home    Patient requires continuous monitoring with bedside rhythm monitoring, pulse ox monitoring, and intermittent blood gas analysis. Care plan discussed with ICU care team. Patient remained critical and at risk for life threatening decompensation.  Patient seen, examined and plan discussed with CCU team during rounds.     Melania Barba PA-C Assessment: 84M PMHx Dementia, CAD, AFib on eliquis, HFrEF p/w AMS, found to have poor end organ perfusion markers and elevated INR, adm CICU for acute hypoxic respiratory failure requiring bipap/avaps & possible cardiogenic shock.      Plan:  NEURO  #AMS  - A&Ox1-2  - unclear baseline, possible dementia  -  CTH: Senescent cerebral changes without acute intracranial hemorrhage   - continue to monitor mental status as per protocol     RESPIRATORY  #Acute hypoxic respiratory failure   - DNR/DNI, MOLST in chart  - placed on bipap in the ER, continue noninvasive measures then transitioned to AVAPS /backup rate14/exp pressure 8/FiO2 80% trend ABGs  - Transitioned to 6L NC this am, tolerating currently  - unable to r/o PE with CTA given inability to lie flat and elevated creatinine   - CTA chest  neg for PE   - continue to monitor SpO2 with goal >94%    CARDIOVASCULAR  #Cardiogenic shock i/s/o ADHF  - no invasive lines per family  - preload reduction/diuresis: s/p 80 Lasix IVP x2 for diuresis with appropriate response  - inotropic support:  2.5 (empiric)  - afterload reduction: holding while BP borderline  - TTE in AM      RENAL/  #ENMANUEL  - creatinine uptrending with minimal UOP  - Continue monitoring urine output, lytes, SCr/ BUN  - replete lytes prn with goal K >4 and Mg >2    GI  #Acute liver failure   - transaminitis, uptrending LFTs   - trend lactate   - elevated anion gap, likely i/s/o acute renal and liver failure   - consider NAC if no improvement with Vit K & FFP     HEME  #Elevated INR  - Vitamin K 10mg given with improvement of INR to 5.55 from 6.73  - will check TEG & consider FFP if within GOC  - Monitor H/H, platelets, and signs of bleeding  - Blood consent in chart, patient's nephew agrees to transfusions but only if necessary  - DVT PPX: SCDs    ENDO  Follow up A1c, TSH, Lipid panel    ID  #Leukocytosis   - WBC 11  - infectious workup sent in ED   - UA neg   - continue Zosyn -  - monitor and trend WBC and temperature curve     Code Status: DNR/DNI    ======================= DISPOSITION  =====================  [X] Critical   [ ] Guarded    [ ] Stable    [X] Maintain in CICU  [ ] Downgrade to Telemetry  [ ] Discharge Home    Patient requires continuous monitoring with bedside rhythm monitoring, pulse ox monitoring, and intermittent blood gas analysis. Care plan discussed with ICU care team. Patient remained critical and at risk for life threatening decompensation.  Patient seen, examined and plan discussed with CCU team during rounds.     Melania Barba PA-C

## 2025-05-22 NOTE — CONSULT NOTE ADULT - PROBLEM SELECTOR RECOMMENDATION 4
Patient appears comfortable during encounter.   - I-stop reviewed  - IV Dilaudid 0.2 mg q2hrs PRN for moderate pain.   - IV Dilaudid 0.5 mg q2hrs PRN for severe pain.   - Bowel regimen while on opioids.  Monitor for constipation.

## 2025-05-22 NOTE — CONSULT NOTE ADULT - PROBLEM SELECTOR RECOMMENDATION 9
Patient presented with ADHF, admitted to CICU started inotropic support  - Family opted for symptom directed approach.

## 2025-05-23 DIAGNOSIS — R68.89 OTHER GENERAL SYMPTOMS AND SIGNS: ICD-10-CM

## 2025-05-23 LAB
CULTURE RESULTS: NO GROWTH — SIGNIFICANT CHANGE UP
SPECIMEN SOURCE: SIGNIFICANT CHANGE UP

## 2025-05-23 RX ADMIN — Medication 0.5 MILLIGRAM(S): at 13:44

## 2025-05-23 RX ADMIN — Medication 0.5 MILLIGRAM(S): at 13:14

## 2025-05-23 NOTE — PROGRESS NOTE ADULT - PROBLEM SELECTOR PLAN 4
controlled on exam   Past medical history of dementia, full dependent in ALD's.   - Monitor for constipation, urinary retention, pain, hunger, thirst, etc.  Promote sleep wake cycle and reorientation as indicated.  - Advise IV Ativan 0.25mg q2hrs PRN for agitation. Patient presented with ADHF, admitted to CICU started inotropic support  - Family opted for symptom directed approach.

## 2025-05-23 NOTE — PROGRESS NOTE ADULT - SUBJECTIVE AND OBJECTIVE BOX
GAP TEAM PALLIATIVE CARE UNIT PROGRESS NOTE:      [  ] Patient on hospice program.    INDICATION FOR PALLIATIVE CARE UNIT SERVICES/Interval HPI: 84M, dementia (A&Ox2 baseline), CAD s/p PCI to LAD, HF (EF 29%), severe MR, LV thrombus, presents to [facility] ER with worsening lethargy from [facility] care. Found to be in cardiogenic shock, transferred to CICU for AVAPS and dobutamine gtt. HCP opted for a symptom directed/comfort approach to care. Pt transferred to PCU for symptom directed care, Rx management, and disposition planning.     OVERNIGHT EVENTS:  No acute events o/n. 0 prns in 24 hours (8a-8a)     Do Not Resuscitate:   DNR/DNI: Trial NIV (05-21-25 @ 20:14)      Allergies    palm oil (Unknown)  No Known Drug Allergies    Intolerances    MEDICATIONS  (STANDING):    MEDICATIONS  (PRN):  acetaminophen  Suppository .. 650 milliGRAM(s) Rectal every 6 hours PRN Temp greater or equal to 38C (100.4F), Mild Pain (1 - 3)  bisacodyl Suppository 10 milliGRAM(s) Rectal daily PRN Constipation  glycopyrrolate Injectable 0.4 milliGRAM(s) IV Push every 6 hours PRN audible, excessive secretions  HYDROmorphone  Injectable 0.5 milliGRAM(s) IV Push every 1 hour PRN dyspnea  HYDROmorphone  Injectable 0.2 milliGRAM(s) IV Push every 1 hour PRN Moderate Pain (4 - 6)  HYDROmorphone  Injectable 0.5 milliGRAM(s) IV Push every 1 hour PRN Severe Pain (7 - 10)  LORazepam   Injectable 0.25 milliGRAM(s) IV Push every 1 hour PRN Anxiety, agiation, delirium    ITEMS UNCHECKED ARE NOT PRESENT    PRESENT SYMPTOMS: [ ]Unable to self-report see PAINAD, RDOS below  Source if other than patient:  [ ]Family   [ ]Team     Pain: [ ] yes [x ] no  QOL impact -   Location -                    Aggravating factors -  Quality -  Radiation -  Timing-  Severity (0-10 scale):  Minimal acceptable level (0-10 scale):       PCSSQ [Palliative Care Spiritual Screening Question]   Severity (0-10):  Score of 4 or > indicate consideration of Chaplaincy referral.  Chaplaincy Referral: [x ] yes [ ] refused [x ] following [ ] deferred    Caregiver [place]? : [ x] yes [ ] no [ ] deferred:  Social work referral [ x] Patient & Family Centered Care Referral [ ]   Anticipatory Grief present?:  [x ] yes [ ] no [ ] deferred:  Social work referral [ x] Patient & Family Centered Care Referral [ ]  	  Other Symptoms:  [ x]All other review of systems negative     PHYSICAL EXAM:   Vital Signs Last 24 Hrs  T(C): 36.6 (23 May 2025 08:00), Max: 36.6 (23 May 2025 08:00)  T(F): 97.9 (23 May 2025 08:00), Max: 97.9 (23 May 2025 08:00)  HR: 82 (23 May 2025 08:00) (54 - 82)  BP: 90/54 (23 May 2025 08:00) (86/49 - 131/65)  BP(mean): 65 (22 May 2025 13:00) (65 - 65)  RR: 18 (23 May 2025 08:00) (16 - 26)  SpO2: 99% (23 May 2025 08:00) (99% - 100%)    Parameters below as of 23 May 2025 08:00  Patient On (Oxygen Delivery Method): nasal cannula     I&O's Summary    22 May 2025 07:01  -  23 May 2025 07:00  --------------------------------------------------------  IN: 23.5 mL / OUT: 2400 mL / NET: -2376.5 mL      GENERAL: [ ] Cachexia  [ ]Alert  [x ]Oriented x1   [x ]Lethargic  [ ]Unarousable  [ ]Verbal  [ ]Non-Verbal  Behavioral:   [ ] Anxiety  [ ] Delirium [ ] Agitation [ ] Other  HEENT:  [ ]Normal   [ x]Dry mouth   [ ]ET Tube/Trach  [ ]Oral lesions  PULMONARY:   [ ]Clear [ ]Tachypnea  [ ]Audible excessive secretions   [ ]Rhonchi        [ ]Right [ ]Left [ ]Bilateral  [ ]Crackles        [ ]Right [ ]Left [ ]Bilateral  [ ]Wheezing     [ ]Right [ ]Left [ ]Bilateral  [x ]Diminished BS [ ]Right [ ]Left [x ]Bilateral    CARDIOVASCULAR:    [ ]Regular [ x]Irregular [ ]Tachy  [ ]Salvatore [ ]Murmur [ ]Other  GASTROINTESTINAL:  [x ]Soft  [ ]Distended   [ x]+BS  [x ]Non tender [ ]Tender  [ ]Other [ ]PEG [ ]OGT/ NGT   Last BM:  5/22  GENITOURINARY:  [ ]Normal [ ] Incontinent   [ ]Oliguria/Anuria   [ x]Woodruff  MUSCULOSKELETAL:   [ ]Normal   [x]Weakness  [ x]Bed/Wheelchair bound [ ]Edema  NEUROLOGIC:   [ ]No focal deficits  [x ] Cognitive impairment  [ ] Dysphagia [ ]Dysarthria [ ] Paresis [ ]Other   SKIN:   [ ]Normal  [ ]Rash  [ ]Other  [ x]Pressure ulcer(s)  [ x]y [ ]n  Present on admission      CRITICAL CARE:  [ x] Shock Present  [ ]Septic [ x]Cardiogenic [ ]Neurologic [ ]Hypovolemic  [ ]  Vasopressors [ ]  Inotropes   [ ] Respiratory failure present [ ] Mechanical Ventilation [ ] Non-invasive ventilatory support [ ] High-Flow    [ ] Acute  [ ] Chronic [ ] Hypoxic  [ ] Hypercarbic [ ] Other  [ ] Other organ failure     LABS:                        11.1   12.89 )-----------( 162      ( 22 May 2025 00:57 )             35.2   05-22    133[L]  |  98  |  77[H]  ----------------------------<  107[H]  5.6[H]   |  14[L]  |  2.03[H]    Ca    9.4      22 May 2025 02:34  Phos  6.1     05-22  Mg     2.4     05-22    TPro  6.7  /  Alb  3.9  /  TBili  2.9[H]  /  DBili  x   /  AST  282[H]  /  ALT  362[H]  /  AlkPhos  140[H]  05-22  PT/INR - ( 22 May 2025 06:12 )   PT: 62.8 sec;   INR: 5.55 ratio         PTT - ( 22 May 2025 06:12 )  PTT:44.0 sec  Urinalysis Basic - ( 22 May 2025 02:34 )    Color: x / Appearance: x / SG: x / pH: x  Gluc: 107 mg/dL / Ketone: x  / Bili: x / Urobili: x   Blood: x / Protein: x / Nitrite: x   Leuk Esterase: x / RBC: x / WBC x   Sq Epi: x / Non Sq Epi: x / Bacteria: x      RADIOLOGY & ADDITIONAL STUDIES:  < from: Xray Chest 1 View- PORTABLE-Routine (Xray Chest 1 View- PORTABLE-Routine in AM.) (05.22.25 @ 03:04) >    ACC: [id number] EXAM:  XR CHEST PORTABLE ROUTINE 1V   ORDERED BY: JULES ENNIS     PROCEDURE DATE:  05/22/2025          INTERPRETATION:  Chest one view    HISTORY: Shortness of breath    COMPARISON STUDY: 5/21/2025    Frontal expiratory view of the chest shows the heart to be similarly   enlarged in size. The lungs show less central congestion and there is no   evidence of pneumothorax nor pleural effusion.    IMPRESSION:  Decreased congestive changes.        Thank you for the courtesy of this referral.    --- End of Report ---            BHARTI AGUILAR MD; Attending Interventional Radiologist  This document has been electronically signed. May 22 2025  1:16PM    < end of copied text >    PROTEIN CALORIE MALNUTRITION: [ ] mild [ ] moderate [ ] severe  [ ] underweight [ ] morbid obesity    https://www.andeal.org/vault/2440/web/files/ONC/Table_Clinical%20Characteristics%20to%20Document%20Malnutrition-White%20JV%20et%20al%202012.pdf        [x ] PPSV2 < or = 30% [ ] significant weight loss [ ] poor nutritional intake [ ] anasarca   Artificial Nutrition [ ]     Other REFERRALS:    [ ] Hospice  [ ]Child Life  [ ]Social Work  [ ]Case management [ ]Holistic Therapy [ ] Physical Therapy [ ] Dietary     Progress Notes - Care Coordination [C. Provider] (05-22-25 @ 13:15)                                       Progress Notes    PROGRESS NOTE  Date & Time of Note   2025-05-22 01:08    Notes    Notes: Social work continues to follow patient on 4CIC. LCSW met with  nephew/HCP Johny Scott (ph: [phone]) at bedside, introduction of self  and SW role. Johny acknowledged understanding. Discussed residence at  [facility] living and communication with the facility. Support and  active listening provided to nephew, explored coping. Nephew open to speaking  freely- reports his primary goal is to provide dignity to patient. Plan pending  hospital course, nephew aware of ongoing availability.       Electronically signed by:  Angelica Armas LCSW  Electronically signed on:  2025-05-22  13:15            Palliative Performance Scale:  http://npcrc.org/files/news/palliative_performance_scale_ppsv2.pdf  (Ctrl +  left click to view)  Respiratory Distress Observation Tool:  https://homecareinformation.net/handouts/hen/Respiratory_Distress_Observation_Scale.pdf (Ctrl +  left click to view)  PAINAD Score:  http://geriatrictoolkit.[place].[facility]/cog/painad.pdf (Ctrl +  left click to view)   GAP TEAM PALLIATIVE CARE UNIT PROGRESS NOTE:      [  ] Patient on hospice program.    INDICATION FOR PALLIATIVE CARE UNIT SERVICES/Interval HPI: 84M, dementia (A&Ox2 baseline), CAD s/p PCI to LAD, HF (EF 29%), severe MR, LV thrombus, presents to [facility] ER with worsening lethargy from [facility] care. Found to be in cardiogenic shock, transferred to CICU for AVAPS and dobutamine gtt. HCP opted for a symptom directed/comfort approach to care. Pt transferred to PCU for symptom directed care, Rx management, and disposition planning.     OVERNIGHT EVENTS:  No acute events o/n. 0 prns in 24 hours (8a-8a)     Do Not Resuscitate:   DNR/DNI: Trial NIV (05-21-25 @ 20:14)      Allergies    palm oil (Unknown)  No Known Drug Allergies    Intolerances    MEDICATIONS  (STANDING):    MEDICATIONS  (PRN):  acetaminophen  Suppository .. 650 milliGRAM(s) Rectal every 6 hours PRN Temp greater or equal to 38C (100.4F), Mild Pain (1 - 3)  bisacodyl Suppository 10 milliGRAM(s) Rectal daily PRN Constipation  glycopyrrolate Injectable 0.4 milliGRAM(s) IV Push every 6 hours PRN audible, excessive secretions  HYDROmorphone  Injectable 0.5 milliGRAM(s) IV Push every 1 hour PRN dyspnea  HYDROmorphone  Injectable 0.2 milliGRAM(s) IV Push every 1 hour PRN Moderate Pain (4 - 6)  HYDROmorphone  Injectable 0.5 milliGRAM(s) IV Push every 1 hour PRN Severe Pain (7 - 10)  LORazepam   Injectable 0.25 milliGRAM(s) IV Push every 1 hour PRN Anxiety, agiation, delirium    ITEMS UNCHECKED ARE NOT PRESENT    PRESENT SYMPTOMS: [ ]Unable to self-report see PAINAD, RDOS below  Source if other than patient:  [ ]Family   [ ]Team     Pain: [ ] yes [x ] no  QOL impact -   Location -                    Aggravating factors -  Quality -  Radiation -  Timing-  Severity (0-10 scale):  Minimal acceptable level (0-10 scale):       PCSSQ [Palliative Care Spiritual Screening Question]   Severity (0-10):  Score of 4 or > indicate consideration of Chaplaincy referral.  Chaplaincy Referral: [x ] yes [ ] refused [x ] following [ ] deferred    Caregiver [place]? : [ x] yes [ ] no [ ] deferred:  Social work referral [ x] Patient & Family Centered Care Referral [ ]   Anticipatory Grief present?:  [x ] yes [ ] no [ ] deferred:  Social work referral [ x] Patient & Family Centered Care Referral [ ]  	  Other Symptoms:  [ x]All other review of systems negative     PHYSICAL EXAM:   Vital Signs Last 24 Hrs  T(C): 36.6 (23 May 2025 08:00), Max: 36.6 (23 May 2025 08:00)  T(F): 97.9 (23 May 2025 08:00), Max: 97.9 (23 May 2025 08:00)  HR: 82 (23 May 2025 08:00) (54 - 82)  BP: 90/54 (23 May 2025 08:00) (86/49 - 131/65)  BP(mean): 65 (22 May 2025 13:00) (65 - 65)  RR: 18 (23 May 2025 08:00) (16 - 26)  SpO2: 99% (23 May 2025 08:00) (99% - 100%)    Parameters below as of 23 May 2025 08:00  Patient On (Oxygen Delivery Method): nasal cannula     I&O's Summary    22 May 2025 07:01  -  23 May 2025 07:00  --------------------------------------------------------  IN: 23.5 mL / OUT: 2400 mL / NET: -2376.5 mL      GENERAL: [ ] Cachexia  [ ]Alert  [x ]Oriented x1   [x ]Lethargic  [ ]Unarousable  [x ]Verbal  [ ]Non-Verbal  Behavioral:   [ ] Anxiety  [ ] Delirium [ ] Agitation [ ] Other  HEENT:  [ ]Normal   [ x]Dry mouth   [ ]ET Tube/Trach  [ ]Oral lesions  PULMONARY:   [ ]Clear [ ]Tachypnea  [ ]Audible excessive secretions   [ ]Rhonchi        [ ]Right [ ]Left [ ]Bilateral  [ ]Crackles        [ ]Right [ ]Left [ ]Bilateral  [ ]Wheezing     [ ]Right [ ]Left [ ]Bilateral  [x ]Diminished BS [ ]Right [ ]Left [x ]Bilateral    CARDIOVASCULAR:    [ ]Regular [ x]Irregular [ ]Tachy  [ ]Salvatore [ ]Murmur [ ]Other  GASTROINTESTINAL:  [x ]Soft  [ ]Distended   [ x]+BS  [x ]Non tender [ ]Tender  [ ]Other [ ]PEG [ ]OGT/ NGT   Last BM:  5/22  GENITOURINARY:  [ ]Normal [ ] Incontinent   [ ]Oliguria/Anuria   [ x]Woodruff  MUSCULOSKELETAL:   [ ]Normal   [x]Weakness  [ x]Bed/Wheelchair bound [x ]Edema redness bl calves to feet  NEUROLOGIC:   [ ]No focal deficits  [x ] Cognitive impairment  [ ] Dysphagia [ ]Dysarthria [ ] Paresis [ ]Other   SKIN:   [ ]Normal  [ ]Rash  [ ]Other  [ x]Pressure ulcer(s)  [ x]y [ ]n  Present on admission  sacral dti    CRITICAL CARE:  [ x] Shock Present  [ ]Septic [ x]Cardiogenic [ ]Neurologic [ ]Hypovolemic  [ ]  Vasopressors [ ]  Inotropes   [ ] Respiratory failure present [ ] Mechanical Ventilation [ ] Non-invasive ventilatory support [ ] High-Flow    [ ] Acute  [ ] Chronic [ ] Hypoxic  [ ] Hypercarbic [ ] Other  [x ] Other organ failure kidneys, liver    LABS:                        11.1   12.89 )-----------( 162      ( 22 May 2025 00:57 )             35.2   05-22    133[L]  |  98  |  77[H]  ----------------------------<  107[H]  5.6[H]   |  14[L]  |  2.03[H]    Ca    9.4      22 May 2025 02:34  Phos  6.1     05-22  Mg     2.4     05-22    TPro  6.7  /  Alb  3.9  /  TBili  2.9[H]  /  DBili  x   /  AST  282[H]  /  ALT  362[H]  /  AlkPhos  140[H]  05-22  PT/INR - ( 22 May 2025 06:12 )   PT: 62.8 sec;   INR: 5.55 ratio         PTT - ( 22 May 2025 06:12 )  PTT:44.0 sec  Urinalysis Basic - ( 22 May 2025 02:34 )    Color: x / Appearance: x / SG: x / pH: x  Gluc: 107 mg/dL / Ketone: x  / Bili: x / Urobili: x   Blood: x / Protein: x / Nitrite: x   Leuk Esterase: x / RBC: x / WBC x   Sq Epi: x / Non Sq Epi: x / Bacteria: x      RADIOLOGY & ADDITIONAL STUDIES:  < from: Xray Chest 1 View- PORTABLE-Routine (Xray Chest 1 View- PORTABLE-Routine in AM.) (05.22.25 @ 03:04) >    ACC: [id number] EXAM:  XR CHEST PORTABLE ROUTINE 1V   ORDERED BY: JULES MUNSON DATE:  05/22/2025          INTERPRETATION:  Chest one view    HISTORY: Shortness of breath    COMPARISON STUDY: 5/21/2025    Frontal expiratory view of the chest shows the heart to be similarly   enlarged in size. The lungs show less central congestion and there is no   evidence of pneumothorax nor pleural effusion.    IMPRESSION:  Decreased congestive changes.        Thank you for the courtesy of this referral.    --- End of Report ---            BHARTI AGUILAR MD; Attending Interventional Radiologist  This document has been electronically signed. May 22 2025  1:16PM    < end of copied text >    < from: TTE W or WO Ultrasound Enhancing Agent (05.22.25 @ 07:14) >  CONCLUSIONS:      1. Left ventricular cavity is mildly dilated. Left ventricular systolic function is severely decreased with an ejection fraction visually estimated at 25 to 30 %. Regional wall motion abnormalities present.   2. Enlarged right ventricular cavity size and mildly reduced right ventricular systolic function.   3. Estimated pulmonary artery systolic pressure is 42 mmHg.   4. The atria are severely dilated.   5. Trileaflet aortic valve. There is calcification of the aortic valve leaflets. Mild to moderate aortic stenosis.   6. Moderate to severe mitral regurgitation.   7. Moderate tricuspid regurgitation.   8. No pericardial effusion seen.   9. Compared to the transthoracic echocardiogram performed on 11/9/2023, there is increased tricuspid regurgitation with RV dilatation and dysfunction. There is now mild-to-moderate aortic stenosis.    < end of copied text >  < from: TTE W or WO Ultrasound Enhancing Agent (05.22.25 @ 07:14) >  Electronically signed on 5/22/2025 at 9:59:49 AM by Irais Cardona    < end of copied text >    PROTEIN CALORIE MALNUTRITION: [ ] mild [ ] moderate [ ] severe  [ ] underweight [ ] morbid obesity    https://www.andeal.org/vault/2440/web/files/ONC/Table_Clinical%20Characteristics%20to%20Document%20Malnutrition-White%20JV%20et%20al%202012.pdf        [x ] PPSV2 < or = 30% [ ] significant weight loss [ ] poor nutritional intake [ ] anasarca   Artificial Nutrition [ ]     Other REFERRALS:    [ ] Hospice  [ ]Child Life  [ ]Social Work  [ ]Case management [ ]Holistic Therapy [ ] Physical Therapy [ ] Dietary     Progress Notes - Care Coordination [C. Provider] (05-22-25 @ 13:15)                                       Progress Notes    PROGRESS NOTE  Date & Time of Note   2025-05-22 01:08    Notes    Notes: Social work continues to follow patient on 4CIC. FELICITAW met with  nephew/HCP Johny Scott (ph: [phone]) at bedside, introduction of self  and SW role. Johny acknowledged understanding. Discussed residence at  [facility] and communication with the facility. Support and  active listening provided to nephew, explored coping. Nephew open to speaking  freely- reports his primary goal is to provide dignity to patient. Plan pending  hospital course, nephew aware of ongoing availability.       Electronically signed by:  Angelica Armas LCSW  Electronically signed on:  2025-05-22  13:15                                  Care Coordination Assessment 201    COGNITIVE/LEARNING 201  Mental Status    Answers: Alert,  Answers: Oriented: Person    ADMISSION HISTORY 201  Admitted From    Answers: Assisted Living    Notes: Admitted From    Notes: resides at the [facility] at the [facility] assisted [facility]  (Ph: [phone]) Resides on [facility] dementia unit    Functional Status Prior to Admission    Answers: Assistive equipment,  Answers: Assistive person    FINANCIAL 201  Does patient have financial concerns for discharge?    Answers: None    LIVING ARRANGEMENTS/SUPPORT 201  Housing Environment    Answers: Assisted Living    Living Arrangements    Answers: Other    Sources of support/caregivers    Answers: Other nephew Johny Pruitt [phone]    CAREGIVER CONTACT 201  Does the patient wish to identify a Caregiver?    Answers: Unable to assess    EMERGENCY CONTACTS OUTSIDE HOME 201  Emergency Contact    Answers: Emergency Contact Name Johny Edmond,  Answers: Emergency Contact Phone # [phone],  Answers: Emergency Contact Relationship nephew/surrogate decision maker    DISCHARGE PLANNING 201  Potential Discharge Plan and Services    Answers: Anticipated Needs Unclear at Present    Anticipated Transportation Needs for Discharge    Answers: Ambulance    SCREENING 201  Social Work Screen and Referral    Answers: Advanced Illness    SUMMARY 201  Initial Clinical Summary    Notes: Social work reviewed IC patients chart via high-risk screening.  Patient is a 84 year old A&Ox1-2 single English speaking male. As per H&P  patient was admitted from his residence at the [facility] at the [facility] dementia [facility] ph:[phone]. Per medical chart,  patient has a PMH significant for CAD, bipolar disorder, CHF exacerbation, Afib  and dementia.     Due to patients dementia/mental status, he was unable to participate in bedside  initial assessment. Social work met patient at bedside to provide supportive  intervention and conduct initial assessment.  called emergency  contact/nephew Johny Pruitt to introduce self- voicemail received. Reviewed  assisted living documentation for collateral to complete initial intake.    Patient is single and retired; he was a medical doctor practicing in Urology.  Patient resides in the [place], [place] (since January 2022).  Per  facility, patient receives assistance in ADLs and mobility prior to admission.  [facility] services in the community post  hospitalization ([facility], physical therapy). Per [facility], patient requires a  completed 3122 to return; it should be faxed to Wellness Dept at f:  [phone] or f: [phone] for review upon medical clearance. Contact  person at [facility] is Raj.   Patients healthcare proxy identified as Johny Pruitt, his nephew/emergency  contact (ph: [phone]). Original MOLST located in hard chart, patient DNR  status. Uploaded all documentation from assisted living to [facility], including  Living Will, demographics and med rec.  Caregiver identification not  appropriate due to patients mental status.     Patient is covered by Medicare and  insurance. Patient is of the  [employer] karlos, pastoral care remains available for spiritual support. Patient  with history of bipolar disorder- under no current outpatient psychiatric  treatment. Patients PCP is Dr. Brent Dior, [phone]. [facility]  [facility], ph: [phone]. Social work  continues ongoing outreach to nephew Johny and assisted living facility. Social  work will continue to collaborate with interdisciplinary team.    Anticipated Discharge Plan and Services    Notes: pending hospital course       Electronically signed by:  Angelica Armas LCSW  Electronically signed on:  2025-05-22  11:10        Palliative Performance Scale:  http://npcrc.org/files/news/palliative_performance_scale_ppsv2.pdf  (Ctrl +  left click to view)  Respiratory Distress Observation Tool:  https://homecareinformation.net/handouts/hen/Respiratory_Distress_Observation_Scale.pdf (Ctrl +  left click to view)  PAINAD Score:  http://geriatrictoolkit.[place].[facility]/cog/painad.pdf (Ctrl +  left click to view)

## 2025-05-23 NOTE — PROGRESS NOTE ADULT - PROBLEM SELECTOR PLAN 1
controlled   - c/w IV Dilaudid 0.5 mg q1hr PRN for dyspnea   - IV Glycopyrrolate 0.4mg q6hrs PRN for excessive oral secretions   - Bowel regimen while on opioids.  Monitor for constipation. controlled   - c/w IV Dilaudid 0.5 mg q1hr PRN for dyspnea   - Bowel regimen while on opioids.  Monitor for constipation.

## 2025-05-23 NOTE — PROGRESS NOTE ADULT - PROBLEM SELECTOR PLAN 5
pt will remain in Palliative care unit for ongoing symptom management and disposition planning  HCP Johny Locke updated at bedside: discussed plan for discharge back to [facility] with comfort care, he is amenable with care plan controlled on exam   Past medical history of dementia, full dependent in ALD's.   - Monitor for constipation, urinary retention, pain, hunger, thirst, etc.  Promote sleep wake cycle and reorientation as indicated.  - Advise IV Ativan 0.25mg q2hrs PRN for agitation.

## 2025-05-23 NOTE — PROGRESS NOTE ADULT - PROBLEM SELECTOR PLAN 3
Patient presented with ADHF, admitted to CICU started inotropic support  - Family opted for symptom directed approach. Turn and position, glycopyrrolate 0.4mg IVP every 6 hours PRN, scopolamine patch if refractory.  Presently asymptomatic

## 2025-05-23 NOTE — PROGRESS NOTE ADULT - NS ATTEND AMEND GEN_ALL_CORE FT
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  Family has opted for comfort measures only and patient transferred to PSCU for end of life care, symptom management and disposition planning pending clinical course.      I have reviewed all documentation from prior primary team and consultants, as well as relevant imaging and laboratory data as this patient is new to me.    In the setting of parenteral controlled substance administration, clinical monitoring required for side effects such as respiratory depression, constipation and opioid induced neurotoxicity due to organ failure.    Family updated as to current clinical condition and plan of care.  Educated as to what to expect, questions answered and emotional support provided.    Patient assessment and plan discussed on interdisciplinary team rounds today.

## 2025-05-23 NOTE — PROGRESS NOTE ADULT - TIME BILLING
minutes spent on total encounter. The necessity of the time spent during the encounter on this date of service was due to:     Total time spent including the following  [ x] Physical chart review and documentation   An extensive review of the physical chart, electronic health record, and documentation was conducted to obtain collateral information including but not limited to:   - Current inpatient records (ED, H&P, primary team, and consultants [IR])   - Inpatient values/results (CBC, CMP, CT)   - Prior inpatient records   - Current or proposed treatment plans   - Pharmacotherapy review   [x ]discussion with the interdisciplinary palliative care team -RN, , clinicians, trainees,   [ x]discussion with the patient/family/decision maker  [ x]Physical Exam and/or review of systems   [ x]Formulation of assessment and plan   [ x]Evaluating for response to treatment and side effects of opioids or benzodiazepines.  [ x]Review of care coordination documentation.

## 2025-05-24 DIAGNOSIS — R52 PAIN, UNSPECIFIED: ICD-10-CM

## 2025-05-24 PROCEDURE — 99233 SBSQ HOSP IP/OBS HIGH 50: CPT | Mod: 25

## 2025-05-24 RX ADMIN — Medication 0.25 MILLIGRAM(S): at 08:20

## 2025-05-24 RX ADMIN — Medication 0.5 MILLIGRAM(S): at 08:20

## 2025-05-24 NOTE — PROGRESS NOTE ADULT - PROBLEM SELECTOR PLAN 1
- required 2 PRNs in 24 hours  - c/w IV Dilaudid 0.2 mg q1hr PRN for moderate pain.   - c/w IV Dilaudid 0.5 mg q1hr PRN for severe pain.   - Bowel regimen while on opioids.  Monitor for constipation.

## 2025-05-24 NOTE — PROGRESS NOTE ADULT - SUBJECTIVE AND OBJECTIVE BOX
GAP TEAM PALLIATIVE CARE UNIT PROGRESS NOTE:      [  ] Patient on hospice program.    INDICATION FOR PALLIATIVE CARE UNIT SERVICES/Interval HPI:  84M, dementia (A&Ox2 baseline), CAD s/p PCI to LAD, HF (EF 29%), severe MR, LV thrombus, presents to [facility] ER with worsening lethargy from [facility]. Admitted for cardiogenic shock, transferred to CICU for AVAPS and dobutamine gtt. HCP opted for a symptom directed/comfort approach to care. Pt transferred to PCU for symptom directed care.    OVERNIGHT EVENTS:  Patient seen and examined at bedside, unarousable, appeared comfortable this morning.  Chart review, in 24hrs 8AM-8AM, patient required Dilaudid 0.5mg x2, Ativan 0.25mg x1.    Do Not Resuscitate:   DNR/DNI: Trial NIV (05-21-25 @ 20:14)      Allergies    palm oil (Unknown)  No Known Drug Allergies    Intolerances    MEDICATIONS  (STANDING):    MEDICATIONS  (PRN):  acetaminophen  Suppository .. 650 milliGRAM(s) Rectal every 6 hours PRN Temp greater or equal to 38C (100.4F), Mild Pain (1 - 3)  bisacodyl Suppository 10 milliGRAM(s) Rectal daily PRN Constipation  glycopyrrolate Injectable 0.4 milliGRAM(s) IV Push every 6 hours PRN audible, excessive secretions  HYDROmorphone  Injectable 0.5 milliGRAM(s) IV Push every 1 hour PRN dyspnea  HYDROmorphone  Injectable 0.2 milliGRAM(s) IV Push every 1 hour PRN Moderate Pain (4 - 6)  HYDROmorphone  Injectable 0.5 milliGRAM(s) IV Push every 1 hour PRN Severe Pain (7 - 10)  LORazepam   Injectable 0.25 milliGRAM(s) IV Push every 1 hour PRN Anxiety, agiation, delirium    ITEMS UNCHECKED ARE NOT PRESENT    PRESENT SYMPTOMS: [x]Unable to self-report see PAINAD, RDOS below  Source if other than patient:  [ ]Family   [ ]Team     Pain: [ ] yes [ ] no [x]Unable to self-report  QOL impact -   Location -                    Aggravating factors -  Quality -  Radiation -  Timing-  Severity (0-10 scale):  Minimal acceptable level (0-10 scale):       PCSSQ [Palliative Care Spiritual Screening Question]   Severity (0-10):  Score of 4 or > indicate consideration of Chaplaincy referral.  Chaplaincy Referral: [x ] yes [ ] refused [ ] following [ ] deferred    Caregiver [place]? : [ ] yes [ ] no [x ] deferred:  Social work referral [ ] Patient & Family Centered Care Referral [ ]   Anticipatory Grief present?:  [ ] yes [ ] no [x ] deferred:  Social work referral [ ] Patient & Family Centered Care Referral [ ]  	  Other Symptoms:  [ ]All other review of systems negative   [x] Unable to self-report    PHYSICAL EXAM:   Vital Signs Last 24 Hrs  T(C): 36.7 (24 May 2025 08:15), Max: 36.7 (24 May 2025 08:15)  T(F): 98 (24 May 2025 08:15), Max: 98 (24 May 2025 08:15)  HR: 71 (24 May 2025 08:15) (71 - 79)  BP: 96/58 (24 May 2025 08:15) (96/58 - 102/71)  BP(mean): --  RR: 18 (24 May 2025 08:15) (18 - 18)  SpO2: 99% (24 May 2025 08:15) (98% - 99%)    Parameters below as of 24 May 2025 08:15  Patient On (Oxygen Delivery Method): nasal cannula     I&O's Summary    23 May 2025 07:01  -  24 May 2025 07:00  --------------------------------------------------------  IN: 1200 mL / OUT: 950 mL / NET: 250 mL      GENERAL: [ ] Cachexia  [ ]Alert  [ ]Oriented x   [ ]Lethargic  [x ]Unarousable  [ ]Verbal  [x ]Non-Verbal  Behavioral:   [ ] Anxiety  [ ] Delirium [ ] Agitation [ ] Other  HEENT:  [x ]Normal   [ ]Dry mouth   [ ]ET Tube/Trach  [ ]Oral lesions  PULMONARY:   [ ]Clear [ ]Tachypnea  [ ]Audible excessive secretions   [ ]Rhonchi        [ ]Right [ ]Left [ ]Bilateral  [ ]Crackles        [ ]Right [ ]Left [ ]Bilateral  [ ]Wheezing     [ ]Right [ ]Left [ ]Bilateral  [x ]Diminished BS [ ]Right [ ]Left [x ]Bilateral    CARDIOVASCULAR:    [ x]Regular [ ]Irregular [ ]Tachy  [ ]Salvatore [ ]Murmur [ ]Other  GASTROINTESTINAL:  [x ]Soft  [ ]Distended   [x ]+BS  [ ]Non tender [ ]Tender  [ ]Other [ ]PEG [ ]OGT/ NGT   Last BM: 5/21/25  GENITOURINARY:  [ ]Normal [ ] Incontinent   [ ]Oliguria/Anuria   [ x]Woodruff  MUSCULOSKELETAL:   [ ]Normal   [ ]Weakness  [x ]Bed/Wheelchair bound [ ]Edema  NEUROLOGIC:   [ ]No focal deficits  [x ] Cognitive impairment  [ ] Dysphagia [ ]Dysarthria [ ] Paresis [ ]Other   SKIN:   [ ]Normal  [ ]Rash  [ ]Other  [x ]Pressure ulcer(s): sacral dti  [x ]y [ ]n  Present on admission      CRITICAL CARE:  [ ] Shock Present  [ ]Septic [ ]Cardiogenic [ ]Neurologic [ ]Hypovolemic  [ ]  Vasopressors [ ]  Inotropes   [ ] Respiratory failure present [ ] Mechanical Ventilation [ ] Non-invasive ventilatory support [ ] High-Flow    [ ] Acute  [ ] Chronic [ ] Hypoxic  [ ] Hypercarbic [ ] Other  [ ] Other organ failure     LABS:          RADIOLOGY & ADDITIONAL STUDIES:    PROTEIN CALORIE MALNUTRITION: [ ] mild [ ] moderate [ ] severe  [ ] underweight [ ] morbid obesity    https://www.andeal.org/vault/2440/web/files/ONC/Table_Clinical%20Characteristics%20to%20Document%20Malnutrition-White%20JV%20et%20al%857402.pdf        [x ] PPSV2 < or = 30% [ ] significant weight loss [ ] poor nutritional intake [ ] anasarca   Artificial Nutrition [ ]     Other REFERRALS:    [ ] Hospice  [ ]Child Life  [ ]Social Work  [ ]Case management [ ]Holistic Therapy [ ] Physical Therapy [ ] Dietary     Progress Notes - Care Coordination [C. Provider] (05-23-25 @ 15:30)                                       Progress Notes    PROGRESS NOTE  Date & Time of Note   2025-05-23 03:30    Notes    Notes: Spoke with Matthew in Wellness Dept at [facility] [phone]  informed LMSW to have MD complete 3122 form, fax form [phone] or  [phone]. Discussed home hospice referral made to [facility]. [facility] is accepting  of N home hospice referral.       Electronically signed by:  Leigha Cline  Electronically signed on:  2025-05-23  15:30            Palliative Performance Scale:  http://npcrc.org/files/news/palliative_performance_scale_ppsv2.pdf  (Ctrl +  left click to view)  Respiratory Distress Observation Tool:  https://homecareinformation.net/handouts/hen/Respiratory_Distress_Observation_Scale.pdf (Ctrl +  left click to view)  PAINAD Score:  http://geriatrictoolkit.[place].[facility]/cog/painad.pdf (Ctrl +  left click to view)

## 2025-05-24 NOTE — PROGRESS NOTE ADULT - ASSESSMENT
84M PMHx Dementia, CAD, AFib on eliquis, HFrEF p/w AMS, found to have poor end organ perfusion markers and elevated INR, adm CICU for acute hypoxic respiratory failure requiring bipap/avaps & possible cardiogenic shock.  [facility] team consulted for complex medical decision making in the setting of serious illness and symptoms management. Transferred to PCU for symptom management and monitoring.

## 2025-05-24 NOTE — PROGRESS NOTE ADULT - PROBLEM SELECTOR PLAN 4
- received 1 PRN in 24 hrs  - continue with Ativan 0.25mg IV q1h PRN agitation  - Monitor for constipation, urinary retention, pain, hunger, thirst, etc.  Promote sleep wake cycle and reorientation as indicated.

## 2025-05-24 NOTE — PROGRESS NOTE ADULT - PROBLEM SELECTOR PROBLEM 3
[Well Groomed] : well groomed [Normal Appearance] : normal appearance [General Appearance - In No Acute Distress] : no acute distress [General Appearance - Well Nourished] : well nourished [Normal Conjunctiva] : the conjunctiva exhibited no abnormalities [Normal Oral Mucosa] : normal oral mucosa [Normal Oropharynx] : normal oropharynx [No Oral Pallor] : no oral pallor [Heart Rate And Rhythm] : heart rate and rhythm were normal [Heart Sounds] : normal S1 and S2 [Murmurs] : no murmurs present [] : no respiratory distress [Respiration, Rhythm And Depth] : normal respiratory rhythm and effort [Auscultation Breath Sounds / Voice Sounds] : lungs were clear to auscultation bilaterally [Abdomen Tenderness] : non-tender [Bowel Sounds] : normal bowel sounds [Abdomen Soft] : soft [FreeTextEntry1] : wheelchair [Nail Clubbing] : no clubbing of the fingernails [Cyanosis, Localized] : no localized cyanosis [Skin Color & Pigmentation] : normal skin color and pigmentation [Petechial Hemorrhages (___cm)] : no petechial hemorrhages [Skin Turgor] : normal skin turgor [Oriented To Time, Place, And Person] : oriented to person, place, and time [Affect] : the affect was normal Copious oral secretions

## 2025-05-25 PROCEDURE — 99233 SBSQ HOSP IP/OBS HIGH 50: CPT | Mod: 25

## 2025-05-25 RX ADMIN — Medication 0.5 MILLIGRAM(S): at 06:12

## 2025-05-25 RX ADMIN — Medication 0.5 MILLIGRAM(S): at 16:01

## 2025-05-25 RX ADMIN — Medication 0.5 MILLIGRAM(S): at 06:27

## 2025-05-25 NOTE — PROGRESS NOTE ADULT - SUBJECTIVE AND OBJECTIVE BOX
GAP TEAM PALLIATIVE CARE UNIT PROGRESS NOTE:      [  ] Patient on hospice program.    INDICATION FOR PALLIATIVE CARE UNIT SERVICES/Interval HPI:  84M, dementia (A&Ox2 baseline), CAD s/p PCI to LAD, HF (EF 29%), severe MR, LV thrombus, presents to [facility] ER with worsening lethargy from [facility] care. Admitted for cardiogenic shock, transferred to CICU for AVAPS and dobutamine gtt. HCP opted for a symptom directed/comfort approach to care. Pt transferred to PCU for symptom directed care.    OVERNIGHT EVENTS:  Patient seen and examined at bedside, unarousable, appeared comfortable this morning.  Chart review, in 24hrs 8AM-8AM, patient required Dilaudid 0.5mg x1.    Do Not Resuscitate:   DNR/DNI: Trial NIV (05-21-25 @ 20:14)      Allergies    palm oil (Unknown)  No Known Drug Allergies    Intolerances    MEDICATIONS  (STANDING):    MEDICATIONS  (PRN):  acetaminophen  Suppository .. 650 milliGRAM(s) Rectal every 6 hours PRN Temp greater or equal to 38C (100.4F), Mild Pain (1 - 3)  bisacodyl Suppository 10 milliGRAM(s) Rectal daily PRN Constipation  glycopyrrolate Injectable 0.4 milliGRAM(s) IV Push every 6 hours PRN audible, excessive secretions  HYDROmorphone  Injectable 0.5 milliGRAM(s) IV Push every 1 hour PRN dyspnea  HYDROmorphone  Injectable 0.2 milliGRAM(s) IV Push every 1 hour PRN Moderate Pain (4 - 6)  HYDROmorphone  Injectable 0.5 milliGRAM(s) IV Push every 1 hour PRN Severe Pain (7 - 10)  LORazepam   Injectable 0.25 milliGRAM(s) IV Push every 1 hour PRN Anxiety, agiation, delirium    ITEMS UNCHECKED ARE NOT PRESENT    PRESENT SYMPTOMS: [ x]Unable to self-report see PAINAD, RDOS below  Source if other than patient:  [ ]Family   [ ]Team     Pain: [ ] yes [ ] no [ x]Unable to self-report   QOL impact -   Location -                    Aggravating factors -  Quality -  Radiation -  Timing-  Severity (0-10 scale):  Minimal acceptable level (0-10 scale):       PCSSQ [Palliative Care Spiritual Screening Question]   Severity (0-10):  Score of 4 or > indicate consideration of Chaplaincy referral.  Chaplaincy Referral: [x ] yes [ ] refused [ ] following [ ] deferred    Caregiver [place]? : [ ] yes [ ] no [x ] deferred:  Social work referral [ ] Patient & Family Centered Care Referral [ ]   Anticipatory Grief present?:  [ ] yes [ ] no [x ] deferred:  Social work referral [ ] Patient & Family Centered Care Referral [ ]  	  Other Symptoms:  [ ]All other review of systems negative   [ x]Unable to self-report     PHYSICAL EXAM:   Vital Signs Last 24 Hrs  T(C): 36.6 (25 May 2025 07:45), Max: 36.6 (25 May 2025 07:45)  T(F): 97.9 (25 May 2025 07:45), Max: 97.9 (25 May 2025 07:45)  HR: 64 (25 May 2025 07:45) (64 - 64)  BP: 99/65 (25 May 2025 07:45) (99/65 - 99/65)  BP(mean): --  RR: 16 (25 May 2025 07:45) (16 - 16)  SpO2: 98% (25 May 2025 07:45) (98% - 98%)    Parameters below as of 25 May 2025 07:45  Patient On (Oxygen Delivery Method): nasal cannula     I&O's Summary    24 May 2025 07:01  -  25 May 2025 07:00  --------------------------------------------------------  IN: 0 mL / OUT: 350 mL / NET: -350 mL      GENERAL: [ ] Cachexia  [ ]Alert  [ ]Oriented x   [ ]Lethargic  [x ]Unarousable  [ ]Verbal  [x ]Non-Verbal  Behavioral:   [ ] Anxiety  [ ] Delirium [ ] Agitation [ ] Other  HEENT:  [x ]Normal   [ ]Dry mouth   [ ]ET Tube/Trach  [ ]Oral lesions  PULMONARY:   [ ]Clear [ ]Tachypnea  [ ]Audible excessive secretions   [ ]Rhonchi        [ ]Right [ ]Left [ ]Bilateral  [ ]Crackles        [ ]Right [ ]Left [ ]Bilateral  [ ]Wheezing     [ ]Right [ ]Left [ ]Bilateral  [x ]Diminished BS [ ]Right [ ]Left [x ]Bilateral    CARDIOVASCULAR:    [ x]Regular [ ]Irregular [ ]Tachy  [ ]Salvatore [ ]Murmur [ ]Other  GASTROINTESTINAL:  [x ]Soft  [ ]Distended   [x ]+BS  [ ]Non tender [ ]Tender  [ ]Other [ ]PEG [ ]OGT/ NGT   Last BM: 5/21/25  GENITOURINARY:  [ ]Normal [ ] Incontinent   [ ]Oliguria/Anuria   [ x]Woodruff  MUSCULOSKELETAL:   [ ]Normal   [ ]Weakness  [x ]Bed/Wheelchair bound [ ]Edema  NEUROLOGIC:   [ ]No focal deficits  [x ] Cognitive impairment  [ ] Dysphagia [ ]Dysarthria [ ] Paresis [ ]Other   SKIN:   [ ]Normal  [ ]Rash  [ ]Other  [x ]Pressure ulcer(s): sacral dti  [x ]y [ ]n  Present on admission      CRITICAL CARE:  [ ] Shock Present  [ ]Septic [ ]Cardiogenic [ ]Neurologic [ ]Hypovolemic  [ ]  Vasopressors [ ]  Inotropes   [ ] Respiratory failure present [ ] Mechanical Ventilation [ ] Non-invasive ventilatory support [ ] High-Flow    [ ] Acute  [ ] Chronic [ ] Hypoxic  [ ] Hypercarbic [ ] Other  [ ] Other organ failure     LABS:          RADIOLOGY & ADDITIONAL STUDIES:    PROTEIN CALORIE MALNUTRITION: [ ] mild [ ] moderate [ ] severe  [ ] underweight [ ] morbid obesity    https://www.andeal.org/vault/2440/web/files/ONC/Table_Clinical%20Characteristics%20to%20Document%20Malnutrition-White%20JV%20et%20al%202012.pdf        [x ] PPSV2 < or = 30% [ ] significant weight loss [ ] poor nutritional intake [ ] anasarca   Artificial Nutrition [ ]     Other REFERRALS:    [ ] Hospice  [ ]Child Life  [ ]Social Work  [ ]Case management [ ]Holistic Therapy [ ] Physical Therapy [ ] Dietary           Palliative Performance Scale:  http://npcrc.org/files/news/palliative_performance_scale_ppsv2.pdf  (Ctrl +  left click to view)  Respiratory Distress Observation Tool:  https://homecareinformation.net/handouts/hen/Respiratory_Distress_Observation_Scale.pdf (Ctrl +  left click to view)  PAINAD Score:  http://geriatrictoolkit.[facility]/cog/painad.pdf (Ctrl +  left click to view)

## 2025-05-25 NOTE — PROGRESS NOTE ADULT - PROBLEM SELECTOR PLAN 4
- received 1 PRN this AM  - continue with Ativan 0.25mg IV q1h PRN agitation  - Monitor for constipation, urinary retention, pain, hunger, thirst, etc.  Promote sleep wake cycle and reorientation as indicated.

## 2025-05-25 NOTE — PROGRESS NOTE ADULT - PROBLEM SELECTOR PLAN 1
- required 1 PRNs in 24 hours  - c/w IV Dilaudid 0.2 mg q1hr PRN for moderate pain.   - c/w IV Dilaudid 0.5 mg q1hr PRN for severe pain.   - Bowel regimen while on opioids.  Monitor for constipation.  - one dulcolax today

## 2025-05-25 NOTE — PROGRESS NOTE ADULT - PROBLEM SELECTOR PLAN 5
- Patient presented with ADHF, admitted to CICU started inotropic support  - Family opted for symptom directed approach.

## 2025-05-26 PROCEDURE — 99233 SBSQ HOSP IP/OBS HIGH 50: CPT | Mod: 25

## 2025-05-26 RX ADMIN — Medication 10 MILLIGRAM(S): at 14:13

## 2025-05-26 RX ADMIN — Medication 0.5 MILLIGRAM(S): at 01:11

## 2025-05-26 RX ADMIN — Medication 0.5 MILLIGRAM(S): at 09:42

## 2025-05-26 RX ADMIN — Medication 0.5 MILLIGRAM(S): at 00:56

## 2025-05-26 NOTE — PROGRESS NOTE ADULT - PROBLEM SELECTOR PLAN 4
- Did not require PRN  - continue with Ativan 0.25mg IV q1h PRN agitation  - Monitor for constipation, urinary retention, pain, hunger, thirst, etc.  Promote sleep wake cycle and reorientation as indicated.

## 2025-05-26 NOTE — PROGRESS NOTE ADULT - SUBJECTIVE AND OBJECTIVE BOX
GAP TEAM PALLIATIVE CARE UNIT PROGRESS NOTE:      [  ] Patient on hospice program.    INDICATION FOR PALLIATIVE CARE UNIT SERVICES/Interval HPI:  84M, dementia (A&Ox2 baseline), CAD s/p PCI to LAD, HF (EF 29%), severe MR, LV thrombus, presents to [facility] ER with worsening lethargy from [facility] care. Admitted for cardiogenic shock, transferred to CICU for AVAPS and dobutamine gtt. HCP opted for a symptom directed/comfort approach to care. Pt transferred to PCU for symptom directed care.    OVERNIGHT EVENTS:  Patient seen and examined at bedside, unarousable, NAD.  Chart review, in 24hrs 8AM-8AM, patient required Dilaudid 0.5mg x2.    Do Not Resuscitate:   DNR/DNI: Trial NIV (05-21-25 @ 20:14)      Allergies    palm oil (Unknown)  No Known Drug Allergies    Intolerances    MEDICATIONS  (STANDING):    MEDICATIONS  (PRN):  acetaminophen  Suppository .. 650 milliGRAM(s) Rectal every 6 hours PRN Temp greater or equal to 38C (100.4F), Mild Pain (1 - 3)  bisacodyl Suppository 10 milliGRAM(s) Rectal daily PRN Constipation  glycopyrrolate Injectable 0.4 milliGRAM(s) IV Push every 6 hours PRN audible, excessive secretions  HYDROmorphone  Injectable 0.5 milliGRAM(s) IV Push every 1 hour PRN dyspnea  HYDROmorphone  Injectable 0.2 milliGRAM(s) IV Push every 1 hour PRN Moderate Pain (4 - 6)  HYDROmorphone  Injectable 0.5 milliGRAM(s) IV Push every 1 hour PRN Severe Pain (7 - 10)  LORazepam   Injectable 0.25 milliGRAM(s) IV Push every 1 hour PRN Anxiety, agiation, delirium    ITEMS UNCHECKED ARE NOT PRESENT    PRESENT SYMPTOMS: [X ]Unable to self-report see PAINAD, RDOS below  Source if other than patient:  [ ]Family   [ ]Team     Pain: [ ] yes [X ] no  QOL impact -   Location -                    Aggravating factors -  Quality -  Radiation -  Timing-  Severity (0-10 scale):  Minimal acceptable level (0-10 scale):       PCSSQ [Palliative Care Spiritual Screening Question]   Severity (0-10):  Score of 4 or > indicate consideration of Chaplaincy referral.  Chaplaincy Referral: [X ] yes [ ] refused [ ] following [ ] deferred    Caregiver [place]? : [ ] yes [ ] no [X ] deferred:  Social work referral [ ] Patient & Family Centered Care Referral [ ]   Anticipatory Grief present?:  [ ] yes [ ] no [X ] deferred:  Social work referral [ ] Patient & Family Centered Care Referral [ ]  	  Other Symptoms:  [ ]All other review of systems negative   Unable to self-report    PHYSICAL EXAM:   Vital Signs Last 24 Hrs  T(C): 36.6 (26 May 2025 07:33), Max: 36.6 (26 May 2025 07:33)  T(F): 97.9 (26 May 2025 07:33), Max: 97.9 (26 May 2025 07:33)  HR: 78 (26 May 2025 07:33) (78 - 78)  BP: 105/71 (26 May 2025 07:33) (105/71 - 105/71)  BP(mean): --  RR: 18 (26 May 2025 07:33) (18 - 18)  SpO2: 100% (26 May 2025 07:33) (100% - 100%)    Parameters below as of 26 May 2025 07:33  Patient On (Oxygen Delivery Method): nasal cannula     I&O's Summary    25 May 2025 07:01  -  26 May 2025 07:00  --------------------------------------------------------  IN: 0 mL / OUT: 350 mL / NET: -350 mL      GENERAL: [ ] Cachexia  [ ]Alert  [ ]Oriented x   [ ]Lethargic  [x ]Unarousable  [ ]Verbal  [x ]Non-Verbal  Behavioral:   [ ] Anxiety  [ ] Delirium [ ] Agitation [ ] Other  HEENT:  [x ]Normal   [ ]Dry mouth   [ ]ET Tube/Trach  [ ]Oral lesions  PULMONARY:   [ ]Clear [ ]Tachypnea  [ ]Audible excessive secretions   [ ]Rhonchi        [ ]Right [ ]Left [ ]Bilateral  [ ]Crackles        [ ]Right [ ]Left [ ]Bilateral  [ ]Wheezing     [ ]Right [ ]Left [ ]Bilateral  [x ]Diminished BS [ ]Right [ ]Left [x ]Bilateral    CARDIOVASCULAR:    [ x]Regular [ ]Irregular [ ]Tachy  [ ]Salvatore [ ]Murmur [ ]Other  GASTROINTESTINAL:  [x ]Soft  [ ]Distended   [x ]+BS  [ ]Non tender [ ]Tender  [ ]Other [ ]PEG [ ]OGT/ NGT   Last BM: 5/21/25  GENITOURINARY:  [ ]Normal [ ] Incontinent   [ ]Oliguria/Anuria   [ x]Woodruff  MUSCULOSKELETAL:   [ ]Normal   [ ]Weakness  [x ]Bed/Wheelchair bound [ ]Edema  NEUROLOGIC:   [ ]No focal deficits  [x ] Cognitive impairment  [ ] Dysphagia [ ]Dysarthria [ ] Paresis [ ]Other   SKIN:   [ ]Normal  [ ]Rash  [ ]Other  [x ]Pressure ulcer(s): sacral dti  [x ]y [ ]n  Present on admission      CRITICAL CARE:  [ ] Shock Present  [ ]Septic [ ]Cardiogenic [ ]Neurologic [ ]Hypovolemic  [ ]  Vasopressors [ ]  Inotropes   [ ] Respiratory failure present [ ] Mechanical Ventilation [ ] Non-invasive ventilatory support [ ] High-Flow    [ ] Acute  [ ] Chronic [ ] Hypoxic  [ ] Hypercarbic [ ] Other  [ ] Other organ failure     LABS:          RADIOLOGY & ADDITIONAL STUDIES:    PROTEIN CALORIE MALNUTRITION: [ ] mild [ ] moderate [ ] severe  [ ] underweight [ ] morbid obesity    https://www.andeal.org/vault/2440/web/files/ONC/Table_Clinical%20Characteristics%20to%20Document%20Malnutrition-White%20JV%20et%20al%202012.pdf        [x ] PPSV2 < or = 30% [ ] significant weight loss [ ] poor nutritional intake [ ] anasarca   Artificial Nutrition [ ]     Other REFERRALS:    [ ] Hospice  [ ]Child Life  [ ]Social Work  [ ]Case management [ ]Holistic Therapy [ ] Physical Therapy [ ] Dietary           Palliative Performance Scale:  http://npcrc.org/files/news/palliative_performance_scale_ppsv2.pdf  (Ctrl +  left click to view)  Respiratory Distress Observation Tool:  https://homecareinformation.net/handouts/hen/Respiratory_Distress_Observation_Scale.pdf (Ctrl +  left click to view)  PAINAD Score:  http://geriatrictoolkit.[facility]/cog/painad.pdf (Ctrl +  left click to view)

## 2025-05-26 NOTE — PROGRESS NOTE ADULT - NS ATTEST RISK PROBLEM GEN_ALL_CORE FT
Addendum  created 02/19/20 0744 by MICHAELA Lucas CRNA    Intraprocedure Meds edited, Orders acknowledged in Narrator
1. Number and complexity of problems addressed for this patient:    1.1 Moderate (At least 1)  [ ] 1 or more chronic illnesses with exacerbation, progression, or side effects of treatment  [ ] 2 or more stable chronic illnesses  [ ] 1 undiagnosed new problem with uncertain prognosis  [ ] 1 acute illness with systemic symptoms  [ ] 1 acute complicated injury  1.2 High (At least 1)   [ ] 1 or more chronic illnesses with severe exacerbation, progression, or side effects of treatment  [ x] 1 acute or chronic illnesses or injuries that may pose a threat to life or bodily function    2. Amount and/or Complexity of Data that was Reviewed and Analyzed for this case:       Moderate (1 out of 3)       High (2 out of 3)  2.1. (Any combination of 3 of the following)   [x ] Prior External notes were reviewed  [ ] Each test result was reviewed (see "LABS" and "RADIOLOGY & ADDITIONAL STUDIES" above)  [ ] The following tests were ordered and/or reviewed (Only count 1 point for ordering or reviewing a unique test):  	[ ]CBC  	[ ] Chemistry   	[ ] Imaging   	[ ] Other:   [ ] Assessment requiring an independent historian   		Name of historian and relationship:   2.2  [ ] Personally review and interpretation of  image or testing   2.3  [x ] Discussion of management or test interpretation with external physician/other qualified health care professional\appropriate source (not separately reported)    3. Risk of Complications and/or Morbidity or Mortality of for this Patient’s Management:  3.1 Moderate risk of morbidity from additional diagnostic testing or treatment (At least 1):   [ ] Prescription drug management   [ ] Decision regarding minor surgery, treatment, or procedure with identified patient or procedure risk factors  [ ] Decision regarding elective major surgery, treatment, or procedure without identified patient or procedure risk factors   [ ] Diagnosis or treatment significantly limited by social determinants of health   [ ] Other:   3.2 High risk of morbidity from additional diagnostic testing or treatment (At least 1):   [ ] Drug therapy requiring intensive monitoring for toxicity   [ ] Decision regarding elective major surgery, treatment, or procedure with identified patient or procedure risk factors   [ ] Decision regarding emergency major surgery, treatment, or procedure   [ ] Decision regarding hospitalization or escalation of hospital-level of care  [ ] Decision not to resuscitate, not to intubate, or to de-escalate care because of poor prognosis   [ ] Decision to proceed or not with artificial nutrition   [ x] Parenteral controlled substance  [ ] Other:
1. Number and complexity of problems addressed for this patient:    1.1 Moderate (At least 1)  [ ] 1 or more chronic illnesses with exacerbation, progression, or side effects of treatment  [ ] 2 or more stable chronic illnesses  [ ] 1 undiagnosed new problem with uncertain prognosis  [ ] 1 acute illness with systemic symptoms  [ ] 1 acute complicated injury  1.2 High (At least 1)   [ ] 1 or more chronic illnesses with severe exacerbation, progression, or side effects of treatment  [ x] 1 acute or chronic illnesses or injuries that may pose a threat to life or bodily function    2. Amount and/or Complexity of Data that was Reviewed and Analyzed for this case:       Moderate (1 out of 3)       High (2 out of 3)  2.1. (Any combination of 3 of the following)   [x ] Prior External notes were reviewed  [ ] Each test result was reviewed (see "LABS" and "RADIOLOGY & ADDITIONAL STUDIES" above)  [ ] The following tests were ordered and/or reviewed (Only count 1 point for ordering or reviewing a unique test):  	[ ]CBC  	[ ] Chemistry   	[ ] Imaging   	[ ] Other:   [ ] Assessment requiring an independent historian   		Name of historian and relationship:   2.2  [ ] Personally review and interpretation of  image or testing   2.3  [x ] Discussion of management or test interpretation with external physician/other qualified health care professional\appropriate source (not separately reported)    3. Risk of Complications and/or Morbidity or Mortality of for this Patient’s Management:  3.1 Moderate risk of morbidity from additional diagnostic testing or treatment (At least 1):   [ ] Prescription drug management   [ ] Decision regarding minor surgery, treatment, or procedure with identified patient or procedure risk factors  [ ] Decision regarding elective major surgery, treatment, or procedure without identified patient or procedure risk factors   [ ] Diagnosis or treatment significantly limited by social determinants of health   [ ] Other:   3.2 High risk of morbidity from additional diagnostic testing or treatment (At least 1):   [ ] Drug therapy requiring intensive monitoring for toxicity   [ ] Decision regarding elective major surgery, treatment, or procedure with identified patient or procedure risk factors   [ ] Decision regarding emergency major surgery, treatment, or procedure   [ ] Decision regarding hospitalization or escalation of hospital-level of care  [ ] Decision not to resuscitate, not to intubate, or to de-escalate care because of poor prognosis   [ ] Decision to proceed or not with artificial nutrition   [ x] Parenteral controlled substance  [ ] Other:
Total time spent including the following  [ x] Physical chart review and documentation   An extensive review of the physical chart, electronic health record, and documentation was conducted to obtain collateral information including but not limited to:   - Current inpatient records (ED, H&P, primary team, and consultants [IR])   - Inpatient values/results (CBC, CMP, CT)   - Prior inpatient records   - Current or proposed treatment plans   - Pharmacotherapy review   [x ]discussion with the interdisciplinary palliative care team -RN, , clinicians, trainees,   [ x]discussion with the patient/family/decision maker  [ x]Physical Exam and/or review of systems   [ x]Formulation of assessment and plan   [ x]Evaluating for response to treatment and side effects of opioids or benzodiazepines.  [ x]Review of care coordination documentation.

## 2025-05-26 NOTE — PROGRESS NOTE ADULT - ATTENDING COMMENTS
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  Family has opted for comfort measures only and patient transferred to PCU for end of life care, symptom management and disposition planning pending clinical course.      Patient seen this AM. NO family at bedside during encounter. Patient mumbled incoherently but no meaningful mental status.   Patient required 0.5mg IV dilaudid x2 over the past 24 hours.   Plan to administer suppository if no BM.   Patient appears inpatient hospice appropriate.     In the setting of parenteral controlled substance administration, clinical monitoring required for side effects such as respiratory depression, constipation and opioid induced neurotoxicity due to organ failure.    Patient assessment and plan discussed on interdisciplinary team rounds today.
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  Family has opted for comfort measures only and patient transferred to PSCU for end of life care, symptom management and disposition planning pending clinical course.      Patient seen this AM. NO family at bedside during encounter. Patient mumbled incoherently but no meaningful mental status.   Patient required 0.5mg IV dilaudid x2, 0.25mg IV ativan x1 over the past 24 hours.   Plan to administer suppository if no BM.   Patient appears inpatient hospice appropriate.     In the setting of parenteral controlled substance administration, clinical monitoring required for side effects such as respiratory depression, constipation and opioid induced neurotoxicity due to organ failure.    Patient assessment and plan discussed on interdisciplinary team rounds today.
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  Family has opted for comfort measures only and patient transferred to PSCU for end of life care, symptom management and disposition planning pending clinical course.      Patient seen this AM. NO family at bedside during encounter. Patient required 0.5mg IV dilaudid x2, 0.25mg IV ativan x1 over the past 24 hours. Per RN, patient self removed IV this AM and attempted to get out of bed.   He was not arouseable during my encounter.   Plan to administer suppository if no BM.     In the setting of parenteral controlled substance administration, clinical monitoring required for side effects such as respiratory depression, constipation and opioid induced neurotoxicity due to organ failure.    Patient assessment and plan discussed on interdisciplinary team rounds today.

## 2025-05-26 NOTE — PROGRESS NOTE ADULT - NSPROGADDITIONALINFOA_GEN_ALL_CORE
Charlie Cabello MD  Hospice and Palliative Care Fellow  Geriatrics and Palliative Care Team  This note and recommendations are not finalized until signed by attending physician.

## 2025-05-26 NOTE — PROGRESS NOTE ADULT - PROBLEM SELECTOR PLAN 1
- required 2 PRNs in 24 hours  - c/w IV Dilaudid 0.2 mg q1hr PRN for moderate pain.   - c/w IV Dilaudid 0.5 mg q1hr PRN for severe pain.   - Bowel regimen while on opioids.  Monitor for constipation.  - Dulcolax supp today

## 2025-05-27 DIAGNOSIS — R45.1 RESTLESSNESS AND AGITATION: ICD-10-CM

## 2025-05-27 DIAGNOSIS — K59.00 CONSTIPATION, UNSPECIFIED: ICD-10-CM

## 2025-05-27 DIAGNOSIS — R53.2 FUNCTIONAL QUADRIPLEGIA: ICD-10-CM

## 2025-05-27 LAB
CULTURE RESULTS: SIGNIFICANT CHANGE UP
CULTURE RESULTS: SIGNIFICANT CHANGE UP
SPECIMEN SOURCE: SIGNIFICANT CHANGE UP
SPECIMEN SOURCE: SIGNIFICANT CHANGE UP

## 2025-05-27 PROCEDURE — 99231 SBSQ HOSP IP/OBS SF/LOW 25: CPT | Mod: FS

## 2025-05-27 RX ORDER — HYDROMORPHONE/SOD CHLOR,ISO/PF 2 MG/10 ML
1 SYRINGE (ML) INJECTION
Refills: 0 | Status: DISCONTINUED | OUTPATIENT
Start: 2025-05-27 | End: 2025-05-29

## 2025-05-27 RX ORDER — HYDROMORPHONE/SOD CHLOR,ISO/PF 2 MG/10 ML
0.2 SYRINGE (ML) INJECTION
Refills: 0 | Status: DISCONTINUED | OUTPATIENT
Start: 2025-05-27 | End: 2025-05-27

## 2025-05-27 RX ORDER — HYDROMORPHONE/SOD CHLOR,ISO/PF 2 MG/10 ML
1 SYRINGE (ML) INJECTION ONCE
Refills: 0 | Status: DISCONTINUED | OUTPATIENT
Start: 2025-05-27 | End: 2025-05-27

## 2025-05-27 RX ORDER — LORAZEPAM 4 MG/ML
0.25 VIAL (ML) INJECTION
Refills: 0 | Status: DISCONTINUED | OUTPATIENT
Start: 2025-05-27 | End: 2025-05-29

## 2025-05-27 RX ORDER — HYDROMORPHONE/SOD CHLOR,ISO/PF 2 MG/10 ML
2.5 SYRINGE (ML) INJECTION
Refills: 0 | Status: DISCONTINUED | OUTPATIENT
Start: 2025-05-27 | End: 2025-05-29

## 2025-05-27 RX ORDER — HYDROMORPHONE/SOD CHLOR,ISO/PF 2 MG/10 ML
0.5 SYRINGE (ML) INJECTION
Refills: 0 | Status: DISCONTINUED | OUTPATIENT
Start: 2025-05-27 | End: 2025-05-27

## 2025-05-27 RX ORDER — LORAZEPAM 4 MG/ML
0.25 VIAL (ML) INJECTION
Refills: 0 | Status: DISCONTINUED | OUTPATIENT
Start: 2025-05-27 | End: 2025-05-27

## 2025-05-27 RX ORDER — LORAZEPAM 4 MG/ML
0.25 VIAL (ML) INJECTION ONCE
Refills: 0 | Status: DISCONTINUED | OUTPATIENT
Start: 2025-05-27 | End: 2025-05-27

## 2025-05-27 RX ADMIN — Medication 0.25 MILLIGRAM(S): at 11:14

## 2025-05-27 RX ADMIN — Medication 0.25 MILLIGRAM(S): at 17:40

## 2025-05-27 NOTE — PROGRESS NOTE PEDS - PROBLEM SELECTOR PLAN 2
- Symptoms controlled on current regimen. Changed IV meds to oral in preparation for discharge.  - Hydromorphone 1mg solution q2hr PRN for moderate pain.  - Hydromorphone 2.5mg solution q2hr PRN for severe pain.  - Bowel regimen while on opioids.  Monitor for constipation.

## 2025-05-27 NOTE — PROGRESS NOTE PEDS - PROBLEM SELECTOR PLAN 6
Patient is stable from coronary artery disease standpoint.   - Patient presented with ADHF, admitted to CICU started inotropic support  - Family opted for symptom directed approach.

## 2025-05-27 NOTE — PROGRESS NOTE PEDS - SUBJECTIVE AND OBJECTIVE BOX
GAP TEAM PALLIATIVE CARE UNIT PROGRESS NOTE:      [  ] Patient on hospice program.    INDICATION FOR PALLIATIVE CARE UNIT SERVICES/Interval HPI: 84M, dementia (A&Ox2 baseline), CAD s/p PCI to LAD, HF (EF 29%), severe MR, LV thrombus, presents to [facility] ER with worsening lethargy from [facility]. Admitted for cardiogenic shock, transferred to CICU for AVAPS and dobutamine gtt. HCP opted for a symptom directed/comfort approach to care. Pt transferred to PCU for symptom directed care.    OVERNIGHT EVENTS: Chart reviewed. The patient is seen and examined at the bedside. The patient required PRN Dilaudid 0.5mg IV X1 for dyspnea within a 24hr period 7am-7am.    DNR on chart:      Allergies    palm oil (Unknown)  No Known Drug Allergies    Intolerances    MEDICATIONS  (STANDING):    MEDICATIONS  (PRN):  acetaminophen  Suppository .. 650 milliGRAM(s) Rectal every 6 hours PRN Temp greater or equal to 38C (100.4F), Mild Pain (1 - 3)  bisacodyl Suppository 10 milliGRAM(s) Rectal daily PRN Constipation  glycopyrrolate Injectable 0.4 milliGRAM(s) IV Push every 6 hours PRN audible, excessive secretions  HYDROmorphone   Solution 1 milliGRAM(s) Oral once PRN pain  HYDROmorphone  Injectable 0.5 milliGRAM(s) IV Push every 1 hour PRN Severe Pain (7 - 10)  HYDROmorphone  Injectable 0.5 milliGRAM(s) IV Push every 1 hour PRN dyspnea  HYDROmorphone  Injectable 0.2 milliGRAM(s) IV Push every 1 hour PRN Moderate Pain (4 - 6)  LORazepam   Injectable 0.25 milliGRAM(s) IV Push every 1 hour PRN Anxiety, agiation, delirium    ITEMS UNCHECKED ARE NOT PRESENT    PRESENT SYMPTOMS: [ X]Unable to self-report see PAINAD, RDOS below  Source if other than patient:  [ ]Family   [ X]Team     Pain: [ ] yes [ ] no  QOL impact -   Location -                    Aggravating factors -  Quality -  Radiation -  Timing-  Severity (0-10 scale):  Minimal acceptable level (0-10 scale):       PCSSQ [Palliative Care Spiritual Screening Question]   Severity (0-10):  Score of 4 or > indicate consideration of Chaplaincy referral.  Chaplaincy Referral: [ ] yes [ ] refused [X ] following [ ] deferred Last seen on 5/23    Caregiver [place]? : [ ] yes [ ] no [ X] deferred:  Social work referral [ ] Patient & Family Centered Care Referral [ ]   Anticipatory Grief present?:  [X ] yes [ ] no [ ] deferred:  Social work referral [X ] Patient & Family Centered Care Referral [ ]  	  Other Symptoms:  [ ]All other review of systems negative- unable to assess.    PHYSICAL EXAM:   Vital Signs Last 24 Hrs  T(C): 36.6 (27 May 2025 07:52), Max: 36.6 (27 May 2025 07:52)  T(F): 97.9 (27 May 2025 07:52), Max: 97.9 (27 May 2025 07:52)  HR: 74 (27 May 2025 07:52) (74 - 74)  BP: 94/67 (27 May 2025 07:52) (94/67 - 94/67)  BP(mean): --  RR: 16 (27 May 2025 07:52) (16 - 16)  SpO2: 95% (27 May 2025 07:52) (95% - 95%)    Parameters below as of 27 May 2025 07:52  Patient On (Oxygen Delivery Method): room air     I&O's Summary    26 May 2025 07:01  -  27 May 2025 07:00  --------------------------------------------------------  IN: 0 mL / OUT: 350 mL / NET: -350 mL      GENERAL: [ ] Cachexia  [X ]Alert  [ ]Oriented x   [ ]Lethargic  [ ]Unarousable  [ ]Verbal  [X ]Non-Verbal patient able to nod to yes/no questions  Behavioral:   [ ] Anxiety  [ ] Delirium [ ] Agitation [ ] Other  HEENT:  [ ]Normal   [ X]Dry mouth   [ ]ET Tube/Trach  [ ]Oral lesions  PULMONARY:   [ ]Clear [ ]Tachypnea  [ ]Audible excessive secretions   [ ]Rhonchi        [ ]Right [ ]Left [ ]Bilateral  [ ]Crackles        [ ]Right [ ]Left [ ]Bilateral  [ ]Wheezing     [ ]Right [ ]Left [ ]Bilateral  [ X]Diminished BS [ ]Right [ ]Left [X ]Bilateral    CARDIOVASCULAR:    [X ]Regular [ ]Irregular [ ]Tachy  [ ]Salvatore [ ]Murmur [ ]Other  GASTROINTESTINAL:  [X ]Soft  [ ]Distended   [ X]+BS  [ X]Non tender [ ]Tender  [ ]Other [ ]PEG [ ]OGT/ NGT   Last BM:  5/21  GENITOURINARY:  [X ]Normal [X ] Incontinent   [ ]Oliguria/Anuria   [X ]Woodruff  MUSCULOSKELETAL:   [ ]Normal   [ X]Weakness  [ X]Bed/Wheelchair bound [ ]Edema  NEUROLOGIC:   [ ]No focal deficits  [X ] Cognitive impairment  [ ] Dysphagia [ ]Dysarthria [ ] Paresis [ ]Other   SKIN:   [ ]Normal  [ ]Rash  [ ]Other  [X ]Pressure ulcer(s)  [ X]y [ ]n  Present on admission  Sacral DTI. Please see nursing assessment for further details for which I have reviewed.    CRITICAL CARE:  [ ] Shock Present  [ ]Septic [ ]Cardiogenic [ ]Neurologic [ ]Hypovolemic  [ ]  Vasopressors [ ]  Inotropes   [ ] Respiratory failure present [ ] Mechanical Ventilation [ ] Non-invasive ventilatory support [ ] High-Flow    [ ] Acute  [ ] Chronic [ ] Hypoxic  [ ] Hypercarbic [ ] Other  [X ] Other organ failure- brain, skin, kidneys    LABS: Reviewed.    RADIOLOGY & ADDITIONAL STUDIES: Reviewed    PROTEIN CALORIE MALNUTRITION: [ ] mild [ ] moderate [ ] severe  [ ] underweight [ ] morbid obesity    https://www.andeal.org/vault/2440/web/files/ONC/Table_Clinical%20Characteristics%20to%20Document%20Malnutrition-White%20JV%20et%20al%202012.pdf      [ X] PPSV2 < or = 30% [ ] significant weight loss [ ] poor nutritional intake [ ] anasarca   Artificial Nutrition [ ]     Other REFERRALS:    [ ] Hospice  [ ]Child Life  [X ]Social Work  [ ]Case management [ ]Holistic Therapy [ ] Physical Therapy [ ] Dietary         Palliative Performance Scale:  http://[facility].org/files/news/palliative_performance_scale_ppsv2.pdf  (Ctrl +  left click to view)  Respiratory Distress Observation Tool:  https://homecareinformation.net/handouts/hen/Respiratory_Distress_Observation_Scale.pdf (Ctrl +  left click to view)  PAINAD Score:  http://geriatrictoolkit.[facility]/cog/painad.pdf (Ctrl +  left click to view)

## 2025-05-27 NOTE — PROGRESS NOTE ADULT - PROBLEM SELECTOR PLAN 7
- I spoke with the patient's HCP/nephew, Johny, and updated him on the current clinical condition and plan of care. I educated him on what to expect, answered his questions, and provided emotional support. I explained that we will trial the patient on oral medications for the next 24 hours. If he tolerates them, the plan is to discharge him back to the [facility]. - I spoke with the patient's HCP/nephew, Johny, and updated him on the current clinical condition and plan of care. I educated him on what to expect, answered his questions, and provided emotional support. I explained that we will trial the patient on oral medications for the next 24 hours. If he tolerates them, the plan is to discharge him back to the [facility].

## 2025-05-27 NOTE — PROGRESS NOTE PEDS - PROBLEM SELECTOR PLAN 7
- symptom management as above  - Healthcare proxy: Johny Pruitt  - Code status: DNR/DNI: Trial of NIV  - Remains in PCU for symptom management and monitoring, dispo pending clinical course

## 2025-05-27 NOTE — PROGRESS NOTE ADULT - PROBLEM SELECTOR PLAN 1
Symptom appears controlled. Changed IV meds to oral in preparation for discharge.  - Hydromorphone 2.5mg solution q2hr PRN for dyspnea.  - Bowel regimen while on opioids.  Monitor for constipation.

## 2025-05-27 NOTE — PROGRESS NOTE ADULT - SUBJECTIVE AND OBJECTIVE BOX
GAP TEAM PALLIATIVE CARE UNIT PROGRESS NOTE:      [  ] Patient on hospice program.    INDICATION FOR PALLIATIVE CARE UNIT SERVICES/Interval HPI: 84M, dementia (A&Ox2 baseline), CAD s/p PCI to LAD, HF (EF 29%), severe MR, LV thrombus, presents to [facility] ER with worsening lethargy from [facility]. Admitted for cardiogenic shock, transferred to CICU for AVAPS and dobutamine gtt. HCP opted for a symptom directed/comfort approach to care. Pt transferred to PCU for symptom directed care.    OVERNIGHT EVENTS: Chart reviewed. The patient is seen and examined at the bedside. The patient required PRN Dilaudid 0.5mg IV X1 for dyspnea within a 24hr period 7am-7am.    DNR on chart:   Allergies    palm oil (Unknown)  No Known Drug Allergies    Intolerances    MEDICATIONS  (STANDING):    MEDICATIONS  (PRN):  acetaminophen  Suppository .. 650 milliGRAM(s) Rectal every 6 hours PRN Temp greater or equal to 38C (100.4F), Mild Pain (1 - 3)  bisacodyl Suppository 10 milliGRAM(s) Rectal daily PRN Constipation  glycopyrrolate Injectable 0.4 milliGRAM(s) IV Push every 6 hours PRN audible, excessive secretions  HYDROmorphone   Solution 1 milliGRAM(s) Oral every 2 hours PRN Moderate Pain (4 - 6)  HYDROmorphone   Solution 2.5 milliGRAM(s) Oral every 2 hours PRN Severe Pain (7 - 10)  HYDROmorphone   Solution 2.5 milliGRAM(s) Oral every 2 hours PRN dyspnea  LORazepam    Concentrate 0.25 milliGRAM(s) Oral every 2 hours PRN Agitation    ITEMS UNCHECKED ARE NOT PRESENT    PRESENT SYMPTOMS: [X ]Unable to self-report see PAINAD, RDOS below  Source if other than patient:  [ ]Family   [X ]Team     Pain: [ ] yes [ ] no  QOL impact -   Location -                    Aggravating factors -  Quality -  Radiation -  Timing-  Severity (0-10 scale):  Minimal acceptable level (0-10 scale):     Dyspnea:                           [ ]Mild [ ]Moderate [ ]Severe  Anxiety:                             [ ]Mild [ ]Moderate [ ]Severe  Fatigue:                             [ ]Mild [ ]Moderate [ ]Severe  Nausea:                             [ ]Mild [ ]Moderate [ ]Severe  Loss of appetite:              [ ]Mild [ ]Moderate [ ]Severe  Constipation:                    [ ]Mild [ ]Moderate [ ]Severe    PCSSQ [Palliative Care Spiritual Screening Question]   Severity (0-10):  Score of 4 or > indicate consideration of Chaplaincy referral.  Chaplaincy Referral: [ ] yes [ ] refused [X ] following [ ] deferred Last seen on 5/23    Caregiver [place]? : [ ] yes [ ] no [ X] deferred:  Social work referral [ ] Patient & Family Centered Care Referral [ ]   Anticipatory Grief present?:  [X ] yes [ ] no [ ] deferred:  Social work referral [X ] Patient & Family Centered Care Referral [ ]  	  Other Symptoms:  [ ]All other review of systems negative- unable to assess.    PHYSICAL EXAM:   Vital Signs Last 24 Hrs  T(C): 36.6 (27 May 2025 07:52), Max: 36.6 (27 May 2025 07:52)  T(F): 97.9 (27 May 2025 07:52), Max: 97.9 (27 May 2025 07:52)  HR: 74 (27 May 2025 07:52) (74 - 74)  BP: 94/67 (27 May 2025 07:52) (94/67 - 94/67)  BP(mean): --  RR: 16 (27 May 2025 07:52) (16 - 16)  SpO2: 95% (27 May 2025 07:52) (95% - 95%)    Parameters below as of 27 May 2025 07:52  Patient On (Oxygen Delivery Method): room air     I&O's Summary    26 May 2025 07:01  -  27 May 2025 07:00  --------------------------------------------------------  IN: 0 mL / OUT: 350 mL / NET: -350 mL    GENERAL:  [ ] Cachexia  [X ]Alert  [ ]Oriented x   [ ]Lethargic  [ ]Unarousable  [ ]Verbal  [X ]Non-Verbal patient able to nod to yes/no questions  Behavioral:   [ ] Anxiety  [ ] Delirium [ ] Agitation [ ] Other  HEENT:  [ ]Normal   [ X]Dry mouth   [ ]ET Tube/Trach  [ ]Oral lesions  PULMONARY:   [ ]Clear [ ]Tachypnea  [ ]Audible excessive secretions   [ ]Rhonchi        [ ]Right [ ]Left [ ]Bilateral  [ ]Crackles        [ ]Right [ ]Left [ ]Bilateral  [ ]Wheezing     [ ]Right [ ]Left [ ]Bilateral  [ X]Diminished BS [ ]Right [ ]Left [X ]Bilateral    CARDIOVASCULAR:    [X ]Regular [ ]Irregular [ ]Tachy  [ ]Salvatore [ ]Murmur [ ]Other  GASTROINTESTINAL:  [X ]Soft  [ ]Distended   [ X]+BS  [ X]Non tender [ ]Tender  [ ]Other [ ]PEG [ ]OGT/ NGT   Last BM:  5/21  GENITOURINARY:  [X ]Normal [X ] Incontinent   [ ]Oliguria/Anuria   [X ]Woodruff  MUSCULOSKELETAL:   [ ]Normal   [ X]Weakness  [ X]Bed/Wheelchair bound [ ]Edema  NEUROLOGIC:   [ ]No focal deficits  [X ] Cognitive impairment  [ ] Dysphagia [ ]Dysarthria [ ] Paresis [ ]Other   SKIN:   [ ]Normal  [ ]Rash  [ ]Other  [X ]Pressure ulcer(s)  [ X]y [ ]n  Present on admission  Sacral DTI. Please see nursing assessment for further details for which I have reviewed.    CRITICAL CARE:  [ ] Shock Present  [ ]Septic [ ]Cardiogenic [ ]Neurologic [ ]Hypovolemic  [ ]  Vasopressors [ ]  Inotropes   [ ] Respiratory failure present [ ] Mechanical Ventilation [ ] Non-invasive ventilatory support [ ] High-Flow    [ ] Acute  [ ] Chronic [ ] Hypoxic  [ ] Hypercarbic [ ] Other  [X ] Other organ failure- brain, skin, kidneys    LABS: Reviewed.    RADIOLOGY & ADDITIONAL STUDIES: Reviewed    PROTEIN CALORIE MALNUTRITION: [ ] mild [ ] moderate [ ] severe  [ ] underweight [ ] morbid obesity    https://www.andeal.org/vault/2440/web/files/ONC/Table_Clinical%20Characteristics%20to%20Document%20Malnutrition-White%20JV%20et%20al%202012.pdf    Height (cm): 180.3 (05-21-25 @ 23:46), 175.3 (05-14-25 @ 16:35), 175.3 (05-06-25 @ 18:21)  Weight (kg): 63 (05-21-25 @ 23:46), 68 (05-06-25 @ 18:21), 68 (03-01-25 @ 16:55)  BMI (kg/m2): 19.4 (05-21-25 @ 23:46), 22.1 (05-14-25 @ 16:35), 22.1 (05-06-25 @ 18:21)    [X ] PPSV2 < or = 30% [ ] significant weight loss [ ] poor nutritional intake [ ] anasarca   Artificial Nutrition [ ]     Other REFERRALS:    [ ] Hospice  [ ]Child Life  [X ]Social Work  [ ]Case management [ ]Holistic Therapy [ ] Physical Therapy [ ] Dietary         Palliative Performance Scale:  http://npcrc.org/files/news/palliative_performance_scale_ppsv2.pdf  (Ctrl +  left click to view)  Respiratory Distress Observation Tool:  https://homecareinformation.net/handouts/hen/Respiratory_Distress_Observation_Scale.pdf (Ctrl +  left click to view)  PAINAD Score:  http://geriatrictoolkit.[place].[facility]/cog/painad.pdf (Ctrl +  left click to view)

## 2025-05-27 NOTE — PROGRESS NOTE ADULT - PROBLEM SELECTOR PLAN 4
- Last BM documented on 5/21. Abdomen is soft +BS  - Continue with Dulcolax 10mg suppository daily PRN ( last dose given on 5/26)

## 2025-05-27 NOTE — PROGRESS NOTE PEDS - PROBLEM SELECTOR PLAN 1
- Symptom appears controlled. Changed IV meds to oral in preparation for discharge.  - Hydromorphone 2.5mg solution q2hr PRN for  - Bowel regimen while on opioids.  Monitor for constipation.

## 2025-05-27 NOTE — PROGRESS NOTE ADULT - NS ATTEND AMEND GEN_ALL_CORE FT
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  Family has opted for comfort measures only and patient transferred to PSCU for end of life care, symptom management and disposition planning pending clinical course.      In the setting of parenteral controlled substance administration, clinical monitoring required for side effects such as respiratory depression, constipation and opioid induced neurotoxicity due to organ failure. To transition to oral meds in anticipation of dc back to the [place] with pall/hospice services.      Family updated as to current clinical condition and plan of care.  Educated as to what to expect, questions answered and emotional support provided.    Patient assessment and plan discussed on interdisciplinary team rounds today.

## 2025-05-28 ENCOUNTER — TRANSCRIPTION ENCOUNTER (OUTPATIENT)
Age: 85
End: 2025-05-28

## 2025-05-28 PROCEDURE — 99231 SBSQ HOSP IP/OBS SF/LOW 25: CPT | Mod: FS

## 2025-05-28 NOTE — PROGRESS NOTE ADULT - SUBJECTIVE AND OBJECTIVE BOX
GAP TEAM PALLIATIVE CARE UNIT PROGRESS NOTE:      [  ] Patient on hospice program.      INDICATION FOR PALLIATIVE CARE UNIT SERVICES/Interval HPI: 84M, dementia (A&Ox2 baseline), CAD s/p PCI to LAD, HF (EF 29%), severe MR, LV thrombus, presents to [facility] ER with worsening lethargy from [facility]. Admitted for cardiogenic shock, transferred to CICU for AVAPS and dobutamine gtt. HCP opted for a symptom directed/comfort approach to care. Pt transferred to PCU for symptom directed care.    OVERNIGHT EVENTS: Chart reviewed. The patient is seen and examined at the bedside. The patient required PRN Ativan 0.25mg solution X 2  for agitation within a 24hr period 7am-7am.    DNR on chart:      Allergies    palm oil (Unknown)  No Known Drug Allergies    Intolerances    MEDICATIONS  (STANDING):    MEDICATIONS  (PRN):  acetaminophen  Suppository .. 650 milliGRAM(s) Rectal every 6 hours PRN Temp greater or equal to 38C (100.4F), Mild Pain (1 - 3)  bisacodyl Suppository 10 milliGRAM(s) Rectal daily PRN Constipation  glycopyrrolate Injectable 0.4 milliGRAM(s) IV Push every 6 hours PRN audible, excessive secretions  HYDROmorphone   Solution 1 milliGRAM(s) Oral every 2 hours PRN Moderate Pain (4 - 6)  HYDROmorphone   Solution 2.5 milliGRAM(s) Oral every 2 hours PRN Severe Pain (7 - 10)  HYDROmorphone   Solution 2.5 milliGRAM(s) Oral every 2 hours PRN dyspnea  LORazepam    Concentrate 0.25 milliGRAM(s) Oral every 2 hours PRN Agitation    ITEMS UNCHECKED ARE NOT PRESENT    PRESENT SYMPTOMS: [ X]Unable to self-report see PAINAD, RDOS below  Source if other than patient:  [ ]Family   [X ]Team     Pain: [ ] yes [ ] no  QOL impact -   Location -                    Aggravating factors -  Quality -  Radiation -  Timing-  Severity (0-10 scale):  Minimal acceptable level (0-10 scale):       PCSSQ [Palliative Care Spiritual Screening Question]   Severity (0-10):  Score of 4 or > indicate consideration of Chaplaincy referral.  Chaplaincy Referral: [ ] yes [ ] refused [ X] following [ ] deferred Last seen on 5/23    Caregiver [place]? : [ ] yes [ X] no [ ] deferred:  Social work referral [X ] Patient & Family Centered Care Referral [ ]   Anticipatory Grief present?:  [X ] yes [ ] no [ ] deferred:  Social work referral [X ] Patient & Family Centered Care Referral [ ]  	  Other Symptoms:  [ ]All other review of systems negative- Unable to assess.    PHYSICAL EXAM:   Vital Signs Last 24 Hrs  T(C): 36.8 (28 May 2025 08:10), Max: 36.8 (28 May 2025 08:10)  T(F): 98.3 (28 May 2025 08:10), Max: 98.3 (28 May 2025 08:10)  HR: 77 (28 May 2025 08:10) (77 - 77)  BP: 85/53 (28 May 2025 08:10) (85/53 - 85/53)  BP(mean): --  RR: 18 (28 May 2025 08:10) (18 - 18)  SpO2: 94% (28 May 2025 08:10) (94% - 94%)    Parameters below as of 28 May 2025 08:10  Patient On (Oxygen Delivery Method): room air     I&O's Summary    27 May 2025 07:01  -  28 May 2025 07:00  --------------------------------------------------------  IN: 0 mL / OUT: 550 mL / NET: -550 mL      GENERAL:  [ ] Cachexia  [X ]Alert  [ ]Oriented x   [ ]Lethargic  [ ]Unarousable  [ ]Verbal  [X ]Non-Verbal   Behavioral:   [ ] Anxiety  [ ] Delirium [ ] Agitation [ ] Other  HEENT:  [ ]Normal   [ X]Dry mouth   [ ]ET Tube/Trach  [ ]Oral lesions  PULMONARY:   [ ]Clear [ ]Tachypnea  [ ]Audible excessive secretions   [ ]Rhonchi        [ ]Right [ ]Left [ ]Bilateral  [ ]Crackles        [ ]Right [ ]Left [ ]Bilateral  [ ]Wheezing     [ ]Right [ ]Left [ ]Bilateral  [ X]Diminished BS [ ]Right [ ]Left [X ]Bilateral    CARDIOVASCULAR:    [X ]Regular [ ]Irregular [ ]Tachy  [ ]Salvatore [ ]Murmur [ ]Other  GASTROINTESTINAL:  [X ]Soft  [ ]Distended   [ X]+BS  [ X]Non tender [ ]Tender  [ ]Other [ ]PEG [ ]OGT/ NGT   Last BM:  5/21  GENITOURINARY:  [X ]Normal [X ] Incontinent   [ ]Oliguria/Anuria   [X ]Woodruff  MUSCULOSKELETAL:   [ ]Normal   [ X]Weakness  [ X]Bed/Wheelchair bound [ ]Edema  NEUROLOGIC:   [ ]No focal deficits  [X ] Cognitive impairment  [ ] Dysphagia [ ]Dysarthria [ ] Paresis [ ]Other   SKIN:   [ ]Normal  [ ]Rash  [ ]Other  [X ]Pressure ulcer(s)  [ X]y [ ]n  Present on admission  Sacral DTI. Please see nursing assessment for further details for which I have reviewed.    CRITICAL CARE:  [ ] Shock Present  [ ]Septic [ ]Cardiogenic [ ]Neurologic [ ]Hypovolemic  [ ]  Vasopressors [ ]  Inotropes   [ ] Respiratory failure present [ ] Mechanical Ventilation [ ] Non-invasive ventilatory support [ ] High-Flow    [ ] Acute  [ ] Chronic [ ] Hypoxic  [ ] Hypercarbic [ ] Other  [X ] Other organ failure- brain, skin, kidneys    LABS: None new.    RADIOLOGY & ADDITIONAL STUDIES: None new.      PROTEIN CALORIE MALNUTRITION: [ ] mild [ ] moderate [ ] severe  [ ] underweight [ ] morbid obesity    https://www.andeal.org/vault/2440/web/files/ONC/Table_Clinical%20Characteristics%20to%20Document%20Malnutrition-White%20JV%20et%20al%202012.pdf    [X ] PPSV2 < or = 30% [ ] significant weight loss [ ] poor nutritional intake [ ] anasarca   Artificial Nutrition [ ]     Other REFERRALS:    [ ] Hospice  [ ]Child Life  [X ]Social Work  [ ]Case management [ ]Holistic Therapy [ ] Physical Therapy [ ] Dietary         Palliative Performance Scale:  http://npcrc.org/files/news/palliative_performance_scale_ppsv2.pdf  (Ctrl +  left click to view)  Respiratory Distress Observation Tool:  https://homecareinformation.net/handouts/hen/Respiratory_Distress_Observation_Scale.pdf (Ctrl +  left click to view)  PAINAD Score:  http://geriatrictoolkit.[facility]/cog/painad.pdf (Ctrl +  left click to view)   GAP TEAM PALLIATIVE CARE UNIT PROGRESS NOTE:      [  ] Patient on hospice program.      INDICATION FOR PALLIATIVE CARE UNIT SERVICES/Interval HPI: 84M, dementia (A&Ox2 baseline), CAD s/p PCI to LAD, HF (EF 29%), severe MR, LV thrombus, presents to [facility] ER with worsening lethargy from [facility]. Admitted for cardiogenic shock, transferred to CICU for AVAPS and dobutamine gtt. HCP opted for a symptom directed/comfort approach to care. Pt transferred to PCU for symptom directed care.    OVERNIGHT EVENTS: Chart reviewed. The patient is seen and examined at the bedside. The patient required PRN Ativan 0.25mg solution X 2  for agitation within a 24hr period 7am-7am.    DNR on chart: yes/DNI      Allergies    palm oil (Unknown)  No Known Drug Allergies    Intolerances    MEDICATIONS  (STANDING):    MEDICATIONS  (PRN):  acetaminophen  Suppository .. 650 milliGRAM(s) Rectal every 6 hours PRN Temp greater or equal to 38C (100.4F), Mild Pain (1 - 3)  bisacodyl Suppository 10 milliGRAM(s) Rectal daily PRN Constipation  glycopyrrolate Injectable 0.4 milliGRAM(s) IV Push every 6 hours PRN audible, excessive secretions  HYDROmorphone   Solution 1 milliGRAM(s) Oral every 2 hours PRN Moderate Pain (4 - 6)  HYDROmorphone   Solution 2.5 milliGRAM(s) Oral every 2 hours PRN Severe Pain (7 - 10)  HYDROmorphone   Solution 2.5 milliGRAM(s) Oral every 2 hours PRN dyspnea  LORazepam    Concentrate 0.25 milliGRAM(s) Oral every 2 hours PRN Agitation    ITEMS UNCHECKED ARE NOT PRESENT    PRESENT SYMPTOMS: [ X]Unable to self-report see PAINAD, RDOS below  Source if other than patient:  [ ]Family   [X ]Team     Pain: [ ] yes [ ] no  QOL impact -   Location -                    Aggravating factors -  Quality -  Radiation -  Timing-  Severity (0-10 scale):  Minimal acceptable level (0-10 scale):       PCSSQ [Palliative Care Spiritual Screening Question]   Severity (0-10):  Score of 4 or > indicate consideration of Chaplaincy referral.  Chaplaincy Referral: [ ] yes [ ] refused [ X] following [ ] deferred Last seen on 5/23    Caregiver [place]? : [ ] yes [ X] no [ ] deferred:  Social work referral [X ] Patient & Family Centered Care Referral [ ]   Anticipatory Grief present?:  [X ] yes [ ] no [ ] deferred:  Social work referral [X ] Patient & Family Centered Care Referral [ ]  	  Other Symptoms:  [ ]All other review of systems negative- Unable to assess.    PHYSICAL EXAM:   Vital Signs Last 24 Hrs  T(C): 36.8 (28 May 2025 08:10), Max: 36.8 (28 May 2025 08:10)  T(F): 98.3 (28 May 2025 08:10), Max: 98.3 (28 May 2025 08:10)  HR: 77 (28 May 2025 08:10) (77 - 77)  BP: 85/53 (28 May 2025 08:10) (85/53 - 85/53)  BP(mean): --  RR: 18 (28 May 2025 08:10) (18 - 18)  SpO2: 94% (28 May 2025 08:10) (94% - 94%)    Parameters below as of 28 May 2025 08:10  Patient On (Oxygen Delivery Method): room air     I&O's Summary    27 May 2025 07:01  -  28 May 2025 07:00  --------------------------------------------------------  IN: 0 mL / OUT: 550 mL / NET: -550 mL      GENERAL:  [ ] Cachexia  [X ]Alert  [ ]Oriented x   [ ]Lethargic  [ ]Unarousable  [ ]Verbal  [X ]Non-Verbal   Behavioral:   [ ] Anxiety  [ ] Delirium [ ] Agitation [ ] Other  HEENT:  [ ]Normal   [ X]Dry mouth   [ ]ET Tube/Trach  [ ]Oral lesions  PULMONARY:   [ ]Clear [ ]Tachypnea  [ ]Audible excessive secretions   [ ]Rhonchi        [ ]Right [ ]Left [ ]Bilateral  [ ]Crackles        [ ]Right [ ]Left [ ]Bilateral  [ ]Wheezing     [ ]Right [ ]Left [ ]Bilateral  [ X]Diminished BS [ ]Right [ ]Left [X ]Bilateral    CARDIOVASCULAR:    [X ]Regular [ ]Irregular [ ]Tachy  [ ]Salvatore [ ]Murmur [ ]Other  GASTROINTESTINAL:  [X ]Soft  [ ]Distended   [ X]+BS  [ X]Non tender [ ]Tender  [ ]Other [ ]PEG [ ]OGT/ NGT   Last BM:  5/21  GENITOURINARY:  [X ]Normal [X ] Incontinent   [ ]Oliguria/Anuria   [X ]Woodruff  MUSCULOSKELETAL:   [ ]Normal   [ X]Weakness  [ X]Bed/Wheelchair bound [ ]Edema  NEUROLOGIC:   [ ]No focal deficits  [X ] Cognitive impairment  [ ] Dysphagia [ ]Dysarthria [ ] Paresis [ ]Other   SKIN:   [ ]Normal  [ ]Rash  [ ]Other  [X ]Pressure ulcer(s)  [ X]y [ ]n  Present on admission  Sacral DTI. Please see nursing assessment for further details for which I have reviewed.    CRITICAL CARE:  [ ] Shock Present  [ ]Septic [ ]Cardiogenic [ ]Neurologic [ ]Hypovolemic  [ ]  Vasopressors [ ]  Inotropes   [ ] Respiratory failure present [ ] Mechanical Ventilation [ ] Non-invasive ventilatory support [ ] High-Flow    [ ] Acute  [ ] Chronic [ ] Hypoxic  [ ] Hypercarbic [ ] Other  [X ] Other organ failure- brain, skin, kidneys    LABS: None new.    RADIOLOGY & ADDITIONAL STUDIES: None new.      PROTEIN CALORIE MALNUTRITION: [ ] mild [ ] moderate [ ] severe  [ ] underweight [ ] morbid obesity    https://www.andeal.org/vault/2440/web/files/ONC/Table_Clinical%20Characteristics%20to%20Document%20Malnutrition-White%20JV%20et%20al%202012.pdf    [X ] PPSV2 < or = 30% [ ] significant weight loss [ ] poor nutritional intake [ ] anasarca   Artificial Nutrition [ ]     Other REFERRALS:    [ ] Hospice  [ ]Child Life  [X ]Social Work  [ ]Case management [ ]Holistic Therapy [ ] Physical Therapy [ ] Dietary         Palliative Performance Scale:  http://npcrc.org/files/news/palliative_performance_scale_ppsv2.pdf  (Ctrl +  left click to view)  Respiratory Distress Observation Tool:  https://homecareinformation.net/handouts/hen/Respiratory_Distress_Observation_Scale.pdf (Ctrl +  left click to view)  PAINAD Score:  http://geriatrictoolkit.[employer].edu/cog/painad.pdf (Ctrl +  left click to view)

## 2025-05-28 NOTE — DISCHARGE NOTE NURSING/CASE MANAGEMENT/SOCIAL WORK - NSDCVIVACCINE_GEN_ALL_CORE_FT
influenza, high-dose, quadrivalent; 07-Sep-2023 14:53; Akilah Garcia (ERNESTO); [employer]; [id number] (Exp. Date: 07-Jun-2024); IntraMuscular; Deltoid Right.; 0.7 milliLiter(s); VIS (VIS Published: 06-Aug-2021, VIS Presented: 07-Sep-2023);

## 2025-05-28 NOTE — PROGRESS NOTE ADULT - NS ATTEND AMEND GEN_ALL_CORE FT
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  Family has opted for comfort measures only and patient transferred to PSCU for end of life care, symptom management and disposition planning pending clinical course.      In the setting of parenteral controlled substance administration, clinical monitoring required for side effects such as respiratory depression, constipation and opioid induced neurotoxicity due to organ failure. Transitioned to oral meds in anticipation of dc back to the [place] with pall/hospice services.   Awaiting delivery of DME    Family updated as to current clinical condition and plan of care.  Educated as to what to expect, questions answered and emotional support provided.    Patient assessment and plan discussed on interdisciplinary team rounds today.

## 2025-05-28 NOTE — DISCHARGE NOTE NURSING/CASE MANAGEMENT/SOCIAL WORK - PATIENT PORTAL LINK FT
You can access the FollowMyHealth Patient Portal offered by [facility] by registering at the following website: [email or web address]. By joining [employer]’s FollowMyHealth portal, you will also be able to view your health information using other applications (apps) compatible with our system.

## 2025-05-28 NOTE — PROGRESS NOTE ADULT - PROBLEM SELECTOR PLAN 1
- Symptom appears controlled.   - Continue with Hydromorphone 2.5mg solution q2hr PRN for dyspnea. ( None required in a 24hr period 7am-7am)   - Bowel regimen while on opioids.  Monitor for constipation.

## 2025-05-28 NOTE — DISCHARGE NOTE NURSING/CASE MANAGEMENT/SOCIAL WORK - NSDCPEFALRISK_GEN_ALL_CORE
For information on Fall & Injury Prevention, visit: [email or web address] OR  [email or web address] OR  https://www.cdc.gov/steadi/patient.html

## 2025-05-28 NOTE — DISCHARGE NOTE NURSING/CASE MANAGEMENT/SOCIAL WORK - FINANCIAL ASSISTANCE
[facility] provides services at a reduced cost to those who are determined to be eligible through [facility]’s financial assistance program. Information regarding [facility]’s financial assistance program can be found by going to [email or web address] or by calling [phone].

## 2025-05-28 NOTE — PROGRESS NOTE ADULT - PROBLEM SELECTOR PLAN 3
- Continue with Ativan 0.25mg oral concentrate q2hr PRN. ( 2 required in a 24hr period 7am-7am) Ilumya Counseling: I discussed with the patient the risks of tildrakizumab including but not limited to immunosuppression, malignancy, posterior leukoencephalopathy syndrome, and serious infections.  The patient understands that monitoring is required including a PPD at baseline and must alert us or the primary physician if symptoms of infection or other concerning signs are noted.

## 2025-05-29 ENCOUNTER — TRANSCRIPTION ENCOUNTER (OUTPATIENT)
Age: 85
End: 2025-05-29

## 2025-05-29 VITALS
OXYGEN SATURATION: 96 % | DIASTOLIC BLOOD PRESSURE: 50 MMHG | RESPIRATION RATE: 18 BRPM | SYSTOLIC BLOOD PRESSURE: 86 MMHG

## 2025-05-29 PROCEDURE — 93306 TTE W/DOPPLER COMPLETE: CPT

## 2025-05-29 PROCEDURE — 82803 BLOOD GASES ANY COMBINATION: CPT

## 2025-05-29 PROCEDURE — 71045 X-RAY EXAM CHEST 1 VIEW: CPT

## 2025-05-29 PROCEDURE — 86900 BLOOD TYPING SEROLOGIC ABO: CPT

## 2025-05-29 PROCEDURE — 82140 ASSAY OF AMMONIA: CPT

## 2025-05-29 PROCEDURE — 85730 THROMBOPLASTIN TIME PARTIAL: CPT

## 2025-05-29 PROCEDURE — 83605 ASSAY OF LACTIC ACID: CPT

## 2025-05-29 PROCEDURE — 85379 FIBRIN DEGRADATION QUANT: CPT

## 2025-05-29 PROCEDURE — 0241U: CPT

## 2025-05-29 PROCEDURE — 85025 COMPLETE CBC W/AUTO DIFF WBC: CPT

## 2025-05-29 PROCEDURE — 82947 ASSAY GLUCOSE BLOOD QUANT: CPT

## 2025-05-29 PROCEDURE — 82150 ASSAY OF AMYLASE: CPT

## 2025-05-29 PROCEDURE — 99239 HOSP IP/OBS DSCHRG MGMT >30: CPT

## 2025-05-29 PROCEDURE — 87040 BLOOD CULTURE FOR BACTERIA: CPT

## 2025-05-29 PROCEDURE — 70450 CT HEAD/BRAIN W/O DYE: CPT

## 2025-05-29 PROCEDURE — 84295 ASSAY OF SERUM SODIUM: CPT

## 2025-05-29 PROCEDURE — 85396 CLOTTING ASSAY WHOLE BLOOD: CPT

## 2025-05-29 PROCEDURE — 83690 ASSAY OF LIPASE: CPT

## 2025-05-29 PROCEDURE — 81001 URINALYSIS AUTO W/SCOPE: CPT

## 2025-05-29 PROCEDURE — 87640 STAPH A DNA AMP PROBE: CPT

## 2025-05-29 PROCEDURE — 85384 FIBRINOGEN ACTIVITY: CPT

## 2025-05-29 PROCEDURE — 96374 THER/PROPH/DIAG INJ IV PUSH: CPT

## 2025-05-29 PROCEDURE — 86850 RBC ANTIBODY SCREEN: CPT

## 2025-05-29 PROCEDURE — 82330 ASSAY OF CALCIUM: CPT

## 2025-05-29 PROCEDURE — 96375 TX/PRO/DX INJ NEW DRUG ADDON: CPT

## 2025-05-29 PROCEDURE — 87086 URINE CULTURE/COLONY COUNT: CPT

## 2025-05-29 PROCEDURE — 85610 PROTHROMBIN TIME: CPT

## 2025-05-29 PROCEDURE — 84132 ASSAY OF SERUM POTASSIUM: CPT

## 2025-05-29 PROCEDURE — 99285 EMERGENCY DEPT VISIT HI MDM: CPT | Mod: 25

## 2025-05-29 PROCEDURE — 93005 ELECTROCARDIOGRAM TRACING: CPT

## 2025-05-29 PROCEDURE — 80053 COMPREHEN METABOLIC PANEL: CPT

## 2025-05-29 PROCEDURE — 84443 ASSAY THYROID STIM HORMONE: CPT

## 2025-05-29 PROCEDURE — 87641 MR-STAPH DNA AMP PROBE: CPT

## 2025-05-29 PROCEDURE — 84484 ASSAY OF TROPONIN QUANT: CPT

## 2025-05-29 PROCEDURE — 85014 HEMATOCRIT: CPT

## 2025-05-29 PROCEDURE — 82248 BILIRUBIN DIRECT: CPT

## 2025-05-29 PROCEDURE — 93308 TTE F-UP OR LMTD: CPT

## 2025-05-29 PROCEDURE — 84100 ASSAY OF PHOSPHORUS: CPT

## 2025-05-29 PROCEDURE — 87637 SARSCOV2&INF A&B&RSV AMP PRB: CPT

## 2025-05-29 PROCEDURE — 82435 ASSAY OF BLOOD CHLORIDE: CPT

## 2025-05-29 PROCEDURE — 36415 COLL VENOUS BLD VENIPUNCTURE: CPT

## 2025-05-29 PROCEDURE — 83735 ASSAY OF MAGNESIUM: CPT

## 2025-05-29 PROCEDURE — 83036 HEMOGLOBIN GLYCOSYLATED A1C: CPT

## 2025-05-29 PROCEDURE — 86901 BLOOD TYPING SEROLOGIC RH(D): CPT

## 2025-05-29 PROCEDURE — 83880 ASSAY OF NATRIURETIC PEPTIDE: CPT

## 2025-05-29 PROCEDURE — 94660 CPAP INITIATION&MGMT: CPT

## 2025-05-29 PROCEDURE — 85520 HEPARIN ASSAY: CPT

## 2025-05-29 PROCEDURE — 80061 LIPID PANEL: CPT

## 2025-05-29 PROCEDURE — 85018 HEMOGLOBIN: CPT

## 2025-05-29 RX ORDER — ACETAMINOPHEN 500 MG/5ML
2 LIQUID (ML) ORAL
Refills: 0 | DISCHARGE

## 2025-05-29 RX ORDER — ACETAMINOPHEN 500 MG/5ML
1 LIQUID (ML) ORAL
Qty: 20 | Refills: 0
Start: 2025-05-29 | End: 2025-06-02

## 2025-05-29 RX ORDER — ACETAMINOPHEN 500 MG/5ML
1 LIQUID (ML) ORAL
Qty: 3 | Refills: 0
Start: 2025-05-29 | End: 2025-06-04

## 2025-05-29 RX ORDER — POLYETHYLENE GLYCOL 3350 17 G/17G
17 POWDER, FOR SOLUTION ORAL
Refills: 0 | DISCHARGE

## 2025-05-29 RX ORDER — LORAZEPAM 4 MG/ML
0.12 VIAL (ML) INJECTION
Qty: 15 | Refills: 0
Start: 2025-05-29 | End: 2025-06-17

## 2025-05-29 RX ORDER — LORAZEPAM 4 MG/ML
0.25 VIAL (ML) INJECTION
Qty: 30 | Refills: 0
Start: 2025-05-29 | End: 2025-06-17

## 2025-05-29 RX ORDER — NALOXONE HYDROCHLORIDE 0.4 MG/ML
1 INJECTION, SOLUTION INTRAMUSCULAR; INTRAVENOUS; SUBCUTANEOUS
Qty: 1 | Refills: 0
Start: 2025-05-29 | End: 2025-05-29

## 2025-05-29 RX ORDER — BISACODYL 5 MG
1 TABLET, DELAYED RELEASE (ENTERIC COATED) ORAL
Qty: 1 | Refills: 0
Start: 2025-05-29 | End: 2025-06-01

## 2025-05-29 RX ORDER — BISACODYL 5 MG
1 TABLET, DELAYED RELEASE (ENTERIC COATED) ORAL
Qty: 5 | Refills: 0
Start: 2025-05-29 | End: 2025-06-02

## 2025-05-29 RX ORDER — HYDROMORPHONE/SOD CHLOR,ISO/PF 2 MG/10 ML
2.5 SYRINGE (ML) INJECTION
Qty: 45 | Refills: 0
Start: 2025-05-29 | End: 2025-05-31

## 2025-05-29 RX ORDER — SACUBITRIL AND VALSARTAN 49; 51 MG/1; MG/1
1 TABLET, FILM COATED ORAL
Refills: 0 | DISCHARGE

## 2025-05-29 RX ADMIN — Medication 1 MILLIGRAM(S): at 03:53

## 2025-05-29 RX ADMIN — Medication 1 MILLIGRAM(S): at 03:23

## 2025-05-29 NOTE — PROGRESS NOTE ADULT - PROBLEM SELECTOR PLAN 7
- Continue with symptom management as above.  - Plan Is for discharge today to the [facility] with hospice.

## 2025-05-29 NOTE — PROGRESS NOTE ADULT - SUBJECTIVE AND OBJECTIVE BOX
GAP TEAM PALLIATIVE CARE UNIT PROGRESS NOTE:      [  ] Patient on hospice program.    INDICATION FOR PALLIATIVE CARE UNIT SERVICES/Interval HPI: 84M, dementia (A&Ox2 baseline), CAD s/p PCI to LAD, HF (EF 29%), severe MR, LV thrombus, presents to [facility] ER with worsening lethargy from [facility]. Admitted for cardiogenic shock, transferred to CICU for AVAPS and dobutamine gtt. HCP opted for a symptom directed/comfort approach to care. Pt transferred to PCU for symptom directed care.    OVERNIGHT EVENTS: Chart reviewed. The patient is seen and examined at the bedside. The patient required PRN hydromorphone 1mg X1 for moderate pain within a 24hr period 7am-7am.    DNR on chart:      Allergies    palm oil (Unknown)  No Known Drug Allergies    Intolerances    MEDICATIONS  (STANDING):    MEDICATIONS  (PRN):  acetaminophen  Suppository .. 650 milliGRAM(s) Rectal every 6 hours PRN Temp greater or equal to 38C (100.4F), Mild Pain (1 - 3)  bisacodyl Suppository 10 milliGRAM(s) Rectal daily PRN Constipation  glycopyrrolate Injectable 0.4 milliGRAM(s) IV Push every 6 hours PRN audible, excessive secretions  HYDROmorphone   Solution 1 milliGRAM(s) Oral every 2 hours PRN Moderate Pain (4 - 6)  HYDROmorphone   Solution 2.5 milliGRAM(s) Oral every 2 hours PRN Severe Pain (7 - 10)  HYDROmorphone   Solution 2.5 milliGRAM(s) Oral every 2 hours PRN dyspnea  LORazepam    Concentrate 0.25 milliGRAM(s) Oral every 2 hours PRN Agitation    ITEMS UNCHECKED ARE NOT PRESENT    PRESENT SYMPTOMS: [ X]Unable to self-report see PAINAD, RDOS below  Source if other than patient:  [ ]Family   [ X]Team     Pain: [ ] yes [ ] no  QOL impact -   Location -                    Aggravating factors -  Quality -  Radiation -  Timing-  Severity (0-10 scale):  Minimal acceptable level (0-10 scale):       PCSSQ [Palliative Care Spiritual Screening Question]   Severity (0-10):  Score of 4 or > indicate consideration of Chaplaincy referral.  Chaplaincy Referral: [ ] yes [ ] refused [ X] following [ ] deferred Last seen on 5/23    Caregiver [place]? : [ ] yes [ X] no [ ] deferred:  Social work referral [X ] Patient & Family Centered Care Referral [ ]   Anticipatory Grief present?:  [X ] yes [ ] no [ ] deferred:  Social work referral [X ] Patient & Family Centered Care Referral [ ]  	  Other Symptoms:  [ ]All other review of systems negative- Unable to assess.    PHYSICAL EXAM:   Vital Signs Last 24 Hrs  T(C): 36.7 (29 May 2025 07:28), Max: 36.7 (29 May 2025 07:28)  T(F): 98 (29 May 2025 07:28), Max: 98 (29 May 2025 07:28)  HR: 67 (29 May 2025 07:28) (67 - 67)  BP: 98/67 (29 May 2025 07:28) (98/67 - 98/67)  BP(mean): --  RR: 18 (29 May 2025 07:28) (18 - 18)  SpO2: 96% (29 May 2025 07:28) (96% - 96%)    Parameters below as of 29 May 2025 07:28  Patient On (Oxygen Delivery Method): room air     I&O's Summary    28 May 2025 07:01  -  29 May 2025 07:00  --------------------------------------------------------  IN: 0 mL / OUT: 350 mL / NET: -350 mL    GENERAL:  [ ] Cachexia  [X ]Alert  [ ]Oriented x   [ ]Lethargic  [ ]Unarousable  [ ]Verbal  [X ]Non-Verbal   Behavioral:   [ ] Anxiety  [ ] Delirium [ ] Agitation [ ] Other  HEENT:  [ ]Normal   [ X]Dry mouth   [ ]ET Tube/Trach  [ ]Oral lesions  PULMONARY:   [ ]Clear [ ]Tachypnea  [ ]Audible excessive secretions   [ ]Rhonchi        [ ]Right [ ]Left [ ]Bilateral  [ ]Crackles        [ ]Right [ ]Left [ ]Bilateral  [ ]Wheezing     [ ]Right [ ]Left [ ]Bilateral  [ X]Diminished BS [ ]Right [ ]Left [X ]Bilateral    CARDIOVASCULAR:    [X ]Regular [ ]Irregular [ ]Tachy  [ ]Salvatore [ ]Murmur [ ]Other  GASTROINTESTINAL:  [X ]Soft  [ ]Distended   [ X]+BS  [ X]Non tender [ ]Tender  [ ]Other [ ]PEG [ ]OGT/ NGT   Last BM:  5/21  GENITOURINARY:  [X ]Normal [X ] Incontinent   [ ]Oliguria/Anuria   [X ]Woodruff  MUSCULOSKELETAL:   [ ]Normal   [ X]Weakness  [ X]Bed/Wheelchair bound [ ]Edema  NEUROLOGIC:   [ ]No focal deficits  [X ] Cognitive impairment  [ ] Dysphagia [ ]Dysarthria [ ] Paresis [ ]Other   SKIN:   [ ]Normal  [ ]Rash  [ ]Other  [X ]Pressure ulcer(s)  [ X]y [ ]n  Present on admission  Sacral DTI. Please see nursing assessment for further details for which I have reviewed.    CRITICAL CARE:  [ ] Shock Present  [ ]Septic [ ]Cardiogenic [ ]Neurologic [ ]Hypovolemic  [ ]  Vasopressors [ ]  Inotropes   [ ] Respiratory failure present [ ] Mechanical Ventilation [ ] Non-invasive ventilatory support [ ] High-Flow    [ ] Acute  [ ] Chronic [ ] Hypoxic  [ ] Hypercarbic [ ] Other  [X ] Other organ failure- brain, skin, kidneys    LABS: None new.    RADIOLOGY & ADDITIONAL STUDIES: None new.      PROTEIN CALORIE MALNUTRITION: [ ] mild [ ] moderate [ ] severe  [ ] underweight [ ] morbid obesity    https://www.andeal.org/vault/2440/web/files/ONC/Table_Clinical%20Characteristics%20to%20Document%20Malnutrition-White%20JV%20et%20al%587351.pdf      [X ] PPSV2 < or = 30% [ ] significant weight loss [ ] poor nutritional intake [ ] anasarca   Artificial Nutrition [ ]     Other REFERRALS:    [ ] Hospice  [ ]Child Life  [X]Social Work  [ ]Case management [ ]Holistic Therapy [ ] Physical Therapy [ ] Dietary         Palliative Performance Scale:  http://npcrc.org/files/news/palliative_performance_scale_ppsv2.pdf  (Ctrl +  left click to view)  Respiratory Distress Observation Tool:  https://homecareinformation.net/handouts/hen/Respiratory_Distress_Observation_Scale.pdf (Ctrl +  left click to view)  PAINAD Score:  http://geriatrictoolkit.[facility]/cog/painad.pdf (Ctrl +  left click to view)   GAP TEAM PALLIATIVE CARE UNIT PROGRESS NOTE:      [  ] Patient on hospice program.    INDICATION FOR PALLIATIVE CARE UNIT SERVICES/Interval HPI: 84M, dementia (A&Ox2 baseline), CAD s/p PCI to LAD, HF (EF 29%), severe MR, LV thrombus, presents to [facility] ER with worsening lethargy from [facility]. Admitted for cardiogenic shock, transferred to CICU for AVAPS and dobutamine gtt. HCP opted for a symptom directed/comfort approach to care. Pt transferred to PCU for symptom directed care.    OVERNIGHT EVENTS: Chart reviewed. The patient is seen and examined at the bedside. The patient required PRN hydromorphone 1mg X1 for moderate pain within a 24hr period 7am-7am.    DNR on chart: Yes  DNI      Allergies    palm oil (Unknown)  No Known Drug Allergies    Intolerances    MEDICATIONS  (STANDING):    MEDICATIONS  (PRN):  acetaminophen  Suppository .. 650 milliGRAM(s) Rectal every 6 hours PRN Temp greater or equal to 38C (100.4F), Mild Pain (1 - 3)  bisacodyl Suppository 10 milliGRAM(s) Rectal daily PRN Constipation  glycopyrrolate Injectable 0.4 milliGRAM(s) IV Push every 6 hours PRN audible, excessive secretions  HYDROmorphone   Solution 1 milliGRAM(s) Oral every 2 hours PRN Moderate Pain (4 - 6)  HYDROmorphone   Solution 2.5 milliGRAM(s) Oral every 2 hours PRN Severe Pain (7 - 10)  HYDROmorphone   Solution 2.5 milliGRAM(s) Oral every 2 hours PRN dyspnea  LORazepam    Concentrate 0.25 milliGRAM(s) Oral every 2 hours PRN Agitation    ITEMS UNCHECKED ARE NOT PRESENT    PRESENT SYMPTOMS: [ X]Unable to self-report see PAINAD, RDOS below  Source if other than patient:  [ ]Family   [ X]Team     Pain: [ ] yes [ ] no  QOL impact -   Location -                    Aggravating factors -  Quality -  Radiation -  Timing-  Severity (0-10 scale):  Minimal acceptable level (0-10 scale):       PCSSQ [Palliative Care Spiritual Screening Question]   Severity (0-10):  Score of 4 or > indicate consideration of Chaplaincy referral.  Chaplaincy Referral: [ ] yes [ ] refused [ X] following [ ] deferred Last seen on 5/23    Caregiver [place]? : [ ] yes [ X] no [ ] deferred:  Social work referral [X ] Patient & Family Centered Care Referral [ ]   Anticipatory Grief present?:  [X ] yes [ ] no [ ] deferred:  Social work referral [X ] Patient & Family Centered Care Referral [ ]  	  Other Symptoms:  [ ]All other review of systems negative- Unable to assess.    PHYSICAL EXAM:   Vital Signs Last 24 Hrs  T(C): 36.7 (29 May 2025 07:28), Max: 36.7 (29 May 2025 07:28)  T(F): 98 (29 May 2025 07:28), Max: 98 (29 May 2025 07:28)  HR: 67 (29 May 2025 07:28) (67 - 67)  BP: 98/67 (29 May 2025 07:28) (98/67 - 98/67)  BP(mean): --  RR: 18 (29 May 2025 07:28) (18 - 18)  SpO2: 96% (29 May 2025 07:28) (96% - 96%)    Parameters below as of 29 May 2025 07:28  Patient On (Oxygen Delivery Method): room air     I&O's Summary    28 May 2025 07:01  -  29 May 2025 07:00  --------------------------------------------------------  IN: 0 mL / OUT: 350 mL / NET: -350 mL    GENERAL:  [ ] Cachexia  [X ]Alert  [ ]Oriented x   [ ]Lethargic  [ ]Unarousable  [ ]Verbal  [X ]Non-Verbal   Behavioral:   [ ] Anxiety  [ ] Delirium [ ] Agitation [ ] Other  HEENT:  [ ]Normal   [ X]Dry mouth   [ ]ET Tube/Trach  [ ]Oral lesions  PULMONARY:   [ ]Clear [ ]Tachypnea  [ ]Audible excessive secretions   [ ]Rhonchi        [ ]Right [ ]Left [ ]Bilateral  [ ]Crackles        [ ]Right [ ]Left [ ]Bilateral  [ ]Wheezing     [ ]Right [ ]Left [ ]Bilateral  [ X]Diminished BS [ ]Right [ ]Left [X ]Bilateral    CARDIOVASCULAR:    [X ]Regular [ ]Irregular [ ]Tachy  [ ]Salvatore [ ]Murmur [ ]Other  GASTROINTESTINAL:  [X ]Soft  [ ]Distended   [ X]+BS  [ X]Non tender [ ]Tender  [ ]Other [ ]PEG [ ]OGT/ NGT   Last BM:  5/21  GENITOURINARY:  [X ]Normal [X ] Incontinent   [ ]Oliguria/Anuria   [X ]Woodruff  MUSCULOSKELETAL:   [ ]Normal   [ X]Weakness  [ X]Bed/Wheelchair bound [ ]Edema  NEUROLOGIC:   [ ]No focal deficits  [X ] Cognitive impairment  [ ] Dysphagia [ ]Dysarthria [ ] Paresis [ ]Other   SKIN:   [ ]Normal  [ ]Rash  [ ]Other  [X ]Pressure ulcer(s)  [ X]y [ ]n  Present on admission  Sacral DTI. Please see nursing assessment for further details for which I have reviewed.    CRITICAL CARE:  [ ] Shock Present  [ ]Septic [ ]Cardiogenic [ ]Neurologic [ ]Hypovolemic  [ ]  Vasopressors [ ]  Inotropes   [ ] Respiratory failure present [ ] Mechanical Ventilation [ ] Non-invasive ventilatory support [ ] High-Flow    [ ] Acute  [ ] Chronic [ ] Hypoxic  [ ] Hypercarbic [ ] Other  [X ] Other organ failure- brain, skin, kidneys    LABS: None new.    RADIOLOGY & ADDITIONAL STUDIES: None new.      PROTEIN CALORIE MALNUTRITION: [ ] mild [ ] moderate [ ] severe  [ ] underweight [ ] morbid obesity    https://www.andeal.org/vault/2440/web/files/ONC/Table_Clinical%20Characteristics%20to%20Document%20Malnutrition-White%20JV%20et%20al%202012.pdf      [X ] PPSV2 < or = 30% [ ] significant weight loss [ ] poor nutritional intake [ ] anasarca   Artificial Nutrition [ ]     Other REFERRALS:    [ ] Hospice  [ ]Child Life  [X]Social Work  [ ]Case management [ ]Holistic Therapy [ ] Physical Therapy [ ] Dietary         Palliative Performance Scale:  http://npcrc.org/files/news/palliative_performance_scale_ppsv2.pdf  (Ctrl +  left click to view)  Respiratory Distress Observation Tool:  https://homecareinformation.net/handouts/hen/Respiratory_Distress_Observation_Scale.pdf (Ctrl +  left click to view)  PAINAD Score:  http://geriatrictoolkit.[facility]/cog/painad.pdf (Ctrl +  left click to view)

## 2025-05-29 NOTE — PROGRESS NOTE ADULT - RESPIRATORY DISTRESS OBSERVATION: RESPIRATORY RATE
Less than or equal to 18 breaths

## 2025-05-29 NOTE — PROGRESS NOTE ADULT - [place] SYMPTOM ASSESSMENT: OTHER PROBLEM DESCRIPTION
Unable to assess.
[x]Unable to self-report
unable to self report
Unable to assess.
unable to self-report
Unable to assess

## 2025-05-29 NOTE — DISCHARGE NOTE PROVIDER - HOSPITAL COURSE
84M PMHx Dementia, CAD, AFib on eliquis, HFrEF p/w AMS, found to have poor end organ perfusion markers and elevated INR, adm CICU for acute hypoxic respiratory failure requiring bipap/avaps & possible cardiogenic shock.  [facility] team consulted for complex medical decision making in the setting of serious illness and symptoms management. Transferred to PCU for symptom management and monitoring.    5/29- The patient is seen and examined at the bedside. Patient is medically appropriate for discharge back to [facility] with Hospice. Family is in agreement. The  will set up transportation.

## 2025-05-29 NOTE — DISCHARGE NOTE PROVIDER - ATTENDING DISCHARGE PHYSICAL EXAMINATION:
please see progress note from today as this note is a continuation of the e and m service captured for the discharge.

## 2025-05-29 NOTE — PROGRESS NOTE ADULT - NS ATTEND AMEND GEN_ALL_CORE FT
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  Family has opted for comfort measures only and patient transferred to PSCU for end of life care, symptom management and disposition planning pending clinical course.      In the setting of parenteral controlled substance administration, clinical monitoring required for side effects such as respiratory depression, constipation and opioid induced neurotoxicity due to organ failure. Transitioned to oral meds in anticipation of dc back to the [place] with pall/hospice services.  Medically appropriate for dc today to the [place] with hospice.  Family updated as to current clinical condition and plan of care.  Educated as to what to expect, questions answered and emotional support provided.    Patient assessment and plan discussed on interdisciplinary team rounds today.    This note is part of the total e and m service for discharge of this patient.  See discharge note.

## 2025-05-29 NOTE — DISCHARGE NOTE PROVIDER - NSDCCPCAREPLAN_GEN_ALL_CORE_FT
PRINCIPAL DISCHARGE DIAGNOSIS  Diagnosis: Acute on chronic systolic congestive heart failure  Assessment and Plan of Treatment: You came to the hospital from your assisted living because the staff noticed you were confused. In the ER, the doctors found your oxygen was low, so they helped you breathe with a machine called BiPAP. Blood tests showed some problems with your kidneys and liver, and you were given medicine to help with this. Doctors were concerned about possible heart or liver problems, so you were moved to a special care unit.  The geriatric and palliative care team spoke with your family about the best way to care for you. Your family decided to focus on your comfort. You were then moved to the palliative care unit. After you leave the hospital, [facility] will continue to care for you.        SECONDARY DISCHARGE DIAGNOSES  Diagnosis: Acute respiratory failure with hypoxia  Assessment and Plan of Treatment:     Diagnosis: Acute kidney injury superimposed on chronic kidney disease  Assessment and Plan of Treatment:

## 2025-05-29 NOTE — PROGRESS NOTE ADULT - PROBLEM SELECTOR PLAN 2
- Symptoms controlled on current regimen.   - Continue with Hydromorphone 1mg solution q2hr PRN for moderate pain. ( 1 required in a 24hr period 7am-7am)   - Continue with Hydromorphone 2.5mg solution q2hr PRN for severe pain. ( None required in a 24hr period 7am-7am)   - Bowel regimen while on opioids.  Monitor for constipation.
Symptoms controlled on current regimen. Changed IV meds to oral in preparation for discharge.  - Hydromorphone 1mg solution q2hr PRN for moderate pain.  - Hydromorphone 2.5mg solution q2hr PRN for severe pain.  - Bowel regimen while on opioids.  Monitor for constipation.
- symptom appears controlled   - c/w IV Dilaudid 0.5 mg q1hr PRN for dyspnea   - Bowel regimen while on opioids.  Monitor for constipation.
controlled   - c/w IV Dilaudid 0.2 mg q1hr PRN for moderate pain.   - c/w IV Dilaudid 0.5 mg q1hr PRN for severe pain.   - Bowel regimen while on opioids.  Monitor for constipation.
- symptom appears controlled   - c/w IV Dilaudid 0.5 mg q1hr PRN for dyspnea   - Bowel regimen while on opioids.  Monitor for constipation.
- Symptoms controlled on current regimen.   - Continue with Hydromorphone 1mg solution q2hr PRN for moderate pain. ( None required in a 24hr period 7am-7am)   - Continue with Hydromorphone 2.5mg solution q2hr PRN for severe pain. ( None required in a 24hr period 7am-7am)   - Bowel regimen while on opioids.  Monitor for constipation.
- symptom appears controlled   - c/w IV Dilaudid 0.5 mg q1hr PRN for dyspnea   - Bowel regimen while on opioids.  Monitor for constipation.

## 2025-05-29 NOTE — DISCHARGE NOTE PROVIDER - REASON FOR ADMISSION
Acute hypoxic respiratory failure no pleuritic chest pain/no hemoptysis/no dyspnea/no cough/no wheezing

## 2025-05-29 NOTE — PROGRESS NOTE ADULT - RESPIRATORY DISTRESS OBSERVATION: HEART RATE
Less than 90 beats

## 2025-05-29 NOTE — PROGRESS NOTE ADULT - PROBLEM SELECTOR PLAN 3
- Symptoms controlled on current regimen.   - Continue with Ativan 0.25mg oral concentrate q2hr PRN. ( none required in a 24hr period 7am-7am)

## 2025-05-29 NOTE — PROGRESS NOTE ADULT - PROBLEM SELECTOR PROBLEM 6
Palliative care encounter
Palliative care encounter
Delirium with dementia
Palliative care encounter
Palliative care encounter

## 2025-05-29 NOTE — PROGRESS NOTE ADULT - PROBLEM SELECTOR PLAN 6
- Ativan 0.25mg oral concentrate q2hr PRN.   - Monitor for constipation, urinary retention, pain, hunger, thirst, etc.  Promote sleep wake cycle and reorientation as indicated.
- symptom management as above  - Healthcare proxy: Johny Pruitt  - Code status: DNR/DNI: Trial of NIV  - Remains in PCU for symptom management and monitoring, dispo pending clinical course
- symptom management as above  - Healthcare proxy: Johny Pruitt, updated today  - Code status: DNR/DNI: Trial of NIV  - Remains in PCU for symptom management and monitoring, dispo pending clinical course
- Ativan 0.25mg oral concentrate q2hr PRN.   - Monitor for constipation, urinary retention, pain, hunger, thirst, etc.  Promote sleep wake cycle and reorientation as indicated.
- symptom management as above  - Healthcare proxy: Johny Pruitt  - Code status: DNR/DNI: Trial of NIV  - Remains in PCU for symptom management and monitoring, dispo pending clinical course
- Ativan 0.25mg oral concentrate q2hr PRN.   - Monitor for constipation, urinary retention, pain, hunger, thirst, etc.  Promote sleep wake cycle and reorientation as indicated.
pt will remain in Palliative care unit for ongoing symptom management and disposition planning  HCP Johny Locke updated at bedside: discussed plan for discharge back to [facility] with comfort care, he is amenable with care plan

## 2025-05-29 NOTE — PROGRESS NOTE ADULT - PROVIDER SPECIALTY LIST ADULT
ROSALBA
Palliative Care

## 2025-05-29 NOTE — DISCHARGE NOTE PROVIDER - NSDCMRMEDTOKEN_GEN_ALL_CORE_FT
acetaminophen 650 mg rectal suppository: 1 suppository(ies) rectal every 6 hours as needed for Temp greater or equal to 38C (100.4F), Mild Pain (1 - 3)  bisacodyl 10 mg rectal suppository: 1 suppository(ies) rectal once a day as needed for Constipation  HYDROmorphone 1 mg/mL oral liquid: 2.5 milliliter(s) orally every 4 hours as needed for pain or shortness of breath MDD: 15ml  LORazepam 2 mg/mL oral concentrate: 0.25 milliliter(s) orally every 4 hours as needed for  agitation MDD: 1.5 ml  Narcan 4 mg/0.1 mL nasal spray: 1 spray(s) intranasally every 2 to 3 minutes as needed for opioid induced respiratory depression   acetaminophen 650 mg rectal suppository: 1 suppository(ies) rectal every 6 hours as needed for Temp greater or equal to 38C (100.4F), Mild Pain (1 - 3)  bisacodyl 10 mg rectal suppository: 1 suppository(ies) rectal once a day as needed for Constipation  HYDROmorphone 1 mg/mL oral liquid: 2.5 milliliter(s) orally every 4 hours as needed for pain or shortness of breath MDD: 15ml  LORazepam 2 mg/mL oral concentrate: 0.25 milliliter(s) orally every 4 hours as needed for  agitation MDD: 1.5  Narcan 4 mg/0.1 mL nasal spray: 1 spray(s) intranasally every 2 to 3 minutes as needed for opioid induced respiratory depression
